# Patient Record
Sex: FEMALE | Race: OTHER | Employment: OTHER | URBAN - METROPOLITAN AREA
[De-identification: names, ages, dates, MRNs, and addresses within clinical notes are randomized per-mention and may not be internally consistent; named-entity substitution may affect disease eponyms.]

---

## 2017-09-05 ENCOUNTER — HOSPITAL ENCOUNTER (OUTPATIENT)
Dept: WOMENS IMAGING | Age: 72
Discharge: HOME | End: 2017-09-05
Payer: MEDICARE

## 2017-09-05 DIAGNOSIS — Z12.31 ENCOUNTER FOR SCREENING MAMMOGRAM FOR BREAST CANCER: ICD-10-CM

## 2017-09-05 PROCEDURE — G0202 SCR MAMMO BI INCL CAD: HCPCS

## 2021-08-12 LAB
ALBUMIN SERPL-MCNC: 4.8 G/DL (ref 3.5–4.6)
ALP BLD-CCNC: 90 U/L (ref 40–130)
ALT SERPL-CCNC: 25 U/L (ref 0–33)
ANION GAP SERPL CALCULATED.3IONS-SCNC: 12 MEQ/L (ref 9–15)
AST SERPL-CCNC: 19 U/L (ref 0–35)
BILIRUB SERPL-MCNC: 0.8 MG/DL (ref 0.2–0.7)
BILIRUBIN DIRECT: <0.2 MG/DL (ref 0–0.4)
BILIRUBIN, INDIRECT: ABNORMAL MG/DL (ref 0–0.6)
BUN BLDV-MCNC: 33 MG/DL (ref 8–23)
CALCIUM SERPL-MCNC: 10.2 MG/DL (ref 8.5–9.9)
CHLORIDE BLD-SCNC: 102 MEQ/L (ref 95–107)
CHOLESTEROL, TOTAL: 134 MG/DL (ref 0–199)
CO2: 22 MEQ/L (ref 20–31)
CREAT SERPL-MCNC: 0.91 MG/DL (ref 0.5–0.9)
GFR AFRICAN AMERICAN: >60
GFR NON-AFRICAN AMERICAN: 59.8
GLUCOSE BLD-MCNC: 115 MG/DL (ref 70–99)
HBA1C MFR BLD: 6.3 % (ref 4.8–5.9)
HCT VFR BLD CALC: 41.6 % (ref 37–47)
HDLC SERPL-MCNC: 59 MG/DL (ref 40–59)
HEMOGLOBIN: 13.8 G/DL (ref 12–16)
LDL CHOLESTEROL CALCULATED: 51 MG/DL (ref 0–129)
MAGNESIUM: 1.9 MG/DL (ref 1.7–2.4)
MCH RBC QN AUTO: 27.9 PG (ref 27–31.3)
MCHC RBC AUTO-ENTMCNC: 33.1 % (ref 33–37)
MCV RBC AUTO: 84.2 FL (ref 82–100)
PDW BLD-RTO: 13.5 % (ref 11.5–14.5)
PLATELET # BLD: 278 K/UL (ref 130–400)
POTASSIUM SERPL-SCNC: 4.8 MEQ/L (ref 3.4–4.9)
RBC # BLD: 4.94 M/UL (ref 4.2–5.4)
SODIUM BLD-SCNC: 136 MEQ/L (ref 135–144)
TOTAL PROTEIN: 6.9 G/DL (ref 6.3–8)
TRIGL SERPL-MCNC: 119 MG/DL (ref 0–150)
TSH SERPL DL<=0.05 MIU/L-ACNC: 3.76 UIU/ML (ref 0.44–3.86)
WBC # BLD: 6 K/UL (ref 4.8–10.8)

## 2022-04-05 ENCOUNTER — APPOINTMENT (OUTPATIENT)
Dept: CT IMAGING | Age: 77
End: 2022-04-05
Payer: MEDICARE

## 2022-04-05 ENCOUNTER — HOSPITAL ENCOUNTER (EMERGENCY)
Age: 77
Discharge: HOME | End: 2022-04-05
Payer: MEDICARE

## 2022-04-05 VITALS
OXYGEN SATURATION: 96 % | SYSTOLIC BLOOD PRESSURE: 187 MMHG | RESPIRATION RATE: 20 BRPM | TEMPERATURE: 97.8 F | BODY MASS INDEX: 22.63 KG/M2 | HEIGHT: 62 IN | DIASTOLIC BLOOD PRESSURE: 82 MMHG | HEART RATE: 95 BPM | WEIGHT: 123 LBS

## 2022-04-05 DIAGNOSIS — S00.83XA FACIAL CONTUSION, INITIAL ENCOUNTER: ICD-10-CM

## 2022-04-05 DIAGNOSIS — S09.93XA DENTAL INJURY, INITIAL ENCOUNTER: ICD-10-CM

## 2022-04-05 DIAGNOSIS — S01.511A LIP LACERATION, INITIAL ENCOUNTER: Primary | ICD-10-CM

## 2022-04-05 PROCEDURE — 12011 RPR F/E/E/N/L/M 2.5 CM/<: CPT

## 2022-04-05 PROCEDURE — 99285 EMERGENCY DEPT VISIT HI MDM: CPT

## 2022-04-05 PROCEDURE — A4217 STERILE WATER/SALINE, 500 ML: HCPCS | Performed by: NURSE PRACTITIONER

## 2022-04-05 PROCEDURE — 2580000003 HC RX 258: Performed by: NURSE PRACTITIONER

## 2022-04-05 PROCEDURE — 72125 CT NECK SPINE W/O DYE: CPT

## 2022-04-05 PROCEDURE — 70486 CT MAXILLOFACIAL W/O DYE: CPT

## 2022-04-05 PROCEDURE — 70450 CT HEAD/BRAIN W/O DYE: CPT

## 2022-04-05 PROCEDURE — 2500000003 HC RX 250 WO HCPCS: Performed by: NURSE PRACTITIONER

## 2022-04-05 RX ORDER — LIDOCAINE HYDROCHLORIDE 20 MG/ML
5 INJECTION, SOLUTION INFILTRATION; PERINEURAL ONCE
Status: COMPLETED | OUTPATIENT
Start: 2022-04-05 | End: 2022-04-05

## 2022-04-05 RX ORDER — MAGNESIUM HYDROXIDE 1200 MG/15ML
250 LIQUID ORAL ONCE
Status: COMPLETED | OUTPATIENT
Start: 2022-04-05 | End: 2022-04-05

## 2022-04-05 RX ORDER — SULFAMETHOXAZOLE AND TRIMETHOPRIM 800; 160 MG/1; MG/1
1 TABLET ORAL 2 TIMES DAILY
Qty: 14 TABLET | Refills: 0 | Status: SHIPPED | OUTPATIENT
Start: 2022-04-05 | End: 2022-04-12

## 2022-04-05 RX ADMIN — LIDOCAINE HYDROCHLORIDE 5 ML: 20 INJECTION, SOLUTION INFILTRATION; PERINEURAL at 12:44

## 2022-04-05 RX ADMIN — SODIUM CHLORIDE 250 ML: 900 IRRIGANT IRRIGATION at 12:44

## 2022-04-05 ASSESSMENT — PAIN SCALES - GENERAL
PAINLEVEL_OUTOF10: 9
PAINLEVEL_OUTOF10: 9

## 2022-04-05 ASSESSMENT — ENCOUNTER SYMPTOMS
FACIAL SWELLING: 1
ABDOMINAL PAIN: 0
COUGH: 0
BACK PAIN: 0
SHORTNESS OF BREATH: 0

## 2022-04-05 ASSESSMENT — PAIN - FUNCTIONAL ASSESSMENT: PAIN_FUNCTIONAL_ASSESSMENT: 0-10

## 2022-04-05 ASSESSMENT — PAIN DESCRIPTION - LOCATION: LOCATION: MOUTH

## 2022-04-05 ASSESSMENT — PAIN DESCRIPTION - PAIN TYPE: TYPE: ACUTE PAIN

## 2022-04-05 ASSESSMENT — PAIN DESCRIPTION - DESCRIPTORS: DESCRIPTORS: SHARP

## 2022-04-05 ASSESSMENT — PAIN DESCRIPTION - FREQUENCY: FREQUENCY: CONTINUOUS

## 2022-04-05 NOTE — ED NOTES
Discharge instructions reviewed with patient. Medications reviewed and explained to patient. Patient denies any further questions at this time. Pt encouraged to make follow up appointments with PCP and any speciality referrals.         Mandie García RN  04/05/22 8059

## 2022-04-05 NOTE — ED PROVIDER NOTES
[address] [facility] ED  eMERGENCY dEPARTMENT eNCOUnter      Pt Name: Chantel Roberson  MRN: [id number]  [place] 1945  Date of evaluation: 4/5/2022  Provider: EITAN Blevins CNP      HISTORY OF PRESENT ILLNESS    Chantel Roberson is a 64 y.o. female who presents to the Emergency Department with lip laceration the occurred when she tripped on the sidewalk and hit her face on the concrete. Pain is moderate. Patient also chipped her 2 front teeth. Denies LOC. REVIEW OF SYSTEMS       Review of Systems   Constitutional: Negative for activity change, appetite change and fever. HENT: Positive for facial swelling. Negative for congestion. Respiratory: Negative for cough and shortness of breath. Cardiovascular: Negative for chest pain. Gastrointestinal: Negative for abdominal pain. Genitourinary: Negative for dysuria. Musculoskeletal: Negative for arthralgias and back pain. Skin: Positive for wound. Negative for rash. Neurological: Negative for dizziness, syncope, light-headedness and headaches. All other systems reviewed and are negative. PAST MEDICAL HISTORY     Past Medical History:   Diagnosis Date    Hypertension          SURGICAL HISTORY     History reviewed. No pertinent surgical history. CURRENT MEDICATIONS       Previous Medications    No medications on file       ALLERGIES     Norco [hydrocodone-acetaminophen]    FAMILY HISTORY     History reviewed. No pertinent family history.        SOCIAL HISTORY       Social History     Socioeconomic History    Marital status:      Spouse name: None    Number of children: None    Years of education: None    Highest education level: None   Occupational History    None   Tobacco Use    Smoking status: Never Smoker    Smokeless tobacco: Never Used   Substance and Sexual Activity    Alcohol use: Not Currently    Drug use: None    Sexual activity: None   Other Topics Concern    None   Social History Narrative    None     Social Determinants of Health     Financial Resource Strain:     Difficulty of Paying Living Expenses: Not on file   Food Insecurity:     Worried About Running Out of Food in the Last Year: Not on file    [place]ju[place] of Food in the Last Year: Not on file   Transportation Needs:     Lack of Transportation (Medical): Not on file    Lack of Transportation (Non-Medical): Not on file   Physical Activity:     Days of Exercise per Week: Not on file    Minutes of Exercise per Session: Not on file   Stress:     Feeling of Stress : Not on file   Social Connections:     Frequency of Communication with Friends and Family: Not on file    Frequency of Social Gatherings with Friends and Family: Not on file    Attends Faith Services: Not on file    Active Member of [address] [place] [address] [employer] or Organizations: Not on file    Attends Club or Organization Meetings: Not on file    Marital Status: Not on file   Intimate Partner Violence:     Fear of Current or Ex-Partner: Not on file    Emotionally Abused: Not on file    Physically Abused: Not on file    Sexually Abused: Not on file   Housing Stability:     Unable to Pay for Housing in the Last Year: Not on file    Number of [place] in the Last Year: Not on file    Unstable Housing in the Last Year: Not on file       SCREENINGS    Kassy Coma Scale  Eye Opening: Spontaneous  Best Verbal Response: Oriented  Best Motor Response: Obeys commands  Kassy Coma Scale Score: 15 @FLOW([id number])@      PHYSICAL EXAM    (up to 7 for level 4, 8 or more for level 5)     ED Triage Vitals [04/05/22 1200]   BP Temp Temp Source Pulse Resp SpO2 Height Weight   (!) 187/82 97.8 °F (36.6 °C) Oral 95 20 96 % 5' 2\" (1.575 m) 123 lb (55.8 kg)       Physical Exam  Vitals and nursing note reviewed. Constitutional:       Appearance: She is well-developed. HENT:      Head: Normocephalic and atraumatic.       Right Ear: Hearing and external ear normal.      Left Ear: Hearing and external ear normal.      Nose: Nasal tenderness present. No nasal deformity or congestion. Mouth/Throat:      Mouth: Lacerations present. Eyes:      Conjunctiva/sclera: Conjunctivae normal.      Pupils: Pupils are equal, round, and reactive to light. Cardiovascular:      Rate and Rhythm: Normal rate and regular rhythm. Heart sounds: Normal heart sounds. Pulmonary:      Effort: Pulmonary effort is normal.      Breath sounds: Normal breath sounds. Chest:      Chest wall: No deformity or crepitus. Abdominal:      General: Bowel sounds are normal. There is no distension. Palpations: Abdomen is soft. Tenderness: There is no abdominal tenderness. Musculoskeletal:         General: Normal range of motion. Cervical back: Normal range of motion and neck supple. Skin:     General: Skin is warm and dry. Neurological:      General: No focal deficit present. Mental Status: She is alert and oriented to person, place, and time. GCS: GCS eye subscore is 4. GCS verbal subscore is 5. GCS motor subscore is 6. Cranial Nerves: Cranial nerves are intact. Sensory: Sensation is intact. Motor: Motor function is intact. Deep Tendon Reflexes: Reflexes are normal and symmetric. Psychiatric:         Judgment: Judgment normal.           All other labs were within normal range or not returned as of this dictation. EMERGENCY DEPARTMENT COURSE and DIFFERENTIALDIAGNOSIS/MDM:   Vitals:    Vitals:    04/05/22 1200 04/05/22 1207   BP: (!) 187/82 (!) 187/82   Pulse: 95 95   Resp: 20 20   Temp: 97.8 °F (36.6 °C) 97.8 °F (36.6 °C)   TempSrc: Oral Oral   SpO2: 96% 96%   Weight: 123 lb (55.8 kg)    Height: 5' 2\" (1.575 m)             64 yr old female with lip laceration d/t facial contusion. Lip was repaired with suture. Sutures to be removed in 5 -7 days. Prescription for Bactrim DS was sent to the pharmacy. Patient verbalizes understanding.        PROCEDURES:  Unless otherwise noted below, none     Lac Repair    Date/Time: 4/5/2022 1:25 PM  Performed by: EITAN Olguin CNP  Authorized by: EITAN Olguin CNP     Consent:     Consent obtained:  Verbal    Consent given by:  Patient    Risks discussed:  Infection, pain, poor cosmetic result and poor wound healing    Alternatives discussed:  No treatment  Anesthesia (see MAR for exact dosages): Anesthesia method:  Local infiltration    Local anesthetic:  Lidocaine 2% w/o epi  Laceration details:     Location:  Lip    Lip location:  Lower interior lip and lower exterior lip    Length (cm):  1.5    Depth (mm):  3  Repair type:     Repair type:  Simple  Pre-procedure details:     Preparation:  Patient was prepped and draped in usual sterile fashion  Exploration:     Hemostasis achieved with:  Direct pressure    Contaminated: no    Treatment:     Area cleansed with:  Hibiclens    Amount of cleaning:  Standard    Irrigation solution:  Sterile saline    Irrigation volume:  300    Irrigation method:  Pressure wash    Visualized foreign bodies/material removed: no    Skin repair:     Repair method:  Sutures    Suture size:  6-0    Suture material:  Nylon    Suture technique:  Simple interrupted    Number of sutures:  5  Approximation:     Approximation:  Close    Vermilion border: poorly aligned    Post-procedure details:     Dressing:  Open (no dressing)    Patient tolerance of procedure: Tolerated well, no immediate complications          FINAL IMPRESSION      1. Lip laceration, initial encounter    2.  Facial contusion, initial encounter          DISPOSITION/PLAN   DISPOSITION Decision To Discharge 04/05/2022 02:15:49 PM          EITAN Olguin CNP (electronically signed)  Attending Emergency Physician     EITAN Olguin CNP  04/05/22 1418

## 2022-04-05 NOTE — ED TRIAGE NOTES
Patient presents to ED c/o laceration to lower lip. Pt states she was walking and fell on concrete, biting thru her lower lip, bleeding controlled. Patient denies LOC, denies blood thinners.

## 2022-07-18 ENCOUNTER — APPOINTMENT (OUTPATIENT)
Dept: CT IMAGING | Age: 77
DRG: 291 | End: 2022-07-18
Payer: MEDICARE

## 2022-07-18 ENCOUNTER — HOSPITAL ENCOUNTER (INPATIENT)
Age: 77
LOS: 2 days | Discharge: HOME | DRG: 291 | End: 2022-07-20
Attending: EMERGENCY MEDICINE | Admitting: INTERNAL MEDICINE
Payer: MEDICARE

## 2022-07-18 ENCOUNTER — APPOINTMENT (OUTPATIENT)
Dept: GENERAL RADIOLOGY | Age: 77
DRG: 291 | End: 2022-07-18
Payer: MEDICARE

## 2022-07-18 DIAGNOSIS — J90 PLEURAL EFFUSION: ICD-10-CM

## 2022-07-18 DIAGNOSIS — K52.9 COLITIS: ICD-10-CM

## 2022-07-18 DIAGNOSIS — I50.9 ACUTE CONGESTIVE HEART FAILURE, UNSPECIFIED HEART FAILURE TYPE (HCC): Primary | ICD-10-CM

## 2022-07-18 PROBLEM — I50.43 CHF (CONGESTIVE HEART FAILURE), NYHA CLASS I, ACUTE ON CHRONIC, COMBINED (HCC): Status: ACTIVE | Noted: 2022-07-18

## 2022-07-18 PROBLEM — R06.2 WHEEZING: Status: ACTIVE | Noted: 2022-07-18

## 2022-07-18 LAB
ALBUMIN SERPL-MCNC: 4.4 G/DL (ref 3.5–4.6)
ALP BLD-CCNC: 99 U/L (ref 40–130)
ALT SERPL-CCNC: 41 U/L (ref 0–33)
ANION GAP SERPL CALCULATED.3IONS-SCNC: 12 MEQ/L (ref 9–15)
APTT: 20 SEC (ref 24.4–36.8)
AST SERPL-CCNC: 30 U/L (ref 0–35)
BACTERIA: NEGATIVE /HPF
BASOPHILS ABSOLUTE: 0.1 K/UL (ref 0–0.2)
BASOPHILS RELATIVE PERCENT: 0.8 %
BILIRUB SERPL-MCNC: 0.8 MG/DL (ref 0.2–0.7)
BILIRUBIN URINE: NEGATIVE
BLOOD, URINE: ABNORMAL
BUN BLDV-MCNC: 36 MG/DL (ref 8–23)
CALCIUM SERPL-MCNC: 9 MG/DL (ref 8.5–9.9)
CHLORIDE BLD-SCNC: 108 MEQ/L (ref 95–107)
CLARITY: CLEAR
CO2: 16 MEQ/L (ref 20–31)
COLOR: YELLOW
CREAT SERPL-MCNC: 1.06 MG/DL (ref 0.5–0.9)
D DIMER: 2.61 MG/L FEU (ref 0–0.5)
EOSINOPHILS ABSOLUTE: 0.1 K/UL (ref 0–0.7)
EOSINOPHILS RELATIVE PERCENT: 1.1 %
EPITHELIAL CELLS, UA: ABNORMAL /HPF (ref 0–5)
GFR AFRICAN AMERICAN: >60
GFR NON-AFRICAN AMERICAN: 50.1
GLOBULIN: 2.2 G/DL (ref 2.3–3.5)
GLUCOSE BLD-MCNC: 140 MG/DL (ref 70–99)
GLUCOSE URINE: NEGATIVE MG/DL
HCT VFR BLD CALC: 35.8 % (ref 37–47)
HEMOGLOBIN: 12.1 G/DL (ref 12–16)
HYALINE CASTS: ABNORMAL /HPF (ref 0–5)
HYALINE CASTS: ABNORMAL /LPF (ref 0–5)
INFLUENZA A BY PCR: NEGATIVE
INFLUENZA B BY PCR: NEGATIVE
INR BLD: 0.9
KETONES, URINE: NEGATIVE MG/DL
LEUKOCYTE ESTERASE, URINE: NEGATIVE
LIPASE: 41 U/L (ref 12–95)
LYMPHOCYTES ABSOLUTE: 1 K/UL (ref 1–4.8)
LYMPHOCYTES RELATIVE PERCENT: 11.2 %
MCH RBC QN AUTO: 28.5 PG (ref 27–31.3)
MCHC RBC AUTO-ENTMCNC: 34 % (ref 33–37)
MCV RBC AUTO: 83.8 FL (ref 82–100)
MONOCYTES ABSOLUTE: 0.4 K/UL (ref 0.2–0.8)
MONOCYTES RELATIVE PERCENT: 4.6 %
NEUTROPHILS ABSOLUTE: 7.1 K/UL (ref 1.4–6.5)
NEUTROPHILS RELATIVE PERCENT: 82.3 %
NITRITE, URINE: NEGATIVE
PDW BLD-RTO: 13 % (ref 11.5–14.5)
PH UA: 5.5 (ref 5–9)
PLATELET # BLD: 181 K/UL (ref 130–400)
POTASSIUM REFLEX MAGNESIUM: 5.1 MEQ/L (ref 3.4–4.9)
PRO-BNP: 3990 PG/ML
PROTEIN UA: >=300 MG/DL
PROTHROMBIN TIME: 12.4 SEC (ref 12.3–14.9)
RBC # BLD: 4.27 M/UL (ref 4.2–5.4)
RBC UA: >100 /HPF (ref 0–5)
RENAL EPITHELIAL, UA: ABNORMAL /HPF
SARS-COV-2, NAAT: NOT DETECTED
SODIUM BLD-SCNC: 136 MEQ/L (ref 135–144)
SPECIFIC GRAVITY UA: 1.02 (ref 1–1.03)
TOTAL PROTEIN: 6.6 G/DL (ref 6.3–8)
TROPONIN: <0.01 NG/ML (ref 0–0.01)
URINE REFLEX TO CULTURE: YES
UROBILINOGEN, URINE: 0.2 E.U./DL
WBC # BLD: 8.7 K/UL (ref 4.8–10.8)
WBC UA: ABNORMAL /HPF (ref 0–5)

## 2022-07-18 PROCEDURE — 85730 THROMBOPLASTIN TIME PARTIAL: CPT

## 2022-07-18 PROCEDURE — 99285 EMERGENCY DEPT VISIT HI MDM: CPT

## 2022-07-18 PROCEDURE — 87040 BLOOD CULTURE FOR BACTERIA: CPT

## 2022-07-18 PROCEDURE — 80053 COMPREHEN METABOLIC PANEL: CPT

## 2022-07-18 PROCEDURE — 99223 1ST HOSP IP/OBS HIGH 75: CPT | Performed by: INTERNAL MEDICINE

## 2022-07-18 PROCEDURE — 87502 INFLUENZA DNA AMP PROBE: CPT

## 2022-07-18 PROCEDURE — 81001 URINALYSIS AUTO W/SCOPE: CPT

## 2022-07-18 PROCEDURE — 6360000002 HC RX W HCPCS: Performed by: EMERGENCY MEDICINE

## 2022-07-18 PROCEDURE — 87086 URINE CULTURE/COLONY COUNT: CPT

## 2022-07-18 PROCEDURE — 2580000003 HC RX 258: Performed by: INTERNAL MEDICINE

## 2022-07-18 PROCEDURE — 93005 ELECTROCARDIOGRAM TRACING: CPT | Performed by: EMERGENCY MEDICINE

## 2022-07-18 PROCEDURE — 87507 IADNA-DNA/RNA PROBE TQ 12-25: CPT

## 2022-07-18 PROCEDURE — 87324 CLOSTRIDIUM AG IA: CPT

## 2022-07-18 PROCEDURE — 6360000004 HC RX CONTRAST MEDICATION: Performed by: EMERGENCY MEDICINE

## 2022-07-18 PROCEDURE — 71275 CT ANGIOGRAPHY CHEST: CPT

## 2022-07-18 PROCEDURE — 71045 X-RAY EXAM CHEST 1 VIEW: CPT

## 2022-07-18 PROCEDURE — 2580000003 HC RX 258: Performed by: EMERGENCY MEDICINE

## 2022-07-18 PROCEDURE — 87635 SARS-COV-2 COVID-19 AMP PRB: CPT

## 2022-07-18 PROCEDURE — 83690 ASSAY OF LIPASE: CPT

## 2022-07-18 PROCEDURE — 83550 IRON BINDING TEST: CPT

## 2022-07-18 PROCEDURE — 85025 COMPLETE CBC W/AUTO DIFF WBC: CPT

## 2022-07-18 PROCEDURE — 84484 ASSAY OF TROPONIN QUANT: CPT

## 2022-07-18 PROCEDURE — 6370000000 HC RX 637 (ALT 250 FOR IP): Performed by: EMERGENCY MEDICINE

## 2022-07-18 PROCEDURE — 6360000002 HC RX W HCPCS: Performed by: INTERNAL MEDICINE

## 2022-07-18 PROCEDURE — 85379 FIBRIN DEGRADATION QUANT: CPT

## 2022-07-18 PROCEDURE — 36415 COLL VENOUS BLD VENIPUNCTURE: CPT

## 2022-07-18 PROCEDURE — 87449 NOS EACH ORGANISM AG IA: CPT

## 2022-07-18 PROCEDURE — 85610 PROTHROMBIN TIME: CPT

## 2022-07-18 PROCEDURE — 83540 ASSAY OF IRON: CPT

## 2022-07-18 PROCEDURE — 74177 CT ABD & PELVIS W/CONTRAST: CPT

## 2022-07-18 PROCEDURE — 83880 ASSAY OF NATRIURETIC PEPTIDE: CPT

## 2022-07-18 PROCEDURE — 6370000000 HC RX 637 (ALT 250 FOR IP): Performed by: INTERNAL MEDICINE

## 2022-07-18 PROCEDURE — 2060000000 HC ICU INTERMEDIATE R&B

## 2022-07-18 RX ORDER — ACETAMINOPHEN 325 MG/1
650 TABLET ORAL EVERY 6 HOURS PRN
Status: DISCONTINUED | OUTPATIENT
Start: 2022-07-18 | End: 2022-07-20 | Stop reason: HOSPADM

## 2022-07-18 RX ORDER — METOPROLOL TARTRATE 50 MG/1
100 TABLET, FILM COATED ORAL 2 TIMES DAILY
Status: DISCONTINUED | OUTPATIENT
Start: 2022-07-18 | End: 2022-07-18

## 2022-07-18 RX ORDER — PANTOPRAZOLE SODIUM 40 MG/1
40 TABLET, DELAYED RELEASE ORAL DAILY
Status: DISCONTINUED | OUTPATIENT
Start: 2022-07-18 | End: 2022-07-20 | Stop reason: HOSPADM

## 2022-07-18 RX ORDER — ATORVASTATIN CALCIUM 20 MG/1
20 TABLET, FILM COATED ORAL NIGHTLY
Status: DISCONTINUED | OUTPATIENT
Start: 2022-07-18 | End: 2022-07-20 | Stop reason: HOSPADM

## 2022-07-18 RX ORDER — SODIUM CHLORIDE 9 MG/ML
INJECTION, SOLUTION INTRAVENOUS PRN
Status: DISCONTINUED | OUTPATIENT
Start: 2022-07-18 | End: 2022-07-20 | Stop reason: HOSPADM

## 2022-07-18 RX ORDER — ONDANSETRON 2 MG/ML
4 INJECTION INTRAMUSCULAR; INTRAVENOUS EVERY 6 HOURS PRN
Status: DISCONTINUED | OUTPATIENT
Start: 2022-07-18 | End: 2022-07-20 | Stop reason: HOSPADM

## 2022-07-18 RX ORDER — SPIRONOLACTONE 50 MG/1
50 TABLET, FILM COATED ORAL DAILY
COMMUNITY
End: 2022-08-10

## 2022-07-18 RX ORDER — ACETAMINOPHEN 325 MG/1
650 TABLET ORAL ONCE
Status: COMPLETED | OUTPATIENT
Start: 2022-07-18 | End: 2022-07-18

## 2022-07-18 RX ORDER — SPIRONOLACTONE 50 MG/1
50 TABLET, FILM COATED ORAL DAILY
Status: DISCONTINUED | OUTPATIENT
Start: 2022-07-18 | End: 2022-07-20 | Stop reason: HOSPADM

## 2022-07-18 RX ORDER — AMLODIPINE BESYLATE 10 MG/1
10 TABLET ORAL DAILY
Status: ON HOLD | COMMUNITY
End: 2022-07-20 | Stop reason: HOSPADM

## 2022-07-18 RX ORDER — FUROSEMIDE 10 MG/ML
40 INJECTION INTRAMUSCULAR; INTRAVENOUS 2 TIMES DAILY
Status: DISCONTINUED | OUTPATIENT
Start: 2022-07-18 | End: 2022-07-20 | Stop reason: HOSPADM

## 2022-07-18 RX ORDER — ENOXAPARIN SODIUM 100 MG/ML
40 INJECTION SUBCUTANEOUS DAILY
Status: DISCONTINUED | OUTPATIENT
Start: 2022-07-18 | End: 2022-07-20 | Stop reason: HOSPADM

## 2022-07-18 RX ORDER — METOPROLOL TARTRATE 50 MG/1
100 TABLET, FILM COATED ORAL 2 TIMES DAILY
Status: DISCONTINUED | OUTPATIENT
Start: 2022-07-18 | End: 2022-07-20 | Stop reason: HOSPADM

## 2022-07-18 RX ORDER — POLYETHYLENE GLYCOL 3350 17 G/17G
17 POWDER, FOR SOLUTION ORAL DAILY PRN
Status: DISCONTINUED | OUTPATIENT
Start: 2022-07-18 | End: 2022-07-20 | Stop reason: HOSPADM

## 2022-07-18 RX ORDER — SODIUM CHLORIDE 0.9 % (FLUSH) 0.9 %
5-40 SYRINGE (ML) INJECTION PRN
Status: DISCONTINUED | OUTPATIENT
Start: 2022-07-18 | End: 2022-07-20 | Stop reason: HOSPADM

## 2022-07-18 RX ORDER — SODIUM CHLORIDE 0.9 % (FLUSH) 0.9 %
5-40 SYRINGE (ML) INJECTION EVERY 12 HOURS SCHEDULED
Status: DISCONTINUED | OUTPATIENT
Start: 2022-07-18 | End: 2022-07-20 | Stop reason: HOSPADM

## 2022-07-18 RX ORDER — ONDANSETRON 4 MG/1
4 TABLET, ORALLY DISINTEGRATING ORAL EVERY 8 HOURS PRN
Status: DISCONTINUED | OUTPATIENT
Start: 2022-07-18 | End: 2022-07-20 | Stop reason: HOSPADM

## 2022-07-18 RX ORDER — 0.9 % SODIUM CHLORIDE 0.9 %
1000 INTRAVENOUS SOLUTION INTRAVENOUS ONCE
Status: COMPLETED | OUTPATIENT
Start: 2022-07-18 | End: 2022-07-18

## 2022-07-18 RX ORDER — CARVEDILOL 3.12 MG/1
3.12 TABLET ORAL 2 TIMES DAILY WITH MEALS
Status: DISCONTINUED | OUTPATIENT
Start: 2022-07-18 | End: 2022-07-18

## 2022-07-18 RX ORDER — LISINOPRIL 20 MG/1
20 TABLET ORAL DAILY
Status: DISCONTINUED | OUTPATIENT
Start: 2022-07-18 | End: 2022-07-20 | Stop reason: HOSPADM

## 2022-07-18 RX ORDER — METOPROLOL TARTRATE 100 MG/1
100 TABLET ORAL 2 TIMES DAILY
COMMUNITY

## 2022-07-18 RX ORDER — ATORVASTATIN CALCIUM 20 MG/1
20 TABLET, FILM COATED ORAL NIGHTLY
COMMUNITY
End: 2022-08-10

## 2022-07-18 RX ORDER — PANTOPRAZOLE SODIUM 40 MG/1
40 TABLET, DELAYED RELEASE ORAL DAILY
COMMUNITY
End: 2022-08-10

## 2022-07-18 RX ORDER — ACETAMINOPHEN 650 MG/1
650 SUPPOSITORY RECTAL EVERY 6 HOURS PRN
Status: DISCONTINUED | OUTPATIENT
Start: 2022-07-18 | End: 2022-07-20 | Stop reason: HOSPADM

## 2022-07-18 RX ORDER — METOPROLOL SUCCINATE 25 MG/1
25 TABLET, EXTENDED RELEASE ORAL DAILY
Status: DISCONTINUED | OUTPATIENT
Start: 2022-07-18 | End: 2022-07-18

## 2022-07-18 RX ADMIN — PANTOPRAZOLE SODIUM 40 MG: 40 TABLET, DELAYED RELEASE ORAL at 17:06

## 2022-07-18 RX ADMIN — FUROSEMIDE 40 MG: 10 INJECTION, SOLUTION INTRAMUSCULAR; INTRAVENOUS at 17:06

## 2022-07-18 RX ADMIN — LISINOPRIL 20 MG: 20 TABLET ORAL at 17:06

## 2022-07-18 RX ADMIN — CEFTRIAXONE SODIUM 1000 MG: 1 INJECTION, POWDER, FOR SOLUTION INTRAMUSCULAR; INTRAVENOUS at 15:03

## 2022-07-18 RX ADMIN — AZITHROMYCIN MONOHYDRATE 500 MG: 500 INJECTION, POWDER, LYOPHILIZED, FOR SOLUTION INTRAVENOUS at 13:03

## 2022-07-18 RX ADMIN — SODIUM ZIRCONIUM CYCLOSILICATE 10 G: 10 POWDER, FOR SUSPENSION ORAL at 17:06

## 2022-07-18 RX ADMIN — SODIUM CHLORIDE, PRESERVATIVE FREE 10 ML: 5 INJECTION INTRAVENOUS at 19:59

## 2022-07-18 RX ADMIN — ENOXAPARIN SODIUM 40 MG: 100 INJECTION SUBCUTANEOUS at 17:06

## 2022-07-18 RX ADMIN — ATORVASTATIN CALCIUM 20 MG: 20 TABLET, FILM COATED ORAL at 19:59

## 2022-07-18 RX ADMIN — IOPAMIDOL 75 ML: 612 INJECTION, SOLUTION INTRAVENOUS at 12:36

## 2022-07-18 RX ADMIN — SPIRONOLACTONE 50 MG: 50 TABLET ORAL at 17:06

## 2022-07-18 RX ADMIN — METOPROLOL TARTRATE 100 MG: 50 TABLET ORAL at 17:06

## 2022-07-18 RX ADMIN — ACETAMINOPHEN 650 MG: 325 TABLET ORAL at 10:23

## 2022-07-18 RX ADMIN — SODIUM CHLORIDE 1000 ML: 9 INJECTION, SOLUTION INTRAVENOUS at 11:19

## 2022-07-18 ASSESSMENT — PAIN SCALES - GENERAL
PAINLEVEL_OUTOF10: 0
PAINLEVEL_OUTOF10: 3
PAINLEVEL_OUTOF10: 9

## 2022-07-18 ASSESSMENT — ENCOUNTER SYMPTOMS
PHOTOPHOBIA: 0
VOMITING: 0

## 2022-07-18 ASSESSMENT — PAIN - FUNCTIONAL ASSESSMENT: PAIN_FUNCTIONAL_ASSESSMENT: 0-10

## 2022-07-18 ASSESSMENT — PAIN DESCRIPTION - LOCATION
LOCATION: HEAD
LOCATION: HEAD

## 2022-07-18 NOTE — CARE COORDINATION
[facility] Case Management Initial Discharge Assessment    Met with Patient to discuss discharge plan. PCP: Sonu Mitchell MD                                  Date of Last Visit: 3/22/2022    [facility] Patient: No        [facility] Notified: no    If no PCP, list provided? N/A    Discharge Planning    Living Arrangements: independently at home    Who do you live with? Boyfriend    Who helps you with your care:  self    If lives at home:     Do you have any barriers navigating in your home? no    Patient can perform ADL? Yes    Current Services (outpatient and in home) :  None    Dialysis: No    Is transportation available to get to your appointments? Yes, pt drives     DME Equipment:  no    Respiratory equipment: None    Respiratory provider:  no     Pharmacy:  yes - [facility]fall with Medication Assistance Program?  No      Patient agreeable to [facility]? Declined    Patient agreeable to SNF/Rehab? Declined    Other discharge needs identified? N/A    Does Patient Have a High-Risk for Readmission Diagnosis (CHF, PN, MI, COPD)? No      Initial Discharge Plan? (Note: please see concurrent daily documentation for any updates after initial note). Met with pt at bedside to discuss discharge planning. Pt plans to return home and declines needs at this time.      Readmission Risk              Risk of Unplanned Readmission:  0         Electronically signed by Lydia Hatch RN on 7/18/2022 at 1:56 PM

## 2022-07-18 NOTE — ED PROVIDER NOTES
[address] [facility] ED  EMERGENCY DEPARTMENT ENCOUNTER      Pt Name: Shelley Koroma  MRN: [id number]  [place] 1945  Date of evaluation: 7/18/2022  Provider: Guy Lamb [id number]       Chief Complaint   Patient presents with    Leg Swelling     Bilateral ankle swelling (started last night). Pt states she is wheezing. Pt states she has diarrhea and she is nauseated. Pt state she has a headache (9/10 pain)          HISTORY OF PRESENT ILLNESS   (Location/Symptom, Timing/Onset, Context/Setting, Quality, Duration, Modifying Factors, Severity)  Note limiting factors. Shelley Koroma is a 64 y.o. female who presents to the emergency department with multiple complaints. She reports history of hypertension. Denies previous heart or lung diagnoses. Says that yesterday she notices that her legs are swelling and she also noticed like she felt like there was a wheeze in her chest and when she would cough she would have some pressure. The symptoms were nonexertional, nonradiating with no associated vomiting, abdominal pain, back pain or calf pain. She also noted that she had general head pressure and nonbloody diarrhea. Denies syncope, fever, vision change, neck stiffness, numbness or weakness. HPI    Nursing Notes were reviewed. REVIEW OF SYSTEMS    (2-9 systems for level 4, 10 or more for level 5)     Review of Systems   Constitutional:  Negative for fever. Leg swelling, cough, diarrhea, headache   Eyes:  Negative for photophobia and visual disturbance. Gastrointestinal:  Negative for vomiting. Genitourinary:  Negative for dysuria and flank pain. Musculoskeletal:  Negative for neck pain and neck stiffness. Neurological:  Negative for dizziness, syncope, facial asymmetry, weakness, light-headedness and numbness. All other systems reviewed and are negative. Except as noted above the remainder of the review of systems was reviewed and negative.        PAST MEDICAL HISTORY Past Medical History:   Diagnosis Date    Hypertension          SURGICAL HISTORY     History reviewed. No pertinent surgical history. CURRENT MEDICATIONS       Previous Medications    No medications on file       ALLERGIES     Norco [hydrocodone-acetaminophen]    FAMILY HISTORY     No family history on file. SOCIAL HISTORY       Social History     Socioeconomic History    Marital status:      Spouse name: None    Number of children: None    Years of education: None    Highest education level: None   Tobacco Use    Smoking status: Never    Smokeless tobacco: Never   Substance and Sexual Activity    Alcohol use: Not Currently       SCREENINGS         [place] Coma Scale  Eye Opening: Spontaneous  Best Verbal Response: Oriented  Best Motor Response: Obeys commands  Kassy Coma Scale Score: 15                     CIWA Assessment  BP: (!) 166/75  Heart Rate: 87                 PHYSICAL EXAM    (up to 7 for level 4, 8 or more for level 5)     ED Triage Vitals [07/18/22 0943]   BP Temp Temp Source Heart Rate Resp SpO2 Height Weight   (!) 179/76 98.9 °F (37.2 °C) Temporal 93 20 97 % 5' 2\" (1.575 m) 125 lb (56.7 kg)       Physical Exam  Constitutional:       General: She is not in acute distress. Appearance: She is not ill-appearing, toxic-appearing or diaphoretic. HENT:      Head: Normocephalic and atraumatic. Nose: Nose normal.      Mouth/Throat:      Mouth: Mucous membranes are moist.   Eyes:      General: No scleral icterus. Extraocular Movements: Extraocular movements intact. Pupils: Pupils are equal, round, and reactive to light. Neck:      Comments: No JVD  Cardiovascular:      Rate and Rhythm: Normal rate and regular rhythm. Pulses: Normal pulses. Heart sounds: No murmur heard. No gallop. Pulmonary:      Effort: Pulmonary effort is normal. No respiratory distress. Breath sounds: Normal breath sounds. No stridor. No wheezing or rhonchi.    Abdominal: General: There is no distension. Tenderness: There is no abdominal tenderness. There is no right CVA tenderness, left CVA tenderness, guarding or rebound. Hernia: No hernia is present. Musculoskeletal:      Cervical back: Normal range of motion. No rigidity. Right lower leg: No edema. Left lower leg: No edema. Skin:     Capillary Refill: Capillary refill takes less than 2 seconds. Findings: No rash. Neurological:      General: No focal deficit present. Mental Status: She is alert and oriented to person, place, and time. Cranial Nerves: No cranial nerve deficit. Sensory: No sensory deficit. Motor: No weakness. Comments: No upper extremity pronator drift, no lateralizing signs   Psychiatric:         Mood and Affect: Mood normal.       DIAGNOSTIC RESULTS     EKG: All EKG's are interpreted by the Emergency Department Physician who either signs or Co-signs this chart in the absence of a cardiologist.    Normal sinus rhythm, rate 95, normal intervals, , poor wave progression, no STEMI    RADIOLOGY:   Non-plain film images such as CT, Ultrasound and MRI are read by the radiologist. Plain radiographic images are visualized and preliminarily interpreted by the emergency physician with the below findings:        Interpretation per the Radiologist below, if available at the time of this note:    CTA CHEST W WO CONTRAST   Final Result      No CT evidence pulmonary embolism. Borderline cardiomegaly. Small bilateral pleural effusions with bilateral dependent subsegmental atelectatic change. All CT scans at this facility use dose modulation, iterative reconstruction, and/or weight based dosing when appropriate to reduce radiation dose to as low as reasonably achievable. CT ABDOMEN PELVIS W IV CONTRAST Additional Contrast? None   Final Result   QUESTIONABLE FINDINGS OF COLITIS. SMALL AMOUNT OF FREE FLUID IN THE PELVIS.       SUSPECT PULMONARY EDEMA         XR CHEST PORTABLE   Final Result   BIBASILAR INFILTRATES OR (LESS LIKELY) EDEMA, LEFT GREATER THAN RIGHT.                ED BEDSIDE ULTRASOUND:   Performed by ED Physician - none    LABS:  Labs Reviewed   CBC WITH AUTO DIFFERENTIAL - Abnormal; Notable for the following components:       Result Value    Hematocrit 35.8 (*)     Neutrophils Absolute 7.1 (*)     All other components within normal limits   COMPREHENSIVE METABOLIC PANEL W/ REFLEX TO MG FOR LOW K - Abnormal; Notable for the following components:    Potassium reflex Magnesium 5.1 (*)     Chloride 108 (*)     CO2 16 (*)     Glucose 140 (*)     BUN 36 (*)     CREATININE 1.06 (*)     GFR Non- 50.1 (*)     Total Bilirubin 0.8 (*)     ALT 41 (*)     Globulin 2.2 (*)     All other components within normal limits   APTT - Abnormal; Notable for the following components:    aPTT 20.0 (*)     All other components within normal limits    Narrative:     CALL  [place]  [facility] tel. [phone],  DIMER results called to and read back by DR COMBS, 07/18/2022 11:07, by Johnathan Rising   D-DIMER, QUANTITATIVE - Abnormal; Notable for the following components:    D-Dimer, Quant 2.61 (*)     All other components within normal limits    Narrative:     Jane Amlin  [facility] tel. [phone],  DIMER results called to and read back by DR COMBS, 07/18/2022 11:07, by Alhaji [address] - Abnormal; Notable for the following components:    Blood, Urine LARGE (*)     Protein, UA >=300 (*)     All other components within normal limits   MICROSCOPIC URINALYSIS - Abnormal; Notable for the following components:    Renal Epithelial, UA 0-2 (*)     WBC, UA 10-20 (*)     RBC, UA >100 (*)     All other components within normal limits   COVID-19, RAPID   RAPID INFLUENZA A/B ANTIGENS   CULTURE, BLOOD 1   CULTURE, BLOOD 2   CULTURE, URINE   LIPASE   TROPONIN   BRAIN NATRIURETIC PEPTIDE   PROTIME-INR       All other labs were within normal range or not returned as of this dictation. EMERGENCY DEPARTMENT COURSE and DIFFERENTIAL DIAGNOSIS/MDM:   Vitals:    Vitals:    07/18/22 0943 07/18/22 1100 07/18/22 1130 07/18/22 1200   BP: (!) 179/76 (!) 173/77 (!) 168/75 (!) 166/75   Pulse: 93 87 84 87   Resp: 20 18 20 20   Temp: 98.9 °F (37.2 °C)      TempSrc: Temporal      SpO2: 97% 97% 95% 96%   Weight: 125 lb (56.7 kg)      Height: 5' 2\" (1.575 m)            No leukocytosis or significant anemia. Nonspecific electrolyte derangements consistent with dehydration. No transaminitis. Troponin within normal limits not consistent with ACS. MDM  Patient reports general headache, no red flags concerning for meningitis/encephalitis, no indication for CT imaging of the head. Benign neurologic exam not consistent with CVA/SAH  She does have moderately elevated BNP, denies history of CHF. If we were to treat this as pneumonia, CURB65 2  PSI/PORT Score: Pneumonia Severity Index for CAP 98 points- ADMIT  We can explain her cough with new onset CHF, this is likely given her pleural effusions    REASSESSMENT      Given her age, new onset symptoms is appropriate to admit for work-up, patient agreeable        CONSULTS:  None    PROCEDURES:  Unless otherwise noted below, none     Procedures        FINAL IMPRESSION      1. Acute congestive heart failure, unspecified heart failure type ([address]    2. Colitis    3. Pleural effusion          DISPOSITION/PLAN   DISPOSITION Decision To Admit 07/18/2022 12:30:58 PM      PATIENT REFERRED TO:  No follow-up provider specified. DISCHARGE MEDICATIONS:  New Prescriptions    No medications on file     Controlled Substances Monitoring:     No flowsheet data found.     (Please note that portions of this note were completed with a voice recognition program.  Efforts were made to edit the dictations but occasionally words are mis-transcribed.)    Per Sunshine MD (electronically signed)  Attending Emergency Physician            Per Sunshine MD  07/18/22 4858

## 2022-07-18 NOTE — ED NOTES
Report called to Isaac Villanueva RN on [address], [address] [place] [address]  07/18/22 0749

## 2022-07-18 NOTE — H&P
Hospital Medicine  History and Physical    Patient:  Negar Welch  MRN: [id number]    CHIEF COMPLAINT:    Chief Complaint   Patient presents with    Leg Swelling     Bilateral ankle swelling (started last night). Pt states she is wheezing. Pt states she has diarrhea and she is nauseated. Pt state she has a headache (9/10 pain)        History Obtained From:  Patient, EMR  Primary Care Physician: Virgie Rouse MD    HISTORY OF PRESENT ILLNESS:   The patient is a 64 y.o. female with PMH of htn who presents with lower extremity swelling. The patient states that yesterday or last night she noticed her feet beign swollen. She put ice on her feet and it got better. Last night she felt like she couldn't breath and had a headache. She has nausea and diarrhea. She called her doctor to make an appointment; talked to another doctor who told her to go to the hospital. She no longer has orthopnea. Past Medical History:      Diagnosis Date    Hypertension        Past Surgical History:      Procedure Laterality Date    CATARACT REMOVAL Bilateral     HERNIA REPAIR      HYSTERECTOMY, TOTAL ABDOMINAL (CERVIX REMOVED)         Medications Prior to Admission:    Prior to Admission medications    Medication Sig Start Date End Date Taking? Authorizing Provider   spironolactone (ALDACTONE) 50 MG tablet Take 50 mg by mouth in the morning. Yes Historical Provider, MD   pantoprazole (PROTONIX) 40 MG tablet Take 40 mg by mouth in the morning. Yes Historical Provider, MD   amLODIPine (NORVASC) 10 MG tablet Take 10 mg by mouth in the morning. Yes Historical Provider, MD   metoprolol (LOPRESSOR) 100 MG tablet Take 100 mg by mouth in the morning and 100 mg before bedtime. Yes Historical Provider, MD   atorvastatin (LIPITOR) 20 MG tablet Take 20 mg by mouth nightly   Yes Historical Provider, MD       Allergies:  Norco [hydrocodone-acetaminophen]    Social History:   TOBACCO:   reports that she has never smoked.  She has never FINDINGS   Liver: Negative    Spleen: Negative   Gallbladder: No calcified gallstones. Normal gallbladder wall. No pericholecystic fluid. Pancreas: Negative   Kidney: Negative   Adrenal glands are negative. Bowel: Lita Morelos Questionable wall thickening in the rectosigmoid area. Accuracy of this finding is limited due to lack of intraluminal contrast and resulting nondistended colon. Small amount of free fluid in the pelvis. Some fatty infiltration of the submucosal fat along the right side of the colon. This is a nonspecific finding but can be seen in inflammatory bowel disease. No acute diverticulitis. There  is no CT evidence for appendicitis. Nodes: No lymphadenopathy. Aorta: Negative    Pelvis:Negative   Peritoneum: No free fluid or free air. The abdominal wall is intact. Bones: No acute osseous abnormalities. Other: Small bilateral effusions with some dependent opacities with some interstitial lines/fluid at the lung bases. QUESTIONABLE FINDINGS OF COLITIS. SMALL AMOUNT OF FREE FLUID IN THE PELVIS. SUSPECT PULMONARY EDEMA     XR CHEST PORTABLE    Result Date: 7/18/2022  EXAMINATION:  PORTABLE CHEST RADIOGRAPH CLINICAL HISTORY:  PNEUMONIA. COMPARISONS:  NONE AVAILABLE TECHNIQUE:  AP erect portable frontal view chest. FINDINGS:  Heart size is probably within upper limits of normal. Amorphous density involves the lung bases, more so on the left. Although nonspecific, this does have an appearance compatible with pneumonia. More superior lung fields are clear. There is no significant pleural fluid or pneumothorax. Osseous structures appear grossly intact     BIBASILAR INFILTRATES OR (LESS LIKELY) EDEMA, LEFT GREATER THAN RIGHT.      Assessment and Plan   Lower extrtemity edema with SILVA:  Possibly CHF vs possible norvasc related; cardiology consult; echo; beta blocker; switch norvasc to lisinopril  Expiratory wheezes:  Likely cardiac; will consult pulm per pt request  HTN:  Continue home meds; change norvasc to lisinopril; consider changing lpressor to coreg; defer to cardiology  Functional Status: Fall precautions. Up with assistance. PT OT  Diet: Cardiac Diabetic   DVT ppx: Lovenox SCDs  Disposition: Dependent on hospital course. Will discharge once medically stable. SW on board for discharge planning.      Patient Active Problem List   Diagnosis Code    CHF (congestive heart failure), NYHA class I, acute on chronic, combined ([facility] I50.43       Juanjose Owusu MD, MD  Admitting Hospitalist    Emergency Contact:

## 2022-07-18 NOTE — PROGRESS NOTES
Advance Care Planning     Advance Care Planning Inpatient Note  [facility] Department    Today's Date: 7/18/2022  Unit: [facility]LO[facility] 1W TELEMETRY    Received request from [employer]. Upon review of chart and communication with care team, patient's decision making abilities are not in question. . Patient was/were present in the room during visit. Goals of ACP Conversation:  Discuss advance care planning documents    Health Care Decision Makers:     No healthcare decision makers have been documented. Click here to complete [address] including selection of the Healthcare Decision Maker Relationship (ie \"Primary\")  Summary:  No Decision Maker named by patient at this time    Advance Care Planning Documents (Patient Wishes):  None     Assessment:  Pt would like to name her son as her decision maker. He is the one she depends on she said, and who is always there for her. She said that she is not , although we have her listed as such. Reviewed next of kin law in our state, and answered all questions regarding POA. She will speak to him tonight she said, and will consider completing POA forms with us.      Interventions:  Provided education on documents for clarity and greater understanding  Discussed and provided education on state decision maker hierarchy    Electronically signed by Vandana Wheatley, [address] [place] [employer] [employer] on 7/18/2022 at 4:42 PM

## 2022-07-18 NOTE — ED TRIAGE NOTES
Pt states last night he ankles were swollen and she heard whistling in her chest   Pt states she has diarrhea, cough, nauseated, and headache  Pt states her pain in her head is a 9/10  Pt has a steady gait   Pt is afebrile  Pt is A&Ox 4  Pt's breathing and respiration are unlabored and equal

## 2022-07-19 PROBLEM — R31.29 MICROHEMATURIA: Status: ACTIVE | Noted: 2022-07-19

## 2022-07-19 PROBLEM — I50.9 ACUTE CONGESTIVE HEART FAILURE (HCC): Status: ACTIVE | Noted: 2022-07-19

## 2022-07-19 LAB
ADENOVIRUS F 40 41 PCR: NOT DETECTED
ANION GAP SERPL CALCULATED.3IONS-SCNC: 11 MEQ/L (ref 9–15)
ANION GAP SERPL CALCULATED.3IONS-SCNC: 12 MEQ/L (ref 9–15)
ASTROVIRUS PCR: NOT DETECTED
BASOPHILS ABSOLUTE: 0.1 K/UL (ref 0–0.2)
BASOPHILS RELATIVE PERCENT: 1.2 %
BUN BLDV-MCNC: 28 MG/DL (ref 8–23)
BUN BLDV-MCNC: 29 MG/DL (ref 8–23)
C DIFF TOXIN/ANTIGEN: NORMAL
C-REACTIVE PROTEIN, HIGH SENSITIVITY: 13.7 MG/L (ref 0–5)
CALCIUM SERPL-MCNC: 8.5 MG/DL (ref 8.5–9.9)
CALCIUM SERPL-MCNC: 9.1 MG/DL (ref 8.5–9.9)
CAMPYLOBACTER PCR: NOT DETECTED
CHLORIDE BLD-SCNC: 108 MEQ/L (ref 95–107)
CHLORIDE BLD-SCNC: 110 MEQ/L (ref 95–107)
CHOLESTEROL, TOTAL: 96 MG/DL (ref 0–199)
CO2: 16 MEQ/L (ref 20–31)
CO2: 21 MEQ/L (ref 20–31)
CREAT SERPL-MCNC: 1.02 MG/DL (ref 0.5–0.9)
CREAT SERPL-MCNC: 1.17 MG/DL (ref 0.5–0.9)
CRYPTOSPORIDIUM PCR: NOT DETECTED
CYCLOSPORA CAYETANENSIS PCR: NOT DETECTED
E COLI ENTEROAGGREGATIVE PCR: NOT DETECTED
E COLI ENTEROPATHOGENIC PCR: NOT DETECTED
E COLI ENTEROTOXIGENIC PCR: NOT DETECTED
E COLI SHIGELLA/ENTEROINVASIVE PCR: NOT DETECTED
ENTAMOEBA HISTOLYTICA PCR: NOT DETECTED
EOSINOPHILS ABSOLUTE: 0.1 K/UL (ref 0–0.7)
EOSINOPHILS RELATIVE PERCENT: 2.6 %
GFR AFRICAN AMERICAN: 54
GFR AFRICAN AMERICAN: >60
GFR NON-AFRICAN AMERICAN: 44.7
GFR NON-AFRICAN AMERICAN: 52.3
GIARDIA LAMBLIA PCR: NOT DETECTED
GLUCOSE BLD-MCNC: 115 MG/DL (ref 70–99)
GLUCOSE BLD-MCNC: 87 MG/DL (ref 70–99)
HCT VFR BLD CALC: 30.9 % (ref 37–47)
HDLC SERPL-MCNC: 36 MG/DL (ref 40–59)
HEMOGLOBIN: 10.6 G/DL (ref 12–16)
IRON SATURATION: 18 % (ref 20–55)
IRON: 52 UG/DL (ref 37–145)
LDL CHOLESTEROL CALCULATED: 29 MG/DL (ref 0–129)
LV EF: 75 %
LVEF MODALITY: NORMAL
LYMPHOCYTES ABSOLUTE: 1.1 K/UL (ref 1–4.8)
LYMPHOCYTES RELATIVE PERCENT: 21.9 %
MAGNESIUM: 2.6 MG/DL (ref 1.7–2.4)
MCH RBC QN AUTO: 28.7 PG (ref 27–31.3)
MCHC RBC AUTO-ENTMCNC: 34.4 % (ref 33–37)
MCV RBC AUTO: 83.6 FL (ref 82–100)
MONOCYTES ABSOLUTE: 0.5 K/UL (ref 0.2–0.8)
MONOCYTES RELATIVE PERCENT: 8.9 %
NEUTROPHILS ABSOLUTE: 3.4 K/UL (ref 1.4–6.5)
NEUTROPHILS RELATIVE PERCENT: 65.4 %
NOROVIRUS GI GII PCR: NOT DETECTED
PDW BLD-RTO: 13 % (ref 11.5–14.5)
PLATELET # BLD: 166 K/UL (ref 130–400)
PLESIOMONAS SHIGELLOIDES PCR: NOT DETECTED
POTASSIUM SERPL-SCNC: 4.4 MEQ/L (ref 3.4–4.9)
POTASSIUM SERPL-SCNC: 4.6 MEQ/L (ref 3.4–4.9)
PROCALCITONIN: 0.08 NG/ML (ref 0–0.15)
RBC # BLD: 3.7 M/UL (ref 4.2–5.4)
ROTAVIRUS A PCR: NOT DETECTED
SALMONELLA PCR: NOT DETECTED
SAPOVIRUS PCR: NOT DETECTED
SEDIMENTATION RATE, ERYTHROCYTE: 13 MM (ref 0–30)
SHIGA-LIKE TOXIN-PRODUCING E. COLI (STEC) STX1/STX2: NOT DETECTED
SODIUM BLD-SCNC: 138 MEQ/L (ref 135–144)
SODIUM BLD-SCNC: 140 MEQ/L (ref 135–144)
TOTAL IRON BINDING CAPACITY: 292 UG/DL (ref 250–450)
TRIGL SERPL-MCNC: 156 MG/DL (ref 0–150)
UNSATURATED IRON BINDING CAPACITY: 240 UG/DL (ref 112–347)
URINE CULTURE, ROUTINE: NORMAL
VIBRIO CHOLERAE PCR: NOT DETECTED
VIBRIO PCR: NOT DETECTED
WBC # BLD: 5.2 K/UL (ref 4.8–10.8)
YERSINIA ENTEROCOLITICA PCR: NOT DETECTED

## 2022-07-19 PROCEDURE — 85025 COMPLETE CBC W/AUTO DIFF WBC: CPT

## 2022-07-19 PROCEDURE — 6360000002 HC RX W HCPCS: Performed by: INTERNAL MEDICINE

## 2022-07-19 PROCEDURE — 36415 COLL VENOUS BLD VENIPUNCTURE: CPT

## 2022-07-19 PROCEDURE — 99233 SBSQ HOSP IP/OBS HIGH 50: CPT | Performed by: INTERNAL MEDICINE

## 2022-07-19 PROCEDURE — 93306 TTE W/DOPPLER COMPLETE: CPT

## 2022-07-19 PROCEDURE — 6370000000 HC RX 637 (ALT 250 FOR IP): Performed by: INTERNAL MEDICINE

## 2022-07-19 PROCEDURE — 86141 C-REACTIVE PROTEIN HS: CPT

## 2022-07-19 PROCEDURE — 2060000000 HC ICU INTERMEDIATE R&B

## 2022-07-19 PROCEDURE — 80048 BASIC METABOLIC PNL TOTAL CA: CPT

## 2022-07-19 PROCEDURE — 85652 RBC SED RATE AUTOMATED: CPT

## 2022-07-19 PROCEDURE — 84145 PROCALCITONIN (PCT): CPT

## 2022-07-19 PROCEDURE — 99221 1ST HOSP IP/OBS SF/LOW 40: CPT | Performed by: UROLOGY

## 2022-07-19 PROCEDURE — 2580000003 HC RX 258: Performed by: INTERNAL MEDICINE

## 2022-07-19 PROCEDURE — 80061 LIPID PANEL: CPT

## 2022-07-19 PROCEDURE — 83735 ASSAY OF MAGNESIUM: CPT

## 2022-07-19 RX ORDER — POTASSIUM CHLORIDE AND SODIUM CHLORIDE 450; 150 MG/100ML; MG/100ML
INJECTION, SOLUTION INTRAVENOUS CONTINUOUS
Status: DISCONTINUED | OUTPATIENT
Start: 2022-07-19 | End: 2022-07-20

## 2022-07-19 RX ORDER — ALBUTEROL SULFATE 2.5 MG/3ML
2.5 SOLUTION RESPIRATORY (INHALATION) EVERY 6 HOURS PRN
Status: DISCONTINUED | OUTPATIENT
Start: 2022-07-19 | End: 2022-07-20 | Stop reason: HOSPADM

## 2022-07-19 RX ADMIN — SODIUM CHLORIDE, PRESERVATIVE FREE 10 ML: 5 INJECTION INTRAVENOUS at 08:24

## 2022-07-19 RX ADMIN — METOPROLOL TARTRATE 100 MG: 50 TABLET ORAL at 08:17

## 2022-07-19 RX ADMIN — ACETAMINOPHEN 650 MG: 325 TABLET ORAL at 08:19

## 2022-07-19 RX ADMIN — LISINOPRIL 20 MG: 20 TABLET ORAL at 08:16

## 2022-07-19 RX ADMIN — SPIRONOLACTONE 50 MG: 50 TABLET ORAL at 08:16

## 2022-07-19 RX ADMIN — FUROSEMIDE 40 MG: 10 INJECTION, SOLUTION INTRAMUSCULAR; INTRAVENOUS at 17:16

## 2022-07-19 RX ADMIN — ATORVASTATIN CALCIUM 20 MG: 20 TABLET, FILM COATED ORAL at 21:19

## 2022-07-19 RX ADMIN — PANTOPRAZOLE SODIUM 40 MG: 40 TABLET, DELAYED RELEASE ORAL at 08:16

## 2022-07-19 RX ADMIN — POTASSIUM CHLORIDE AND SODIUM CHLORIDE: 450; 150 INJECTION, SOLUTION INTRAVENOUS at 14:55

## 2022-07-19 RX ADMIN — METOPROLOL TARTRATE 100 MG: 50 TABLET ORAL at 21:19

## 2022-07-19 RX ADMIN — FUROSEMIDE 40 MG: 10 INJECTION, SOLUTION INTRAMUSCULAR; INTRAVENOUS at 08:14

## 2022-07-19 ASSESSMENT — PAIN DESCRIPTION - LOCATION
LOCATION: HEAD
LOCATION: HEAD

## 2022-07-19 ASSESSMENT — PAIN SCALES - GENERAL
PAINLEVEL_OUTOF10: 5
PAINLEVEL_OUTOF10: 7

## 2022-07-19 NOTE — PROGRESS NOTES
Hospitalist Progress Note      PCP: Jose Charles MD    Date of Admission: 7/18/2022    Chief Complaint:    Chief Complaint   Patient presents with    Leg Swelling     Bilateral ankle swelling (started last night). Pt states she is wheezing. Pt states she has diarrhea and she is nauseated. Pt state she has a headache (9/10 pain)        Subjective:  Patient denies fevers, chills, sweats, CP, SOB, diarrhea or burning micturition. Breathing feels better. 12 point ROS negative other than mentioned above     Medications:  Reviewed    Infusion Medications    sodium chloride       Scheduled Medications    sodium chloride flush  5-40 mL IntraVENous 2 times per day    enoxaparin  40 mg SubCUTAneous Daily    furosemide  40 mg IntraVENous BID    atorvastatin  20 mg Oral Nightly    spironolactone  50 mg Oral Daily    pantoprazole  40 mg Oral Daily    metoprolol  100 mg Oral BID    lisinopril  20 mg Oral Daily     PRN Meds: sodium chloride flush, sodium chloride, ondansetron **OR** ondansetron, polyethylene glycol, acetaminophen **OR** acetaminophen      Intake/Output Summary (Last 24 hours) at 7/19/2022 1355  Last data filed at 7/19/2022 1324  Gross per 24 hour   Intake 1338.42 ml   Output 1100 ml   Net 238.42 ml       Exam:    BP (!) 156/86   Pulse 94   Temp 97.9 °F (36.6 °C) (Oral)   Resp 18   Ht 5' 2\" (1.575 m)   Wt 151 lb (68.5 kg)   SpO2 97%   BMI 27.62 kg/m²     General appearance: No apparent distress, appears stated age and cooperative. HEENT:  Conjunctivae/corneas clear. Neck: Trachea midline. Respiratory:  Normal respiratory effort. Clear to auscultation  Cardiovascular: Regular rate and rhythm  Abdomen: Soft, non-tender, non-distended with normal bowel sounds.   Musculoskeletal: No clubbing, cyanosis or edema bilaterally  Neuro: Non Focal.   Capillary Refill: Brisk,< 3 seconds   Peripheral Pulses: +2 palpable, equal bilaterally       Labs:   Recent Labs     07/18/22  1025   WBC 8.7   HGB 12.1   HCT 35.8*      Recent Labs     07/18/22  1025 07/19/22  0449    138   K 5.1* 4.6   * 110*   CO2 16* 16*   BUN 36* 29*   CREATININE 1.06* 1.02*   CALCIUM 9.0 8.5     Recent Labs     07/18/22  1025   AST 30   ALT 41*   BILITOT 0.8*   ALKPHOS 99     Recent Labs     07/18/22  1026   INR 0.9     Recent Labs     07/18/22  1025   TROPONINI <0.010       Urinalysis:      Lab Results   Component Value Date/Time    NITRU Negative 07/18/2022 10:30 AM    WBCUA 10-20 07/18/2022 10:30 AM    BACTERIA Negative 07/18/2022 10:30 AM    RBCUA >100 07/18/2022 10:30 AM    BLOODU LARGE 07/18/2022 10:30 AM    SPECGRAV 1.020 07/18/2022 10:30 AM    GLUCOSEU Negative 07/18/2022 10:30 AM       Radiology:  CTA CHEST W WO CONTRAST   Final Result      No CT evidence pulmonary embolism. Borderline cardiomegaly. Small bilateral pleural effusions with bilateral dependent subsegmental atelectatic change. All CT scans at this facility use dose modulation, iterative reconstruction, and/or weight based dosing when appropriate to reduce radiation dose to as low as reasonably achievable. CT ABDOMEN PELVIS W IV CONTRAST Additional Contrast? None   Final Result   QUESTIONABLE FINDINGS OF COLITIS. SMALL AMOUNT OF FREE FLUID IN THE PELVIS. SUSPECT PULMONARY EDEMA         XR CHEST PORTABLE   Final Result   BIBASILAR INFILTRATES OR (LESS LIKELY) EDEMA, LEFT GREATER THAN RIGHT. Assessment/Plan:    Acute on chronic diastolic CHF:  Improving; continue IVF  Expiratory wheezes:  Much improved; likely cardiac wheeze; improving; pulm is on board  HTN:  ContiWill defer further changes to antihypertensives to cardiology  Functional Status: Fall precautions. Up with assistance. PT OT  Diet: Cardiac Diabetic  DVT ppx: Lovenox SCDs  Disposition: Dependent on hospital course. Will discharge once medically stable. SW on board for discharge planning.      Active Hospital Problems    Diagnosis Date Noted    Acute congestive

## 2022-07-19 NOTE — FLOWSHEET NOTE
Pt in room alert no distress noted HS medication given per [facility] pt ambulated to bathroom with steady gate stand by assist no needs at this time will continue to monitor

## 2022-07-19 NOTE — CONSULTS
[address]                      [address], [address] [facility]                                  CONSULTATION    PATIENT NAME: Kamaljit Mac                    :        1945  MED REC NO:   [id number]                            ROOM:       R646  ACCOUNT NO:   [de-identified]                           ADMIT DATE: 2022  PROVIDER:     Gisele Kate MD    CONSULT DATE:  2022    PERSON REQUESTING CONSULT:  Esequiel Smart MD    REASON FOR CONSULTATION:  Hematuria. HISTORY OF PRESENT ILLNESS:  This is a 41-year-old female with a history  of hypertension, who was admitted through the emergency room yesterday  with lower extremity edema, shortness of breath and was found to have  microscopic hematuria. Urologic consultation was requested. She has  been admitted for possible congestive heart failure and management of  this and is undergoing cardiac evaluation. The patient has no prior  urologic surgical history. She may have passed the kidney stone several  years ago. She has no abdominal or flank pain. No gross hematuria,  dysuria, frequency, urgency, urge incontinence or suprapubic discomfort. She has no other current urologic complaints. She has no prior urologic  surgical history. PAST MEDICAL HISTORY:  Includes hypertension. PAST SURGICAL HISTORY:  Includes cataract, removal of hernia, and  hysterectomy. FAMILY HISTORY:  There is no history of genitourinary malignancies in  the family. SOCIAL HISTORY:  Denies cigarette smoking. MEDICATIONS:  On admission include Aldactone, Protonix, Norvasc,  Lopressor, Lipitor. ALLERGIES:  She has allergies to Susan Otoniel. REVIEW OF SYSTEMS:  Negative unless otherwise as outlined in the history  of present illness. PHYSICAL EXAMINATION:  GENERAL:  She is a well-developed, well-nourished female lying in no  acute distress in bed.   VITAL SIGNS:  Her temperature is 36.6, heart rate 94, respiratory rate  18, blood pressure 156/86. HEENT:  Atraumatic, normocephalic. Her eyes showed normal conjunctivae. CHEST:  She had good breath sounds bilaterally with some expiratory  wheezing. She had no rhonchi. CARDIOVASCULAR:  Her heart was regular rate and rhythm. ABDOMEN:  Soft, nondistended, nontender. She had no palpable  organomegaly. No palpable abdominal hernias. No CVA tenderness. EXTREMITIES:  Showed some edema. NEUROLOGIC:  She was alert and oriented. PSYCHIATRIC:  She had a normal affect. LABORATORY DATA:  Her labs shows a creatinine of 1.02. She has a C.  diff negative. This was done for diarrhea. She has a urine culture  pending. Her microanalysis showed greater than 100 red cells, 10-20  white cells. Her urine was nitrite negative and leukocyte negative. COVID testing was negative. Her INR was 0.9. She had a white count of  8.7, hematocrit of 35.8, and platelet count of 292. She had a CT scan  with contrast of the abdomen and pelvis, which showed the kidneys to be  negative without hydronephrosis, stones or any abnormalities. She had a  questionable findings of colitis and some pulmonary edema noted on CT  scan. IMPRESSION:  A 40-year-old female with hypertension, admitted for  possible congestive heart failure evaluation with lower extremity edema  and shortness of breath. She had an incidental finding of microscopic  hematuria. I explained the workup for microscopic hematuria. His CT  with contrast which she has already had showing no abnormalities and  then we will arrange for outpatient cystoscopy through my office. There  is no indication for any urologic intervention at this time. She has no  evidence for urinary tract infection on urinalysis nor she had any gross  hematuria or other urologic complaints. These recommendations were  discussed in detail with the patient today. We will continue to follow  her through her stay here.         Sheryl Whitney MD    D: 07/19/2022 12:31:47       T: 07/19/2022 12:36:16     [email or web address]  Job#: [id number]     Doc#: [id number]    CC:  Tawanda Jeffery MD

## 2022-07-19 NOTE — PROGRESS NOTES
INPATIENT PROGRESS NOTES    PATIENT NAME: Nathan Knapp  MRN: [id number]  SERVICE DATE:  July 19, 2022   SERVICE TIME:  4:42 PM      PRIMARY SERVICE: Pulmonary Disease    CHIEF COMPLAIN: Shortness of breath, wheezing, diarrhea      INTERVAL HPI: Patient seen and examined at bedside, Interval Notes, orders reviewed. Nursing notes noted  Patient is still feeling like having wheezing and chest tightness. She is coughing with clear mucus. She is having diarrhea. Still leg swelling less than yesterday. OBJECTIVE    Body mass index is 27.62 kg/m². PHYSICAL EXAM:  Vitals:  BP (!) 175/90   Pulse 92   Temp 98.2 °F (36.8 °C)   Resp 18   Ht 5' 2\" (1.575 m)   Wt 151 lb (68.5 kg)   SpO2 96%   BMI 27.62 kg/m²   General: Alert, awake . comfortable in bed, No distress. Head: Atraumatic , Normocephalic   Eyes: PERRL. No sclera icterus. No conjunctival injection. No discharge   ENT: No nasal  discharge. Pharynx clear. Neck:  Trachea midline. No thyromegaly, no JVD, No cervical adenopathy. Chest : Bilaterally symmetrical ,Normal effort,  No accessory muscle use  Lung : . Fair BS bilateral, decreased BS at bases. No Rales. Few scattered wheezing. No rhonchi. Heart[de-identified] Normal  rate. Regular rhythm. No mumur ,  Rub or gallop  ABD: Non-tender. Non-distended. No masses. No organmegaly. Normal bowel sounds. No hernia.   Ext : 1+ pitting both leg , No Cyanosis No clubbing  Neuro: no focal weakness          DATA:   Recent Labs     07/18/22  1025 07/19/22  1428   WBC 8.7 5.2   HGB 12.1 10.6*   HCT 35.8* 30.9*   MCV 83.8 83.6    166     Recent Labs     07/18/22  1025 07/19/22  0449 07/19/22  1428    138 140   K 5.1* 4.6 4.4   * 110* 108*   CO2 16* 16* 21   BUN 36* 29* 28*   CREATININE 1.06* 1.02* 1.17*   GLUCOSE 140* 87 115*   CALCIUM 9.0 8.5 9.1   PROT 6.6  --   --    LABALBU 4.4  --   --    BILITOT 0.8*  --   --    ALKPHOS 99  --   --    AST 30  --   --    ALT 41*  --   --    LABGLOM 50.1* 52.3* 44.7* GFRAA >60.0 >60.0 54.0*   GLOB 2.2*  --   --        MV Settings:          No results for input(s): PHART, ZFV6PRD, PO2ART, OLI9ACC, BEART, S8DHTSTF in the last 72 hours. O2 Device: None (Room air)    ADULT DIET; Regular; Low Sodium (2 gm)     MEDICATIONS during current hospitalization:    Continuous Infusions:   0.45 % NaCl with KCl 20 mEq 50 mL/hr at 07/19/22 1455    sodium chloride         Scheduled Meds:   sodium chloride flush  5-40 mL IntraVENous 2 times per day    enoxaparin  40 mg SubCUTAneous Daily    furosemide  40 mg IntraVENous BID    atorvastatin  20 mg Oral Nightly    spironolactone  50 mg Oral Daily    pantoprazole  40 mg Oral Daily    metoprolol  100 mg Oral BID    lisinopril  20 mg Oral Daily       PRN Meds:sodium chloride flush, sodium chloride, ondansetron **OR** ondansetron, polyethylene glycol, acetaminophen **OR** acetaminophen    Radiology  Echocardiogram complete 2D with doppler with color    Result Date: 7/19/2022  Transthoracic Echocardiography Report (TTE)  Demographics   Patient Name     Dennie Marts Gender                Female   Patient Number   [id number]        Race                  Other                                    Ethnicity              or    Visit Number     [id number]       Room Number           N797   Corporate ID                     Date of Study         07/19/2022   Accession Number [id number]      Referring Physician   Date of Birth    1945      Sonographer           Shaw Maxwell   Age              64 year(s)      Interpreting          [facility]                                   Physician             Chapito Soliman MD  Procedure Type of Study   TTE procedure:ECHO COMPLETE 2D W/DOP W/COLOR. Procedure Date Date: 07/19/2022 Start: 08:45 AM Study Location: Portable Technical Quality: Adequate visualization Indications:Congestive heart failure.  Patient Status: Routine Height: 62 inches Weight: 146 pounds BSA: 1.67 m^2 BMI: 26.7 kg/m^2 BP: 166/67 mmHg Conclusions   Summary  Normal left ventricle structure and function. Left ventricular ejection fraction is visually estimated at 75%. Moderate concentric LVH  Borderline diastolic dysfunction  There is DUST (discrete upper septal thickening) without evidence of  outflow tract obstruction. Normal right ventricle structure and function. Right ventricular systolic pressure of 37 mm Hg consistent with mild  pulmonary hypertension. Normal mitral valve structure and function. 1+ MR  MAC  Normal aortic valve structure and function. Mild aortic sclerosis  Normal tricuspid valve structure and function. Mild tricuspid regurgitation. Mildly dilated left atrium. Signature   ----------------------------------------------------------------  Electronically signed by Denilson Clayton MD(Interpreting  physician) on 07/19/2022 12:11 PM  ----------------------------------------------------------------   Findings  Left Ventricle Normal left ventricle structure and function. Left ventricular ejection fraction is visually estimated at 75%. Moderate concentric LVH Borderline diastolic dysfunction There is DUST (discrete upper septal thickening) without evidence of outflow tract obstruction. Right Ventricle Normal right ventricle structure and function. Right ventricular systolic pressure of 37 mm Hg consistent with mild pulmonary hypertension. Left Atrium Mildly dilated left atrium. Right Atrium Normal right atrium. Mitral Valve Normal mitral valve structure and function. 1+ MR MAC Tricuspid Valve Normal tricuspid valve structure and function. Mild tricuspid regurgitation. Aortic Valve Normal aortic valve structure and function. Mild aortic sclerosis Pulmonic Valve Normal pulmonic valve structure and function. Pericardial Effusion No evidence of pericardial effusion. Pleural Effusion No evidence of pleural effusion. Aorta \ Miscellaneous Aortic root dimension within normal limits.  M-Mode Measurements (cm)   LVIDd: 4.26 cm LVIDs: 2.38 cm  IVSd: 1.53 cm                          IVSs: 1.65 cm  LVPWd: 0.94 cm                         LVPWs: 1.45 cm  Rt. Vent.  Dimension: 1.81 cm           AO Root Dimension: 2.37 cm                                         ACS: 1.41 cm                                         LVOT: 1.68 cm  Doppler Measurements:   AV Peak Gradient: 9.88 mmHg           MV Peak E-Wave: 1.2 m/s  AV Mean Gradient: 5.27 mmHg           MV Peak A-Wave: 1.55 m/s  TR Velocity:2.6 m/s  TR Gradient:27.03 mmHg                                        Estimated RAP:10 mmHg                                        RVSP:37.03 mmHg  Valves  Mitral Valve   Peak E-Wave: 1.2 m/s           Peak A-Wave: 1.55 m/s                                 E/A Ratio: 0.78                                 Peak Gradient: 5.77 mmHg  MR Velocity: 3.92 m/s          Deceleration Time: 155.9 msec   Aortic Valve   Peak Velocity: 1.57 m/s             Mean Velocity: 1.06 m/s  Peak Gradient: 9.88 mmHg            Mean Gradient: 5.27 mmHg  AV VTI: 34.61 cm   Cusp Separation: 1.41 cm   Tricuspid Valve   Estimated RVSP: 37.03 mmHg              Estimated RAP: 10 mmHg  TR Velocity: 2.6 m/s                    TR Gradient: 27.03 mmHg   Pulmonic Valve   Peak Velocity: 0.98 m/s           Peak Gradient: 3.85 mmHg                                    Estimated PASP: 37.03 mmHg   LVOT   LVOT Diameter: 1.68 cm  Structures  Left Atrium   LA Volume/Index: 41.59 ml /25 m^2             LA Area: 15.59 cm^2   Left Ventricle   Diastolic Dimension: 4.52 cm         Systolic Dimension: 8.56 cm  Septum Diastolic: 4.77 cm            Septum Systolic: 6.73 cm  PW Diastolic: 9.10 cm                PW Systolic: 4.63 cm                                       FS: 44.1 %  LV EDV/LV EDV Index: 81.31 ml/49 m^2 LV ESV/LV ESV Index: 19.76 ml/12 m^2  EF Calculated: 75.7 %   LVOT Diameter: 1.68 cm   Right Atrium   RA Systolic Pressure: 10 mmHg   Right Ventricle   Diastolic Dimension: 5.51 cm RV Systolic Pressure: 33.08 mmHg  Aorta/ Miscellaneous Aorta   Aortic Root: 2.37 cm  LVOT Diameter: 1.68 cm      CTA CHEST W WO CONTRAST    Result Date: 7/18/2022  CT PULMONARY ANGIOGRAM WITH INTRAVENOUS CONTRAST MEDIUM. REASON FOR EXAMINATION:  CHEST PRESSURE, WHEEZING, LEG SWELLING TECHNIQUE: Helical CTA was performed through the chest utilizing 75 mL Isovue 300 intravenous contrast.  Images were obtained with bolus tracking in order to opacify the pulmonary arteries. Thick section coronal MIP 3D reconstructions were performed on a separate workstation. Study done in conjunction with CT abdomen pelvis, reported separately. COMPARISON:none FINDINGS:  Pulmonary arteries: No intraluminal filling defects. Thoracic aorta: Normal in course and caliber. Cardiac Size: Upper limits of normal. Pericardial effusion: None. Right lung: No nodules, or masses. Small right pleural effusion. Dependent subsegmental atelectatic change. Left lung: No nodules, or masses. Small left pleural effusion. Dependent subsegmental atelectatic change. Lymph nodes: No hilar, mediastinal, or axillary lymph node enlargement. Upper abdomen:Limited imaging upper abdomen shows no gross anomaly. Musculoskeletal:No osteoblastic, and no osteolytic lesions. No CT evidence pulmonary embolism. Borderline cardiomegaly. Small bilateral pleural effusions with bilateral dependent subsegmental atelectatic change. All CT scans at this facility use dose modulation, iterative reconstruction, and/or weight based dosing when appropriate to reduce radiation dose to as low as reasonably achievable. CT ABDOMEN PELVIS W IV CONTRAST Additional Contrast? None    Result Date: 7/18/2022  EXAMINATION: CT ABDOMEN PELVIS W IV CONTRAST DATE AND TIME:7/18/2022 12:29 PM CLINICAL HISTORY: Acute abdominal pain. Epigastric pain. abd pain, diarrhea  COMPARISON: July 16, 2014 TECHNIQUE: Contiguous axial CT sections of the abdomen and pelvis.   100 More superior lung fields are clear. There is no significant pleural fluid or pneumothorax. Osseous structures appear grossly intact     BIBASILAR INFILTRATES OR (LESS LIKELY) EDEMA, LEFT GREATER THAN RIGHT. IMPRESSION AND SUGGESTION:  Acute on chronic diastolic CHF  Wheezing likely cardiac secondary to pulmonary vascular congestion  Mild pulmonary hypertension  Diarrhea probably secondary to colitis. C. difficile negative  Leg edema    Patient is on diuretic therapy with Lasix 40 mg twice daily. She is still having wheezing but less than yesterday. 2D echo results noted. Mild pulmonary hypertension. Diarrhea likely due to colitis. C. difficile is negative. .  Bibasilar infiltration likely edema. Procalcitonin is within normal range, nebulizer with albuterol 4 times daily as needed. Will follow. NOTE: This report was transcribed using voice recognition software. Every effort was made to ensure accuracy; however, inadvertent computerized transcription errors may be present.       Electronically signed by Maxine Terrell MD, FCCP on 7/19/2022 at 4:42 PM

## 2022-07-19 NOTE — CONSULTS
[address] [facility] Consult Note        Date of Consult:   2022    Patient:    Nathan Knapp    :    1945  CSN:    [id number]    Consulting Cardiologist:  Dr. Raegan Hernandez APRN-CNP     Primary Cardiologist:  Dr. Raegan Carlos MD    Requesting Physician:   Ricky Mercedes MD      Reason for Consult:   CHF      Assessment:    Shortness of breath  Edema: Bilateral lower extremity edema  Cough, R/O infection  Diarrhea  Headaches  Abdominal Pain with N/V: CT of abdomen showed questionable findings of colitis. Small amount of free fluid in pelvis. Abnormal CXR with Infiltrates vs edema. R/O infection / other. Possible pulmonary edema. Possible CHF, Acute, History of diastolic dysfunction, mild. Normal LV systolic function, LVEF 84%. Hypertension  Elevated D-dimer: 2.61  CT of chest negative for Pulmonary embolism  Elevated ALT:  ALT 41  Hematuria:  Urology consulted. CAD in native artery: Mild CAD only on Cardiac CTA 1/15. Ca Score 41. History of abnormal Lexiscan with mild apical ischemia  Hyperlipidemia: Triglycerides 156  GERD  History of sinus tachycardia/wide-complex tachycardia  History vitamin D deficiency  Otherwise as prior and below. Plan:    Cardiac Supportive Care  Serial Labs, R/O ACS, Infection  Monitor on telemetry   Continue current medications as tolerated. Hydration with Diuresis. Medicine work up and treatment. Pulmonary consulted  Urology consulted  Further Recommendations  to follow. See orders. HISTORY OF PRESENT ILLNESS:      Nathan Knapp is a pleasant 64 y.o. female  with a PMH of HTN, HLD, CAD, diastolic dysfunction, GERD that presented to [facility] ER 2022 with CCO bilateral ankle swelling, wheezing, diarrhea and nausea. She stated that she called her doctors office who advised her to go to the hospital for further evaluation. EKG showed SR HR 95 bpm. Chest x-ray showed bibasilar infiltrates or edema.   Abnormal labs; K + 5.1, Bun/creat 36/1.06, GFR 50.1, BNP 3990, Triglycerides 156, ALT 41, D-dimer  2.61 with CT of chest negative for pulmonary embolism. She was admitted for further evaluation. We were asked to see her for cardiac consult for CHF. She states that she was short of breath, and had lower extremity edema, nausea and diarrhea. She thought she had COVID but has tested negative. She states that her breathing and edema has improved some with the Lasix IV but she continues to have diarrhea and abdominal upset. Patient Follows with Dr. Andi Ferrara MD     Patient History and Records, EMR reviewed. Patient Interviewed and examined. Denies CP, SOB, LH, Dizziness, TIA or CVA Symptoms. No Orthopnea, Edema or CHF symptoms. No Palpitations. No Syncope. No Fever, Chills or Cold symptoms. No GI,  or Bleeding complaints. Cardiac and general ROS otherwise negative. [address] otherwise negative other than noted. Past Medical History:   Diagnosis Date    Hypertension        Past Surgical History:   Procedure Laterality Date    CATARACT REMOVAL Bilateral     HERNIA REPAIR      HYSTERECTOMY, TOTAL ABDOMINAL (CERVIX REMOVED)         Prior to Admission medications    Medication Sig Start Date End Date Taking? Authorizing Provider   spironolactone (ALDACTONE) 50 MG tablet Take 50 mg by mouth in the morning. Yes Historical Provider, MD   pantoprazole (PROTONIX) 40 MG tablet Take 40 mg by mouth in the morning. Yes Historical Provider, MD   amLODIPine (NORVASC) 10 MG tablet Take 10 mg by mouth in the morning. Yes Historical Provider, MD   metoprolol (LOPRESSOR) 100 MG tablet Take 100 mg by mouth in the morning and 100 mg before bedtime.    Yes Historical Provider, MD   atorvastatin (LIPITOR) 20 MG tablet Take 20 mg by mouth nightly   Yes Historical Provider, MD       Scheduled Meds:   sodium chloride flush  5-40 mL IntraVENous 2 times per day    enoxaparin  40 mg SubCUTAneous Daily    furosemide  40 mg IntraVENous BID    atorvastatin  20 mg Oral Nightly    spironolactone  50 mg Oral Daily    pantoprazole  40 mg Oral Daily    metoprolol  100 mg Oral BID    sodium zirconium cyclosilicate  10 g Oral TID    lisinopril  20 mg Oral Daily     Continuous Infusions:   sodium chloride       PRN Meds:sodium chloride flush, sodium chloride, ondansetron **OR** ondansetron, polyethylene glycol, acetaminophen **OR** acetaminophen    Allergies   Allergen Reactions    Norco [Hydrocodone-Acetaminophen]        Social History     Socioeconomic History    Marital status:      Spouse name: Not on file    Number of children: Not on file    Years of education: Not on file    Highest education level: Not on file   Occupational History    Not on file   Tobacco Use    Smoking status: Never    Smokeless tobacco: Never   Substance and Sexual Activity    Alcohol use: Not Currently    Drug use: Not on file    Sexual activity: Not on file   Other Topics Concern    Not on file   Social History Narrative    Not on file     Social Determinants of Health     Financial Resource Strain: Not on file   Food Insecurity: Not on file   Transportation Needs: Not on file   Physical Activity: Not on file   Stress: Not on file   Social Connections: Not on file   Intimate Partner Violence: Not on file   Housing Stability: Not on file       No family history on file. Review Of Systems:    14 point ROS negative other than mentioned. Physical Exam:    CURRENT VITALS: BP (!) 156/86   Pulse 94   Temp 97.9 °F (36.6 °C) (Oral)   Resp 18   Ht 5' 2\" (1.575 m)   Wt 151 lb (68.5 kg)   SpO2 97%   BMI 27.62 kg/m²     CONSTITUTIONAL:  awake, alert, cooperative, no apparent distress,   ENT:  Normocephalic, without obvious abnormality, atraumatic, sinuses nontender on palpation, external ears without lesions,  NECK:  Supple, symmetrical, trachea midline, no adenopathy, thyroid symmetric, not enlarged and no tenderness, skin normal, No bruits.   LUNGS:  No mg/dL    Alkaline Phosphatase 99 40 - 130 U/L    ALT 41 (H) 0 - 33 U/L    AST 30 0 - 35 U/L    Globulin 2.2 (L) 2.3 - 3.5 g/dL   Lipase    Collection Time: 07/18/22 10:25 AM   Result Value Ref Range    Lipase 41 12 - 95 U/L   Troponin    Collection Time: 07/18/22 10:25 AM   Result Value Ref Range    Troponin <0.010 0.000 - 0.010 ng/mL   Brain Natriuretic Peptide    Collection Time: 07/18/22 10:25 AM   Result Value Ref Range    Pro-BNP 3,990 pg/mL   Protime-INR    Collection Time: 07/18/22 10:26 AM   Result Value Ref Range    Protime 12.4 12.3 - 14.9 sec    INR 0.9    APTT    Collection Time: 07/18/22 10:26 AM   Result Value Ref Range    aPTT 20.0 (L) 24.4 - 36.8 sec   D-Dimer, Quantitative    Collection Time: 07/18/22 10:26 AM   Result Value Ref Range    D-Dimer, Quant 2.61 (HH) 0.00 - 0.50 mg/L FEU   COVID-19, Rapid    Collection Time: 07/18/22 10:28 AM    Specimen: Nasopharyngeal Swab   Result Value Ref Range    SARS-CoV-2, NAAT Not Detected Not Detected   Rapid Influenza A/B Antigens    Collection Time: 07/18/22 10:28 AM    Specimen: Nasopharyngeal   Result Value Ref Range    Influenza A by PCR Negative     Influenza B by PCR Negative    Urinalysis with Reflex to Culture    Collection Time: 07/18/22 10:30 AM    Specimen: Urine   Result Value Ref Range    Color, UA Yellow Straw/Yellow    Clarity, UA Clear Clear    Glucose, Ur Negative Negative mg/dL    Bilirubin Urine Negative Negative    Ketones, Urine Negative Negative mg/dL    Specific Gravity, UA 1.020 1.005 - 1.030    Blood, Urine LARGE (A) Negative    pH, UA 5.5 5.0 - 9.0    Protein, UA >=300 (A) Negative mg/dL    Urobilinogen, Urine 0.2 <2.0 E.U./dL    Nitrite, Urine Negative Negative    Leukocyte Esterase, Urine Negative Negative    Urine Reflex to Culture Yes    Microscopic Urinalysis    Collection Time: 07/18/22 10:30 AM   Result Value Ref Range    Hyaline Casts, UA 1-3 0 - 5 /LPF    Renal Epithelial, UA 0-2 (A) /HPF    Bacteria, UA Negative Negative /HPF    Hyaline Casts, UA 10-20 0 - 5 /HPF    WBC, UA 10-20 (A) 0 - 5 /HPF    RBC, UA >100 (H) 0 - 5 /HPF    Epithelial Cells, UA 3-5 0 - 5 /HPF   Iron and TIBC    Collection Time: 22  4:25 PM   Result Value Ref Range    Iron 52 37 - 145 ug/dL    TIBC 292 250 - 450 ug/dL    Iron Saturation 18 (L) 20 - 55 %    UIBC 240 112 - 347 ug/dL   Basic Metabolic Panel    Collection Time: 22  4:49 AM   Result Value Ref Range    Sodium 138 135 - 144 mEq/L    Potassium 4.6 3.4 - 4.9 mEq/L    Chloride 110 (H) 95 - 107 mEq/L    CO2 16 (L) 20 - 31 mEq/L    Anion Gap 12 9 - 15 mEq/L    Glucose 87 70 - 99 mg/dL    BUN 29 (H) 8 - 23 mg/dL    CREATININE 1.02 (H) 0.50 - 0.90 mg/dL    GFR Non-African American 52.3 (L) >60    GFR  >60.0 >60    Calcium 8.5 8.5 - 9.9 mg/dL   Magnesium    Collection Time: 22  4:49 AM   Result Value Ref Range    Magnesium 2.6 (H) 1.7 - 2.4 mg/dL   Lipid panel - fasting    Collection Time: 22  4:49 AM   Result Value Ref Range    Cholesterol, Total 96 0 - 199 mg/dL    Triglycerides 156 (H) 0 - 150 mg/dL    HDL 36 (L) 40 - 59 mg/dL    LDL Calculated 29 0 - 129 mg/dL   EKG 12 Lead    Collection Time: 22  8:35 AM   Result Value Ref Range    Ventricular Rate 95 BPM    Atrial Rate 95 BPM    P-R Interval 182 ms    QRS Duration 76 ms    Q-T Interval 372 ms    QTc Calculation (Bazett) 467 ms    P Axis 60 degrees    R Axis 24 degrees    T Axis 46 degrees       [id number]   Normal sinus rhythm  HR 95 bpm   Normal ECG     ECHO:     Normal left ventricle structure and function. Left ventricular ejection fraction is visually estimated at 75%. Moderate concentric LVH    Borderline diastolic dysfunction    There is DUST (discrete upper septal thickening) without evidence of    outflow tract obstruction. Normal right ventricle structure and function. Right ventricular systolic pressure of 37 mm Hg consistent with mild    pulmonary hypertension.     Normal mitral valve structure and function. 1+ MR    MAC    Normal aortic valve structure and function. Mild aortic sclerosis    Normal tricuspid valve structure and function. Mild tricuspid regurgitation. Mildly dilated left atrium.       ========================================    [address] [facility] [facility] Office Note 9/1/2021  Subjective:   09/01/2021 - [place] Lemons was seen in cardiac evaluation at the [facility] office 09/01/2021. The patients problems are listed in the impression below. Electronic medical records reviewed. Patient returns. She states that she is quite active. She walks 7 to 8 miles per day. She has no symptoms of. No cardiovascular complaints. Patient denies Chest Pain, SOB, Lightheadedness, Dizziness, TIA or CVA symptoms. No CHF or Edema. No Palpitations. No GI,  or Bleeding Issues. No Recent Fever or Chills. Cardiovascular and general review of systems is otherwise negative. A 14-system review is otherwise negative, other than noted. ELECTROCARDIOGRAM: Sinus rhythm rate 83. No acute changes. CARDIAC TESTING: None    LABORATORY DATA: Chem-7, CBC, liver function days normal except for glucose 158. Hemoglobin A1c 6.3. TSH normal. Magnesium 1.9. Cholesterol 134, triglyceride 119, HDL 59, LDL 51. Otherwise as noted below. All above testing was personally reviewed. PHYSICAL EXAMINATION:   General: No acute distress. Vital signs as noted. Alert and oriented. Head And Neck Examination: No jugular venous distention, no carotid  bruits, no mass. Carotid upstrokes preserved. Oral mucosa moist.   No xanthelasma. Head and neck examination otherwise unremarkable. Lungs: Clear to auscultation and percussion. No wheezes, no rales,   and no rhonchi. Chest: Excursion appeared to be normal. No chest wall tenderness on  palpation. Heart: Normal S1 and S2. No S3. No S4. No rub. Grade 1/6  systolic murmur, best heard at the left sternal border.  Point of  maximal impulse was within normal limits. Abdomen: Soft. Nontender. No organomegaly. No bruits. No masses. Obese. Extremities: No bipedal edema. No clubbing. No cyanosis. Pulses are strong throughout. No bruits. Musculoskeletal Exam: No ulcers, otherwise unremarkable. Neuro: Neurologically appeared grossly intact. IMPRESSION:     Cardiovascular status stable  Asymptomatic  Palpitations, resolved. Chest pain, atypical but concerning given below history, resolved  Wide complex tachycardia  Inappropriate sinus tachycardia  Coronary artery disease, mild by EKG a January 2015 (calcium score 41)  Normal upper function ejection fraction 53%  Diastolic dysfunction, stage I  Negative Lexiscan perfusion study 11/2020, LVEF 78%. Mild mitral and tricuspid regurgitation. Hypertension  Hyperlipidemia  Reflux disease  Nonsmoker  Degenerative joint disease  Otherwise as per assessment below. RECOMMENDATIONS:     She will continue her current medications. Refills were provided. I have encouraged her to continue her exercise walking program. Hydration. Follow my health portal was encouraged. We will plan to see back in 1 year with Laboratory Studies and ECG as noted below. Patient will follow up with their primary physician for general care. The patient knows to contact medical care earlier if need be. Active Problems   1. Abnormal ECG (794.31) (R94.31)   2. Abnormal results of cardiovascular function studies (794.30) (R94.30)   abnormal Lesiscan stress with mild apical ischemia 12/14   3. CAD in native artery (414.01) (I25.10)   Mild CAD only on Cardiac CTA 1/15. Ca Score 41.   4. Cervical radiculopathy (723.4) (M54.12)   5. Cervical spondylolysis (756.19) (M43.02)   · Xray 5/2015 C3-4 throgh C6-C7   6. Diastolic dysfunction (785.7) (I51.89)   stage 1   7. GERD (gastroesophageal reflux disease) (530.81) (K21.9)   8. HTN (hypertension) (401.9) (I10)   9. Hyperlipidemia (272.4) (E78.5)   10.  Inappropriate sinus tachycardia (427.89) (R00.0)   11. Never a smoker   12. Normal colonoscopy   · Dr. Claudell Goodnight 10.15.2013   13. Papanicolaou smear (V76.2) (Z12.4)   · Dr. Jason Mcbride ([facility])   14. Vitamin D deficiency (268.9) (E55.9)   15. Wide-complex tachycardia (427.1) (I47.2)    Family History   1. Family history of hypertension (V17.49) (Z82.49) : Mother, Sister    Social History   · Caffeine use (V49.89) (Z78.9)   · Never a smoker   · No alcohol use   · No illicit drug use    Current Meds   1. amLODIPine Besylate 10 MG Oral Tablet; take 1 tablet by mouth once daily; Therapy: 08Apr2019 to ([facility])  Requested for: 65Pqv8016; Last   Rx:[id number] Ordered   2. Aspirin EC Low Dose 81 MG Oral Tablet Delayed Release; 1 TABLET DAILY; Therapy: 55XAW4166 to (Ernestine Traore)  Requested for: 48Vcv9799; Last   Rx:[id number] Ordered   3. Atorvastatin Calcium 20 MG Oral Tablet; take 1 tablet by mouth once daily at bedtime; Therapy: 11SGD3887 to ([facility])  Requested for: Feroz Freeman; Last   Rx:[id number] Ordered   4. Losartan Potassium 50 MG Oral Tablet; 1 tablet twice a day; Therapy: 04Wsi2065 to (Rigoberto Risk)  Requested for: 79Wna2124; Last   Rx:[id number] Ordered   5. Metoprolol Tartrate 100 MG Oral Tablet; take 1 tablet by mouth twice daily; Therapy: 32TXJ2119 to ([facility])  Requested for: Feroz Freeman; Last   Rx:[id number] Ordered   6. Omega 3 1000 MG Oral Capsule; TAKE 1 TABLET WHEN REMEMBERS; Therapy: (Recorded:01Mar2021) to Recorded   7. Pantoprazole Sodium 40 MG Oral Tablet Delayed Release; TAKE 1 TABLET DAILY; Therapy: 59FDW6876 to (Rigoberto Risk)  Requested for: 69Cjs9864; Last   Rx:[id number] Ordered   8.  Spironolactone 50 MG Oral Tablet; TAKE 1 TABLET DAILY  Requested for: 14UGU7933;   Last Gracia Cuellar MD, [address] [facility] / [address] [place] [address], APRN - CNP  [address] [facility]      Electronically signed on 7/19/22 at 8:58 AM

## 2022-07-19 NOTE — PROGRESS NOTES
Pt resting in bed most of the day. Pt able to get oob and ambulate to bathroom well. Pts  at bedisde throughout the day. Iv restarted due to infiltrated site, pt tolerated well. Iv fluids initiated per order. Pt states she had two loose stools this am but none since this morning, and pt has been without complaints this shift.

## 2022-07-19 NOTE — PROGRESS NOTES
No changes from previous assessment. Pt asleep, call light in reach and will continue to monitor.  Ginger Skelton RN

## 2022-07-19 NOTE — CARE COORDINATION
Definition of CHF discussed with patient. Symptoms of heart failure and decompensation reviewed: weight gain of >3 #, edema, difficulty breathing, cough, issues with appetite, fatigue, or difficulty with sleep. Common causes of CHF reviewed: CAD, arrhythmias, MI, HTN, valve dz., infection,  ETOH or drug abuse, or genetic abnormalities. Importance of daily weight and B/P monitoring discussed. Pt to use a calender or notebook to record daily weight and call physician immediately with 3 # weight gain. Low sodium diet and fluid intake discussed. Pt taught about a fluid restriction and advised to discuss this with the cardiologist prior to limiting oral intake. Shown how to read labels for sodium levels, recommended food list provided. I emphasized the importance of following their physician's orders for medication administration. Importance of flu and pneumonia vaccinations reinforced. Common CHF medications reviewed as well as avoiding certain other meds (decongestants, NSAIDS)  Instructed to discuss activity recommendations with physician. Pt. denies smoking. Sample CHF weight documentation form provided. CHF Zones discussed. Importance of staying in \"green\" area stressed. Pt verbalized understanding to call MD ASAP when she reaches the yellow zone, and to call 911 when reaching the red zone. Booklet and zone pamphlet provided to the pt. Patient denies any further questions at this time.    Electronically signed by Daysi Bowling RN on 7/19/2022 at 11:46 AM

## 2022-07-19 NOTE — PROGRESS NOTES
Nightly    spironolactone  50 mg Oral Daily    pantoprazole  40 mg Oral Daily    metoprolol  100 mg Oral BID    sodium zirconium cyclosilicate  10 g Oral TID    lisinopril  20 mg Oral Daily       PRN Meds:sodium chloride flush, sodium chloride, ondansetron **OR** ondansetron, polyethylene glycol, acetaminophen **OR** acetaminophen    REVIEW OF SYSTEMS:  As in history of present illness  Other 14 point review of system is negative. PHYSICAL EXAM:    Vitals:  BP (!) 176/80   Pulse 92   Temp 97.3 °F (36.3 °C) (Oral)   Resp 20   Ht 5' 2\" (1.575 m)   Wt 146 lb 12.8 oz (66.6 kg)   SpO2 93%   BMI 26.85 kg/m²   General:   Alert, awake, Oriented x3  .comfortable in bed, No distress. Head: Atraumatic , Normocephalic   Eyes: PERRL. No sclera icterus. No conjunctival injection. No discharge   ENT: No nasal  discharge. Pharynx clear. Neck:  Trachea midline. No thyromegaly, no JVD, No cervical adenopathy. Chest : Bilaterally symmetrical ,Normal effort,  No accessory muscle use  Lung : Diminished BS bilateraly. No Rales. Few scattered wheezing. No rhonchi. Heart[de-identified] Normal  rate. Regular rhythm. No mumur ,  Rub or gallop  ABD: Non-tender. Non-distended. No masses. No organmegaly. Normal bowel sounds. No hernia. Musculoskeleton: normal range of motion in all extremites, strength and tone  Extremities: 1+ pitting in both lower leg , No Cyanosis ,No clubbing  Neuro: no cranial nerve abnormality, normal reflex and sensation, no focal weakness   Skin: Warm and dry. No erythema rash on exposed extremities. Data Review  Recent Labs     07/18/22  1025   WBC 8.7   HGB 12.1   HCT 35.8*         Recent Labs     07/18/22  1025      K 5.1*   *   CO2 16*   BUN 36*   CREATININE 1.06*   GLUCOSE 140*       MV Settings: ABGs: No results for input(s): PHART, VVJ2AGT, PO2ART, VXO5ODO, BEART, U6BKVHSJ, UCD0XGX in the last 72 hours.   O2 Device: None (Room air)  No results found for: [facility]    Radiology  CTA CHEST W WO CONTRAST    Result Date: 7/18/2022  CT PULMONARY ANGIOGRAM WITH INTRAVENOUS CONTRAST MEDIUM. REASON FOR EXAMINATION:  CHEST PRESSURE, WHEEZING, LEG SWELLING TECHNIQUE: Helical CTA was performed through the chest utilizing 75 mL Isovue 300 intravenous contrast.  Images were obtained with bolus tracking in order to opacify the pulmonary arteries. Thick section coronal MIP 3D reconstructions were performed on a separate workstation. Study done in conjunction with CT abdomen pelvis, reported separately. COMPARISON:none FINDINGS:  Pulmonary arteries: No intraluminal filling defects. Thoracic aorta: Normal in course and caliber. Cardiac Size: Upper limits of normal. Pericardial effusion: None. Right lung: No nodules, or masses. Small right pleural effusion. Dependent subsegmental atelectatic change. Left lung: No nodules, or masses. Small left pleural effusion. Dependent subsegmental atelectatic change. Lymph nodes: No hilar, mediastinal, or axillary lymph node enlargement. Upper abdomen:Limited imaging upper abdomen shows no gross anomaly. Musculoskeletal:No osteoblastic, and no osteolytic lesions. No CT evidence pulmonary embolism. Borderline cardiomegaly. Small bilateral pleural effusions with bilateral dependent subsegmental atelectatic change. All CT scans at this facility use dose modulation, iterative reconstruction, and/or weight based dosing when appropriate to reduce radiation dose to as low as reasonably achievable. CT ABDOMEN PELVIS W IV CONTRAST Additional Contrast? None    Result Date: 7/18/2022  EXAMINATION: CT ABDOMEN PELVIS W IV CONTRAST DATE AND TIME:7/18/2022 12:29 PM CLINICAL HISTORY: Acute abdominal pain. Epigastric pain. abd pain, diarrhea  COMPARISON: July 16, 2014 TECHNIQUE: Contiguous axial CT sections of the abdomen and pelvis. 100 cc's of IV contrast given. .  All CT scans at this facility use dose modulation, iterative reconstruction, and/or weight grossly intact     BIBASILAR INFILTRATES OR (LESS LIKELY) EDEMA, LEFT GREATER THAN RIGHT. Assessment and  plan:     Wheezing/bronchospasm secondary to pulmonary vascular congestion  Acute congestive heart failure, fluid overload  Leg edema secondary to above  Hypertension    Patient has bilateral lower extremity edema with shortness of breath and most likely acute congestive heart failure. Bronchospasm due to pulmonary vascular congestion. No history of asthma in the past no history of smoking. Currently on Lasix 40 mg IV twice daily. Chest x-ray and CT chest reviewed. Nebulizer with albuterol 4 times daily as needed for wheezing and shortness of breath. O2 as needed to keep saturation 92% or above. If continue to have wheezing and shortness of breath consider PFT as outpatient. We will follow. Thank you for consult    NOTE: This report was transcribed using voice recognition software. Every effort was made to ensure accuracy; however, inadvertent computerized transcription errors may be present.     Electronically signed by Jo-Ann Powell MD, FCCP on 7/18/2022 at 8:41 PM

## 2022-07-20 VITALS
BODY MASS INDEX: 29.44 KG/M2 | HEIGHT: 62 IN | DIASTOLIC BLOOD PRESSURE: 72 MMHG | WEIGHT: 160 LBS | RESPIRATION RATE: 18 BRPM | SYSTOLIC BLOOD PRESSURE: 163 MMHG | TEMPERATURE: 98.4 F | OXYGEN SATURATION: 97 % | HEART RATE: 85 BPM

## 2022-07-20 PROBLEM — R19.7 DIARRHEA OF PRESUMED INFECTIOUS ORIGIN: Status: ACTIVE | Noted: 2022-07-20

## 2022-07-20 LAB
ANION GAP SERPL CALCULATED.3IONS-SCNC: 12 MEQ/L (ref 9–15)
BUN BLDV-MCNC: 27 MG/DL (ref 8–23)
CALCIUM SERPL-MCNC: 8.8 MG/DL (ref 8.5–9.9)
CHLORIDE BLD-SCNC: 109 MEQ/L (ref 95–107)
CO2: 19 MEQ/L (ref 20–31)
CREAT SERPL-MCNC: 1.03 MG/DL (ref 0.5–0.9)
GFR AFRICAN AMERICAN: >60
GFR NON-AFRICAN AMERICAN: 51.7
GLUCOSE BLD-MCNC: 102 MG/DL (ref 70–99)
POTASSIUM SERPL-SCNC: 4.9 MEQ/L (ref 3.4–4.9)
SODIUM BLD-SCNC: 140 MEQ/L (ref 135–144)

## 2022-07-20 PROCEDURE — 80048 BASIC METABOLIC PNL TOTAL CA: CPT

## 2022-07-20 PROCEDURE — 6370000000 HC RX 637 (ALT 250 FOR IP): Performed by: INTERNAL MEDICINE

## 2022-07-20 PROCEDURE — 99232 SBSQ HOSP IP/OBS MODERATE 35: CPT | Performed by: INTERNAL MEDICINE

## 2022-07-20 PROCEDURE — 36415 COLL VENOUS BLD VENIPUNCTURE: CPT

## 2022-07-20 PROCEDURE — 6360000002 HC RX W HCPCS: Performed by: INTERNAL MEDICINE

## 2022-07-20 PROCEDURE — 2580000003 HC RX 258: Performed by: INTERNAL MEDICINE

## 2022-07-20 PROCEDURE — 99222 1ST HOSP IP/OBS MODERATE 55: CPT | Performed by: INTERNAL MEDICINE

## 2022-07-20 RX ORDER — LOPERAMIDE HYDROCHLORIDE 2 MG/1
2 CAPSULE ORAL 4 TIMES DAILY PRN
Qty: 40 CAPSULE | Refills: 0 | Status: SHIPPED | OUTPATIENT
Start: 2022-07-20 | End: 2022-07-30

## 2022-07-20 RX ORDER — AMLODIPINE BESYLATE 5 MG/1
5 TABLET ORAL 2 TIMES DAILY
Qty: 30 TABLET | Refills: 3 | Status: SHIPPED | OUTPATIENT
Start: 2022-07-20 | End: 2022-08-10

## 2022-07-20 RX ORDER — AMLODIPINE BESYLATE 5 MG/1
5 TABLET ORAL 2 TIMES DAILY
Status: DISCONTINUED | OUTPATIENT
Start: 2022-07-20 | End: 2022-07-20 | Stop reason: HOSPADM

## 2022-07-20 RX ORDER — LISINOPRIL 20 MG/1
20 TABLET ORAL DAILY
Qty: 30 TABLET | Refills: 3 | Status: SHIPPED | OUTPATIENT
Start: 2022-07-21 | End: 2022-08-10

## 2022-07-20 RX ORDER — FUROSEMIDE 20 MG/1
20 TABLET ORAL DAILY
Qty: 30 TABLET | Refills: 1 | Status: ON HOLD | OUTPATIENT
Start: 2022-07-20 | End: 2022-10-15 | Stop reason: HOSPADM

## 2022-07-20 RX ORDER — LOPERAMIDE HYDROCHLORIDE 2 MG/1
2 CAPSULE ORAL 4 TIMES DAILY PRN
Status: DISCONTINUED | OUTPATIENT
Start: 2022-07-20 | End: 2022-07-20 | Stop reason: HOSPADM

## 2022-07-20 RX ADMIN — FUROSEMIDE 40 MG: 10 INJECTION, SOLUTION INTRAMUSCULAR; INTRAVENOUS at 08:12

## 2022-07-20 RX ADMIN — PANTOPRAZOLE SODIUM 40 MG: 40 TABLET, DELAYED RELEASE ORAL at 08:21

## 2022-07-20 RX ADMIN — SPIRONOLACTONE 50 MG: 50 TABLET ORAL at 08:21

## 2022-07-20 RX ADMIN — METOPROLOL TARTRATE 100 MG: 50 TABLET ORAL at 08:20

## 2022-07-20 RX ADMIN — SODIUM CHLORIDE, PRESERVATIVE FREE 10 ML: 5 INJECTION INTRAVENOUS at 08:22

## 2022-07-20 RX ADMIN — AMLODIPINE BESYLATE 5 MG: 5 TABLET ORAL at 11:13

## 2022-07-20 RX ADMIN — LISINOPRIL 20 MG: 20 TABLET ORAL at 08:21

## 2022-07-20 RX ADMIN — ENOXAPARIN SODIUM 40 MG: 100 INJECTION SUBCUTANEOUS at 08:21

## 2022-07-20 NOTE — CARE COORDINATION
MET WITH PATIENT, FOC OFFERED. STATES FROM HOME. PLAN IS TO RETURN . PATIENT MAY BENEFIT FROM ONGOING CHF EDUCATION AS PATIENT STATES \"IM CONFUSED ABOUT WHATS WRONG WITH ME\" ENCOURAGED PATIENT TO CONTINUE TO READ HAND OUT PROVIDED PER HF NURSE.

## 2022-07-20 NOTE — PROGRESS NOTES
Nutrition Education      Met with pt to review low sodium guidelines for CHF. Reviewed 2,000 mg sodium limit with examples of foods to avoid as well as foods to choose more often. Reviewed nutrition label reading for comparing foods for best choices and identifying lower sodium foods. Discussed tips for dining out and ways to flavor foods without salt. Recipes for salt-free seasoning blends provided. All questions answered. Pt encouraged to contact RD for further questions/assistance prior to discharge, contact information provided     Educated on 2000 mg sodium restriction for CHF  Learners: Patient  Readiness: Acceptance  Method: Explanation and Handout  Response: Verbalizes Understanding  Contact name and number provided.     Flor Valente, NASREEN, LD

## 2022-07-20 NOTE — CONSULTS
Inpatient consult to GI  Consult performed by: Brian Goel MD  Consult ordered by: Anita Woods MD      Patient Name: Tiny Page Date: 2022  9:48 AM  MR #: [id number]  : 1945    Attending Physician: Anita Woods MD  Reason for consult: persistent diarrhea  History Obtained From:  patient, electronic medical record, staff nurse  History of Presenting Illness:      Tiffanie Mancilla is a 64 y.o. female on hospital day 2 with PMH HTN. Past surgical history of hysterectomy, hernia repair. Admitted with acute on chronic diastolic CHF. GI consulted for persistent diarrhea. Patient reports presenting to the hospital due to lower extremity swelling and shortness of breath. She reports on  she began experiencing nausea along with diarrhea. She states she began experiencing yellow/watery diarrhea approximately 5-6 times per day since , denies hematochezia, melena or abdominal pain. She reports history of GERD for which she takes omeprazole 20 mg over-the-counter which helps her symptoms. She reports prior to  she would have a formed bowel movement in the morning with possibly one loose stool within the hour afterwards and then she would have no further bowel movements for the day. She denies vomiting, hematemesis, dysphagia. She is a non-smoker, denies alcohol or illicit drug use, denies NSAID use. States she takes Tylenol as needed. She denies taking a blood thinner or aspirin. She denies fever/chills or new skin rashes. She reports nausea has subsided, however diarrhea has continued, states she has had approximately 4 loose stools since this morning.   On admit, WBCs 8.7, Hgb 12.1, MCV 83.8, platelets 985, sodium 136, potassium 5.1, BUN 36, creatinine 1.06, ALT 41, AST 30, total bilirubin 0.8, lipase 41, INR 0.9  Preliminary blood cultures negative  2022, iron levels normal with decreased saturation (likely chronic) GI panel and C. difficile stool negative, sed (PROTONIX) 40 MG tablet, Take 40 mg by mouth in the morning. amLODIPine (NORVASC) 10 MG tablet, Take 10 mg by mouth in the morning. metoprolol (LOPRESSOR) 100 MG tablet, Take 100 mg by mouth in the morning and 100 mg before bedtime. atorvastatin (LIPITOR) 20 MG tablet, Take 20 mg by mouth nightly  Current Hospital Medications:   Scheduled Meds:   amLODIPine  5 mg Oral BID    sodium chloride flush  5-40 mL IntraVENous 2 times per day    enoxaparin  40 mg SubCUTAneous Daily    furosemide  40 mg IntraVENous BID    atorvastatin  20 mg Oral Nightly    spironolactone  50 mg Oral Daily    pantoprazole  40 mg Oral Daily    metoprolol  100 mg Oral BID    lisinopril  20 mg Oral Daily     Continuous Infusions:   sodium chloride       PRN Meds:. loperamide, albuterol, sodium chloride flush, sodium chloride, ondansetron **OR** ondansetron, polyethylene glycol, acetaminophen **OR** acetaminophen   sodium chloride        Allergies: Allergies   Allergen Reactions    Norco [Hydrocodone-Acetaminophen]       Review of Systems:    [x] CV, Resp, Neuro, , and all other systems reviewed and negative other than listed in HPI. Objective Findings:     Vitals:   Vitals:    07/19/22 2119 07/20/22 0443 07/20/22 0820 07/20/22 1113   BP: (!) 182/82  (!) 174/69 (!) 174/69   Pulse: 94  87    Resp:       Temp:       TempSrc:       SpO2:       Weight:  160 lb (72.6 kg)     Height:            Physical Examination:  General: Alert, oriented, in no acute distress  HEENT: Normocephalic, no scleral icterus. Neck: Soft/supple  Heart: Regular, no murmur, no rub/gallop. Lungs: Clear to ascultation, no rales/wheezing/rhonchi. Equal chest wall excursion. Abdomen: Abdomen soft, non-tender. BS normal. No masses,  No organomegaly. + surgical car  Extremities: No clubbing/cyanosis, no edema. Skin: Warm, dry, normal turgor, no rash, no bruise, no petichiae.   Neuro: without focal deficit, moves all extremities   Psych: normal affect    Results/ Medications reviewed 7/20/2022, 11:31 AM     Laboratory, Microbiology, Pathology, Radiology, Cardiology, Medications and Transcriptions reviewed  Scheduled Meds:   amLODIPine  5 mg Oral BID    sodium chloride flush  5-40 mL IntraVENous 2 times per day    enoxaparin  40 mg SubCUTAneous Daily    furosemide  40 mg IntraVENous BID    atorvastatin  20 mg Oral Nightly    spironolactone  50 mg Oral Daily    pantoprazole  40 mg Oral Daily    metoprolol  100 mg Oral BID    lisinopril  20 mg Oral Daily     Continuous Infusions:   sodium chloride         Recent Labs     07/18/22  1025 07/19/22  1428   WBC 8.7 5.2   HGB 12.1 10.6*   HCT 35.8* 30.9*   MCV 83.8 83.6    166     Recent Labs     07/19/22  0449 07/19/22  1428 07/20/22  0454    140 140   K 4.6 4.4 4.9   * 108* 109*   CO2 16* 21 19*   BUN 29* 28* 27*   CREATININE 1.02* 1.17* 1.03*     Recent Labs     07/18/22  1025   AST 30   ALT 41*   BILITOT 0.8*   ALKPHOS 99     Recent Labs     07/18/22  1025   LIPASE 41     Recent Labs     07/18/22  1025 07/18/22  1026   PROT 6.6  --    INR  --  0.9     Echocardiogram complete 2D with doppler with color    Result Date: 7/19/2022  Transthoracic Echocardiography Report (TTE)  Demographics   Patient Name     Elva Bachelor Gender                Female   Patient Number   [id number]        Race                  Other                                    Ethnicity              or    Visit Number     [id number]       Room Number           O840   Corporate ID                     Date of Study         07/19/2022   Accession Number [id number]      Referring Physician   Date of Birth    1945      Sonographer           Emily Wills   Age              64 year(s)      Interpreting          [address]                                   Physician             Ashley Traylor MD  Procedure Type of Study   TTE procedure:ECHO COMPLETE 2D W/DOP W/COLOR.   Procedure Date Date: 07/19/2022 Start: 08:45 AM Study Location: Portable Technical Quality: Adequate visualization Indications:Congestive heart failure. Patient Status: Routine Height: 62 inches Weight: 146 pounds BSA: 1.67 m^2 BMI: 26.7 kg/m^2 BP: 166/67 mmHg  Conclusions   Summary  Normal left ventricle structure and function. Left ventricular ejection fraction is visually estimated at 75%. Moderate concentric LVH  Borderline diastolic dysfunction  There is DUST (discrete upper septal thickening) without evidence of  outflow tract obstruction. Normal right ventricle structure and function. Right ventricular systolic pressure of 37 mm Hg consistent with mild  pulmonary hypertension. Normal mitral valve structure and function. 1+ MR  MAC  Normal aortic valve structure and function. Mild aortic sclerosis  Normal tricuspid valve structure and function. Mild tricuspid regurgitation. Mildly dilated left atrium. Signature   ----------------------------------------------------------------  Electronically signed by Candelario Mcallister MD(Interpreting  physician) on 07/19/2022 12:11 PM  ----------------------------------------------------------------   Findings  Left Ventricle Normal left ventricle structure and function. Left ventricular ejection fraction is visually estimated at 75%. Moderate concentric LVH Borderline diastolic dysfunction There is DUST (discrete upper septal thickening) without evidence of outflow tract obstruction. Right Ventricle Normal right ventricle structure and function. Right ventricular systolic pressure of 37 mm Hg consistent with mild pulmonary hypertension. Left Atrium Mildly dilated left atrium. Right Atrium Normal right atrium. Mitral Valve Normal mitral valve structure and function. 1+ MR MAC Tricuspid Valve Normal tricuspid valve structure and function. Mild tricuspid regurgitation. Aortic Valve Normal aortic valve structure and function. Mild aortic sclerosis Pulmonic Valve Normal pulmonic valve structure and function.  Pericardial Effusion No ml/49 m^2 LV ESV/LV ESV Index: 19.76 ml/12 m^2  EF Calculated: 75.7 %   LVOT Diameter: 1.68 cm   Right Atrium   RA Systolic Pressure: 10 mmHg   Right Ventricle   Diastolic Dimension: 4.77 cm                                    RV Systolic Pressure: 85.44 mmHg  Aorta/ Miscellaneous Aorta   Aortic Root: 2.37 cm  LVOT Diameter: 1.68 cm      CTA CHEST W WO CONTRAST    Result Date: 7/18/2022  CT PULMONARY ANGIOGRAM WITH INTRAVENOUS CONTRAST MEDIUM. REASON FOR EXAMINATION:  CHEST PRESSURE, WHEEZING, LEG SWELLING TECHNIQUE: Helical CTA was performed through the chest utilizing 75 mL Isovue 300 intravenous contrast.  Images were obtained with bolus tracking in order to opacify the pulmonary arteries. Thick section coronal MIP 3D reconstructions were performed on a separate workstation. Study done in conjunction with CT abdomen pelvis, reported separately. COMPARISON:none FINDINGS:  Pulmonary arteries: No intraluminal filling defects. Thoracic aorta: Normal in course and caliber. Cardiac Size: Upper limits of normal. Pericardial effusion: None. Right lung: No nodules, or masses. Small right pleural effusion. Dependent subsegmental atelectatic change. Left lung: No nodules, or masses. Small left pleural effusion. Dependent subsegmental atelectatic change. Lymph nodes: No hilar, mediastinal, or axillary lymph node enlargement. Upper abdomen:Limited imaging upper abdomen shows no gross anomaly. Musculoskeletal:No osteoblastic, and no osteolytic lesions. No CT evidence pulmonary embolism. Borderline cardiomegaly. Small bilateral pleural effusions with bilateral dependent subsegmental atelectatic change. All CT scans at this facility use dose modulation, iterative reconstruction, and/or weight based dosing when appropriate to reduce radiation dose to as low as reasonably achievable.       CT ABDOMEN PELVIS W IV CONTRAST Additional Contrast? None    Result Date: 7/18/2022  EXAMINATION: CT ABDOMEN PELVIS W IV CONTRAST DATE AND TIME:7/18/2022 12:29 PM CLINICAL HISTORY: Acute abdominal pain. Epigastric pain. abd pain, diarrhea  COMPARISON: July 16, 2014 TECHNIQUE: Contiguous axial CT sections of the abdomen and pelvis. 100 cc's of IV contrast given. .  All CT scans at this facility use dose modulation, iterative reconstruction, and/or weight based dosing when appropriate to reduce radiation dose to as low as reasonably achievable. Some of this report was completed using  [id number] and may include unintended errors with respect to translation of words, typographical errors or grammatical errors which may not have been identified prior to the finalization of this report. FINDINGS   Liver: Negative    Spleen: Negative   Gallbladder: No calcified gallstones. Normal gallbladder wall. No pericholecystic fluid. Pancreas: Negative   Kidney: Negative   Adrenal glands are negative. Bowel: Christine [employer] Questionable wall thickening in the rectosigmoid area. Accuracy of this finding is limited due to lack of intraluminal contrast and resulting nondistended colon. Small amount of free fluid in the pelvis. Some fatty infiltration of the submucosal fat along the right side of the colon. This is a nonspecific finding but can be seen in inflammatory bowel disease. No acute diverticulitis. There  is no CT evidence for appendicitis. Nodes: No lymphadenopathy. Aorta: Negative    Pelvis:Negative   Peritoneum: No free fluid or free air. The abdominal wall is intact. Bones: No acute osseous abnormalities. Other: Small bilateral effusions with some dependent opacities with some interstitial lines/fluid at the lung bases. QUESTIONABLE FINDINGS OF COLITIS. SMALL AMOUNT OF FREE FLUID IN THE PELVIS. SUSPECT PULMONARY EDEMA     XR CHEST PORTABLE    Result Date: 7/18/2022  EXAMINATION:  PORTABLE CHEST RADIOGRAPH CLINICAL HISTORY:  PNEUMONIA.  COMPARISONS:  NONE AVAILABLE TECHNIQUE:  AP erect portable frontal view chest. FINDINGS:  Heart size is probably within upper limits of normal. Amorphous density involves the lung bases, more so on the left. Although nonspecific, this does have an appearance compatible with pneumonia. More superior lung fields are clear. There is no significant pleural fluid or pneumothorax. Osseous structures appear grossly intact     BIBASILAR INFILTRATES OR (LESS LIKELY) EDEMA, LEFT GREATER THAN RIGHT. Impression:   64 y.o. female with PMH HTN. Past surgical history of hysterectomy, hernia repair. Admitted with acute on chronic diastolic CHF. GI consulted for persistent diarrhea. Patient with new onset yellow/watery diarrhea proximately 5-6 times per day that began on Sunday, no overt GI bleed. Not on any blood thinners. No fever/chills. Stool for C. difficile and GI panel negative. CT this admit with questionable bowel wall thickening in the rectosigmoid area and some fatty infiltration in the submucosal fat along the right side of the colon may be nonspecific but can be seen in IBD. Last colonoscopy in 2013 with sigmoid diverticulosis, normal TI and internal/external hemorrhoids. On admit, WBCs 8.7, Hgb 12.1, MCV 83.8, platelets 526, sodium 136, potassium 5.1, BUN 36, creatinine 1.06, ALT 41, AST 30, total bilirubin 0.8, lipase 41, INR 0.9  Preliminary blood cultures negative  7/18/2022, iron levels normal with decreased saturation (likely chronic) GI panel and C. difficile stool negative, sed rate 13, WBCs 5.2, Hgb 10.6. Today, sodium 140, potassium 4.9, BUN 27, creatinine 303    58-year-old female with new onset diarrhea and nausea. Given acute onset of symptoms, likely related to viral gastroenteritis. Post infectious IBS versus inflammatory process in differential, although less likely as patient without weight loss, hematochezia, fever/chills or leukocytosis.     Plan:   -Medical management per primary team  -Recommend trial of fiber supplementation (Metamucil or Benefiber) daily titrate upwards as needed  -Trial of Imodium 2 mg tablets by mouth daily  -No urgent need for endoscopic evaluation as patient without overt GI bleed. Patient can follow-up as outpatient in GI clinic in 4 weeks. -GI to sign off    Comments: Thank you for allowing us to participate in the care of this patient. Will sign off. Please call if questions or concerns arise. Electronically signed by Belkis Lassiter MD on 7/20/2022 at 11:31 AM    Please note this report has been partially produced using speech recognition software and may cause contain errors related to that system including grammar, punctuation and spelling as well as words and phrases that may seem inappropriate. If there are questions or concerns please feel free to contact me to clarify.

## 2022-07-20 NOTE — PROGRESS NOTES
INPATIENT PROGRESS NOTES    PATIENT NAME: Penelope Pineda  MRN: [id number]  SERVICE DATE:  July 20, 2022   SERVICE TIME:  8:05 AM      PRIMARY SERVICE: Pulmonary Disease    CHIEF COMPLAIN: Shortness of breath, wheezing, diarrhea      INTERVAL HPI: Patient seen and examined at bedside, Interval Notes, orders reviewed. Nursing notes noted  Patient is still having diarrhea. Wheezing and shortness of breath is better. Leg swelling also improved. No fever or chills. No chest pain. Body mass index is 29.26 kg/m². PHYSICAL EXAM:  Vitals:  BP (!) 182/82   Pulse 94   Temp 98.2 °F (36.8 °C)   Resp 18   Ht 5' 2\" (1.575 m)   Wt 160 lb (72.6 kg)   SpO2 96%   BMI 29.26 kg/m²   General: Alert, awake . comfortable in bed, No distress. Head: Atraumatic , Normocephalic   Eyes: PERRL. No sclera icterus. No conjunctival injection. No discharge   ENT: No nasal  discharge. Pharynx clear. Neck:  Trachea midline. No thyromegaly, no JVD, No cervical adenopathy. Chest : Bilaterally symmetrical ,Normal effort,  No accessory muscle use  Lung : . Fair BS bilateral, decreased BS at bases. No Rales. No wheezing no rhonchi. Heart[de-identified] Normal  rate. Regular rhythm. No mumur ,  Rub or gallop  ABD: Non-tender. Non-distended. No masses. No organmegaly. Normal bowel sounds. No hernia.   Ext : race pitting both leg , No Cyanosis No clubbing  Neuro: no focal weakness          DATA:   Recent Labs     07/18/22  1025 07/19/22  1428   WBC 8.7 5.2   HGB 12.1 10.6*   HCT 35.8* 30.9*   MCV 83.8 83.6    166     Recent Labs     07/18/22  1025 07/19/22  0449 07/19/22  1428 07/20/22  0454      < > 140 140   K 5.1*   < > 4.4 4.9   *   < > 108* 109*   CO2 16*   < > 21 19*   BUN 36*   < > 28* 27*   CREATININE 1.06*   < > 1.17* 1.03*   GLUCOSE 140*   < > 115* 102*   CALCIUM 9.0   < > 9.1 8.8   PROT 6.6  --   --   --    LABALBU 4.4  --   --   --    BILITOT 0.8*  --   --   --    ALKPHOS 99  --   --   --    AST 30  --   --   --    ALT 41*  --   --   --    LABGLOM 50.1*   < > 44.7* 51.7*   GFRAA >60.0   < > 54.0* >60.0   GLOB 2.2*  --   --   --     < > = values in this interval not displayed. MV Settings:          No results for input(s): PHART, QRR8JPB, PO2ART, DFX8DMN, BEART, J8JYCUHZ in the last 72 hours. O2 Device: None (Room air)    ADULT DIET; Regular; Low Sodium (2 gm)     MEDICATIONS during current hospitalization:    Continuous Infusions:   0.45 % NaCl with KCl 20 mEq 50 mL/hr at 07/19/22 1455    sodium chloride         Scheduled Meds:   sodium chloride flush  5-40 mL IntraVENous 2 times per day    enoxaparin  40 mg SubCUTAneous Daily    furosemide  40 mg IntraVENous BID    atorvastatin  20 mg Oral Nightly    spironolactone  50 mg Oral Daily    pantoprazole  40 mg Oral Daily    metoprolol  100 mg Oral BID    lisinopril  20 mg Oral Daily       PRN Meds:albuterol, sodium chloride flush, sodium chloride, ondansetron **OR** ondansetron, polyethylene glycol, acetaminophen **OR** acetaminophen    Radiology  Echocardiogram complete 2D with doppler with color    Result Date: 7/19/2022  Transthoracic Echocardiography Report (TTE)  Demographics   Patient Name     Chato Baig Gender                Female   Patient Number   [id number]        Race                  Other                                    Ethnicity              or    Visit Number     [id number]       Room Number           A744   Corporate ID                     Date of Study         07/19/2022   Accession Number [id number]      Referring Physician   Date of Birth    1945      Sonographer           Patience Ring   Age              64 year(s)      Interpreting          [facility]                                   Physician             Arianne Sheriff MD  Procedure Type of Study   TTE procedure:ECHO COMPLETE 2D W/DOP W/COLOR.   Procedure Date Date: 07/19/2022 Start: 08:45 AM Study Location: Portable Technical Quality: Adequate visualization Indications:Congestive heart failure. Patient Status: Routine Height: 62 inches Weight: 146 pounds BSA: 1.67 m^2 BMI: 26.7 kg/m^2 BP: 166/67 mmHg  Conclusions   Summary  Normal left ventricle structure and function. Left ventricular ejection fraction is visually estimated at 75%. Moderate concentric LVH  Borderline diastolic dysfunction  There is DUST (discrete upper septal thickening) without evidence of  outflow tract obstruction. Normal right ventricle structure and function. Right ventricular systolic pressure of 37 mm Hg consistent with mild  pulmonary hypertension. Normal mitral valve structure and function. 1+ MR  MAC  Normal aortic valve structure and function. Mild aortic sclerosis  Normal tricuspid valve structure and function. Mild tricuspid regurgitation. Mildly dilated left atrium. Signature   ----------------------------------------------------------------  Electronically signed by Lucio Farnsworth MD(Interpreting  physician) on 07/19/2022 12:11 PM  ----------------------------------------------------------------   Findings  Left Ventricle Normal left ventricle structure and function. Left ventricular ejection fraction is visually estimated at 75%. Moderate concentric LVH Borderline diastolic dysfunction There is DUST (discrete upper septal thickening) without evidence of outflow tract obstruction. Right Ventricle Normal right ventricle structure and function. Right ventricular systolic pressure of 37 mm Hg consistent with mild pulmonary hypertension. Left Atrium Mildly dilated left atrium. Right Atrium Normal right atrium. Mitral Valve Normal mitral valve structure and function. 1+ MR MAC Tricuspid Valve Normal tricuspid valve structure and function. Mild tricuspid regurgitation. Aortic Valve Normal aortic valve structure and function. Mild aortic sclerosis Pulmonic Valve Normal pulmonic valve structure and function. Pericardial Effusion No evidence of pericardial effusion.  Pleural Effusion No evidence of pleural effusion. Aorta \ Miscellaneous Aortic root dimension within normal limits. M-Mode Measurements (cm)   LVIDd: 4.26 cm                         LVIDs: 2.38 cm  IVSd: 1.53 cm                          IVSs: 1.65 cm  LVPWd: 0.94 cm                         LVPWs: 1.45 cm  Rt. Vent.  Dimension: 1.81 cm           AO Root Dimension: 2.37 cm                                         ACS: 1.41 cm                                         LVOT: 1.68 cm  Doppler Measurements:   AV Peak Gradient: 9.88 mmHg           MV Peak E-Wave: 1.2 m/s  AV Mean Gradient: 5.27 mmHg           MV Peak A-Wave: 1.55 m/s  TR Velocity:2.6 m/s  TR Gradient:27.03 mmHg                                        Estimated RAP:10 mmHg                                        RVSP:37.03 mmHg  Valves  Mitral Valve   Peak E-Wave: 1.2 m/s           Peak A-Wave: 1.55 m/s                                 E/A Ratio: 0.78                                 Peak Gradient: 5.77 mmHg  MR Velocity: 3.92 m/s          Deceleration Time: 155.9 msec   Aortic Valve   Peak Velocity: 1.57 m/s             Mean Velocity: 1.06 m/s  Peak Gradient: 9.88 mmHg            Mean Gradient: 5.27 mmHg  AV VTI: 34.61 cm   Cusp Separation: 1.41 cm   Tricuspid Valve   Estimated RVSP: 37.03 mmHg              Estimated RAP: 10 mmHg  TR Velocity: 2.6 m/s                    TR Gradient: 27.03 mmHg   Pulmonic Valve   Peak Velocity: 0.98 m/s           Peak Gradient: 3.85 mmHg                                    Estimated PASP: 37.03 mmHg   LVOT   LVOT Diameter: 1.68 cm  Structures  Left Atrium   LA Volume/Index: 41.59 ml /25 m^2             LA Area: 15.59 cm^2   Left Ventricle   Diastolic Dimension: 8.90 cm         Systolic Dimension: 9.97 cm  Septum Diastolic: 5.80 cm            Septum Systolic: 0.67 cm  PW Diastolic: 0.40 cm                PW Systolic: 3.09 cm                                       FS: 44.1 %  LV EDV/LV EDV Index: 81.31 ml/49 m^2 LV ESV/LV ESV Index: 19.76 ml/12 m^2  EF Calculated: 75.7 % LVOT Diameter: 1.68 cm   Right Atrium   RA Systolic Pressure: 10 mmHg   Right Ventricle   Diastolic Dimension: 9.95 cm                                    RV Systolic Pressure: 35.04 mmHg  Aorta/ Miscellaneous Aorta   Aortic Root: 2.37 cm  LVOT Diameter: 1.68 cm      CTA CHEST W WO CONTRAST    Result Date: 7/18/2022  CT PULMONARY ANGIOGRAM WITH INTRAVENOUS CONTRAST MEDIUM. REASON FOR EXAMINATION:  CHEST PRESSURE, WHEEZING, LEG SWELLING TECHNIQUE: Helical CTA was performed through the chest utilizing 75 mL Isovue 300 intravenous contrast.  Images were obtained with bolus tracking in order to opacify the pulmonary arteries. Thick section coronal MIP 3D reconstructions were performed on a separate workstation. Study done in conjunction with CT abdomen pelvis, reported separately. COMPARISON:none FINDINGS:  Pulmonary arteries: No intraluminal filling defects. Thoracic aorta: Normal in course and caliber. Cardiac Size: Upper limits of normal. Pericardial effusion: None. Right lung: No nodules, or masses. Small right pleural effusion. Dependent subsegmental atelectatic change. Left lung: No nodules, or masses. Small left pleural effusion. Dependent subsegmental atelectatic change. Lymph nodes: No hilar, mediastinal, or axillary lymph node enlargement. Upper abdomen:Limited imaging upper abdomen shows no gross anomaly. Musculoskeletal:No osteoblastic, and no osteolytic lesions. No CT evidence pulmonary embolism. Borderline cardiomegaly. Small bilateral pleural effusions with bilateral dependent subsegmental atelectatic change. All CT scans at this facility use dose modulation, iterative reconstruction, and/or weight based dosing when appropriate to reduce radiation dose to as low as reasonably achievable. CT ABDOMEN PELVIS W IV CONTRAST Additional Contrast? None    Result Date: 7/18/2022  EXAMINATION: CT ABDOMEN PELVIS W IV CONTRAST DATE AND TIME:7/18/2022 12:29 PM CLINICAL HISTORY: Acute abdominal pain. Epigastric pain. abd pain, diarrhea  COMPARISON: July 16, 2014 TECHNIQUE: Contiguous axial CT sections of the abdomen and pelvis. 100 cc's of IV contrast given. .  All CT scans at this facility use dose modulation, iterative reconstruction, and/or weight based dosing when appropriate to reduce radiation dose to as low as reasonably achievable. Some of this report was completed using  Power [employer] [facility] OLYUWXBYHN and may include unintended errors with respect to translation of words, typographical errors or grammatical errors which may not have been identified prior to the finalization of this report. FINDINGS   Liver: Negative    Spleen: Negative   Gallbladder: No calcified gallstones. Normal gallbladder wall. No pericholecystic fluid. Pancreas: Negative   Kidney: Negative   Adrenal glands are negative. Bowel: Christine Gallery Questionable wall thickening in the rectosigmoid area. Accuracy of this finding is limited due to lack of intraluminal contrast and resulting nondistended colon. Small amount of free fluid in the pelvis. Some fatty infiltration of the submucosal fat along the right side of the colon. This is a nonspecific finding but can be seen in inflammatory bowel disease. No acute diverticulitis. There  is no CT evidence for appendicitis. Nodes: No lymphadenopathy. Aorta: Negative    Pelvis:Negative   Peritoneum: No free fluid or free air. The abdominal wall is intact. Bones: No acute osseous abnormalities. Other: Small bilateral effusions with some dependent opacities with some interstitial lines/fluid at the lung bases. QUESTIONABLE FINDINGS OF COLITIS. SMALL AMOUNT OF FREE FLUID IN THE PELVIS. SUSPECT PULMONARY EDEMA     XR CHEST PORTABLE    Result Date: 7/18/2022  EXAMINATION:  PORTABLE CHEST RADIOGRAPH CLINICAL HISTORY:  PNEUMONIA.  COMPARISONS:  NONE AVAILABLE TECHNIQUE:  AP erect portable frontal view chest. FINDINGS:  Heart size is probably within upper limits of normal. Amorphous density involves the lung bases, more so on the left. Although nonspecific, this does have an appearance compatible with pneumonia. More superior lung fields are clear. There is no significant pleural fluid or pneumothorax. Osseous structures appear grossly intact     BIBASILAR INFILTRATES OR (LESS LIKELY) EDEMA, LEFT GREATER THAN RIGHT. IMPRESSION AND SUGGESTION:  Acute on chronic diastolic CHF, improving  Wheezing likely cardiac secondary to pulmonary vascular congestion, improved  Mild pulmonary hypertension  Diarrhea probably secondary to colitis. C. difficile negative  Leg edema  Rule out sleep disordered breathing    Patient is on diuretic therapy with Lasix 40 mg twice daily. Wheezing improved 2D echo results noted. Mild pulmonary hypertension. Diarrhea likely due to colitis. Bibasilar infiltration likely edema. Procalcitonin is within normal range, nebulizer with albuterol 4 times daily as needed. Recommend PFT and also may consider sleep study as outpatient if patient is agreeable    NOTE: This report was transcribed using voice recognition software. Every effort was made to ensure accuracy; however, inadvertent computerized transcription errors may be present.       Electronically signed by Gia Zheng MD, FCCP on 7/20/2022 at 8:05 AM

## 2022-07-20 NOTE — PROGRESS NOTES
[facility] Progress Note        Date of Consult:   2022    Patient:    Miguelina Serna    :    1945  CSN:    [id number]    Consulting Cardiologist:  Dr. Max Stover MD    Primary Cardiologist:  Dr. Max Stover MD    Requesting Physician:   Erma Edwards MD      Reason for Initial Consult:   CHF      Subjective:    No significant changes overnite. Still SOB with cough. Less edema. Diarrhea better. 14 system General and Cardiac ROS otherwise negative or unchanged. Assessment:    Shortness of breath  Edema: Bilateral lower extremity edema  Cough, R/O infection  Diarrhea  Headaches  Abdominal Pain with N/V: CT of abdomen showed questionable findings of colitis. Small amount of free fluid in pelvis. Abnormal CXR with Infiltrates vs edema. R/O infection / other. Possible pulmonary edema. Possible CHF, Acute, History of diastolic dysfunction, mild. Normal LV systolic function, LVEF 38%. Hypertension  Elevated D-dimer: 2.61  CT of chest negative for Pulmonary embolism  Elevated ALT:  ALT 41  Hematuria:  Urology consulted. CAD in native artery: Mild CAD only on Cardiac CTA 1/15. Ca Score 41. History of abnormal Lexiscan with mild apical ischemia  Hyperlipidemia: Triglycerides 156  GERD  History of sinus tachycardia/wide-complex tachycardia  History vitamin D deficiency  Otherwise as prior and below. Plan:    Cardiac Supportive Care  Monitor on telemetry  Continue current medications as tolerated. Medicine work up and treatment. Pulmonary consulted noted  OK to DC home form CV point once ok with others. Outpatient CV re eval with 48 hr holter. as noted. See orders. HISTORY OF PRESENT ILLNESS:      Miguelina Serna is a pleasant 64 y.o. female  with a PMH of HTN, HLD, CAD, diastolic dysfunction, GERD that presented to [facility] 2022 with CCO bilateral ankle swelling, wheezing, diarrhea and nausea.   She stated that she called her doctors office who advised her to go to the hospital for further evaluation. EKG showed SR HR 95 bpm. Chest x-ray showed bibasilar infiltrates or edema. Abnormal labs; K + 5.1, Bun/creat 36/1.06, GFR 50.1, BNP 3990, Triglycerides 156, ALT 41, D-dimer  2.61 with CT of chest negative for pulmonary embolism. She was admitted for further evaluation. We were asked to see her for cardiac consult for CHF. She states that she was short of breath, and had lower extremity edema, nausea and diarrhea. She thought she had COVID but has tested negative. She states that her breathing and edema has improved some with the Lasix IV but she continues to have diarrhea and abdominal upset. Patient Follows with Dr. Michelle Medrano MD     Patient History and Records, EMR reviewed. Patient Interviewed and examined. Denies CP, SOB, LH, Dizziness, TIA or CVA Symptoms. No Orthopnea, Edema or CHF symptoms. No Palpitations. No Syncope. No Fever, Chills or Cold symptoms. No GI,  or Bleeding complaints. Cardiac and general ROS otherwise negative. [address] otherwise negative other than noted. Past Medical History:   Diagnosis Date    Hypertension        Past Surgical History:   Procedure Laterality Date    CATARACT REMOVAL Bilateral     HERNIA REPAIR      HYSTERECTOMY, TOTAL ABDOMINAL (CERVIX REMOVED)         Prior to Admission medications    Medication Sig Start Date End Date Taking? Authorizing Provider   spironolactone (ALDACTONE) 50 MG tablet Take 50 mg by mouth in the morning. Yes Historical Provider, MD   pantoprazole (PROTONIX) 40 MG tablet Take 40 mg by mouth in the morning. Yes Historical Provider, MD   amLODIPine (NORVASC) 10 MG tablet Take 10 mg by mouth in the morning. Yes Historical Provider, MD   metoprolol (LOPRESSOR) 100 MG tablet Take 100 mg by mouth in the morning and 100 mg before bedtime.    Yes Historical Provider, MD   atorvastatin (LIPITOR) 20 MG tablet Take 20 mg by mouth nightly   Yes Historical Provider, MD       Scheduled Meds:   sodium chloride flush  5-40 mL IntraVENous 2 times per day    enoxaparin  40 mg SubCUTAneous Daily    furosemide  40 mg IntraVENous BID    atorvastatin  20 mg Oral Nightly    spironolactone  50 mg Oral Daily    pantoprazole  40 mg Oral Daily    metoprolol  100 mg Oral BID    lisinopril  20 mg Oral Daily     Continuous Infusions:   0.45 % NaCl with KCl 20 mEq 50 mL/hr at 07/19/22 1455    sodium chloride       PRN Meds:albuterol, sodium chloride flush, sodium chloride, ondansetron **OR** ondansetron, polyethylene glycol, acetaminophen **OR** acetaminophen    Allergies   Allergen Reactions    Norco [Hydrocodone-Acetaminophen]        Social History     Socioeconomic History    Marital status:      Spouse name: Not on file    Number of children: Not on file    Years of education: Not on file    Highest education level: Not on file   Occupational History    Not on file   Tobacco Use    Smoking status: Never    Smokeless tobacco: Never   Substance and Sexual Activity    Alcohol use: Not Currently    Drug use: Not on file    Sexual activity: Not on file   Other Topics Concern    Not on file   Social History Narrative    Not on file     Social Determinants of Health     Financial Resource Strain: Not on file   Food Insecurity: Not on file   Transportation Needs: Not on file   Physical Activity: Not on file   Stress: Not on file   Social Connections: Not on file   Intimate Partner Violence: Not on file   Housing Stability: Not on file       No family history on file. Review Of Systems:    14 point ROS negative other than mentioned.      Physical Exam:    CURRENT VITALS: BP (!) 174/69   Pulse 87   Temp 98.2 °F (36.8 °C)   Resp 18   Ht 5' 2\" (1.575 m)   Wt 160 lb (72.6 kg)   SpO2 96%   BMI 29.26 kg/m²     CONSTITUTIONAL:  awake, alert, cooperative, no apparent distress,   ENT:  Normocephalic, without obvious abnormality, atraumatic, sinuses nontender on palpation, external ears without lesions,  NECK:  Supple, symmetrical, trachea midline, no adenopathy, thyroid symmetric, not enlarged and no tenderness, skin normal, No bruits. LUNGS:  No increased work of breathing, good air exchange, clear to auscultation bilaterally, no crackles, no wheezing  CARDIOVASCULAR:  Normal apical impulse, regular rate and rhythm, normal S1 and S2,  1/6 Systolic murmur noted. ABDOMEN:   normal bowel sounds, soft, non-distended, non-tender, no masses palpated, no hepatosplenomegally  EXTREMETIES: Trace bilateral lower extremity edema, Pulses Strong Thruout. No ulcers. NEUROLOGIC:  Awake, alert, oriented to name, place and time. Following all commands and moving all extremties.   SKIN:  no bruising or bleeding, normal skin color, texture, turgor and no rashes    Labs:  Recent Results (from the past 24 hour(s))   Basic Metabolic Panel    Collection Time: 07/19/22  2:28 PM   Result Value Ref Range    Sodium 140 135 - 144 mEq/L    Potassium 4.4 3.4 - 4.9 mEq/L    Chloride 108 (H) 95 - 107 mEq/L    CO2 21 20 - 31 mEq/L    Anion Gap 11 9 - 15 mEq/L    Glucose 115 (H) 70 - 99 mg/dL    BUN 28 (H) 8 - 23 mg/dL    CREATININE 1.17 (H) 0.50 - 0.90 mg/dL    GFR Non-African American 44.7 (L) >60    GFR  54.0 (L) >60    Calcium 9.1 8.5 - 9.9 mg/dL   CBC with Auto Differential    Collection Time: 07/19/22  2:28 PM   Result Value Ref Range    WBC 5.2 4.8 - 10.8 K/uL    RBC 3.70 (L) 4.20 - 5.40 M/uL    Hemoglobin 10.6 (L) 12.0 - 16.0 g/dL    Hematocrit 30.9 (L) 37.0 - 47.0 %    MCV 83.6 82.0 - 100.0 fL    MCH 28.7 27.0 - 31.3 pg    MCHC 34.4 33.0 - 37.0 %    RDW 13.0 11.5 - 14.5 %    Platelets 575 614 - 036 K/uL    Neutrophils % 65.4 %    Lymphocytes % 21.9 %    Monocytes % 8.9 %    Eosinophils % 2.6 %    Basophils % 1.2 %    Neutrophils Absolute 3.4 1.4 - 6.5 K/uL    Lymphocytes Absolute 1.1 1.0 - 4.8 K/uL    Monocytes Absolute 0.5 0.2 - 0.8 K/uL    Eosinophils Absolute 0.1 0.0 - 0.7 K/uL    Basophils Absolute 0.1 0.0 - 0.2 K/uL   Procalcitonin    Collection Time: 22  2:28 PM   Result Value Ref Range    Procalcitonin 0.08 0.00 - 0.15 ng/mL   Sedimentation Rate    Collection Time: 22  2:28 PM   Result Value Ref Range    Sed Rate 13 0 - 30 mm   High sensitivity CRP    Collection Time: 22  2:28 PM   Result Value Ref Range    CRP High Sensitivity 13.7 (H) 0.0 - 5.0 mg/L   Basic Metabolic Panel    Collection Time: 22  4:54 AM   Result Value Ref Range    Sodium 140 135 - 144 mEq/L    Potassium 4.9 3.4 - 4.9 mEq/L    Chloride 109 (H) 95 - 107 mEq/L    CO2 19 (L) 20 - 31 mEq/L    Anion Gap 12 9 - 15 mEq/L    Glucose 102 (H) 70 - 99 mg/dL    BUN 27 (H) 8 - 23 mg/dL    CREATININE 1.03 (H) 0.50 - 0.90 mg/dL    GFR Non-African American 51.7 (L) >60    GFR  >60.0 >60    Calcium 8.8 8.5 - 9.9 mg/dL       EC2022   Normal sinus rhythm  HR 95 bpm   Normal ECG     ECHO:     Normal left ventricle structure and function. Left ventricular ejection fraction is visually estimated at 75%. Moderate concentric LVH    Borderline diastolic dysfunction    There is DUST (discrete upper septal thickening) without evidence of    outflow tract obstruction. Normal right ventricle structure and function. Right ventricular systolic pressure of 37 mm Hg consistent with mild    pulmonary hypertension. Normal mitral valve structure and function. 1+ MR    MAC    Normal aortic valve structure and function. Mild aortic sclerosis    Normal tricuspid valve structure and function. Mild tricuspid regurgitation. Mildly dilated left atrium.       ========================================    [facility] Office Note 2021  Subjective:   2021 - Randee Davisondiony     Randee Ivy was seen in cardiac evaluation at the [facility] office 2021. The patients problems are listed in the impression below. Electronic medical records reviewed. Patient returns.  She states that she 39)  Normal upper function ejection fraction 25%  Diastolic dysfunction, stage I  Negative Lexiscan perfusion study 11/2020, LVEF 78%. Mild mitral and tricuspid regurgitation. Hypertension  Hyperlipidemia  Reflux disease  Nonsmoker  Degenerative joint disease  Otherwise as per assessment below. RECOMMENDATIONS:     She will continue her current medications. Refills were provided. I have encouraged her to continue her exercise walking program. Hydration. Follow my health portal was encouraged. We will plan to see back in 1 year with Laboratory Studies and ECG as noted below. Patient will follow up with their primary physician for general care. The patient knows to contact medical care earlier if need be. Active Problems   1. Abnormal ECG (794.31) (R94.31)   2. Abnormal results of cardiovascular function studies (794.30) (R94.30)   abnormal Lesiscan stress with mild apical ischemia 12/14   3. CAD in native artery (414.01) (I25.10)   Mild CAD only on Cardiac CTA 1/15. Ca Score 41.   4. Cervical radiculopathy (723.4) (M54.12)   5. Cervical spondylolysis (756.19) (M43.02)   · Xray 5/2015 C3-4 throgh C6-C7   6. Diastolic dysfunction (062.2) (I51.89)   stage 1   7. GERD (gastroesophageal reflux disease) (530.81) (K21.9)   8. HTN (hypertension) (401.9) (I10)   9. Hyperlipidemia (272.4) (E78.5)   10. Inappropriate sinus tachycardia (427.89) (R00.0)   11. Never a smoker   12. Normal colonoscopy   · Dr. Yemi Flores 10.15.2013   13. Papanicolaou smear (V76.2) (Z12.4)   · Dr. Omar Hall ([facility])   14. Vitamin D deficiency (268.9) (E55.9)   15. Wide-complex tachycardia (427.1) (I47.2)    Family History   1. Family history of hypertension (V17.49) (Z82.49) : Mother, Sister    Social History   · Caffeine use (V49.89) (Z78.9)   · Never a smoker   · No alcohol use   · No illicit drug use    Current Meds   1. amLODIPine Besylate 10 MG Oral Tablet; take 1 tablet by mouth once daily;    Therapy: 08Apr2019 to (Donte Chiang)  Requested for: [id number]; Last   Rx:[id number] Ordered   2. Aspirin EC Low Dose 81 MG Oral Tablet Delayed Release; 1 TABLET DAILY; Therapy: [id number] to (Cristina Whitley)  Requested for: 64Ygz0277; Last   Rx:[id number] Ordered   3. Atorvastatin Calcium 20 MG Oral Tablet; take 1 tablet by mouth once daily at bedtime; Therapy: [id number] to (Marizol Daley)  Requested for: Misbah Priest; Last   Rx:[id number] Ordered   4. Losartan Potassium 50 MG Oral Tablet; 1 tablet twice a day; Therapy: [id number] to (Tonny Bermeo)  Requested for: 25Mar2021; Last   Rx:25Mar2021 Ordered   5. Metoprolol Tartrate 100 MG Oral Tablet; take 1 tablet by mouth twice daily; Therapy: [id number] to (Marizol Daley)  Requested for: Misbah Priest; Last   Rx:[id number] Ordered   6. Omega 3 1000 MG Oral Capsule; TAKE 1 TABLET WHEN REMEMBERS; Therapy: (Recorded:01Mar2021) to Recorded   7. Pantoprazole Sodium 40 MG Oral Tablet Delayed Release; TAKE 1 TABLET DAILY; Therapy: [id number] to (Tonny Bermeo)  Requested for: 02Apr2021; Last   Rx:02Apr2021 Ordered   8.  Spironolactone 50 MG Oral Tablet; TAKE 1 TABLET DAILY  Requested for: [id number];   Last Pascual Angel MD, [address] [facility]            -----

## 2022-07-20 NOTE — DISCHARGE INSTR - DIET
Good nutrition is important when healing from an illness, injury, or surgery. Follow any nutrition recommendations given to you during your hospital stay. If you were given an oral nutrition supplement while in the hospital, continue to take this supplement at home. You can take it with meals, in-between meals, and/or before bedtime. These supplements can be purchased at most local grocery stores, pharmacies, and chain [employer]-stores. If you have any questions about your diet or nutrition, call the hospital and ask for the dietitian.       Resume normal diet as tolerated

## 2022-07-20 NOTE — DISCHARGE SUMMARY
Hospital Medicine Discharge Summary    Valery Licea  :  1945  MRN:  [id number]    Admit date:  2022  Discharge date:  2022    Admitting Physician:  Rosebud Schlatter, MD  Primary Care Physician:  Aron Beltran MD    Discharge Diagnoses:    Diastolic CHF    Chief Complaint   Patient presents with    Leg Swelling     Bilateral ankle swelling (started last night). Pt states she is wheezing. Pt states she has diarrhea and she is nauseated. Pt state she has a headache (9/10 pain)      Hospital Course:   Patient presented with acute on chronic diastolic CHF. She improved with IV diuresis; cardiology adjusted her medications for discharge and she will be discharged home now that she is euvolemic with advice to follow up closely. Exam on discharge:   BP (!) 174/69   Pulse 87   Temp 98.2 °F (36.8 °C) (Oral)   Resp 18   Ht 5' 2\" (1.575 m)   Wt 160 lb (72.6 kg)   SpO2 95%   BMI 29.26 kg/m²   General appearance: No apparent distress, appears stated age and cooperative. HEENT: Conjunctivae/corneas clear. Neck: Trachea midline. Respiratory:  Normal respiratory effort. Clear to auscultation  Cardiovascular: Regular rate and rhythm . Abdomen: Soft, non-tender, non-distended with normal bowel sounds. Musculoskeletal: No clubbing, cyanosis or edema bilaterally.    Neuro: Non Focal.   Capillary Refill: Brisk,< 3 seconds   Peripheral Pulses: +2 palpable, equal bilaterally     Patient was seen by the following consultants   Consults:  IP CONSULT TO [place]  IP CONSULT TO HEART FAILURE NURSE/COORDINATOR  IP CONSULT TO DIETITIAN  IP CONSULT TO UROLOGY  IP CONSULT TO PULMONOLOGY  IP CONSULT TO CARDIOLOGY  IP CONSULT TO GI    Significant Diagnostic Studies:    Refer to chart     Please refer to chart if no studies are shown here    Echocardiogram complete 2D with doppler with color    Result Date: 2022  Transthoracic Echocardiography Report (TTE)  Demographics   Patient Name     [facility] Gender                Female   Patient Number   [id number]        Race                  Other                                    Ethnicity              or    Visit Number     [id number]       Room Number           H778   Corporate ID                     Date of Study         07/19/2022   Accession Number [id number]      Referring Physician   Date of Birth    1945      Sonographer           Patience Ring   Age              64 year(s)      Interpreting          [address]                                   Physician             Arianne Sheriff MD  Procedure Type of Study   TTE procedure:ECHO COMPLETE 2D W/DOP W/COLOR. Procedure Date Date: 07/19/2022 Start: 08:45 AM Study Location: Portable Technical Quality: Adequate visualization Indications:Congestive heart failure. Patient Status: Routine Height: 62 inches Weight: 146 pounds BSA: 1.67 m^2 BMI: 26.7 kg/m^2 BP: 166/67 mmHg  Conclusions   Summary  Normal left ventricle structure and function. Left ventricular ejection fraction is visually estimated at 75%. Moderate concentric LVH  Borderline diastolic dysfunction  There is DUST (discrete upper septal thickening) without evidence of  outflow tract obstruction. Normal right ventricle structure and function. Right ventricular systolic pressure of 37 mm Hg consistent with mild  pulmonary hypertension. Normal mitral valve structure and function. 1+ MR  MAC  Normal aortic valve structure and function. Mild aortic sclerosis  Normal tricuspid valve structure and function. Mild tricuspid regurgitation. Mildly dilated left atrium. Signature   ----------------------------------------------------------------  Electronically signed by Arianne Sheriff MD(Interpreting  physician) on 07/19/2022 12:11 PM  ----------------------------------------------------------------   Findings  Left Ventricle Normal left ventricle structure and function. Left ventricular ejection fraction is visually estimated at 75%. Moderate concentric LVH Borderline diastolic dysfunction There is DUST (discrete upper septal thickening) without evidence of outflow tract obstruction. Right Ventricle Normal right ventricle structure and function. Right ventricular systolic pressure of 37 mm Hg consistent with mild pulmonary hypertension. Left Atrium Mildly dilated left atrium. Right Atrium Normal right atrium. Mitral Valve Normal mitral valve structure and function. 1+ MR MAC Tricuspid Valve Normal tricuspid valve structure and function. Mild tricuspid regurgitation. Aortic Valve Normal aortic valve structure and function. Mild aortic sclerosis Pulmonic Valve Normal pulmonic valve structure and function. Pericardial Effusion No evidence of pericardial effusion. Pleural Effusion No evidence of pleural effusion. Aorta \ Miscellaneous Aortic root dimension within normal limits. M-Mode Measurements (cm)   LVIDd: 4.26 cm                         LVIDs: 2.38 cm  IVSd: 1.53 cm                          IVSs: 1.65 cm  LVPWd: 0.94 cm                         LVPWs: 1.45 cm  Rt. Vent.  Dimension: 1.81 cm           AO Root Dimension: 2.37 cm                                         ACS: 1.41 cm                                         LVOT: 1.68 cm  Doppler Measurements:   AV Peak Gradient: 9.88 mmHg           MV Peak E-Wave: 1.2 m/s  AV Mean Gradient: 5.27 mmHg           MV Peak A-Wave: 1.55 m/s  TR Velocity:2.6 m/s  TR Gradient:27.03 mmHg                                        Estimated RAP:10 mmHg                                        RVSP:37.03 mmHg  Valves  Mitral Valve   Peak E-Wave: 1.2 m/s           Peak A-Wave: 1.55 m/s                                 E/A Ratio: 0.78                                 Peak Gradient: 5.77 mmHg  MR Velocity: 3.92 m/s          Deceleration Time: 155.9 msec   Aortic Valve   Peak Velocity: 1.57 m/s             Mean Velocity: 1.06 m/s  Peak Gradient: 9.88 mmHg            Mean Gradient: 5.27 mmHg  AV VTI: 34.61 cm   Cusp Separation: 1.41 cm   Tricuspid Valve   Estimated RVSP: 37.03 mmHg              Estimated RAP: 10 mmHg  TR Velocity: 2.6 m/s                    TR Gradient: 27.03 mmHg   Pulmonic Valve   Peak Velocity: 0.98 m/s           Peak Gradient: 3.85 mmHg                                    Estimated PASP: 37.03 mmHg   LVOT   LVOT Diameter: 1.68 cm  Structures  Left Atrium   LA Volume/Index: 41.59 ml /25 m^2             LA Area: 15.59 cm^2   Left Ventricle   Diastolic Dimension: 6.86 cm         Systolic Dimension: 4.01 cm  Septum Diastolic: 0.87 cm            Septum Systolic: 9.19 cm  PW Diastolic: 6.79 cm                PW Systolic: 7.17 cm                                       FS: 44.1 %  LV EDV/LV EDV Index: 81.31 ml/49 m^2 LV ESV/LV ESV Index: 19.76 ml/12 m^2  EF Calculated: 75.7 %   LVOT Diameter: 1.68 cm   Right Atrium   RA Systolic Pressure: 10 mmHg   Right Ventricle   Diastolic Dimension: 1.56 cm                                    RV Systolic Pressure: 12.86 mmHg  Aorta/ Miscellaneous Aorta   Aortic Root: 2.37 cm  LVOT Diameter: 1.68 cm      CTA CHEST W WO CONTRAST    Result Date: 7/18/2022  CT PULMONARY ANGIOGRAM WITH INTRAVENOUS CONTRAST MEDIUM. REASON FOR EXAMINATION:  CHEST PRESSURE, WHEEZING, LEG SWELLING TECHNIQUE: Helical CTA was performed through the chest utilizing 75 mL Isovue 300 intravenous contrast.  Images were obtained with bolus tracking in order to opacify the pulmonary arteries. Thick section coronal MIP 3D reconstructions were performed on a separate workstation. Study done in conjunction with CT abdomen pelvis, reported separately. COMPARISON:none FINDINGS:  Pulmonary arteries: No intraluminal filling defects. Thoracic aorta: Normal in course and caliber. Cardiac Size: Upper limits of normal. Pericardial effusion: None. Right lung: No nodules, or masses. Small right pleural effusion. Dependent subsegmental atelectatic change. Left lung: No nodules, or masses. Small left pleural effusion.  Dependent subsegmental atelectatic change. Lymph nodes: No hilar, mediastinal, or axillary lymph node enlargement. Upper abdomen:Limited imaging upper abdomen shows no gross anomaly. Musculoskeletal:No osteoblastic, and no osteolytic lesions. No CT evidence pulmonary embolism. Borderline cardiomegaly. Small bilateral pleural effusions with bilateral dependent subsegmental atelectatic change. All CT scans at this facility use dose modulation, iterative reconstruction, and/or weight based dosing when appropriate to reduce radiation dose to as low as reasonably achievable. CT ABDOMEN PELVIS W IV CONTRAST Additional Contrast? None    Result Date: 7/18/2022  EXAMINATION: CT ABDOMEN PELVIS W IV CONTRAST DATE AND TIME:7/18/2022 12:29 PM CLINICAL HISTORY: Acute abdominal pain. Epigastric pain. abd pain, diarrhea  COMPARISON: July 16, 2014 TECHNIQUE: Contiguous axial CT sections of the abdomen and pelvis. 100 cc's of IV contrast given. .  All CT scans at this facility use dose modulation, iterative reconstruction, and/or weight based dosing when appropriate to reduce radiation dose to as low as reasonably achievable. Some of this report was completed using  Power Scribe 147 [id number] and may include unintended errors with respect to translation of words, typographical errors or grammatical errors which may not have been identified prior to the finalization of this report. FINDINGS   Liver: Negative    Spleen: Negative   Gallbladder: No calcified gallstones. Normal gallbladder wall. No pericholecystic fluid. Pancreas: Negative   Kidney: Negative   Adrenal glands are negative. Bowel: Pinky Angles Questionable wall thickening in the rectosigmoid area. Accuracy of this finding is limited due to lack of intraluminal contrast and resulting nondistended colon. Small amount of free fluid in the pelvis. Some fatty infiltration of the submucosal fat along the right side of the colon.  This is a nonspecific finding but can be seen in inflammatory bowel disease. No acute diverticulitis. There  is no CT evidence for appendicitis. Nodes: No lymphadenopathy. Aorta: Negative    Pelvis:Negative   Peritoneum: No free fluid or free air. The abdominal wall is intact. Bones: No acute osseous abnormalities. Other: Small bilateral effusions with some dependent opacities with some interstitial lines/fluid at the lung bases. QUESTIONABLE FINDINGS OF COLITIS. SMALL AMOUNT OF FREE FLUID IN THE PELVIS. SUSPECT PULMONARY EDEMA     XR CHEST PORTABLE    Result Date: 7/18/2022  EXAMINATION:  PORTABLE CHEST RADIOGRAPH CLINICAL HISTORY:  PNEUMONIA. COMPARISONS:  NONE AVAILABLE TECHNIQUE:  AP erect portable frontal view chest. FINDINGS:  Heart size is probably within upper limits of normal. Amorphous density involves the lung bases, more so on the left. Although nonspecific, this does have an appearance compatible with pneumonia. More superior lung fields are clear. There is no significant pleural fluid or pneumothorax. Osseous structures appear grossly intact     BIBASILAR INFILTRATES OR (LESS LIKELY) EDEMA, LEFT GREATER THAN RIGHT. Discharge Medications:         Medication List        START taking these medications      furosemide 20 MG tablet  Commonly known as: Lasix  Take 1 tablet by mouth in the morning. lisinopril 20 MG tablet  Commonly known as: PRINIVIL;ZESTRIL  Take 1 tablet by mouth in the morning. Start taking on: July 21, 2022     loperamide 2 MG capsule  Commonly known as: IMODIUM  Take 1 capsule by mouth 4 times daily as needed for Diarrhea            CHANGE how you take these medications      amLODIPine 5 MG tablet  Commonly known as: NORVASC  Take 1 tablet by mouth in the morning and 1 tablet before bedtime.   What changed:   medication strength  how much to take  when to take this            CONTINUE taking these medications      atorvastatin 20 MG tablet  Commonly known as: LIPITOR     metoprolol 100 MG tablet  Commonly known as: LOPRESSOR     Protonix 40 MG tablet  Generic drug: pantoprazole     spironolactone 50 MG tablet  Commonly known as: ALDACTONE               Where to Get Your Medications        These medications were sent to [address], [address] [place] [address]      Phone: [phone]   amLODIPine 5 MG tablet  furosemide 20 MG tablet  lisinopril 20 MG tablet  loperamide 2 MG capsule         Disposition:   If discharged to Home, Any [facility] needs that were indicated and/or required as been addressed and set up by Social Work. Condition at discharge: good     Activity: activity as tolerated    Total time taken for discharging this patient: 40 minutes. Greater than 70% of time was spent focused exclusively on this patient. Time was taken to review chart, discuss plans with consultants, reconciling medications, discussing plan answering questions with patient.      Signed:  Darell Tierney MD  7/20/2022, 3:15 PM  ----------------------------------------------------------------------------------------------------------------------    Roxane Del Rio

## 2022-07-20 NOTE — PROGRESS NOTES
CLINICAL PHARMACY NOTE: MEDS TO BEDS    Total # of Prescriptions Filled: 3   The following medications were delivered to the patient:  Furosemide 20 mg Tab  Lisinopril 20 mg Tab  Amlodipine 5 mg Tab    Additional Documentation:

## 2022-07-21 LAB
EKG ATRIAL RATE: 95 BPM
EKG P AXIS: 60 DEGREES
EKG P-R INTERVAL: 182 MS
EKG Q-T INTERVAL: 372 MS
EKG QRS DURATION: 76 MS
EKG QTC CALCULATION (BAZETT): 467 MS
EKG R AXIS: 24 DEGREES
EKG T AXIS: 46 DEGREES
EKG VENTRICULAR RATE: 95 BPM

## 2022-07-23 LAB
BLOOD CULTURE, ROUTINE: NORMAL
CULTURE, BLOOD 2: NORMAL

## 2022-07-24 LAB
EKG ATRIAL RATE: 95 BPM
EKG P AXIS: 66 DEGREES
EKG P-R INTERVAL: 188 MS
EKG Q-T INTERVAL: 350 MS
EKG QRS DURATION: 80 MS
EKG QTC CALCULATION (BAZETT): 439 MS
EKG R AXIS: 34 DEGREES
EKG T AXIS: 43 DEGREES
EKG VENTRICULAR RATE: 95 BPM

## 2022-07-24 PROCEDURE — 93010 ELECTROCARDIOGRAM REPORT: CPT | Performed by: INTERNAL MEDICINE

## 2022-08-04 LAB
ALBUMIN SERPL-MCNC: 4.1 G/DL (ref 3.5–4.6)
ALP BLD-CCNC: 80 U/L (ref 40–130)
ALT SERPL-CCNC: 23 U/L (ref 0–33)
ANION GAP SERPL CALCULATED.3IONS-SCNC: 11 MEQ/L (ref 9–15)
AST SERPL-CCNC: 14 U/L (ref 0–35)
BILIRUB SERPL-MCNC: 0.5 MG/DL (ref 0.2–0.7)
BILIRUBIN DIRECT: <0.2 MG/DL (ref 0–0.4)
BILIRUBIN, INDIRECT: ABNORMAL MG/DL (ref 0–0.6)
BUN BLDV-MCNC: 44 MG/DL (ref 8–23)
CALCIUM SERPL-MCNC: 9.7 MG/DL (ref 8.5–9.9)
CHLORIDE BLD-SCNC: 110 MEQ/L (ref 95–107)
CHOLESTEROL, TOTAL: 136 MG/DL (ref 0–199)
CO2: 18 MEQ/L (ref 20–31)
CREAT SERPL-MCNC: 1.35 MG/DL (ref 0.5–0.9)
GFR AFRICAN AMERICAN: 45.8
GFR NON-AFRICAN AMERICAN: 37.9
GLUCOSE BLD-MCNC: 106 MG/DL (ref 70–99)
HCT VFR BLD CALC: 33.9 % (ref 37–47)
HDLC SERPL-MCNC: 47 MG/DL (ref 40–59)
HEMOGLOBIN: 11.3 G/DL (ref 12–16)
LDL CHOLESTEROL CALCULATED: 56 MG/DL (ref 0–129)
MAGNESIUM: 2.1 MG/DL (ref 1.7–2.4)
MCH RBC QN AUTO: 28 PG (ref 27–31.3)
MCHC RBC AUTO-ENTMCNC: 33.4 % (ref 33–37)
MCV RBC AUTO: 84 FL (ref 82–100)
PDW BLD-RTO: 13.3 % (ref 11.5–14.5)
PLATELET # BLD: 241 K/UL (ref 130–400)
POTASSIUM SERPL-SCNC: 5.3 MEQ/L (ref 3.4–4.9)
RBC # BLD: 4.04 M/UL (ref 4.2–5.4)
SODIUM BLD-SCNC: 139 MEQ/L (ref 135–144)
TOTAL PROTEIN: 6.1 G/DL (ref 6.3–8)
TRIGL SERPL-MCNC: 167 MG/DL (ref 0–150)
TSH SERPL DL<=0.05 MIU/L-ACNC: 6.59 UIU/ML (ref 0.44–3.86)
WBC # BLD: 6.7 K/UL (ref 4.8–10.8)

## 2022-08-10 ENCOUNTER — PROCEDURE VISIT (OUTPATIENT)
Dept: UROLOGY | Age: 77
End: 2022-08-10
Payer: MEDICARE

## 2022-08-10 VITALS
HEART RATE: 69 BPM | BODY MASS INDEX: 24.66 KG/M2 | WEIGHT: 134 LBS | HEIGHT: 62 IN | SYSTOLIC BLOOD PRESSURE: 132 MMHG | DIASTOLIC BLOOD PRESSURE: 70 MMHG

## 2022-08-10 DIAGNOSIS — R31.29 MICROSCOPIC HEMATURIA: Primary | ICD-10-CM

## 2022-08-10 LAB
BILIRUBIN, POC: ABNORMAL
BLOOD URINE, POC: ABNORMAL
CLARITY, POC: CLEAR
COLOR, POC: YELLOW
GLUCOSE URINE, POC: ABNORMAL
KETONES, POC: ABNORMAL
LEUKOCYTE EST, POC: ABNORMAL
NITRITE, POC: ABNORMAL
PH, POC: 5
PROTEIN, POC: ABNORMAL
SPECIFIC GRAVITY, POC: 1.02
UROBILINOGEN, POC: 0.2

## 2022-08-10 PROCEDURE — 52000 CYSTOURETHROSCOPY: CPT | Performed by: UROLOGY

## 2022-08-10 PROCEDURE — 81003 URINALYSIS AUTO W/O SCOPE: CPT | Performed by: UROLOGY

## 2022-08-10 RX ORDER — PANTOPRAZOLE SODIUM 40 MG/1
40 TABLET, DELAYED RELEASE ORAL DAILY
Status: ON HOLD | COMMUNITY
Start: 2022-03-07 | End: 2022-10-15 | Stop reason: HOSPADM

## 2022-08-10 RX ORDER — LOSARTAN POTASSIUM 50 MG/1
TABLET ORAL
Status: ON HOLD | COMMUNITY
Start: 2022-06-16 | End: 2022-10-15 | Stop reason: HOSPADM

## 2022-08-10 RX ORDER — AMLODIPINE BESYLATE 10 MG/1
TABLET ORAL
Status: ON HOLD | COMMUNITY
Start: 2022-06-09 | End: 2022-10-15 | Stop reason: HOSPADM

## 2022-08-10 RX ORDER — ATORVASTATIN CALCIUM 20 MG/1
TABLET, FILM COATED ORAL
COMMUNITY
Start: 2022-06-03

## 2022-08-10 RX ORDER — SULFAMETHOXAZOLE AND TRIMETHOPRIM 800; 160 MG/1; MG/1
1 TABLET ORAL ONCE
Qty: 1 TABLET | Refills: 0 | COMMUNITY
Start: 2022-08-10 | End: 2022-08-10

## 2022-08-10 ASSESSMENT — ENCOUNTER SYMPTOMS
ABDOMINAL PAIN: 0
ABDOMINAL DISTENTION: 0

## 2022-08-10 NOTE — PROGRESS NOTES
Psychiatric:         Mood and Affect: Mood normal.              CT ABDOMEN PELVIS W IV CONTRAST Additional Contrast? None [[id number]]    Status: Final result       Order Providers    Authorizing Billing   MD Ramona Graham MD                Signed by    Signed Date/Time Phone Pager   Edwin Weems 7/18/2022  1:08 [phone]      Reading Providers    Read Date Phone Pager   Dot Ledesma Jul 18, 2022  1:08 [phone]              CT ABDOMEN PELVIS W IV CONTRAST Additional Contrast? None: Patient Communication    Not Released Not seen       Routing History    Priority Sent On From To Message Type    8/4/2022 12:23 PM Meek, Chpo Incoming Lab Results From Chana Murray MD Results    7/23/2022  2:15 PM Emek, Chpo Incoming Lab Results From Halima Coleman MD Results    7/22/2022 12:09 PM Meek, Chpo Incoming Results From Nata Jackson MD Results    7/21/2022  7:46 AM Meek, Chpo Incoming Results From Madhavi Toth MD Results    7/19/2022  2:57 PM Meek, Chpo Incoming Lab Results From Chana Ndiaye MD CC'd Results    7/19/2022  6:12 AM Meek, Chpo Incoming Lab Results From Ramos Sanchez MD CC'd Results    7/19/2022  6:12 AM Meek, Chpo Incoming Lab Results From Chana Fitzpatrick MD CC'd Results    7/19/2022  6:12 AM Meek, Chpo Incoming Lab Results From Chana Finney DO CC'd Results               Radiation Dose Estimates    No radiation information found for this patient            Narrative       EXAMINATION: CT ABDOMEN PELVIS W IV CONTRAST        DATE AND TIME:7/18/2022 12:29 PM       CLINICAL HISTORY: Acute abdominal pain. Epigastric pain. abd pain, diarrhea         COMPARISON: July 16, 2014       TECHNIQUE: Contiguous axial CT sections of the abdomen and pelvis. 100 cc's of IV contrast given. .  All CT scans at this facility use dose modulation, iterative reconstruction, and/or weight based dosing when appropriate to reduce radiation dose to as low as reasonably achievable. Some of this report was completed using  Power Scribe 078 [id number] and may include unintended errors with respect to translation of words, typographical errors or grammatical errors which may not have    been identified prior to the finalization of this report. FINDINGS         Liver: Negative           Spleen: Negative          Gallbladder: No calcified gallstones. Normal gallbladder wall. No pericholecystic fluid. Pancreas: Negative          Kidney: Negative   Adrenal glands are negative. Bowel: Sterling Welch Questionable wall thickening in the rectosigmoid area. Accuracy of this finding is limited due to lack of intraluminal contrast and resulting nondistended colon. Small amount of free fluid in the pelvis. Some fatty infiltration of the    submucosal fat along the right side of the colon. This is a nonspecific finding but can be seen in inflammatory bowel disease. No acute diverticulitis. There  is no CT evidence for appendicitis. Nodes: No lymphadenopathy. Aorta: Negative           Pelvis:Negative         Peritoneum: No free fluid or free air. The abdominal wall is intact. Bones: No acute osseous abnormalities. Other: Small bilateral effusions with some dependent opacities with some interstitial lines/fluid at the lung bases. Impression   QUESTIONABLE FINDINGS OF COLITIS. SMALL AMOUNT OF FREE FLUID IN THE PELVIS.        SUSPECT PULMONARY EDEMA         Component 8/10/22 1515    Color, UA YELLOW    Clarity, UA CLEAR    Glucose, UA POC NEG    Bilirubin, UA NEG    Ketones, UA NEG    Spec Grav, UA 1.020    Blood, UA POC LARGE    pH, UA 5.0    Protein, UA POC >300MG    Urobilinogen, UA 0.2    Leukocytes, UA NEG    Nitrite, UA NEG    Cystoscopy Procedure Note      Pre-operative Diagnosis: Microhematuria    Post-operative Diagnosis: Same    Surgeon: Alondra Pena MD     Assistants: Josh Lockett. CLAUDIO Woods    Anesthesia: Local anesthesia topical 2% lidocaine gel      Procedure Details   The risks, benefits, complications, treatment options, and expected outcomes were discussed with the patient. The patient concurred with the proposed plan, giving informed consent. Cystoscopy was performed today under local anesthesia, using sterile technique. The patient was placed in the supine position, prepped and draped in the usual sterile fashion. A flexible cystoscope was used to inspect the entire bladder including retroflexion. Findings:  Anterior urethra: Normal  Prostatic Urethra:Normal  Bladder: Normal Mucosa with mild trabeculation and two posterior bladder wall diverticuli with normal internal mucosa  Ureteral orifice(s) were Normal . Ureteral orifice(s) were in the Normal location. Specimens: None                 Complications:  None; patient tolerated the procedure well. Disposition: To home. Condition: stable    Assessment: This is a 68-year-old female with a history HTN, and now with negative microhematuria evaluation given cystoscopy today. I reassured her of these findings and explained the microhematuria may be of medical-renal etiology given proteinuria noted and h/o HTN. She will call if gets any new  problems such as gross hematuria in future.        Plan:      F/U prn        Kimberlyn Ball MD

## 2022-09-01 ENCOUNTER — OFFICE VISIT (OUTPATIENT)
Dept: ENDOCRINOLOGY | Age: 77
End: 2022-09-01
Payer: MEDICARE

## 2022-09-01 VITALS
HEIGHT: 62 IN | DIASTOLIC BLOOD PRESSURE: 73 MMHG | SYSTOLIC BLOOD PRESSURE: 163 MMHG | BODY MASS INDEX: 23.92 KG/M2 | OXYGEN SATURATION: 97 % | HEART RATE: 89 BPM | WEIGHT: 130 LBS

## 2022-09-01 DIAGNOSIS — E03.9 ACQUIRED HYPOTHYROIDISM: Primary | ICD-10-CM

## 2022-09-01 PROCEDURE — 99203 OFFICE O/P NEW LOW 30 MIN: CPT | Performed by: INTERNAL MEDICINE

## 2022-09-01 PROCEDURE — 1123F ACP DISCUSS/DSCN MKR DOCD: CPT | Performed by: INTERNAL MEDICINE

## 2022-09-01 RX ORDER — LEVOTHYROXINE SODIUM 0.03 MG/1
TABLET ORAL
Qty: 30 TABLET | Refills: 5 | Status: SHIPPED | OUTPATIENT
Start: 2022-09-01 | End: 2022-11-04 | Stop reason: SDUPTHER

## 2022-09-01 RX ORDER — CALCIUM CARBONATE 300MG(750)
TABLET,CHEWABLE ORAL
COMMUNITY
Start: 2022-08-30

## 2022-09-01 RX ORDER — DOXAZOSIN 2 MG/1
2 TABLET ORAL DAILY
Status: ON HOLD | COMMUNITY
Start: 2022-08-23 | End: 2022-10-15 | Stop reason: HOSPADM

## 2022-09-01 NOTE — PROGRESS NOTES
2022    Assessment:       Diagnosis Orders   1. Acquired hypothyroidism  TSH with Reflex    T4, Free    Thyroid Peroxidase Antibody            PLAN:     Orders Placed This Encounter   Procedures    TSH with Reflex     Standing Status:   Future     Standing Expiration Date:   2023    T4, Free     Standing Status:   Future     Standing Expiration Date:   2023    Thyroid Peroxidase Antibody     Standing Status:   Future     Standing Expiration Date:   2023     Start patient on low-dose levothyroxine repeat labs in 2 months also check thyroid antibodies more than 50% of 30 minutes spent patient education counseling  Orders Placed This Encounter   Medications    levothyroxine (SYNTHROID) 25 MCG tablet     Si/2 po daily     Dispense:  30 tablet     Refill:  5       Subjective:     Chief Complaint   Patient presents with    New Patient    Thyroid Problem     Referred by Dr. Annabel Gargce:    22 1534 22 1536   BP: (!) 158/77 (!) 163/73   Site: Left Upper Arm Right Upper Arm   Position: Sitting Sitting   Cuff Size: Medium Adult Medium Adult   Pulse: 89    SpO2: 97%    Weight: 130 lb (59 kg)    Height: 5' 2\" (1.575 m)      Wt Readings from Last 3 Encounters:   22 130 lb (59 kg)   08/10/22 134 lb (60.8 kg)   22 160 lb (72.6 kg)     BP Readings from Last 3 Encounters:   22 (!) 163/73   08/10/22 132/70   22 (!) 163/72     Patient referred by cardiologist for hypothyroidism TSH has been slightly high patient does have a history of congestive heart failure denies any history of A. fib currently not on thyroid replacement denies any neck pain difficulty swallowing weight gain    Other  This is a new (Hypothyroidism) problem. The current episode started more than 1 year ago. The problem occurs intermittently. The problem has been waxing and waning. Associated symptoms include fatigue. Pertinent negatives include no neck pain or swollen glands.  Nothing aggravates the symptoms. She has tried nothing for the symptoms. The treatment provided no relief. Past Medical History:   Diagnosis Date    Hypertension      Past Surgical History:   Procedure Laterality Date    CATARACT REMOVAL Bilateral     HERNIA REPAIR      HYSTERECTOMY, TOTAL ABDOMINAL (CERVIX REMOVED)       Social History     Socioeconomic History    Marital status:      Spouse name: Not on file    Number of children: Not on file    Years of education: Not on file    Highest education level: Not on file   Occupational History    Not on file   Tobacco Use    Smoking status: Never    Smokeless tobacco: Never   Substance and Sexual Activity    Alcohol use: Not Currently    Drug use: Not on file    Sexual activity: Not on file   Other Topics Concern    Not on file   Social History Narrative    Not on file     Social Determinants of Health     Financial Resource Strain: Not on file   Food Insecurity: Not on file   Transportation Needs: Not on file   Physical Activity: Not on file   Stress: Not on file   Social Connections: Not on file   Intimate Partner Violence: Not on file   Housing Stability: Not on file     No family history on file. Allergies   Allergen Reactions    Norco [Hydrocodone-Acetaminophen]        Current Outpatient Medications:     doxazosin (CARDURA) 2 MG tablet, Take 2 mg by mouth daily, Disp: , Rfl:     Magnesium 400 MG TABS, TAKE 1 TABLET BY MOUTH ONCE DAILY, Disp: , Rfl:     amLODIPine (NORVASC) 10 MG tablet, Take by mouth, Disp: , Rfl:     atorvastatin (LIPITOR) 20 MG tablet, Take by mouth, Disp: , Rfl:     losartan (COZAAR) 50 MG tablet, , Disp: , Rfl:     pantoprazole (PROTONIX) 40 MG tablet, Take 40 mg by mouth in the morning., Disp: , Rfl:     furosemide (LASIX) 20 MG tablet, Take 1 tablet by mouth in the morning., Disp: 30 tablet, Rfl: 1    metoprolol (LOPRESSOR) 100 MG tablet, Take 100 mg by mouth in the morning and 100 mg before bedtime. , Disp: , Rfl:   Lab Results   Component Value Date Movements: Extraocular movements intact. Conjunctiva/sclera: Conjunctivae normal.   Cardiovascular:      Rate and Rhythm: Normal rate and regular rhythm. Heart sounds: Normal heart sounds. Pulmonary:      Effort: Pulmonary effort is normal.      Breath sounds: Normal breath sounds. Abdominal:      Palpations: Abdomen is soft. Musculoskeletal:         General: Normal range of motion. Cervical back: Normal range of motion and neck supple. Right lower leg: No edema. Left lower leg: No edema. Skin:     General: Skin is warm and dry. Neurological:      General: No focal deficit present. Mental Status: She is alert and oriented to person, place, and time.    Psychiatric:         Mood and Affect: Mood normal.         Behavior: Behavior normal.

## 2022-09-05 ASSESSMENT — ENCOUNTER SYMPTOMS
EYES NEGATIVE: 1
SWOLLEN GLANDS: 0

## 2022-09-30 ENCOUNTER — APPOINTMENT (OUTPATIENT)
Dept: GENERAL RADIOLOGY | Age: 77
DRG: 291 | End: 2022-09-30
Payer: MEDICARE

## 2022-09-30 ENCOUNTER — HOSPITAL ENCOUNTER (INPATIENT)
Age: 77
LOS: 14 days | Discharge: HOME | DRG: 291 | End: 2022-10-15
Attending: INTERNAL MEDICINE | Admitting: INTERNAL MEDICINE
Payer: MEDICARE

## 2022-09-30 DIAGNOSIS — I50.33 ACUTE ON CHRONIC DIASTOLIC (CONGESTIVE) HEART FAILURE (HCC): ICD-10-CM

## 2022-09-30 DIAGNOSIS — N17.9 AKI (ACUTE KIDNEY INJURY) (HCC): ICD-10-CM

## 2022-09-30 DIAGNOSIS — I13.10 CARDIORENAL DISEASE: ICD-10-CM

## 2022-09-30 DIAGNOSIS — I16.1 HYPERTENSIVE EMERGENCY: Primary | ICD-10-CM

## 2022-09-30 LAB
ALBUMIN SERPL-MCNC: 3 G/DL (ref 3.5–4.6)
ALP BLD-CCNC: 126 U/L (ref 40–130)
ALT SERPL-CCNC: 15 U/L (ref 0–33)
ANION GAP SERPL CALCULATED.3IONS-SCNC: 13 MEQ/L (ref 9–15)
AST SERPL-CCNC: 23 U/L (ref 0–35)
BACTERIA: NEGATIVE /HPF
BASOPHILS ABSOLUTE: 0.1 K/UL (ref 0–0.2)
BASOPHILS RELATIVE PERCENT: 1.2 %
BILIRUB SERPL-MCNC: 0.3 MG/DL (ref 0.2–0.7)
BILIRUBIN URINE: NEGATIVE
BLOOD, URINE: ABNORMAL
BUN BLDV-MCNC: 35 MG/DL (ref 8–23)
CALCIUM SERPL-MCNC: 8.2 MG/DL (ref 8.5–9.9)
CHLORIDE BLD-SCNC: 105 MEQ/L (ref 95–107)
CLARITY: CLEAR
CO2: 23 MEQ/L (ref 20–31)
COLOR: YELLOW
CREAT SERPL-MCNC: 2.39 MG/DL (ref 0.5–0.9)
EKG ATRIAL RATE: 88 BPM
EKG P AXIS: 50 DEGREES
EKG P-R INTERVAL: 172 MS
EKG Q-T INTERVAL: 386 MS
EKG QRS DURATION: 74 MS
EKG QTC CALCULATION (BAZETT): 467 MS
EKG R AXIS: 4 DEGREES
EKG T AXIS: 31 DEGREES
EKG VENTRICULAR RATE: 88 BPM
EOSINOPHILS ABSOLUTE: 0.6 K/UL (ref 0–0.7)
EOSINOPHILS RELATIVE PERCENT: 9.8 %
EPITHELIAL CELLS, UA: ABNORMAL /HPF (ref 0–5)
GFR AFRICAN AMERICAN: 23.7
GFR NON-AFRICAN AMERICAN: 19.6
GLOBULIN: 2.2 G/DL (ref 2.3–3.5)
GLUCOSE BLD-MCNC: 112 MG/DL (ref 70–99)
GLUCOSE URINE: NEGATIVE MG/DL
HCT VFR BLD CALC: 25.1 % (ref 37–47)
HEMOGLOBIN: 8.8 G/DL (ref 12–16)
HYALINE CASTS: ABNORMAL /HPF (ref 0–5)
KETONES, URINE: NEGATIVE MG/DL
LEUKOCYTE ESTERASE, URINE: NEGATIVE
LYMPHOCYTES ABSOLUTE: 1 K/UL (ref 1–4.8)
LYMPHOCYTES RELATIVE PERCENT: 16.2 %
MAGNESIUM: 2.2 MG/DL (ref 1.7–2.4)
MCH RBC QN AUTO: 28.4 PG (ref 27–31.3)
MCHC RBC AUTO-ENTMCNC: 35.2 % (ref 33–37)
MCV RBC AUTO: 80.7 FL (ref 82–100)
MONOCYTES ABSOLUTE: 0.4 K/UL (ref 0.2–0.8)
MONOCYTES RELATIVE PERCENT: 6.5 %
NEUTROPHILS ABSOLUTE: 4 K/UL (ref 1.4–6.5)
NEUTROPHILS RELATIVE PERCENT: 66.3 %
NITRITE, URINE: NEGATIVE
PDW BLD-RTO: 13.5 % (ref 11.5–14.5)
PH UA: 7 (ref 5–9)
PLATELET # BLD: 239 K/UL (ref 130–400)
POTASSIUM SERPL-SCNC: 3.1 MEQ/L (ref 3.4–4.9)
PRO-BNP: NORMAL PG/ML
PROCALCITONIN: 0.21 NG/ML (ref 0–0.15)
PROTEIN UA: >=300 MG/DL
RBC # BLD: 3.1 M/UL (ref 4.2–5.4)
RBC UA: >100 /HPF (ref 0–5)
RENAL EPITHELIAL, UA: ABNORMAL /HPF
SARS-COV-2, NAAT: NOT DETECTED
SODIUM BLD-SCNC: 141 MEQ/L (ref 135–144)
SPECIFIC GRAVITY UA: 1.01 (ref 1–1.03)
TOTAL PROTEIN: 5.2 G/DL (ref 6.3–8)
TROPONIN: <0.01 NG/ML (ref 0–0.01)
TSH SERPL DL<=0.05 MIU/L-ACNC: 4.8 UIU/ML (ref 0.44–3.86)
URINE REFLEX TO CULTURE: ABNORMAL
UROBILINOGEN, URINE: 0.2 E.U./DL
WBC # BLD: 6.1 K/UL (ref 4.8–10.8)
WBC UA: ABNORMAL /HPF (ref 0–5)

## 2022-09-30 PROCEDURE — 6370000000 HC RX 637 (ALT 250 FOR IP): Performed by: STUDENT IN AN ORGANIZED HEALTH CARE EDUCATION/TRAINING PROGRAM

## 2022-09-30 PROCEDURE — 85025 COMPLETE CBC W/AUTO DIFF WBC: CPT

## 2022-09-30 PROCEDURE — 99285 EMERGENCY DEPT VISIT HI MDM: CPT

## 2022-09-30 PROCEDURE — 36415 COLL VENOUS BLD VENIPUNCTURE: CPT

## 2022-09-30 PROCEDURE — 93005 ELECTROCARDIOGRAM TRACING: CPT | Performed by: STUDENT IN AN ORGANIZED HEALTH CARE EDUCATION/TRAINING PROGRAM

## 2022-09-30 PROCEDURE — 2500000003 HC RX 250 WO HCPCS: Performed by: STUDENT IN AN ORGANIZED HEALTH CARE EDUCATION/TRAINING PROGRAM

## 2022-09-30 PROCEDURE — 84484 ASSAY OF TROPONIN QUANT: CPT

## 2022-09-30 PROCEDURE — 83880 ASSAY OF NATRIURETIC PEPTIDE: CPT

## 2022-09-30 PROCEDURE — 81001 URINALYSIS AUTO W/SCOPE: CPT

## 2022-09-30 PROCEDURE — 71045 X-RAY EXAM CHEST 1 VIEW: CPT

## 2022-09-30 PROCEDURE — 96374 THER/PROPH/DIAG INJ IV PUSH: CPT

## 2022-09-30 PROCEDURE — 87635 SARS-COV-2 COVID-19 AMP PRB: CPT

## 2022-09-30 PROCEDURE — 2500000003 HC RX 250 WO HCPCS

## 2022-09-30 PROCEDURE — 83735 ASSAY OF MAGNESIUM: CPT

## 2022-09-30 PROCEDURE — 84443 ASSAY THYROID STIM HORMONE: CPT

## 2022-09-30 PROCEDURE — 96375 TX/PRO/DX INJ NEW DRUG ADDON: CPT

## 2022-09-30 PROCEDURE — 6360000002 HC RX W HCPCS: Performed by: STUDENT IN AN ORGANIZED HEALTH CARE EDUCATION/TRAINING PROGRAM

## 2022-09-30 PROCEDURE — 84145 PROCALCITONIN (PCT): CPT

## 2022-09-30 PROCEDURE — 93010 ELECTROCARDIOGRAM REPORT: CPT | Performed by: INTERNAL MEDICINE

## 2022-09-30 PROCEDURE — 80053 COMPREHEN METABOLIC PANEL: CPT

## 2022-09-30 RX ORDER — LABETALOL HYDROCHLORIDE 5 MG/ML
20 INJECTION, SOLUTION INTRAVENOUS ONCE
Status: DISCONTINUED | OUTPATIENT
Start: 2022-09-30 | End: 2022-09-30

## 2022-09-30 RX ORDER — NITROGLYCERIN 20 MG/100ML
5-200 INJECTION INTRAVENOUS CONTINUOUS
Status: DISCONTINUED | OUTPATIENT
Start: 2022-09-30 | End: 2022-10-01

## 2022-09-30 RX ORDER — LABETALOL HYDROCHLORIDE 5 MG/ML
10 INJECTION, SOLUTION INTRAVENOUS ONCE
Status: COMPLETED | OUTPATIENT
Start: 2022-09-30 | End: 2022-09-30

## 2022-09-30 RX ORDER — POTASSIUM CHLORIDE 750 MG/1
40 TABLET, FILM COATED, EXTENDED RELEASE ORAL ONCE
Status: COMPLETED | OUTPATIENT
Start: 2022-09-30 | End: 2022-09-30

## 2022-09-30 RX ORDER — FUROSEMIDE 10 MG/ML
20 INJECTION INTRAMUSCULAR; INTRAVENOUS ONCE
Status: COMPLETED | OUTPATIENT
Start: 2022-09-30 | End: 2022-09-30

## 2022-09-30 RX ORDER — NITROGLYCERIN 0.4 MG/1
0.4 TABLET SUBLINGUAL ONCE
Status: COMPLETED | OUTPATIENT
Start: 2022-09-30 | End: 2022-09-30

## 2022-09-30 RX ORDER — HYDRALAZINE HYDROCHLORIDE 20 MG/ML
20 INJECTION INTRAMUSCULAR; INTRAVENOUS ONCE
Status: COMPLETED | OUTPATIENT
Start: 2022-09-30 | End: 2022-09-30

## 2022-09-30 RX ORDER — FUROSEMIDE 10 MG/ML
40 INJECTION INTRAMUSCULAR; INTRAVENOUS ONCE
Status: COMPLETED | OUTPATIENT
Start: 2022-09-30 | End: 2022-09-30

## 2022-09-30 RX ORDER — FUROSEMIDE 10 MG/ML
40 INJECTION INTRAMUSCULAR; INTRAVENOUS ONCE
Status: DISCONTINUED | OUTPATIENT
Start: 2022-09-30 | End: 2022-09-30

## 2022-09-30 RX ADMIN — FUROSEMIDE 20 MG: 10 INJECTION, SOLUTION INTRAMUSCULAR; INTRAVENOUS at 13:58

## 2022-09-30 RX ADMIN — NITROGLYCERIN 80 MCG/MIN: 20 INJECTION INTRAVENOUS at 23:13

## 2022-09-30 RX ADMIN — LABETALOL HYDROCHLORIDE 10 MG: 5 INJECTION, SOLUTION INTRAVENOUS at 19:14

## 2022-09-30 RX ADMIN — POTASSIUM CHLORIDE 40 MEQ: 750 TABLET, FILM COATED, EXTENDED RELEASE ORAL at 16:19

## 2022-09-30 RX ADMIN — LABETALOL HYDROCHLORIDE 10 MG: 5 INJECTION, SOLUTION INTRAVENOUS at 16:20

## 2022-09-30 RX ADMIN — FUROSEMIDE 40 MG: 10 INJECTION, SOLUTION INTRAMUSCULAR; INTRAVENOUS at 16:22

## 2022-09-30 RX ADMIN — HYDRALAZINE HYDROCHLORIDE 20 MG: 20 INJECTION INTRAMUSCULAR; INTRAVENOUS at 19:15

## 2022-09-30 RX ADMIN — NITROGLYCERIN 0.4 MG: 0.4 TABLET SUBLINGUAL at 15:11

## 2022-09-30 ASSESSMENT — LIFESTYLE VARIABLES
HOW OFTEN DO YOU HAVE A DRINK CONTAINING ALCOHOL: NEVER
HOW MANY STANDARD DRINKS CONTAINING ALCOHOL DO YOU HAVE ON A TYPICAL DAY: PATIENT DOES NOT DRINK
HOW OFTEN DO YOU HAVE A DRINK CONTAINING ALCOHOL: NEVER
HOW MANY STANDARD DRINKS CONTAINING ALCOHOL DO YOU HAVE ON A TYPICAL DAY: PATIENT DOES NOT DRINK

## 2022-09-30 ASSESSMENT — ENCOUNTER SYMPTOMS
WHEEZING: 0
PHOTOPHOBIA: 0
SHORTNESS OF BREATH: 1
NAUSEA: 0
DIARRHEA: 0
VOMITING: 0
ABDOMINAL PAIN: 0
COUGH: 1

## 2022-09-30 ASSESSMENT — PAIN - FUNCTIONAL ASSESSMENT
PAIN_FUNCTIONAL_ASSESSMENT: NONE - DENIES PAIN

## 2022-09-30 NOTE — ED TRIAGE NOTES
Patient arrives via EMS from [facility], with reports of shortness of breath for over one week. Patient states she also had chest pain yesterday, but denies any pain today. Patient in no distress on arrival. Skin pink, warm, and dry. Respirations equal and unlabored.

## 2022-09-30 NOTE — ED NOTES
1847 [facility] AC update, still awaiting bed at [facility].       Reynaldo Hayes  09/30/22 1851

## 2022-09-30 NOTE — ED PROVIDER NOTES
[address] [facility] ED  EMERGENCY DEPARTMENT ENCOUNTER      Pt Name: Khadra Patricio  MRN: [id number]  [place] 1945  Date of evaluation: 9/30/2022  Provider: Aida Underwood Dr       Chief Complaint   Patient presents with    Shortness of Breath     X 1 week with bilateral leg swelling, had chest pain yesterday, denies chest pain today         HISTORY OF PRESENT ILLNESS   (Location/Symptom, Timing/Onset, Context/Setting, Quality, Duration, Modifying Factors, Severity)  Note limiting factors. Khadra Patricio is a 64 y.o. female who presents to the emergency department for evaluation of sob x 2 weeks. Was sent from primary care for evaluation. SOB is constant, worse with exertion. +orthopnea. States when she lies flat she hears wheezing. Non smoker, no asthma or COPD. Follows with [facility] cardiology. Associated leg swelling. Compliant with meds/diet at home. Had episode of substernal chest pain yesterday at night, none since. Associated dry cough. No sick contacts. Denies fever, chills, nvd, headache, current cp, abd pain, urinary sx, diaphoresis, dizziness. HPI    Nursing Notes were reviewed. REVIEW OF SYSTEMS    (2-9 systems for level 4, 10 or more for level 5)     Review of Systems   Constitutional:  Negative for chills, fatigue and fever. HENT:  Negative for congestion. Eyes:  Negative for photophobia. Respiratory:  Positive for cough and shortness of breath. Negative for wheezing. Cardiovascular:  Positive for chest pain (resolved) and leg swelling. Negative for palpitations. Gastrointestinal:  Negative for abdominal pain, diarrhea, nausea and vomiting. Genitourinary:  Negative for dysuria, frequency and hematuria. Musculoskeletal:  Negative for myalgias. Allergic/Immunologic: Negative for immunocompromised state. Neurological:  Negative for dizziness, weakness and headaches. All other systems reviewed and are negative.     Except as noted above the remainder of the review of systems was reviewed and negative. PAST MEDICAL HISTORY     Past Medical History:   Diagnosis Date    CHF (congestive heart failure) ([facility] [address]     Hypertension          SURGICAL HISTORY       Past Surgical History:   Procedure Laterality Date    CATARACT REMOVAL Bilateral     HERNIA REPAIR      HYSTERECTOMY, TOTAL ABDOMINAL (CERVIX REMOVED)           CURRENT MEDICATIONS       Previous Medications    AMLODIPINE (NORVASC) 10 MG TABLET    Take by mouth    ATORVASTATIN (LIPITOR) 20 MG TABLET    Take by mouth    DOXAZOSIN (CARDURA) 2 MG TABLET    Take 2 mg by mouth daily    FUROSEMIDE (LASIX) 20 MG TABLET    Take 1 tablet by mouth in the morning. LEVOTHYROXINE (SYNTHROID) 25 MCG TABLET    1/2 po daily    LOSARTAN (COZAAR) 50 MG TABLET        MAGNESIUM 400 MG TABS    TAKE 1 TABLET BY MOUTH ONCE DAILY    METOPROLOL (LOPRESSOR) 100 MG TABLET    Take 100 mg by mouth in the morning and 100 mg before bedtime. PANTOPRAZOLE (PROTONIX) 40 MG TABLET    Take 40 mg by mouth in the morning. ALLERGIES     Norco [hydrocodone-acetaminophen]    FAMILY HISTORY     History reviewed. No pertinent family history.        SOCIAL HISTORY       Social History     Socioeconomic History    Marital status:      Spouse name: None    Number of children: None    Years of education: None    Highest education level: None   Tobacco Use    Smoking status: Never    Smokeless tobacco: Never   Vaping Use    Vaping Use: Never used   Substance and Sexual Activity    Alcohol use: Not Currently    Drug use: Never    Sexual activity: Never       SCREENINGS         [place] Coma Scale  Eye Opening: Spontaneous  Best Verbal Response: Oriented  Best Motor Response: Obeys commands  [place] Coma Scale Score: 15                     CIWA Assessment  BP: (!) 189/95  Heart Rate: (!) 110                 PHYSICAL EXAM    (up to 7 for level 4, 8 or more for level 5)     ED Triage Vitals [09/30/22 1244]   BP Temp Temp Source Heart Rate Resp SpO2 Height Weight   (!) 219/102 98.1 °F (36.7 °C) Oral 88 20 99 % -- --       Physical Exam  Constitutional:       General: She is not in acute distress. Appearance: She is well-developed. She is not ill-appearing, toxic-appearing or diaphoretic. HENT:      Head: Normocephalic and atraumatic. Nose: Nose normal.      Mouth/Throat:      Mouth: Mucous membranes are moist.   Eyes:      Pupils: Pupils are equal, round, and reactive to light. Cardiovascular:      Rate and Rhythm: Normal rate and regular rhythm. Heart sounds: No murmur heard. No friction rub. No gallop. Pulmonary:      Effort: Pulmonary effort is normal. No respiratory distress. Breath sounds: Wheezing present. No rhonchi. Comments: Mild crackles, bilateral lower lobes  Chest:      Chest wall: No tenderness. Abdominal:      General: Bowel sounds are normal. There is no distension. Palpations: Abdomen is soft. Tenderness: There is no abdominal tenderness. There is no guarding or rebound. Musculoskeletal:         General: No swelling. Cervical back: Normal range of motion. Right lower leg: Edema present. Left lower leg: Edema present. Comments: Bilateral 2+ pitting edema    Skin:     General: Skin is warm and dry. Capillary Refill: Capillary refill takes less than 2 seconds. Findings: No rash. Neurological:      General: No focal deficit present. Mental Status: She is alert and oriented to person, place, and time. DIAGNOSTIC RESULTS     EKG: All EKG's are interpreted by the Emergency Department Physician who either signs or Co-signs this chart in the absence of a cardiologist.    EKG shows NSR with HR 88, normal axis, normal intervals, no ST changes.         RADIOLOGY:   Non-plain film images such as CT, Ultrasound and MRI are read by the radiologist. Plain radiographic images are visualized and preliminarily interpreted by the emergency physician with the below findings:        Interpretation per the Radiologist below, if available at the time of this note:    XR CHEST PORTABLE   Final Result   1. Trace bilateral pleural effusions   2.  Mild vascular congestion               ED BEDSIDE ULTRASOUND:   Performed by ED Physician - none    LABS:  Labs Reviewed   COMPREHENSIVE METABOLIC PANEL - Abnormal; Notable for the following components:       Result Value    Potassium 3.1 (*)     Glucose 112 (*)     BUN 35 (*)     Creatinine 2.39 (*)     GFR Non- 19.6 (*)     GFR  23.7 (*)     Calcium 8.2 (*)     Total Protein 5.2 (*)     Albumin 3.0 (*)     Globulin 2.2 (*)     All other components within normal limits   CBC WITH AUTO DIFFERENTIAL - Abnormal; Notable for the following components:    RBC 3.10 (*)     Hemoglobin 8.8 (*)     Hematocrit 25.1 (*)     MCV 80.7 (*)     All other components within normal limits   URINALYSIS WITH REFLEX TO CULTURE - Abnormal; Notable for the following components:    Blood, Urine LARGE (*)     Protein, UA >=300 (*)     All other components within normal limits   TSH - Abnormal; Notable for the following components:    TSH 4.800 (*)     All other components within normal limits   PROCALCITONIN - Abnormal; Notable for the following components:    Procalcitonin 0.21 (*)     All other components within normal limits   MICROSCOPIC URINALYSIS - Abnormal; Notable for the following components:    Renal Epithelial, UA 0-2 (*)     WBC, UA 6-9 (*)     RBC, UA >100 (*)     All other components within normal limits   TROPONIN - Abnormal; Notable for the following components:    Troponin 0.019 (*)     All other components within normal limits    Narrative:     CALL  [facility] tel. [phone],  Chemistry results called to and read back by liza mitchell, 10/01/2022 05:35, by  ELICIA NOLAND-19, RAPID   MAGNESIUM   TROPONIN   BRAIN NATRIURETIC PEPTIDE   TROPONIN   TROPONIN   TROPONIN   TROPONIN   CBC WITH AUTO DIFFERENTIAL   COMPREHENSIVE METABOLIC PANEL   BRAIN NATRIURETIC PEPTIDE       All other labs were within normal range or not returned as of this dictation. EMERGENCY DEPARTMENT COURSE and DIFFERENTIAL DIAGNOSIS/MDM:   Vitals:    Vitals:    10/01/22 0400 10/01/22 0430 10/01/22 0500 10/01/22 0515   BP: (!) 148/118 (!) 177/84 (!) 194/87 (!) 189/95   Pulse: (!) 113 100 (!) 108 (!) 110   Resp: 25 17 20 26   Temp:       TempSrc:       SpO2: 99% 94% 95% 99%   Weight:       Height:           MDM     Amount and/or Complexity of Data Reviewed  Clinical lab tests: reviewed  Tests in the radiology section of CPT®: reviewed  Tests in the medicine section of CPT®: reviewed  Decide to obtain previous medical records or to obtain history from someone other than the patient: yes      Patient is a 75-year-old female presents the ED for evaluation of shortness of breath. She is afebrile hemodynamically stable. Hypertensive to 219/102. No significant respiratory distress 99% on room air. Clinically patient is fluid overloaded. She was given IV Lasix at this time. Per ED attending, pt was given SL nitro x 1 for HTN/CHF without improvement in BP. Given IV labetalol. EKG shows NSR with HR 88, normal axis, normal intervals, no ST changes. CMP is remarkable for potassium of 3.1 BUN 35 creatinine 2.39 and GFR 19 consistent with CHAYO, baseline creatinine around 1. Anemia with a hemoglobin of 8.8 which is decreased from baseline of 11. Patient denies any active bleeding. BNP is significant elevated to 14,458. Mild elevation procalcitonin 0.21, no signs of infection at this time. COVID-negative. Chest x-ray shows trace bilateral pleural effusions and mild vascular congestion. Due to CHF exacerbation with CHAYO, cardiorenal syndrome pt to be admitted. Patient requesting transfer to [facility] for continuity of care. Gi Crespo is her cardiologist. Valentino Heller with Dr. Liz Arteaga at [facility] who will accept once BP improves. Additional meds ordered.  [facility] will call back 90 min to reassess. REASSESSMENT      Patient was received on signout from [facility] at 18 with transfer initiated and patient accepted at [facility]. Patient is in hypertensive emergency. As described above patient had gotten labetalol x2 and IV hydralazine as well as 1 sublingual nitro in the emergency department. She remained persistently hypertensive. With CHF exacerbation and persistent hypertension initiated nitroglycerin drip. Patient had been given 60 mg IV Lasix on presentation, noted by nursing during her extended stay to have very good urinary output. Diuresing well. Given second dose 60 mg IV lasix. Patient is reporting marked symptom improvement. She remains comfortable without respiratory distress, increased work of breathing, no return of chest pain. Troponin is trending. As patient is in the emergency department for approximately 16 hours with no ETA on bed assignment at [facility], I had a discussion with patient regarding admission to this facility for better care acutely. Patient completely agrees with this and would like to be admitted in house. At this time her nitro drip is titrated at 120 mcg. Will admit to [facility] for hypertensive emergency, CHAYO. Spoke with Dr. Kalie Ramsay accepting. Transition orders placed. Pt updated of plan and continues to be agreeable. CRITICAL CARE TIME   Total Critical Care time was 0 minutes, excluding separately reportable procedures. There was a high probability of clinically significant/life threatening deterioration in the patient's condition which required my urgent intervention. CONSULTS:  None    PROCEDURES:  Unless otherwise noted below, none     Procedures        FINAL IMPRESSION      1. Hypertensive emergency    2. Acute on chronic diastolic (congestive) heart failure (HCC)    3. Cardiorenal disease    4.  CHAYO (acute kidney injury) ([facility] [address]          DISPOSITION/PLAN   DISPOSITION Admitted 10/01/2022 05:48:11 AM      PATIENT REFERRED TO:  No follow-up provider specified. DISCHARGE MEDICATIONS:  New Prescriptions    No medications on file     Controlled Substances Monitoring:     No flowsheet data found.     (Please note that portions of this note were completed with a voice recognition program.  Efforts were made to edit the dictations but occasionally words are mis-transcribed.)    CARMELITA Shukla (electronically signed)             Izzy Shukla  10/01/22 5605

## 2022-10-01 PROBLEM — I16.1 HYPERTENSIVE EMERGENCY: Status: ACTIVE | Noted: 2022-10-01

## 2022-10-01 LAB
ALBUMIN SERPL-MCNC: 2.7 G/DL (ref 3.5–4.6)
ALP BLD-CCNC: 119 U/L (ref 40–130)
ALT SERPL-CCNC: 14 U/L (ref 0–33)
ANION GAP SERPL CALCULATED.3IONS-SCNC: 13 MEQ/L (ref 9–15)
AST SERPL-CCNC: 18 U/L (ref 0–35)
BASOPHILS ABSOLUTE: 0.1 K/UL (ref 0–0.2)
BASOPHILS RELATIVE PERCENT: 1.1 %
BILIRUB SERPL-MCNC: 0.4 MG/DL (ref 0.2–0.7)
BUN BLDV-MCNC: 34 MG/DL (ref 8–23)
CALCIUM SERPL-MCNC: 8.3 MG/DL (ref 8.5–9.9)
CHLORIDE BLD-SCNC: 103 MEQ/L (ref 95–107)
CO2: 24 MEQ/L (ref 20–31)
CREAT SERPL-MCNC: 2.25 MG/DL (ref 0.5–0.9)
EOSINOPHILS ABSOLUTE: 0.3 K/UL (ref 0–0.7)
EOSINOPHILS RELATIVE PERCENT: 3.7 %
GFR AFRICAN AMERICAN: 25.4
GFR NON-AFRICAN AMERICAN: 21
GLOBULIN: 2 G/DL (ref 2.3–3.5)
GLUCOSE BLD-MCNC: 153 MG/DL (ref 70–99)
HCT VFR BLD CALC: 22.6 % (ref 37–47)
HCT VFR BLD CALC: 23.8 % (ref 37–47)
HEMOGLOBIN: 7.3 G/DL (ref 12–16)
HEMOGLOBIN: 8.1 G/DL (ref 12–16)
LYMPHOCYTES ABSOLUTE: 0.9 K/UL (ref 1–4.8)
LYMPHOCYTES RELATIVE PERCENT: 11.4 %
MCH RBC QN AUTO: 26.9 PG (ref 27–31.3)
MCH RBC QN AUTO: 27.7 PG (ref 27–31.3)
MCHC RBC AUTO-ENTMCNC: 32.3 % (ref 33–37)
MCHC RBC AUTO-ENTMCNC: 34 % (ref 33–37)
MCV RBC AUTO: 81.6 FL (ref 82–100)
MCV RBC AUTO: 83.3 FL (ref 82–100)
MONOCYTES ABSOLUTE: 0.5 K/UL (ref 0.2–0.8)
MONOCYTES RELATIVE PERCENT: 6.3 %
NEUTROPHILS ABSOLUTE: 6.1 K/UL (ref 1.4–6.5)
NEUTROPHILS RELATIVE PERCENT: 77.5 %
PDW BLD-RTO: 13.7 % (ref 11.5–14.5)
PDW BLD-RTO: 13.8 % (ref 11.5–14.5)
PLATELET # BLD: 203 K/UL (ref 130–400)
PLATELET # BLD: 212 K/UL (ref 130–400)
POTASSIUM SERPL-SCNC: 2.7 MEQ/L (ref 3.4–4.9)
PRO-BNP: NORMAL PG/ML
RBC # BLD: 2.72 M/UL (ref 4.2–5.4)
RBC # BLD: 2.91 M/UL (ref 4.2–5.4)
SODIUM BLD-SCNC: 140 MEQ/L (ref 135–144)
TOTAL PROTEIN: 4.7 G/DL (ref 6.3–8)
TROPONIN: 0.01 NG/ML (ref 0–0.01)
TROPONIN: 0.01 NG/ML (ref 0–0.01)
TROPONIN: 0.02 NG/ML (ref 0–0.01)
WBC # BLD: 7.2 K/UL (ref 4.8–10.8)
WBC # BLD: 7.9 K/UL (ref 4.8–10.8)

## 2022-10-01 PROCEDURE — 6370000000 HC RX 637 (ALT 250 FOR IP): Performed by: INTERNAL MEDICINE

## 2022-10-01 PROCEDURE — 83880 ASSAY OF NATRIURETIC PEPTIDE: CPT

## 2022-10-01 PROCEDURE — 99223 1ST HOSP IP/OBS HIGH 75: CPT | Performed by: INTERNAL MEDICINE

## 2022-10-01 PROCEDURE — 85025 COMPLETE CBC W/AUTO DIFF WBC: CPT

## 2022-10-01 PROCEDURE — 6360000002 HC RX W HCPCS: Performed by: INTERNAL MEDICINE

## 2022-10-01 PROCEDURE — 85027 COMPLETE CBC AUTOMATED: CPT

## 2022-10-01 PROCEDURE — 84484 ASSAY OF TROPONIN QUANT: CPT

## 2022-10-01 PROCEDURE — 36415 COLL VENOUS BLD VENIPUNCTURE: CPT

## 2022-10-01 PROCEDURE — 0TB03ZX EXCISION OF RIGHT KIDNEY, PERCUTANEOUS APPROACH, DIAGNOSTIC: ICD-10-PCS | Performed by: RADIOLOGY

## 2022-10-01 PROCEDURE — 2500000003 HC RX 250 WO HCPCS: Performed by: STUDENT IN AN ORGANIZED HEALTH CARE EDUCATION/TRAINING PROGRAM

## 2022-10-01 PROCEDURE — 6360000002 HC RX W HCPCS: Performed by: STUDENT IN AN ORGANIZED HEALTH CARE EDUCATION/TRAINING PROGRAM

## 2022-10-01 PROCEDURE — 80053 COMPREHEN METABOLIC PANEL: CPT

## 2022-10-01 PROCEDURE — 2060000000 HC ICU INTERMEDIATE R&B

## 2022-10-01 PROCEDURE — 2580000003 HC RX 258: Performed by: INTERNAL MEDICINE

## 2022-10-01 PROCEDURE — 6360000002 HC RX W HCPCS

## 2022-10-01 PROCEDURE — 6370000000 HC RX 637 (ALT 250 FOR IP): Performed by: STUDENT IN AN ORGANIZED HEALTH CARE EDUCATION/TRAINING PROGRAM

## 2022-10-01 PROCEDURE — 2500000003 HC RX 250 WO HCPCS: Performed by: INTERNAL MEDICINE

## 2022-10-01 RX ORDER — FUROSEMIDE 10 MG/ML
60 INJECTION INTRAMUSCULAR; INTRAVENOUS ONCE
Status: COMPLETED | OUTPATIENT
Start: 2022-10-01 | End: 2022-10-01

## 2022-10-01 RX ORDER — DOXAZOSIN MESYLATE 1 MG/1
2 TABLET ORAL DAILY
Status: DISCONTINUED | OUTPATIENT
Start: 2022-10-01 | End: 2022-10-15 | Stop reason: HOSPADM

## 2022-10-01 RX ORDER — POTASSIUM BICARBONATE 25 MEQ/1
50 TABLET, EFFERVESCENT ORAL ONCE
Status: COMPLETED | OUTPATIENT
Start: 2022-10-01 | End: 2022-10-01

## 2022-10-01 RX ORDER — NITROGLYCERIN 20 MG/100ML
120 INJECTION INTRAVENOUS CONTINUOUS
Status: DISCONTINUED | OUTPATIENT
Start: 2022-10-01 | End: 2022-10-02

## 2022-10-01 RX ORDER — LABETALOL HYDROCHLORIDE 5 MG/ML
10 INJECTION, SOLUTION INTRAVENOUS EVERY 4 HOURS PRN
Status: DISCONTINUED | OUTPATIENT
Start: 2022-10-01 | End: 2022-10-15 | Stop reason: HOSPADM

## 2022-10-01 RX ORDER — LABETALOL HYDROCHLORIDE 5 MG/ML
20 INJECTION, SOLUTION INTRAVENOUS ONCE
Status: COMPLETED | OUTPATIENT
Start: 2022-10-01 | End: 2022-10-01

## 2022-10-01 RX ORDER — HEPARIN SODIUM 5000 [USP'U]/ML
5000 INJECTION, SOLUTION INTRAVENOUS; SUBCUTANEOUS EVERY 8 HOURS SCHEDULED
Status: DISCONTINUED | OUTPATIENT
Start: 2022-10-01 | End: 2022-10-05

## 2022-10-01 RX ORDER — LEVOTHYROXINE SODIUM 0.03 MG/1
25 TABLET ORAL DAILY
Status: DISCONTINUED | OUTPATIENT
Start: 2022-10-01 | End: 2022-10-15 | Stop reason: HOSPADM

## 2022-10-01 RX ORDER — LOSARTAN POTASSIUM 50 MG/1
50 TABLET ORAL DAILY
Status: DISCONTINUED | OUTPATIENT
Start: 2022-10-01 | End: 2022-10-02

## 2022-10-01 RX ORDER — SODIUM CHLORIDE 0.9 % (FLUSH) 0.9 %
5-40 SYRINGE (ML) INJECTION PRN
Status: DISCONTINUED | OUTPATIENT
Start: 2022-10-01 | End: 2022-10-15 | Stop reason: HOSPADM

## 2022-10-01 RX ORDER — METOPROLOL TARTRATE 50 MG/1
100 TABLET, FILM COATED ORAL 2 TIMES DAILY
Status: DISCONTINUED | OUTPATIENT
Start: 2022-10-01 | End: 2022-10-15 | Stop reason: HOSPADM

## 2022-10-01 RX ORDER — PANTOPRAZOLE SODIUM 40 MG/1
40 TABLET, DELAYED RELEASE ORAL DAILY
Status: DISCONTINUED | OUTPATIENT
Start: 2022-10-01 | End: 2022-10-15 | Stop reason: HOSPADM

## 2022-10-01 RX ORDER — SODIUM CHLORIDE 0.9 % (FLUSH) 0.9 %
5-40 SYRINGE (ML) INJECTION EVERY 12 HOURS SCHEDULED
Status: DISCONTINUED | OUTPATIENT
Start: 2022-10-01 | End: 2022-10-15 | Stop reason: HOSPADM

## 2022-10-01 RX ORDER — ACETAMINOPHEN 325 MG/1
650 TABLET ORAL EVERY 4 HOURS PRN
Status: DISCONTINUED | OUTPATIENT
Start: 2022-10-01 | End: 2022-10-15 | Stop reason: HOSPADM

## 2022-10-01 RX ORDER — ATORVASTATIN CALCIUM 40 MG/1
40 TABLET, FILM COATED ORAL NIGHTLY
Status: DISCONTINUED | OUTPATIENT
Start: 2022-10-01 | End: 2022-10-15 | Stop reason: HOSPADM

## 2022-10-01 RX ORDER — TORSEMIDE 20 MG/1
20 TABLET ORAL 2 TIMES DAILY
Status: ON HOLD | COMMUNITY
End: 2022-10-15 | Stop reason: HOSPADM

## 2022-10-01 RX ORDER — LANOLIN ALCOHOL/MO/W.PET/CERES
400 CREAM (GRAM) TOPICAL DAILY
Status: DISCONTINUED | OUTPATIENT
Start: 2022-10-01 | End: 2022-10-15 | Stop reason: HOSPADM

## 2022-10-01 RX ORDER — ONDANSETRON 4 MG/1
4 TABLET, ORALLY DISINTEGRATING ORAL EVERY 8 HOURS PRN
Status: DISCONTINUED | OUTPATIENT
Start: 2022-10-01 | End: 2022-10-15 | Stop reason: HOSPADM

## 2022-10-01 RX ORDER — FUROSEMIDE 10 MG/ML
40 INJECTION INTRAMUSCULAR; INTRAVENOUS 2 TIMES DAILY
Status: DISCONTINUED | OUTPATIENT
Start: 2022-10-01 | End: 2022-10-02

## 2022-10-01 RX ORDER — HYDRALAZINE HYDROCHLORIDE 10 MG/1
10 TABLET, FILM COATED ORAL 4 TIMES DAILY
Status: ON HOLD | COMMUNITY
End: 2022-10-15 | Stop reason: HOSPADM

## 2022-10-01 RX ORDER — ONDANSETRON 2 MG/ML
4 INJECTION INTRAMUSCULAR; INTRAVENOUS EVERY 6 HOURS PRN
Status: DISCONTINUED | OUTPATIENT
Start: 2022-10-01 | End: 2022-10-15 | Stop reason: HOSPADM

## 2022-10-01 RX ORDER — ISOSORBIDE MONONITRATE 60 MG/1
120 TABLET, EXTENDED RELEASE ORAL DAILY
COMMUNITY
End: 2022-11-03

## 2022-10-01 RX ORDER — HYDRALAZINE HYDROCHLORIDE 20 MG/ML
10 INJECTION INTRAMUSCULAR; INTRAVENOUS EVERY 4 HOURS PRN
Status: DISCONTINUED | OUTPATIENT
Start: 2022-10-01 | End: 2022-10-15 | Stop reason: HOSPADM

## 2022-10-01 RX ORDER — AMLODIPINE BESYLATE 10 MG/1
10 TABLET ORAL DAILY
Status: DISCONTINUED | OUTPATIENT
Start: 2022-10-01 | End: 2022-10-02

## 2022-10-01 RX ORDER — HYDRALAZINE HYDROCHLORIDE 20 MG/ML
10 INJECTION INTRAMUSCULAR; INTRAVENOUS ONCE
Status: COMPLETED | OUTPATIENT
Start: 2022-10-01 | End: 2022-10-01

## 2022-10-01 RX ORDER — SODIUM CHLORIDE 9 MG/ML
INJECTION, SOLUTION INTRAVENOUS PRN
Status: DISCONTINUED | OUTPATIENT
Start: 2022-10-01 | End: 2022-10-15 | Stop reason: HOSPADM

## 2022-10-01 RX ADMIN — LOSARTAN POTASSIUM 50 MG: 50 TABLET, FILM COATED ORAL at 12:46

## 2022-10-01 RX ADMIN — Medication 400 MG: at 12:46

## 2022-10-01 RX ADMIN — METOPROLOL TARTRATE 100 MG: 50 TABLET, FILM COATED ORAL at 12:46

## 2022-10-01 RX ADMIN — POTASSIUM BICARBONATE 50 MEQ: 978 TABLET, EFFERVESCENT ORAL at 09:00

## 2022-10-01 RX ADMIN — LEVOTHYROXINE SODIUM 25 MCG: 0.03 TABLET ORAL at 12:46

## 2022-10-01 RX ADMIN — Medication 10 ML: at 20:47

## 2022-10-01 RX ADMIN — DOXAZOSIN 2 MG: 1 TABLET ORAL at 12:46

## 2022-10-01 RX ADMIN — HEPARIN SODIUM 5000 UNITS: 5000 INJECTION INTRAVENOUS; SUBCUTANEOUS at 20:46

## 2022-10-01 RX ADMIN — NITROGLYCERIN 120 MCG/MIN: 20 INJECTION INTRAVENOUS at 07:17

## 2022-10-01 RX ADMIN — LABETALOL HYDROCHLORIDE 10 MG: 5 INJECTION, SOLUTION INTRAVENOUS at 10:27

## 2022-10-01 RX ADMIN — FUROSEMIDE 60 MG: 10 INJECTION, SOLUTION INTRAMUSCULAR; INTRAVENOUS at 03:48

## 2022-10-01 RX ADMIN — HYDRALAZINE HYDROCHLORIDE 10 MG: 20 INJECTION INTRAMUSCULAR; INTRAVENOUS at 10:27

## 2022-10-01 RX ADMIN — METOPROLOL TARTRATE 100 MG: 50 TABLET, FILM COATED ORAL at 20:46

## 2022-10-01 RX ADMIN — ATORVASTATIN CALCIUM 40 MG: 40 TABLET, FILM COATED ORAL at 20:46

## 2022-10-01 RX ADMIN — HEPARIN SODIUM 5000 UNITS: 5000 INJECTION INTRAVENOUS; SUBCUTANEOUS at 07:20

## 2022-10-01 RX ADMIN — FUROSEMIDE 40 MG: 10 INJECTION, SOLUTION INTRAMUSCULAR; INTRAVENOUS at 12:48

## 2022-10-01 RX ADMIN — HYDRALAZINE HYDROCHLORIDE 10 MG: 20 INJECTION INTRAMUSCULAR; INTRAVENOUS at 16:26

## 2022-10-01 RX ADMIN — PANTOPRAZOLE SODIUM 40 MG: 40 TABLET, DELAYED RELEASE ORAL at 12:46

## 2022-10-01 RX ADMIN — HEPARIN SODIUM 5000 UNITS: 5000 INJECTION INTRAVENOUS; SUBCUTANEOUS at 14:28

## 2022-10-01 RX ADMIN — HYDRALAZINE HYDROCHLORIDE 10 MG: 20 INJECTION INTRAMUSCULAR; INTRAVENOUS at 07:17

## 2022-10-01 RX ADMIN — LABETALOL HYDROCHLORIDE 20 MG: 5 INJECTION, SOLUTION INTRAVENOUS at 08:56

## 2022-10-01 RX ADMIN — Medication 10 ML: at 08:58

## 2022-10-01 ASSESSMENT — ENCOUNTER SYMPTOMS
WHEEZING: 0
GASTROINTESTINAL NEGATIVE: 1
VOMITING: 0
ALLERGIC/IMMUNOLOGIC NEGATIVE: 1
ABDOMINAL PAIN: 0
EYES NEGATIVE: 1
SHORTNESS OF BREATH: 1
NAUSEA: 0

## 2022-10-01 ASSESSMENT — PAIN - FUNCTIONAL ASSESSMENT
PAIN_FUNCTIONAL_ASSESSMENT: NONE - DENIES PAIN

## 2022-10-01 NOTE — CARE COORDINATION
10/01/22    From:HOME W SPOUSE INDEPENDENT (ONLY USING ROLATOR NOW B/C LEGS SWOLLEN & HURT)    Admit:CHF CLASS 1 ACUTE ON CHRONIC, HYPERTENSIVE URGENCY SENT BY PCP SEEN 9/30    PMH:CHF, HTN    Anticipated Discharge Disposition:HOME W SPOUSE    Patient Mobility or PT/OT ordered:N/A INDEPENDENT    Consults: CARDIOLOGY (ADMITTING)    Clinical:   SR--RA--BP-- 217/99--186/78--189/83--154/74 NITRO GTT D/C IN ER  K+ 2.7--CXR= MILD VAS CONGESTION  TRACE B/L PLEURAL EFF ---UA= LG BLOOD > 300 PROTEIN    Barriers to Discharge:  NO ECHO OR CHF, DIETICIAN ORDER  APRESOLINE/LABETALOL IV Q4'  IV LASIX  TROP Q3'  35/ 2.39--34/ 2.25  TROP NEG--0.019--0.012  BNP--14,458--16,372--    Assessments: CMI DONE

## 2022-10-01 NOTE — ED NOTES
Pt ambulated to restroom with a steady gait at this time. Family at bedside. Pt is aware that we are still waiting on a bed.      Sarita Dyson RN  10/01/22 9618

## 2022-10-01 NOTE — ED NOTES
Pt and family updated and are aware that we are waiting on a bed assignment from  at this time.      Kinga Cardenas RN  09/30/22 7877

## 2022-10-01 NOTE — ED NOTES
Patient to [facility].  Attempted to call and update nurse on Nitro drip changes and Hydralazine prn. Patient to come back to ED per nurse Wellington Dietz. Dr. Kami Cruz/FESTUS informed of the above.      Raj Falk RN  10/01/22 5265

## 2022-10-01 NOTE — CARE COORDINATION
[facility] EMERGENCY [facility] Case Management Initial Discharge Assessment    Met with Patient and  Justyna Smith to discuss discharge plan. PCP: Radames Lopez MD                                Date of Last Visit: 9/30    [facility] Patient: No        [facility] Notified: no    If no PCP, list provided? N/A    Discharge Planning    Living Arrangements: independently at home    Who do you live with? SPOUSE    Who helps you with your care:  self, family, or spouse    If lives at home:     Do you have any barriers navigating in your home? yes - BED & BATH UPSTAIRS    Patient can perform ADL? Yes ONLY USING ROLATOR B/C LEGS HURT FROM SWELLING OTHERWISE INDEPENDENT W ALL ADLS'    Current Services (outpatient and in home) :   NONE    Dialysis: No    Is transportation available to get to your appointments? Yes PT DRIVES    DME Equipment:  ROLLATOR    Respiratory equipment: None    Respiratory provider:  no     Pharmacy:  yes - Liliana Boothe with Medication Assistance Program?  No      Patient agreeable to [facility]? N/A    Patient agreeable to SNF/Rehab? N/A  Other discharge needs identified? Other CM TO REASSESS FOR ANY NEW NEEDSI    Does Patient Have a High-Risk for Readmission Diagnosis (CHF, PN, MI, COPD)? Yes CHF     If Yes,    Consult with pulmonologist? No  Consult with cardiologist? Yes  Cardiac Rehab referral if EF <35%? TBD*  Consult with Pharmacy for medication assessment prior to discharge? No  Consult with Behavioral health to aid in depression, anxiety, or coping issues? No  Palliative Care Consult? No  Pulmonary Rehab order for COPD, PN, and CHF (if EF > 35%)? N/A   Does patient have a reliable scale and know how to read it (for CHF)? Yes  Nutrition consult for CHF? WILL NEED ORDER*  Respiratory therapy consult that includes bedside instruction on administration of nebulizers and/or inhalers, and assessment of oxygen and equipment needs in the home? CURRENTLY ON RA*    Initial Discharge Plan?  (Note: please see concurrent daily documentation for any updates after initial note).     RETURN HOME W SPOUSE DENIES NEEDS    Readmission Risk              Risk of Unplanned Readmission:  17         Electronically signed by Kiley Lopez RN on 10/1/2022 at 11:22 AM

## 2022-10-01 NOTE — H&P
Chief Complaint   Patient presents with    Shortness of Breath     X 1 week with bilateral leg swelling, had chest pain yesterday, denies chest pain today        Patient is a 64 y.o. female who presents with a chief complaint of short of breath. Patient is followed on a regular basis by Dr. uJstin Kumar MD. patient with past medical history of chronic diastolic congestive heart failure, hypertension, hyperlipidemia, chronic kidney disease who presents with worsening shortness of breath and increased lower extremity edema. Patient generally follows with [facility] she was requesting transfer but no beds available. She denies history of cardiac catheterization or any recent stress test.  Creatinine of 2.39 with a GFR of 19.6 on admission. BNP is elevated at 16,000 with mildly elevated troponin and a flat pattern. Hemoglobin is 8.1. He was noted to have extremely elevated blood pressure on arrival initiated on nitro drip. Currently she is in the 566C systolic. Echocardiogram at [facility] in 2017 ejection fraction of 60 to 65% no significant valve abnormalities. Negative nuclear stress test in 2017 at [facility].       Past Medical History:   Diagnosis Date    CHF (congestive heart failure) ([facility]     Hypertension       Patient Active Problem List   Diagnosis    CHF (congestive heart failure), NYHA class I, acute on chronic, combined ([facility]    Wheezing    Acute congestive heart failure (HCC)    Microhematuria    Diarrhea of presumed infectious origin    Hypertensive emergency       Past Surgical History:   Procedure Laterality Date    CATARACT REMOVAL Bilateral     HERNIA REPAIR      HYSTERECTOMY, TOTAL ABDOMINAL (CERVIX REMOVED)         Social History     Socioeconomic History    Marital status:      Spouse name: None    Number of children: None    Years of education: None    Highest education level: None   Tobacco Use    Smoking status: Never    Smokeless tobacco: Never   Vaping Use    Vaping Use: Never used   Substance and Sexual Activity    Alcohol use: Not Currently    Drug use: Never    Sexual activity: Never       History reviewed. No pertinent family history. Current Facility-Administered Medications   Medication Dose Route Frequency Provider Last Rate Last Admin    sodium chloride flush 0.9 % injection 5-40 mL  5-40 mL IntraVENous 2 times per day Guille J Holiday, DO   10 mL at 10/01/22 0858    sodium chloride flush 0.9 % injection 5-40 mL  5-40 mL IntraVENous PRN Guille DANIELLA Holiday, DO        0.9 % sodium chloride infusion   IntraVENous PRN [facility] Holiday, DO        ondansetron (ZOFRAN-ODT) disintegrating tablet 4 mg  4 mg Oral Q8H PRN [facility] Holiday, DO        Or    ondansetron (ZOFRAN) injection 4 mg  4 mg IntraVENous Q6H PRN [facility] Holiday, DO        acetaminophen (TYLENOL) tablet 650 mg  650 mg Oral Q4H PRN [facility] Holiday, DO        heparin (porcine) injection 5,000 Units  5,000 Units SubCUTAneous 3 times per day [facility] Holiday, DO   5,000 Units at 10/01/22 0720    nitroGLYCERIN 50 mg in dextrose 5% 250 mL infusion  120 mcg/min IntraVENous Continuous Jessica Neumann MD   Stopped at 10/01/22 2018     Current Outpatient Medications   Medication Sig Dispense Refill    doxazosin (CARDURA) 2 MG tablet Take 2 mg by mouth daily      Magnesium 400 MG TABS TAKE 1 TABLET BY MOUTH ONCE DAILY      levothyroxine (SYNTHROID) 25 MCG tablet 1/2 po daily 30 tablet 5    amLODIPine (NORVASC) 10 MG tablet Take by mouth      atorvastatin (LIPITOR) 20 MG tablet Take by mouth      losartan (COZAAR) 50 MG tablet       pantoprazole (PROTONIX) 40 MG tablet Take 40 mg by mouth in the morning. furosemide (LASIX) 20 MG tablet Take 1 tablet by mouth in the morning. 30 tablet 1    metoprolol (LOPRESSOR) 100 MG tablet Take 100 mg by mouth in the morning and 100 mg before bedtime. ALLERGIES: Norco [hydrocodone-acetaminophen]    Review of Systems   Constitutional: Negative. Negative for chills and fever. HENT: Negative. Eyes: Negative. Respiratory:  Positive for shortness of breath. Negative for wheezing. Cardiovascular:  Positive for chest pain and leg swelling. Negative for palpitations. Gastrointestinal: Negative. Negative for abdominal pain, nausea and vomiting. Endocrine: Negative. Genitourinary: Negative. Musculoskeletal: Negative. Skin: Negative. Negative for rash. Allergic/Immunologic: Negative. Neurological: Negative. Negative for dizziness, weakness and headaches. Hematological: Negative. Psychiatric/Behavioral: Negative. VITALS:  Blood pressure (!) 162/72, pulse (!) 108, temperature 98.1 °F (36.7 °C), temperature source Oral, resp. rate 20, height 5' 2\" (1.575 m), weight 132 lb (59.9 kg), SpO2 97 %. Body mass index is 24.14 kg/m². Physical Exam  Vitals and nursing note reviewed. Constitutional:       Appearance: She is well-developed. She is not diaphoretic. HENT:      Head: Normocephalic and atraumatic. Eyes:      Pupils: Pupils are equal, round, and reactive to light. Neck:      Thyroid: No thyromegaly. Vascular: No JVD. Trachea: No tracheal deviation. Cardiovascular:      Rate and Rhythm: Normal rate and regular rhythm. Chest Wall: PMI is not displaced. Pulses: Intact distal pulses. Heart sounds: Normal heart sounds. Heart sounds not distant. No murmur heard. No friction rub. No gallop. No S3 sounds. Pulmonary:      Effort: No respiratory distress. Breath sounds: No wheezing or rales. Chest:      Chest wall: No tenderness. Abdominal:      General: Bowel sounds are normal. There is no distension. Palpations: Abdomen is soft. There is no mass. Tenderness: There is no abdominal tenderness. There is no guarding or rebound. Musculoskeletal:         General: Normal range of motion. Cervical back: Normal range of motion and neck supple. Right lower leg: Edema present. Left lower leg: Edema present.    Skin:     General: Skin is warm and dry. Coloration: Skin is not pale. Findings: No erythema or rash. Neurological:      Mental Status: She is alert and oriented to person, place, and time. Cranial Nerves: No cranial nerve deficit. Psychiatric:         Behavior: Behavior normal.         Thought Content:  Thought content normal.         Judgment: Judgment normal.       LABS:  Recent Results (from the past 24 hour(s))   Comprehensive Metabolic Panel    Collection Time: 09/30/22  1:00 PM   Result Value Ref Range    Sodium 141 135 - 144 mEq/L    Potassium 3.1 (L) 3.4 - 4.9 mEq/L    Chloride 105 95 - 107 mEq/L    CO2 23 20 - 31 mEq/L    Anion Gap 13 9 - 15 mEq/L    Glucose 112 (H) 70 - 99 mg/dL    BUN 35 (H) 8 - 23 mg/dL    Creatinine 2.39 (H) 0.50 - 0.90 mg/dL    GFR Non-African American 19.6 (L) >60    GFR  23.7 (L) >60    Calcium 8.2 (L) 8.5 - 9.9 mg/dL    Total Protein 5.2 (L) 6.3 - 8.0 g/dL    Albumin 3.0 (L) 3.5 - 4.6 g/dL    Total Bilirubin 0.3 0.2 - 0.7 mg/dL    Alkaline Phosphatase 126 40 - 130 U/L    ALT 15 0 - 33 U/L    AST 23 0 - 35 U/L    Globulin 2.2 (L) 2.3 - 3.5 g/dL   CBC with Auto Differential    Collection Time: 09/30/22  1:00 PM   Result Value Ref Range    WBC 6.1 4.8 - 10.8 K/uL    RBC 3.10 (L) 4.20 - 5.40 M/uL    Hemoglobin 8.8 (L) 12.0 - 16.0 g/dL    Hematocrit 25.1 (L) 37.0 - 47.0 %    MCV 80.7 (L) 82.0 - 100.0 fL    MCH 28.4 27.0 - 31.3 pg    MCHC 35.2 33.0 - 37.0 %    RDW 13.5 11.5 - 14.5 %    Platelets 025 846 - 682 K/uL    Neutrophils % 66.3 %    Lymphocytes % 16.2 %    Monocytes % 6.5 %    Eosinophils % 9.8 %    Basophils % 1.2 %    Neutrophils Absolute 4.0 1.4 - 6.5 K/uL    Lymphocytes Absolute 1.0 1.0 - 4.8 K/uL    Monocytes Absolute 0.4 0.2 - 0.8 K/uL    Eosinophils Absolute 0.6 0.0 - 0.7 K/uL    Basophils Absolute 0.1 0.0 - 0.2 K/uL   Magnesium    Collection Time: 09/30/22  1:00 PM   Result Value Ref Range    Magnesium 2.2 1.7 - 2.4 mg/dL   Troponin    Collection Time: 09/30/22 1:00 PM   Result Value Ref Range    Troponin <0.010 0.000 - 0.010 ng/mL   Urinalysis with Reflex to Culture    Collection Time: 09/30/22  1:00 PM    Specimen: Urine   Result Value Ref Range    Color, UA Yellow Straw/Yellow    Clarity, UA Clear Clear    Glucose, Ur Negative Negative mg/dL    Bilirubin Urine Negative Negative    Ketones, Urine Negative Negative mg/dL    Specific Gravity, UA 1.010 1.005 - 1.030    Blood, Urine LARGE (A) Negative    pH, UA 7.0 5.0 - 9.0    Protein, UA >=300 (A) Negative mg/dL    Urobilinogen, Urine 0.2 <2.0 E.U./dL    Nitrite, Urine Negative Negative    Leukocyte Esterase, Urine Negative Negative    Urine Reflex to Culture Not Indicated    TSH    Collection Time: 09/30/22  1:00 PM   Result Value Ref Range    TSH 4.800 (H) 0.440 - 3.860 uIU/mL   Brain Natriuretic Peptide    Collection Time: 09/30/22  1:00 PM   Result Value Ref Range    Pro-BNP 14,458 pg/mL   Procalcitonin    Collection Time: 09/30/22  1:00 PM   Result Value Ref Range    Procalcitonin 0.21 (H) 0.00 - 0.15 ng/mL   Microscopic Urinalysis    Collection Time: 09/30/22  1:00 PM   Result Value Ref Range    Renal Epithelial, UA 0-2 (A) /HPF    Bacteria, UA Negative Negative /HPF    Hyaline Casts, UA 5-10 0 - 5 /HPF    WBC, UA 6-9 (A) 0 - 5 /HPF    RBC, UA >100 (H) 0 - 5 /HPF    Epithelial Cells, UA 3-5 0 - 5 /HPF   EKG 12 Lead - Chest Pain    Collection Time: 09/30/22  1:37 PM   Result Value Ref Range    Ventricular Rate 88 BPM    Atrial Rate 88 BPM    P-R Interval 172 ms    QRS Duration 74 ms    Q-T Interval 386 ms    QTc Calculation (Bazett) 467 ms    P Axis 50 degrees    R Axis 4 degrees    T Axis 31 degrees   COVID-19, Rapid    Collection Time: 09/30/22  1:51 PM    Specimen: Nasopharyngeal Swab   Result Value Ref Range    SARS-CoV-2, NAAT Not Detected Not Detected   Troponin    Collection Time: 10/01/22  5:06 AM   Result Value Ref Range    Troponin 0.019 (HH) 0.000 - 0.010 ng/mL   Troponin    Collection Time: 10/01/22  7:59 AM   Result Value Ref Range    Troponin 0.012 (HH) 0.000 - 0.010 ng/mL   CBC with Auto Differential    Collection Time: 10/01/22  7:59 AM   Result Value Ref Range    WBC 7.9 4.8 - 10.8 K/uL    RBC 2.91 (L) 4.20 - 5.40 M/uL    Hemoglobin 8.1 (L) 12.0 - 16.0 g/dL    Hematocrit 23.8 (L) 37.0 - 47.0 %    MCV 81.6 (L) 82.0 - 100.0 fL    MCH 27.7 27.0 - 31.3 pg    MCHC 34.0 33.0 - 37.0 %    RDW 13.7 11.5 - 14.5 %    Platelets 174 696 - 458 K/uL    Neutrophils % 77.5 %    Lymphocytes % 11.4 %    Monocytes % 6.3 %    Eosinophils % 3.7 %    Basophils % 1.1 %    Neutrophils Absolute 6.1 1.4 - 6.5 K/uL    Lymphocytes Absolute 0.9 (L) 1.0 - 4.8 K/uL    Monocytes Absolute 0.5 0.2 - 0.8 K/uL    Eosinophils Absolute 0.3 0.0 - 0.7 K/uL    Basophils Absolute 0.1 0.0 - 0.2 K/uL   Comprehensive Metabolic Panel    Collection Time: 10/01/22  7:59 AM   Result Value Ref Range    Sodium 140 135 - 144 mEq/L    Potassium 2.7 (LL) 3.4 - 4.9 mEq/L    Chloride 103 95 - 107 mEq/L    CO2 24 20 - 31 mEq/L    Anion Gap 13 9 - 15 mEq/L    Glucose 153 (H) 70 - 99 mg/dL    BUN 34 (H) 8 - 23 mg/dL    Creatinine 2.25 (H) 0.50 - 0.90 mg/dL    GFR Non-African American 21.0 (L) >60    GFR  25.4 (L) >60    Calcium 8.3 (L) 8.5 - 9.9 mg/dL    Total Protein 4.7 (L) 6.3 - 8.0 g/dL    Albumin 2.7 (L) 3.5 - 4.6 g/dL    Total Bilirubin 0.4 0.2 - 0.7 mg/dL    Alkaline Phosphatase 119 40 - 130 U/L    ALT 14 0 - 33 U/L    AST 18 0 - 35 U/L    Globulin 2.0 (L) 2.3 - 3.5 g/dL   Brain Natriuretic Peptide    Collection Time: 10/01/22  7:59 AM   Result Value Ref Range    Pro-BNP 16,372 pg/mL     Troponin:   Lab Results   Component Value Date/Time    TROPONINI 0.012 10/01/2022 07:59 AM       EKG: normal sinus rhythm, nonspecific ST and T waves changes poor progression across the precordial leads      ASSESSMENT:    Active Hospital Problems    Diagnosis Date Noted    Hypertensive emergency [I16.1] 10/01/2022     Priority: Medium     Acute on chronic decompensated diastolic congestive heart failure  Hypertensive urgency  Elevated cardiac enzyme. Questionable demand ischemia. Rule out underlying coronary ischemia  Chronic kidney disease  Hyperlipidemia  Anemia    PLAN:   As always, aggressive risk factor modification is strongly recommended. We should adhere to the JNC VIII guidelines for HTN management and the NCEPATP III guidelines for LDL-C management. Check 2D echocardiogram  IV diuresis as tolerated. Monitor I's and O's and renal function  Coronary valuation in the form of cardiac catheterization when clinically stable/renal function allows. Patient with multiple risk factors for CAD and acute decompensated heart failure.   Also has abnormal EKG and mildly elevated cardiac enzymes high risk for CAD  Avoid nephrotoxic agents  Monitor on telemetry  GI/DVT prophylaxis  CHF teaching  Follow-up in CHF clinic post discharge  Further recommendations to follow      Electronically signed by Vishnu Rivas DO on 10/1/2022 at 10:09 AM

## 2022-10-02 LAB
ANION GAP SERPL CALCULATED.3IONS-SCNC: 11 MEQ/L (ref 9–15)
BUN BLDV-MCNC: 34 MG/DL (ref 8–23)
CALCIUM SERPL-MCNC: 8.3 MG/DL (ref 8.5–9.9)
CHLORIDE BLD-SCNC: 107 MEQ/L (ref 95–107)
CO2: 26 MEQ/L (ref 20–31)
CREAT SERPL-MCNC: 2.53 MG/DL (ref 0.5–0.9)
GFR AFRICAN AMERICAN: 22.2
GFR NON-AFRICAN AMERICAN: 18.3
GLUCOSE BLD-MCNC: 111 MG/DL (ref 70–99)
HCT VFR BLD CALC: 24 % (ref 37–47)
POTASSIUM SERPL-SCNC: 3 MEQ/L (ref 3.4–4.9)
RETICULOCYTE ABSOLUTE COUNT: 0.11 M/CUMM (ref 0.02–0.11)
RETICULOCYTE COUNT PCT: 3.8 % (ref 0.6–2.2)
SODIUM BLD-SCNC: 144 MEQ/L (ref 135–144)

## 2022-10-02 PROCEDURE — 99233 SBSQ HOSP IP/OBS HIGH 50: CPT | Performed by: INTERNAL MEDICINE

## 2022-10-02 PROCEDURE — 6370000000 HC RX 637 (ALT 250 FOR IP): Performed by: INTERNAL MEDICINE

## 2022-10-02 PROCEDURE — 36415 COLL VENOUS BLD VENIPUNCTURE: CPT

## 2022-10-02 PROCEDURE — 82607 VITAMIN B-12: CPT

## 2022-10-02 PROCEDURE — 6360000002 HC RX W HCPCS: Performed by: INTERNAL MEDICINE

## 2022-10-02 PROCEDURE — 2580000003 HC RX 258: Performed by: INTERNAL MEDICINE

## 2022-10-02 PROCEDURE — 83550 IRON BINDING TEST: CPT

## 2022-10-02 PROCEDURE — 82746 ASSAY OF FOLIC ACID SERUM: CPT

## 2022-10-02 PROCEDURE — 83540 ASSAY OF IRON: CPT

## 2022-10-02 PROCEDURE — 85046 RETICYTE/HGB CONCENTRATE: CPT

## 2022-10-02 PROCEDURE — 80048 BASIC METABOLIC PNL TOTAL CA: CPT

## 2022-10-02 PROCEDURE — 2060000000 HC ICU INTERMEDIATE R&B

## 2022-10-02 RX ORDER — POTASSIUM CHLORIDE 7.45 MG/ML
10 INJECTION INTRAVENOUS ONCE
Status: COMPLETED | OUTPATIENT
Start: 2022-10-02 | End: 2022-10-02

## 2022-10-02 RX ORDER — HYDRALAZINE HYDROCHLORIDE 25 MG/1
25 TABLET, FILM COATED ORAL EVERY 12 HOURS SCHEDULED
Status: DISCONTINUED | OUTPATIENT
Start: 2022-10-02 | End: 2022-10-04

## 2022-10-02 RX ORDER — POTASSIUM CHLORIDE 20 MEQ/1
20 TABLET, EXTENDED RELEASE ORAL
Status: DISCONTINUED | OUTPATIENT
Start: 2022-10-02 | End: 2022-10-05

## 2022-10-02 RX ORDER — ISOSORBIDE MONONITRATE 30 MG/1
30 TABLET, EXTENDED RELEASE ORAL DAILY
Status: DISCONTINUED | OUTPATIENT
Start: 2022-10-02 | End: 2022-10-07

## 2022-10-02 RX ADMIN — CHLOROTHIAZIDE SODIUM 250 MG: 500 INJECTION, POWDER, LYOPHILIZED, FOR SOLUTION INTRAVENOUS at 11:11

## 2022-10-02 RX ADMIN — FUROSEMIDE 5 MG/HR: 10 INJECTION, SOLUTION INTRAMUSCULAR; INTRAVENOUS at 11:10

## 2022-10-02 RX ADMIN — POTASSIUM CHLORIDE 20 MEQ: 1500 TABLET, EXTENDED RELEASE ORAL at 14:38

## 2022-10-02 RX ADMIN — METOPROLOL TARTRATE 100 MG: 50 TABLET, FILM COATED ORAL at 09:18

## 2022-10-02 RX ADMIN — Medication 10 ML: at 11:47

## 2022-10-02 RX ADMIN — HYDRALAZINE HYDROCHLORIDE 25 MG: 25 TABLET, FILM COATED ORAL at 20:14

## 2022-10-02 RX ADMIN — METOPROLOL TARTRATE 100 MG: 50 TABLET, FILM COATED ORAL at 20:14

## 2022-10-02 RX ADMIN — HYDRALAZINE HYDROCHLORIDE 25 MG: 25 TABLET, FILM COATED ORAL at 10:11

## 2022-10-02 RX ADMIN — AMLODIPINE BESYLATE 10 MG: 10 TABLET ORAL at 09:17

## 2022-10-02 RX ADMIN — HEPARIN SODIUM 5000 UNITS: 5000 INJECTION INTRAVENOUS; SUBCUTANEOUS at 20:15

## 2022-10-02 RX ADMIN — POTASSIUM CHLORIDE 10 MEQ: 7.46 INJECTION, SOLUTION INTRAVENOUS at 10:35

## 2022-10-02 RX ADMIN — HYDRALAZINE HYDROCHLORIDE 10 MG: 20 INJECTION INTRAMUSCULAR; INTRAVENOUS at 09:18

## 2022-10-02 RX ADMIN — ATORVASTATIN CALCIUM 40 MG: 40 TABLET, FILM COATED ORAL at 20:14

## 2022-10-02 RX ADMIN — POTASSIUM CHLORIDE 20 MEQ: 1500 TABLET, EXTENDED RELEASE ORAL at 20:13

## 2022-10-02 RX ADMIN — DOXAZOSIN 2 MG: 1 TABLET ORAL at 09:18

## 2022-10-02 RX ADMIN — Medication 400 MG: at 09:18

## 2022-10-02 RX ADMIN — Medication 10 ML: at 20:14

## 2022-10-02 RX ADMIN — ISOSORBIDE MONONITRATE 30 MG: 30 TABLET, EXTENDED RELEASE ORAL at 10:22

## 2022-10-02 RX ADMIN — HEPARIN SODIUM 5000 UNITS: 5000 INJECTION INTRAVENOUS; SUBCUTANEOUS at 14:38

## 2022-10-02 RX ADMIN — PANTOPRAZOLE SODIUM 40 MG: 40 TABLET, DELAYED RELEASE ORAL at 09:18

## 2022-10-02 RX ADMIN — HEPARIN SODIUM 5000 UNITS: 5000 INJECTION INTRAVENOUS; SUBCUTANEOUS at 05:17

## 2022-10-02 RX ADMIN — LEVOTHYROXINE SODIUM 25 MCG: 0.03 TABLET ORAL at 05:16

## 2022-10-02 RX ADMIN — CHLOROTHIAZIDE SODIUM 250 MG: 500 INJECTION, POWDER, LYOPHILIZED, FOR SOLUTION INTRAVENOUS at 21:53

## 2022-10-02 ASSESSMENT — PAIN SCALES - GENERAL: PAINLEVEL_OUTOF10: 0

## 2022-10-02 NOTE — CONSULTS
Renal consult dictated CT no hydro  6/18/22  Cr 1.5mg%  GFR 34 cc/min old records    CKD stage ?   OHDx HFrEF  Hypokalemia  Hypertension  Anemia  Obesity    Plan inititate HF medications  obtain urine albumin/Cr  Iron B12 levels  stools blood  hold cozaar incase entresto needed to be started based on ECHO  Supplement IV/oral

## 2022-10-02 NOTE — PROGRESS NOTES
Chief Complaint   Patient presents with    Shortness of Breath       X 1 week with bilateral leg swelling, had chest pain yesterday, denies chest pain today          Patient is a 64 y.o. female who presents with a chief complaint of short of breath. Patient is followed on a regular basis by Dr. Barbara Almeida MD. patient with past medical history of chronic diastolic congestive heart failure, hypertension, hyperlipidemia, chronic kidney disease who presents with worsening shortness of breath and increased lower extremity edema. Patient generally follows with [facility] she was requesting transfer but no beds available. She denies history of cardiac catheterization or any recent stress test.  Creatinine of 2.39 with a GFR of 19.6 on admission. BNP is elevated at 16,000 with mildly elevated troponin and a flat pattern. Hemoglobin is 8.1. He was noted to have extremely elevated blood pressure on arrival initiated on nitro drip. Currently she is in the 543V systolic. Echocardiogram at [facility] in 2017 ejection fraction of 60 to 65% no significant valve abnormalities. Negative nuclear stress test in 2017 at [facility].      10/2/2022: Patient is feeling somewhat better. Renal function slightly worsening. Sinus tach on telemetry heart rate of 106 bpm.  Continues to have significant bilateral extremity edema. Patient with significant EMEA hemoglobin is 7.3 from 12 2 months ago.         Past Medical History        Past Medical History:   Diagnosis Date    CHF (congestive heart failure) ([address]      Hypertension               Patient Active Problem List   Diagnosis    CHF (congestive heart failure), NYHA class I, acute on chronic, combined ([address]    Wheezing    Acute congestive heart failure (HCC)    Microhematuria    Diarrhea of presumed infectious origin    Hypertensive emergency         Past Surgical History         Past Surgical History:   Procedure Laterality Date    CATARACT REMOVAL Bilateral      HERNIA REPAIR HYSTERECTOMY, TOTAL ABDOMINAL (CERVIX REMOVED)                Social History   Social History            Socioeconomic History    Marital status:        Spouse name: None    Number of children: None    Years of education: None    Highest education level: None   Tobacco Use    Smoking status: Never    Smokeless tobacco: Never   Vaping Use    Vaping Use: Never used   Substance and Sexual Activity    Alcohol use: Not Currently    Drug use: Never    Sexual activity: Never            Family History   History reviewed. No pertinent family history.         Current Facility-Administered Medications             Current Facility-Administered Medications   Medication Dose Route Frequency Provider Last Rate Last Admin    sodium chloride flush 0.9 % injection 5-40 mL  5-40 mL IntraVENous 2 times per day Guille J Holiday, DO   10 mL at 10/01/22 0858    sodium chloride flush 0.9 % injection 5-40 mL  5-40 mL IntraVENous PRN SmarterShade Holiday, DO        0.9 % sodium chloride infusion   IntraVENous PRN SmarterShade Holiday, DO        ondansetron (ZOFRAN-ODT) disintegrating tablet 4 mg  4 mg Oral Q8H PRN SmarterShade Holiday, DO         Or    ondansetron (ZOFRAN) injection 4 mg  4 mg IntraVENous Q6H PRN SmarterShade Holiday, DO        acetaminophen (TYLENOL) tablet 650 mg  650 mg Oral Q4H PRN SmarterShade Holiday, DO        heparin (porcine) injection 5,000 Units  5,000 Units SubCUTAneous 3 times per day Guille DANIELLA Holiday, DO   5,000 Units at 10/01/22 0720    nitroGLYCERIN 50 mg in dextrose 5% 250 mL infusion  120 mcg/min IntraVENous Continuous Austin MD Tanvir   Stopped at 10/01/22 7880             Current Outpatient Medications   Medication Sig Dispense Refill    doxazosin (CARDURA) 2 MG tablet Take 2 mg by mouth daily        Magnesium 400 MG TABS TAKE 1 TABLET BY MOUTH ONCE DAILY        levothyroxine (SYNTHROID) 25 MCG tablet 1/2 po daily 30 tablet 5    amLODIPine (NORVASC) 10 MG tablet Take by mouth        atorvastatin (LIPITOR) 20 MG tablet Take by mouth losartan (COZAAR) 50 MG tablet          pantoprazole (PROTONIX) 40 MG tablet Take 40 mg by mouth in the morning. furosemide (LASIX) 20 MG tablet Take 1 tablet by mouth in the morning. 30 tablet 1    metoprolol (LOPRESSOR) 100 MG tablet Take 100 mg by mouth in the morning and 100 mg before bedtime. ALLERGIES: Norco [hydrocodone-acetaminophen]     Review of Systems   Constitutional: Negative. Negative for chills and fever. HENT: Negative. Eyes: Negative. Respiratory:  Positive for shortness of breath. Negative for wheezing. Cardiovascular:  Positive for chest pain and leg swelling. Negative for palpitations. Gastrointestinal: Negative. Negative for abdominal pain, nausea and vomiting. Endocrine: Negative. Genitourinary: Negative. Musculoskeletal: Negative. Skin: Negative. Negative for rash. Allergic/Immunologic: Negative. Neurological: Negative. Negative for dizziness, weakness and headaches. Hematological: Negative. Psychiatric/Behavioral: Negative. VITALS:  Blood pressure (!) 162/72, pulse (!) 108, temperature 98.1 °F (36.7 °C), temperature source Oral, resp. rate 20, height 5' 2\" (1.575 m), weight 132 lb (59.9 kg), SpO2 97 %. Body mass index is 24.14 kg/m². Physical Exam  Vitals and nursing note reviewed. Constitutional:       Appearance: She is well-developed. She is not diaphoretic. HENT:      Head: Normocephalic and atraumatic. Eyes:      Pupils: Pupils are equal, round, and reactive to light. Neck:      Thyroid: No thyromegaly. Vascular: No JVD. Trachea: No tracheal deviation. Cardiovascular:      Rate and Rhythm: Normal rate and regular rhythm. Chest Wall: PMI is not displaced. Pulses: Intact distal pulses. Heart sounds: Normal heart sounds. Heart sounds not distant. No murmur heard. No friction rub. No gallop. No S3 sounds. Pulmonary:      Effort: No respiratory distress.       Breath sounds: No wheezing or rales. Chest:      Chest wall: No tenderness. Abdominal:      General: Bowel sounds are normal. There is no distension. Palpations: Abdomen is soft. There is no mass. Tenderness: There is no abdominal tenderness. There is no guarding or rebound. Musculoskeletal:         General: Normal range of motion. Cervical back: Normal range of motion and neck supple. Right lower leg: Edema present. Left lower leg: Edema present. Skin:     General: Skin is warm and dry. Coloration: Skin is not pale. Findings: No erythema or rash. Neurological:      Mental Status: She is alert and oriented to person, place, and time. Cranial Nerves: No cranial nerve deficit. Psychiatric:         Behavior: Behavior normal.         Thought Content:  Thought content normal.         Judgment: Judgment normal.         LABS:  Recent Results         Recent Results (from the past 24 hour(s))   Comprehensive Metabolic Panel     Collection Time: 09/30/22  1:00 PM   Result Value Ref Range     Sodium 141 135 - 144 mEq/L     Potassium 3.1 (L) 3.4 - 4.9 mEq/L     Chloride 105 95 - 107 mEq/L     CO2 23 20 - 31 mEq/L     Anion Gap 13 9 - 15 mEq/L     Glucose 112 (H) 70 - 99 mg/dL     BUN 35 (H) 8 - 23 mg/dL     Creatinine 2.39 (H) 0.50 - 0.90 mg/dL     GFR Non- 19.6 (L) >60     GFR  23.7 (L) >60     Calcium 8.2 (L) 8.5 - 9.9 mg/dL     Total Protein 5.2 (L) 6.3 - 8.0 g/dL     Albumin 3.0 (L) 3.5 - 4.6 g/dL     Total Bilirubin 0.3 0.2 - 0.7 mg/dL     Alkaline Phosphatase 126 40 - 130 U/L     ALT 15 0 - 33 U/L     AST 23 0 - 35 U/L     Globulin 2.2 (L) 2.3 - 3.5 g/dL   CBC with Auto Differential     Collection Time: 09/30/22  1:00 PM   Result Value Ref Range     WBC 6.1 4.8 - 10.8 K/uL     RBC 3.10 (L) 4.20 - 5.40 M/uL     Hemoglobin 8.8 (L) 12.0 - 16.0 g/dL     Hematocrit 25.1 (L) 37.0 - 47.0 %     MCV 80.7 (L) 82.0 - 100.0 fL     MCH 28.4 27.0 - 31.3 pg MCHC 35.2 33.0 - 37.0 %     RDW 13.5 11.5 - 14.5 %     Platelets 191 735 - 270 K/uL     Neutrophils % 66.3 %     Lymphocytes % 16.2 %     Monocytes % 6.5 %     Eosinophils % 9.8 %     Basophils % 1.2 %     Neutrophils Absolute 4.0 1.4 - 6.5 K/uL     Lymphocytes Absolute 1.0 1.0 - 4.8 K/uL     Monocytes Absolute 0.4 0.2 - 0.8 K/uL     Eosinophils Absolute 0.6 0.0 - 0.7 K/uL     Basophils Absolute 0.1 0.0 - 0.2 K/uL   Magnesium     Collection Time: 09/30/22  1:00 PM   Result Value Ref Range     Magnesium 2.2 1.7 - 2.4 mg/dL   Troponin     Collection Time: 09/30/22  1:00 PM   Result Value Ref Range     Troponin <0.010 0.000 - 0.010 ng/mL   Urinalysis with Reflex to Culture     Collection Time: 09/30/22  1:00 PM     Specimen: Urine   Result Value Ref Range     Color, UA Yellow Straw/Yellow     Clarity, UA Clear Clear     Glucose, Ur Negative Negative mg/dL     Bilirubin Urine Negative Negative     Ketones, Urine Negative Negative mg/dL     Specific Gravity, UA 1.010 1.005 - 1.030     Blood, Urine LARGE (A) Negative     pH, UA 7.0 5.0 - 9.0     Protein, UA >=300 (A) Negative mg/dL     Urobilinogen, Urine 0.2 <2.0 E.U./dL     Nitrite, Urine Negative Negative     Leukocyte Esterase, Urine Negative Negative     Urine Reflex to Culture Not Indicated     TSH     Collection Time: 09/30/22  1:00 PM   Result Value Ref Range     TSH 4.800 (H) 0.440 - 3.860 uIU/mL   Brain Natriuretic Peptide     Collection Time: 09/30/22  1:00 PM   Result Value Ref Range     Pro-BNP 14,458 pg/mL   Procalcitonin     Collection Time: 09/30/22  1:00 PM   Result Value Ref Range     Procalcitonin 0.21 (H) 0.00 - 0.15 ng/mL   Microscopic Urinalysis     Collection Time: 09/30/22  1:00 PM   Result Value Ref Range     Renal Epithelial, UA 0-2 (A) /HPF     Bacteria, UA Negative Negative /HPF     Hyaline Casts, UA 5-10 0 - 5 /HPF     WBC, UA 6-9 (A) 0 - 5 /HPF     RBC, UA >100 (H) 0 - 5 /HPF     Epithelial Cells, UA 3-5 0 - 5 /HPF   EKG 12 Lead - Chest Pain Collection Time: 09/30/22  1:37 PM   Result Value Ref Range     Ventricular Rate 88 BPM     Atrial Rate 88 BPM     P-R Interval 172 ms     QRS Duration 74 ms     Q-T Interval 386 ms     QTc Calculation (Bazett) 467 ms     P Axis 50 degrees     R Axis 4 degrees     T Axis 31 degrees   COVID-19, Rapid     Collection Time: 09/30/22  1:51 PM     Specimen: Nasopharyngeal Swab   Result Value Ref Range     SARS-CoV-2, NAAT Not Detected Not Detected   Troponin     Collection Time: 10/01/22  5:06 AM   Result Value Ref Range     Troponin 0.019 (HH) 0.000 - 0.010 ng/mL   Troponin     Collection Time: 10/01/22  7:59 AM   Result Value Ref Range     Troponin 0.012 (HH) 0.000 - 0.010 ng/mL   CBC with Auto Differential     Collection Time: 10/01/22  7:59 AM   Result Value Ref Range     WBC 7.9 4.8 - 10.8 K/uL     RBC 2.91 (L) 4.20 - 5.40 M/uL     Hemoglobin 8.1 (L) 12.0 - 16.0 g/dL     Hematocrit 23.8 (L) 37.0 - 47.0 %     MCV 81.6 (L) 82.0 - 100.0 fL     MCH 27.7 27.0 - 31.3 pg     MCHC 34.0 33.0 - 37.0 %     RDW 13.7 11.5 - 14.5 %     Platelets 701 393 - 214 K/uL     Neutrophils % 77.5 %     Lymphocytes % 11.4 %     Monocytes % 6.3 %     Eosinophils % 3.7 %     Basophils % 1.1 %     Neutrophils Absolute 6.1 1.4 - 6.5 K/uL     Lymphocytes Absolute 0.9 (L) 1.0 - 4.8 K/uL     Monocytes Absolute 0.5 0.2 - 0.8 K/uL     Eosinophils Absolute 0.3 0.0 - 0.7 K/uL     Basophils Absolute 0.1 0.0 - 0.2 K/uL   Comprehensive Metabolic Panel     Collection Time: 10/01/22  7:59 AM   Result Value Ref Range     Sodium 140 135 - 144 mEq/L     Potassium 2.7 (LL) 3.4 - 4.9 mEq/L     Chloride 103 95 - 107 mEq/L     CO2 24 20 - 31 mEq/L     Anion Gap 13 9 - 15 mEq/L     Glucose 153 (H) 70 - 99 mg/dL     BUN 34 (H) 8 - 23 mg/dL     Creatinine 2.25 (H) 0.50 - 0.90 mg/dL     GFR Non- 21.0 (L) >60     GFR  25.4 (L) >60     Calcium 8.3 (L) 8.5 - 9.9 mg/dL     Total Protein 4.7 (L) 6.3 - 8.0 g/dL     Albumin 2.7 (L) 3.5 - 4.6 g/dL     Total Bilirubin 0.4 0.2 - 0.7 mg/dL     Alkaline Phosphatase 119 40 - 130 U/L     ALT 14 0 - 33 U/L     AST 18 0 - 35 U/L     Globulin 2.0 (L) 2.3 - 3.5 g/dL   Brain Natriuretic Peptide     Collection Time: 10/01/22  7:59 AM   Result Value Ref Range     Pro-BNP 16,372 pg/mL         Troponin:         Lab Results   Component Value Date/Time     TROPONINI 0.012 10/01/2022 07:59 AM         EKG: normal sinus rhythm, nonspecific ST and T waves changes poor progression across the precordial leads        ASSESSMENT:           Active Hospital Problems     Diagnosis Date Noted    Hypertensive emergency [I16.1] 10/01/2022       Priority: Medium      Acute on chronic decompensated diastolic congestive heart failure  Hypertensive urgency  Elevated cardiac enzyme. Questionable demand ischemia. Rule out underlying coronary ischemia  Chronic kidney disease  Hyperlipidemia  Anemia     PLAN:   As always, aggressive risk factor modification is strongly recommended. We should adhere to the JNC VIII guidelines for HTN management and the NCEPATP III guidelines for LDL-C management. Check 2D echocardiogram  IV diuresis as tolerated. Monitor I's and O's and renal function. Initiated on Lasix drip at 5 mg an hour as well as HydroDIURIL IV. Consult nephrology for worsening renal function/assistance with IV diuresis  work-up for anemia. Await iron studies, folate, vitamin B12 as well as Hemoccult stools for  Coronary valuation in the form of cardiac catheterization when clinically stable/renal function allows. Patient with multiple risk factors for CAD and acute decompensated heart failure.   Also has abnormal EKG and mildly elevated cardiac enzymes high risk for CAD  Will discontinue Norvasc secondary to lower extremity edema as well as acute decompensated heart failure  Avoid nephrotoxic agents  Monitor on telemetry  GI/DVT prophylaxis  CHF teaching  Follow-up in CHF clinic post discharge  Further recommendations to follow        Electronically signed by Coco Silverio DO on 10/2/2022 at 9:18 AM

## 2022-10-02 NOTE — CONSULTS
[address]                      [address], [address] [facility]                                  CONSULTATION    PATIENT NAME: Timmy Faith                    :        1945  MED REC NO:   [id number]                            ROOM:       [id number]  ACCOUNT NO:   [de-identified]                           ADMIT DATE: 2022  PROVIDER:     Abraham Davila DO    CONSULT DATE:  10/02/2022    RENAL CONSULTATION    HISTORY OF PRESENT ILLNESS:  A 64-year-old [place] female, pleasant in  no acute distress, being seen for azotemia. The patient sees Dr. Bowen Narvaez  as her primary care at the [facility]. She has been told of  underlying chronic kidney disease, the stage she is uncertain. She has  known chronic diastolic heart failure in addition to hypertension, which  was quite high on admission and a history of hyperlipidemia. The  patient has noted increasing shortness of breath and leg swelling over  the past few days. She denies any nonsteroidal anti-inflammatory  medication use. She denies any gross hematuria or dysuria. No history  of kidney stones. PAST MEDICAL HISTORY:  1. Suspected CKD III. 2.  Organic heart disease with heart failure, reduced ejection fraction. 3.  Dyspnea. 4.  Hypertension. PAST SURGICAL HISTORY:  Cataracts, hernia repair, hysterectomy. HABITS:  No smoking, no alcohol use, no nonsteroidals. Uses Tylenol  only. . ALLERGIES TO MEDICATIONS:  Jennifer Heller. PHYSICAL EXAMINATION:  VITAL SIGNS:  Height 5 feet and 2 inches, 132 pounds. Blood pressure is  160/60, heart rate 100, respirations 18, afebrile. On admission to the  hospital, the patient had blood pressure of 220/100. HEENT:  Normocephalic. Pupils reactive to light. Extraocular muscles  intact. NECK:  Supple. No JVD or adenopathy. CHEST:  Lungs relatively clear. Few moist rhonchi at the bases. No  decreased sounds. No wheezing.   CARDIOVASCULAR:  Heart is regular with 1/6 systolic murmur. ABDOMEN:  Obese, soft. No guarding or rigidity. Evidence of previous  surgery noted with scar healed. EXTREMITIES:  Show the patient to have 2+ edema of the lower limbs from  the knees to the feet bilaterally. No cyanosis. No calf tenderness. IMPRESSION:  1. Suspected CKD III with CHAYO from recent pulmonary edema, heart  failure. 2.  Organic heart disease, heart failure, reduced ejection fraction. 3.  Hypertension, uncontrolled. 4.  Obesity. PLAN:  We will initiate therapy for heart failure. Watch I and Os. Continue diuresis. Obtain old records tomorrow when office hours are  open. Avoid nephrotoxic agents and hypotension.         Joe Haynes DO    D: 10/02/2022 9:10:32       T: 10/02/2022 9:14:32     [email or web address]  Job#: [id number]     Doc#: [id number]    CC:

## 2022-10-02 NOTE — PROGRESS NOTES
Physician Progress Note      PATIENT:               Lazarus Carlson  CSN #:                  [id number]  :                       1945  ADMIT DATE:       2022 12:37 PM  DISCH DATE:  RESPONDING  PROVIDER #:        Jose Finney DO        QUERY TEXT:    Stage of Chronic Kidney Disease: Please provide further specificity, if known. Clinical indicators include: chronic kidney disease, creatinine, gfr, bnp,   bun, brain natriuretic peptide, pro-bnp  Options provided:  -- Chronic kidney disease stage 1  -- Chronic kidney disease stage 2  -- Chronic kidney disease stage 3  -- Chronic kidney disease stage 3a  -- Chronic kidney disease stage 3b  -- Chronic kidney disease stage 4  -- Chronic kidney disease stage 5  -- Chronic kidney disease stage 5, requiring dialysis  -- End stage renal disease  -- Other - I will add my own diagnosis  -- Disagree - Not applicable / Not valid  -- Disagree - Clinically Unable to determine / Unknown        PROVIDER RESPONSE TEXT:    Provider was unable to determine a response for this query. Benedicto      Electronically signed by:   Jose Finney DO 10/2/2022 8:18 AM

## 2022-10-03 ENCOUNTER — APPOINTMENT (OUTPATIENT)
Dept: ULTRASOUND IMAGING | Age: 77
DRG: 291 | End: 2022-10-03
Payer: MEDICARE

## 2022-10-03 PROBLEM — D64.9 ANEMIA: Status: ACTIVE | Noted: 2022-10-03

## 2022-10-03 LAB
ANION GAP SERPL CALCULATED.3IONS-SCNC: 12 MEQ/L (ref 9–15)
BUN BLDV-MCNC: 34 MG/DL (ref 8–23)
CALCIUM SERPL-MCNC: 8.3 MG/DL (ref 8.5–9.9)
CHLORIDE BLD-SCNC: 102 MEQ/L (ref 95–107)
CO2: 27 MEQ/L (ref 20–31)
CREAT SERPL-MCNC: 2.38 MG/DL (ref 0.5–0.9)
FOLATE: 9.7 NG/ML
GFR AFRICAN AMERICAN: 23.8
GFR NON-AFRICAN AMERICAN: 19.7
GLUCOSE BLD-MCNC: 102 MG/DL (ref 70–99)
HCT VFR BLD CALC: 22.2 % (ref 37–47)
HEMOGLOBIN: 7.5 G/DL (ref 12–16)
IRON SATURATION: 22 % (ref 20–55)
IRON: 46 UG/DL (ref 37–145)
MAGNESIUM: 2 MG/DL (ref 1.7–2.4)
POTASSIUM SERPL-SCNC: 3 MEQ/L (ref 3.4–4.9)
SODIUM BLD-SCNC: 141 MEQ/L (ref 135–144)
TOTAL IRON BINDING CAPACITY: 211 UG/DL (ref 250–450)
UNSATURATED IRON BINDING CAPACITY: 165 UG/DL (ref 112–347)
VITAMIN B-12: 411 PG/ML (ref 232–1245)

## 2022-10-03 PROCEDURE — 2060000000 HC ICU INTERMEDIATE R&B

## 2022-10-03 PROCEDURE — 80048 BASIC METABOLIC PNL TOTAL CA: CPT

## 2022-10-03 PROCEDURE — 36415 COLL VENOUS BLD VENIPUNCTURE: CPT

## 2022-10-03 PROCEDURE — 85018 HEMOGLOBIN: CPT

## 2022-10-03 PROCEDURE — 99233 SBSQ HOSP IP/OBS HIGH 50: CPT | Performed by: INTERNAL MEDICINE

## 2022-10-03 PROCEDURE — 83735 ASSAY OF MAGNESIUM: CPT

## 2022-10-03 PROCEDURE — 6360000002 HC RX W HCPCS: Performed by: INTERNAL MEDICINE

## 2022-10-03 PROCEDURE — 6370000000 HC RX 637 (ALT 250 FOR IP): Performed by: INTERNAL MEDICINE

## 2022-10-03 PROCEDURE — 85014 HEMATOCRIT: CPT

## 2022-10-03 PROCEDURE — 2580000003 HC RX 258: Performed by: INTERNAL MEDICINE

## 2022-10-03 PROCEDURE — 93975 VASCULAR STUDY: CPT

## 2022-10-03 PROCEDURE — 99221 1ST HOSP IP/OBS SF/LOW 40: CPT | Performed by: INTERNAL MEDICINE

## 2022-10-03 RX ORDER — POTASSIUM CHLORIDE 7.45 MG/ML
10 INJECTION INTRAVENOUS
Status: COMPLETED | OUTPATIENT
Start: 2022-10-03 | End: 2022-10-03

## 2022-10-03 RX ADMIN — POTASSIUM CHLORIDE 10 MEQ: 7.46 INJECTION, SOLUTION INTRAVENOUS at 08:55

## 2022-10-03 RX ADMIN — DOXAZOSIN 2 MG: 1 TABLET ORAL at 08:56

## 2022-10-03 RX ADMIN — HEPARIN SODIUM 5000 UNITS: 5000 INJECTION INTRAVENOUS; SUBCUTANEOUS at 06:05

## 2022-10-03 RX ADMIN — HYDRALAZINE HYDROCHLORIDE 25 MG: 25 TABLET, FILM COATED ORAL at 08:57

## 2022-10-03 RX ADMIN — CHLOROTHIAZIDE SODIUM 250 MG: 500 INJECTION, POWDER, LYOPHILIZED, FOR SOLUTION INTRAVENOUS at 08:54

## 2022-10-03 RX ADMIN — Medication 400 MG: at 08:57

## 2022-10-03 RX ADMIN — PANTOPRAZOLE SODIUM 40 MG: 40 TABLET, DELAYED RELEASE ORAL at 08:56

## 2022-10-03 RX ADMIN — METOPROLOL TARTRATE 100 MG: 50 TABLET, FILM COATED ORAL at 08:57

## 2022-10-03 RX ADMIN — POTASSIUM CHLORIDE 20 MEQ: 1500 TABLET, EXTENDED RELEASE ORAL at 18:29

## 2022-10-03 RX ADMIN — POTASSIUM CHLORIDE 20 MEQ: 1500 TABLET, EXTENDED RELEASE ORAL at 15:45

## 2022-10-03 RX ADMIN — POTASSIUM CHLORIDE 10 MEQ: 7.46 INJECTION, SOLUTION INTRAVENOUS at 10:04

## 2022-10-03 RX ADMIN — CHLOROTHIAZIDE SODIUM 250 MG: 500 INJECTION, POWDER, LYOPHILIZED, FOR SOLUTION INTRAVENOUS at 21:23

## 2022-10-03 RX ADMIN — HEPARIN SODIUM 5000 UNITS: 5000 INJECTION INTRAVENOUS; SUBCUTANEOUS at 15:45

## 2022-10-03 RX ADMIN — HEPARIN SODIUM 5000 UNITS: 5000 INJECTION INTRAVENOUS; SUBCUTANEOUS at 20:45

## 2022-10-03 RX ADMIN — HYDRALAZINE HYDROCHLORIDE 25 MG: 25 TABLET, FILM COATED ORAL at 20:41

## 2022-10-03 RX ADMIN — ATORVASTATIN CALCIUM 40 MG: 40 TABLET, FILM COATED ORAL at 20:41

## 2022-10-03 RX ADMIN — POTASSIUM CHLORIDE 20 MEQ: 1500 TABLET, EXTENDED RELEASE ORAL at 08:57

## 2022-10-03 RX ADMIN — ISOSORBIDE MONONITRATE 30 MG: 30 TABLET, EXTENDED RELEASE ORAL at 08:56

## 2022-10-03 RX ADMIN — Medication 10 ML: at 11:41

## 2022-10-03 RX ADMIN — METOPROLOL TARTRATE 100 MG: 50 TABLET, FILM COATED ORAL at 20:41

## 2022-10-03 RX ADMIN — Medication 10 ML: at 20:47

## 2022-10-03 RX ADMIN — LEVOTHYROXINE SODIUM 25 MCG: 0.03 TABLET ORAL at 06:05

## 2022-10-03 ASSESSMENT — PAIN SCALES - GENERAL: PAINLEVEL_OUTOF10: 0

## 2022-10-03 NOTE — CARE COORDINATION
Definition of CHF reviewed with patient. Symptoms of heart failure and decompensation reviewed: weight gain of >3 #, edema, difficulty breathing, cough, issues with appetite, fatigue, or difficulty with sleep. Common causes of CHF reviewed: CAD, arrhythmias, MI, HTN, valve dz., infection,  ETOH or drug abuse, or genetic abnormalities. Importance of daily weight and B/P monitoring discussed. Pt to use a calender or notebook to record daily weight and call physician immediately with 3 # weight gain. She did call her doctor and went to the appointment. The doctor sent her to ER. She may have waited a bit too long to schedule an appointment. Low sodium diet and fluid intake discussed. Pt taught about a fluid restriction and advised to discuss this with the cardiologist prior to limiting oral intake. Reminded to read labels for sodium levels, recommended food list provided. She believes that she ate too much salt last week. I emphasized the importance of following their physician's orders for medication administration. Importance of flu and pneumonia vaccinations reinforced. Common CHF medications reviewed as well as avoiding certain other meds (decongestants, NSAIDS)  Instructed to discuss activity recommendations with physician. Pt. denies smoking. Sample CHF weight documentation form provided. CHF Zones discussed. Importance of staying in \"green\" area stressed. Pt verbalized understanding to call MD ASAP when she reaches the yellow zone, and to call 911 when reaching the red zone. Booklet and zone pamphlet provided to the pt. in July and patient states that she still has it. Patient denies any further questions at this time.    Electronically signed by Alysa Lr RN on 10/3/2022 at 10:06 AM

## 2022-10-03 NOTE — CARE COORDINATION
Met with pt and spouse at bedside. D/C plan remains home with spouse. Pt awaiting consults by GI. New order for US ABD to be completed.

## 2022-10-03 NOTE — PROGRESS NOTES
Chief Complaint   Patient presents with    Shortness of Breath       X 1 week with bilateral leg swelling, had chest pain yesterday, denies chest pain today          Patient is a 64 y.o. female who presents with a chief complaint of short of breath. Patient is followed on a regular basis by Dr. Mervin Santos MD. patient with past medical history of chronic diastolic congestive heart failure, hypertension, hyperlipidemia, chronic kidney disease who presents with worsening shortness of breath and increased lower extremity edema. Patient generally follows with [facility] she was requesting transfer but no beds available. She denies history of cardiac catheterization or any recent stress test.  Creatinine of 2.39 with a GFR of 19.6 on admission. BNP is elevated at 16,000 with mildly elevated troponin and a flat pattern. Hemoglobin is 8.1. He was noted to have extremely elevated blood pressure on arrival initiated on nitro drip. Currently she is in the 988K systolic. Echocardiogram at [facility] in 2017 ejection fraction of 60 to 65% no significant valve abnormalities. Negative nuclear stress test in 2017 at [facility].      10/2/2022: Patient is feeling somewhat better. Renal function slightly worsening. Sinus tach on telemetry heart rate of 106 bpm.  Continues to have significant bilateral extremity edema. Patient with significant EMEA hemoglobin is 7.3 from 12 2 months ago. 10-3-22: discussed with son on phone. No CP or SOB today. On lasix gtt. Hd stable. BP elevated. + LE edema.          Past Medical History        Past Medical History:   Diagnosis Date    CHF (congestive heart failure) ([facility] [address]      Hypertension               Patient Active Problem List   Diagnosis    CHF (congestive heart failure), NYHA class I, acute on chronic, combined ([address]    Wheezing    Acute congestive heart failure (HCC)    Microhematuria    Diarrhea of presumed infectious origin    Hypertensive emergency         Past Surgical History Past Surgical History:   Procedure Laterality Date    CATARACT REMOVAL Bilateral      HERNIA REPAIR        HYSTERECTOMY, TOTAL ABDOMINAL (CERVIX REMOVED)                Social History   Social History            Socioeconomic History    Marital status:        Spouse name: None    Number of children: None    Years of education: None    Highest education level: None   Tobacco Use    Smoking status: Never    Smokeless tobacco: Never   Vaping Use    Vaping Use: Never used   Substance and Sexual Activity    Alcohol use: Not Currently    Drug use: Never    Sexual activity: Never            Family History   History reviewed. No pertinent family history.         Current Facility-Administered Medications             Current Facility-Administered Medications   Medication Dose Route Frequency Provider Last Rate Last Admin    sodium chloride flush 0.9 % injection 5-40 mL  5-40 mL IntraVENous 2 times per day Guille DANIELLA Holiday, DO   10 mL at 10/01/22 0858    sodium chloride flush 0.9 % injection 5-40 mL  5-40 mL IntraVENous PRN Guille  Holiday, DO        0.9 % sodium chloride infusion   IntraVENous PRN Guille  Holiday, DO        ondansetron (ZOFRAN-ODT) disintegrating tablet 4 mg  4 mg Oral Q8H PRN [employer] Holiday, DO         Or    ondansetron (ZOFRAN) injection 4 mg  4 mg IntraVENous Q6H PRN [employer] Holiday, DO        acetaminophen (TYLENOL) tablet 650 mg  650 mg Oral Q4H PRN [employer] Holiday, DO        heparin (porcine) injection 5,000 Units  5,000 Units SubCUTAneous 3 times per day Guille  Holiday, DO   5,000 Units at 10/01/22 0720    nitroGLYCERIN 50 mg in dextrose 5% 250 mL infusion  120 mcg/min IntraVENous Continuous Rui Rodriguez MD   Stopped at 10/01/22 5056             Current Outpatient Medications   Medication Sig Dispense Refill    doxazosin (CARDURA) 2 MG tablet Take 2 mg by mouth daily        Magnesium 400 MG TABS TAKE 1 TABLET BY MOUTH ONCE DAILY        levothyroxine (SYNTHROID) 25 MCG tablet 1/2 po daily 30 tablet 5 amLODIPine (NORVASC) 10 MG tablet Take by mouth        atorvastatin (LIPITOR) 20 MG tablet Take by mouth        losartan (COZAAR) 50 MG tablet          pantoprazole (PROTONIX) 40 MG tablet Take 40 mg by mouth in the morning. furosemide (LASIX) 20 MG tablet Take 1 tablet by mouth in the morning. 30 tablet 1    metoprolol (LOPRESSOR) 100 MG tablet Take 100 mg by mouth in the morning and 100 mg before bedtime. ALLERGIES: Norco [hydrocodone-acetaminophen]     Review of Systems   Constitutional: Negative. Negative for chills and fever. HENT: Negative. Eyes: Negative. Respiratory:  Positive for shortness of breath. Negative for wheezing. Cardiovascular:  Positive for chest pain and leg swelling. Negative for palpitations. Gastrointestinal: Negative. Negative for abdominal pain, nausea and vomiting. Endocrine: Negative. Genitourinary: Negative. Musculoskeletal: Negative. Skin: Negative. Negative for rash. Allergic/Immunologic: Negative. Neurological: Negative. Negative for dizziness, weakness and headaches. Hematological: Negative. Psychiatric/Behavioral: Negative. VITALS:  Blood pressure (!) 162/72, pulse (!) 108, temperature 98.1 °F (36.7 °C), temperature source Oral, resp. rate 20, height 5' 2\" (1.575 m), weight 132 lb (59.9 kg), SpO2 97 %. Body mass index is 24.14 kg/m². Physical Exam  Vitals and nursing note reviewed. Constitutional:       Appearance: She is well-developed. She is not diaphoretic. HENT:      Head: Normocephalic and atraumatic. Eyes:      Pupils: Pupils are equal, round, and reactive to light. Neck:      Thyroid: No thyromegaly. Vascular: No JVD. Trachea: No tracheal deviation. Cardiovascular:      Rate and Rhythm: Normal rate and regular rhythm. Chest Wall: PMI is not displaced. Pulses: Intact distal pulses. Heart sounds: Normal heart sounds. Heart sounds not distant. No murmur heard. No friction rub. No gallop. No S3 sounds. Pulmonary:      Effort: No respiratory distress. Breath sounds: No wheezing or rales. Chest:      Chest wall: No tenderness. Abdominal:      General: Bowel sounds are normal. There is no distension. Palpations: Abdomen is soft. There is no mass. Tenderness: There is no abdominal tenderness. There is no guarding or rebound. Musculoskeletal:         General: Normal range of motion. Cervical back: Normal range of motion and neck supple. Right lower leg: Edema present. Left lower leg: Edema present. Skin:     General: Skin is warm and dry. Coloration: Skin is not pale. Findings: No erythema or rash. Neurological:      Mental Status: She is alert and oriented to person, place, and time. Cranial Nerves: No cranial nerve deficit. Psychiatric:         Behavior: Behavior normal.         Thought Content:  Thought content normal.         Judgment: Judgment normal.         LABS:  Recent Results         Recent Results (from the past 24 hour(s))   Comprehensive Metabolic Panel     Collection Time: 09/30/22  1:00 PM   Result Value Ref Range     Sodium 141 135 - 144 mEq/L     Potassium 3.1 (L) 3.4 - 4.9 mEq/L     Chloride 105 95 - 107 mEq/L     CO2 23 20 - 31 mEq/L     Anion Gap 13 9 - 15 mEq/L     Glucose 112 (H) 70 - 99 mg/dL     BUN 35 (H) 8 - 23 mg/dL     Creatinine 2.39 (H) 0.50 - 0.90 mg/dL     GFR Non- 19.6 (L) >60     GFR  23.7 (L) >60     Calcium 8.2 (L) 8.5 - 9.9 mg/dL     Total Protein 5.2 (L) 6.3 - 8.0 g/dL     Albumin 3.0 (L) 3.5 - 4.6 g/dL     Total Bilirubin 0.3 0.2 - 0.7 mg/dL     Alkaline Phosphatase 126 40 - 130 U/L     ALT 15 0 - 33 U/L     AST 23 0 - 35 U/L     Globulin 2.2 (L) 2.3 - 3.5 g/dL   CBC with Auto Differential     Collection Time: 09/30/22  1:00 PM   Result Value Ref Range     WBC 6.1 4.8 - 10.8 K/uL     RBC 3.10 (L) 4.20 - 5.40 M/uL     Hemoglobin 8.8 (L) 12.0 - 16.0 g/dL     Hematocrit 25.1 (L) 37.0 - 47.0 %     MCV 80.7 (L) 82.0 - 100.0 fL     MCH 28.4 27.0 - 31.3 pg     MCHC 35.2 33.0 - 37.0 %     RDW 13.5 11.5 - 14.5 %     Platelets 692 299 - 008 K/uL     Neutrophils % 66.3 %     Lymphocytes % 16.2 %     Monocytes % 6.5 %     Eosinophils % 9.8 %     Basophils % 1.2 %     Neutrophils Absolute 4.0 1.4 - 6.5 K/uL     Lymphocytes Absolute 1.0 1.0 - 4.8 K/uL     Monocytes Absolute 0.4 0.2 - 0.8 K/uL     Eosinophils Absolute 0.6 0.0 - 0.7 K/uL     Basophils Absolute 0.1 0.0 - 0.2 K/uL   Magnesium     Collection Time: 09/30/22  1:00 PM   Result Value Ref Range     Magnesium 2.2 1.7 - 2.4 mg/dL   Troponin     Collection Time: 09/30/22  1:00 PM   Result Value Ref Range     Troponin <0.010 0.000 - 0.010 ng/mL   Urinalysis with Reflex to Culture     Collection Time: 09/30/22  1:00 PM     Specimen: Urine   Result Value Ref Range     Color, UA Yellow Straw/Yellow     Clarity, UA Clear Clear     Glucose, Ur Negative Negative mg/dL     Bilirubin Urine Negative Negative     Ketones, Urine Negative Negative mg/dL     Specific Gravity, UA 1.010 1.005 - 1.030     Blood, Urine LARGE (A) Negative     pH, UA 7.0 5.0 - 9.0     Protein, UA >=300 (A) Negative mg/dL     Urobilinogen, Urine 0.2 <2.0 E.U./dL     Nitrite, Urine Negative Negative     Leukocyte Esterase, Urine Negative Negative     Urine Reflex to Culture Not Indicated     TSH     Collection Time: 09/30/22  1:00 PM   Result Value Ref Range     TSH 4.800 (H) 0.440 - 3.860 uIU/mL   Brain Natriuretic Peptide     Collection Time: 09/30/22  1:00 PM   Result Value Ref Range     Pro-BNP 14,458 pg/mL   Procalcitonin     Collection Time: 09/30/22  1:00 PM   Result Value Ref Range     Procalcitonin 0.21 (H) 0.00 - 0.15 ng/mL   Microscopic Urinalysis     Collection Time: 09/30/22  1:00 PM   Result Value Ref Range     Renal Epithelial, UA 0-2 (A) /HPF     Bacteria, UA Negative Negative /HPF     Hyaline Casts, UA 5-10 0 - 5 /HPF     WBC, UA 6-9 (A) 0 - 5 /HPF     RBC, UA >100 (H) 0 - 5 /HPF     Epithelial Cells, UA 3-5 0 - 5 /HPF   EKG 12 Lead - Chest Pain     Collection Time: 09/30/22  1:37 PM   Result Value Ref Range     Ventricular Rate 88 BPM     Atrial Rate 88 BPM     P-R Interval 172 ms     QRS Duration 74 ms     Q-T Interval 386 ms     QTc Calculation (Bazett) 467 ms     P Axis 50 degrees     R Axis 4 degrees     T Axis 31 degrees   COVID-19, Rapid     Collection Time: 09/30/22  1:51 PM     Specimen: Nasopharyngeal Swab   Result Value Ref Range     SARS-CoV-2, NAAT Not Detected Not Detected   Troponin     Collection Time: 10/01/22  5:06 AM   Result Value Ref Range     Troponin 0.019 (HH) 0.000 - 0.010 ng/mL   Troponin     Collection Time: 10/01/22  7:59 AM   Result Value Ref Range     Troponin 0.012 (HH) 0.000 - 0.010 ng/mL   CBC with Auto Differential     Collection Time: 10/01/22  7:59 AM   Result Value Ref Range     WBC 7.9 4.8 - 10.8 K/uL     RBC 2.91 (L) 4.20 - 5.40 M/uL     Hemoglobin 8.1 (L) 12.0 - 16.0 g/dL     Hematocrit 23.8 (L) 37.0 - 47.0 %     MCV 81.6 (L) 82.0 - 100.0 fL     MCH 27.7 27.0 - 31.3 pg     MCHC 34.0 33.0 - 37.0 %     RDW 13.7 11.5 - 14.5 %     Platelets 442 231 - 791 K/uL     Neutrophils % 77.5 %     Lymphocytes % 11.4 %     Monocytes % 6.3 %     Eosinophils % 3.7 %     Basophils % 1.1 %     Neutrophils Absolute 6.1 1.4 - 6.5 K/uL     Lymphocytes Absolute 0.9 (L) 1.0 - 4.8 K/uL     Monocytes Absolute 0.5 0.2 - 0.8 K/uL     Eosinophils Absolute 0.3 0.0 - 0.7 K/uL     Basophils Absolute 0.1 0.0 - 0.2 K/uL   Comprehensive Metabolic Panel     Collection Time: 10/01/22  7:59 AM   Result Value Ref Range     Sodium 140 135 - 144 mEq/L     Potassium 2.7 (LL) 3.4 - 4.9 mEq/L     Chloride 103 95 - 107 mEq/L     CO2 24 20 - 31 mEq/L     Anion Gap 13 9 - 15 mEq/L     Glucose 153 (H) 70 - 99 mg/dL     BUN 34 (H) 8 - 23 mg/dL     Creatinine 2.25 (H) 0.50 - 0.90 mg/dL     GFR Non- 21.0 (L) >60     GFR  25.4 (L) >60     Calcium 8.3 (L) 8.5 - 9.9 mg/dL     Total Protein 4.7 (L) 6.3 - 8.0 g/dL     Albumin 2.7 (L) 3.5 - 4.6 g/dL     Total Bilirubin 0.4 0.2 - 0.7 mg/dL     Alkaline Phosphatase 119 40 - 130 U/L     ALT 14 0 - 33 U/L     AST 18 0 - 35 U/L     Globulin 2.0 (L) 2.3 - 3.5 g/dL   Brain Natriuretic Peptide     Collection Time: 10/01/22  7:59 AM   Result Value Ref Range     Pro-BNP 16,372 pg/mL         Troponin:         Lab Results   Component Value Date/Time     TROPONINI 0.012 10/01/2022 07:59 AM         EKG: normal sinus rhythm, nonspecific ST and T waves changes poor progression across the precordial leads        ASSESSMENT:           Active Hospital Problems     Diagnosis Date Noted    Hypertensive emergency [I16.1] 10/01/2022       Priority: Medium      Acute on chronic decompensated diastolic congestive heart failure  Hypertensive urgency  Elevated cardiac enzyme. Questionable demand ischemia. Rule out underlying coronary ischemia  Chronic kidney disease  Hyperlipidemia  Anemia     PLAN:   As always, aggressive risk factor modification is strongly recommended. We should adhere to the JNC VIII guidelines for HTN management and the NCEPATP III guidelines for LDL-C management. IV diuresis as tolerated. Monitor I's and O's and renal function. Initiated on Lasix drip at 5 mg an hour as well as HydroDIURIL IV. Consult nephrology for worsening renal function/assistance with IV diuresis  Check renal Duplex US  work-up for anemia. Consult GI. Coronary valuation in the form of cardiac catheterization when clinically stable/renal function allows. Patient with multiple risk factors for CAD and acute decompensated heart failure.   Also has abnormal EKG and mildly elevated cardiac enzymes high risk for CAD  NO Norvasc secondary to lower extremity edema as well as acute decompensated heart failure  Avoid nephrotoxic agents  Monitor on telemetry  GI/DVT prophylaxis  CHF teaching  Follow-up in CHF clinic post discharge  Further recommendations to follow        Electronically signed by Onur Rey DO on 10/3/2022 at 1:17 PM

## 2022-10-03 NOTE — PROGRESS NOTES
NephNo I&O being donerology Progress Note    Assessment:  CKD-4  GFR 26 cc/min 8/23/2022  Cardiac  normal EF  Hypokalemia  Hypertension    Plan:d/c minipress// increase  lasix drip dose  replace Kcl  IV and Oral    Patient Active Problem List:     CHF (congestive heart failure), NYHA class I, acute on chronic, combined (HCC)     Wheezing     Acute congestive heart failure ([address]     Microhematuria     Diarrhea of presumed infectious origin     Hypertensive emergency      Subjective:  Admit Date: 9/30/2022    Interval History: slight improvement in breathing edema legs     Medications:  Scheduled Meds:   chlorothiazide (DIURIL) IVPB  250 mg IntraVENous Q12H    isosorbide mononitrate  30 mg Oral Daily    hydrALAZINE  25 mg Oral 2 times per day    potassium chloride  20 mEq Oral TID WC    sodium chloride flush  5-40 mL IntraVENous 2 times per day    heparin (porcine)  5,000 Units SubCUTAneous 3 times per day    atorvastatin  40 mg Oral Nightly    doxazosin  2 mg Oral Daily    levothyroxine  25 mcg Oral Daily    metoprolol  100 mg Oral BID    pantoprazole  40 mg Oral Daily    magnesium oxide  400 mg Oral Daily     Continuous Infusions:   furosemide (LASIX) infusion 5 mg/hr (10/02/22 1110)    sodium chloride         CBC:   Recent Labs     10/01/22  0759 10/01/22  1453   WBC 7.9 7.2   HGB 8.1* 7.3*    203     CMP:    Recent Labs     10/01/22  0759 10/02/22  0520 10/03/22  0555    144 141   K 2.7* 3.0* 3.0*    107 102   CO2 24 26 27   BUN 34* 34* 34*   CREATININE 2.25* 2.53* 2.38*   GLUCOSE 153* 111* 102*   CALCIUM 8.3* 8.3* 8.3*   LABGLOM 21.0* 18.3* 19.7*     Troponin:   Recent Labs     10/01/22  2249   TROPONINI 0.022*     BNP: No results for input(s): BNP in the last 72 hours. INR: No results for input(s): INR in the last 72 hours. Lipids: No results for input(s): CHOL, LDLDIRECT, TRIG, HDL, AMYLASE, LIPASE in the last 72 hours.   Liver:   Recent Labs     10/01/22  0759   AST 18   ALT 14   ALKPHOS 119   PROT 4.7*   LABALBU 2.7*   BILITOT 0.4     Iron:  No results for input(s): IRONS, FERRITIN in the last 72 hours. Invalid input(s): LABIRONS  Urinalysis: No results for input(s): UA in the last 72 hours.     Objective:  Vitals: BP (!) 148/60   Pulse 88   Temp 97.7 °F (36.5 °C) (Oral)   Resp 18   Ht 5' 2\" (1.575 m)   Wt 132 lb (59.9 kg)   SpO2 98%   BMI 24.14 kg/m²    Wt Readings from Last 3 Encounters:   09/30/22 132 lb (59.9 kg)   09/01/22 130 lb (59 kg)   08/10/22 134 lb (60.8 kg)      24HR INTAKE/OUTPUT:  No intake or output data in the 24 hours ending 10/03/22 0807    General: alert, in no apparent distress  HEENT: normocephalic, atraumatic, anicteric  Neck: supple, no mass  Lungs: non-labored respirations, clear to auscultation bilaterally  Heart: regular rate and rhythm, no murmurs or rubs  Abdomen: soft, non-tender, non-distended  Ext: no cyanosis, 2+ peripheral edema  Neuro: alert and oriented, no gross abnormalities  Psych: normal mood and affect  Skin: no rash      Electronically signed by Kb Niño DO, MD

## 2022-10-03 NOTE — CONSULTS
Inpatient consult to GI  Consult performed by: Ezra Flores MD  Consult ordered by: Lencho Mays DO        Patient Name: Maria M Sorto Date: 2022 12:37 PM  MR #: [id number]  : 1945    Attending Physician: Lencho Mays DO  Reason for consult: anemia    History of Presenting Illness:      Arti Torres is a 64 y.o. female on hospital day 2 with a history of CHF, hypertension. Past surgical history significant for hysterectomy, hernia, and cataracts. Family history was negative for GI malignancies. Social history denies history of nicotine, EtOH or illicit drug use. History Obtained From:  patient, electronic medical record  GI consult for anemia-patient was admitted with acute on chronic decompensated diastolic heart failure and hypertension. GI was asked to evaluate anemia, patient was noted to have microcytic anemia on arrival with hemoglobin of 8.8, baseline from 1 month prior was 11.3. At baseline patient is not on oral anticoagulation or antiplatelet therapy. No reported history of GI bleeding or chronic liver disease. Denies dysphagia, changes to appetite, unintentional weight loss, changes to bowel habits, nausea or vomiting, hematemesis, melena, or hematochezia. History:      Past Medical History:   Diagnosis Date    CHF (congestive heart failure) (HCC)     Hypertension      Past Surgical History:   Procedure Laterality Date    CATARACT REMOVAL Bilateral     HERNIA REPAIR      HYSTERECTOMY, TOTAL ABDOMINAL (CERVIX REMOVED)       Family History  History reviewed. No pertinent family history.   [] Unable to obtain due to ventilated and/ or neurologic status  Social History     Socioeconomic History    Marital status:      Spouse name: Not on file    Number of children: Not on file    Years of education: Not on file    Highest education level: Not on file   Occupational History    Not on file   Tobacco Use    Smoking status: Never    Smokeless tobacco: Never   Vaping Use    Vaping Use: Never used   Substance and Sexual Activity    Alcohol use: Not Currently    Drug use: Never    Sexual activity: Never   Other Topics Concern    Not on file   Social History Narrative    Not on file     Social Determinants of Health     Financial Resource Strain: Not on file   Food Insecurity: Not on file   Transportation Needs: Not on file   Physical Activity: Not on file   Stress: Not on file   Social Connections: Not on file   Intimate Partner Violence: Not on file   Housing Stability: Not on file      [] Unable to obtain due to ventilated and/ or neurologic status    Home Medications:      Medications Prior to Admission: hydrALAZINE (APRESOLINE) 10 MG tablet, Take 10 mg by mouth 4 times daily  isosorbide mononitrate (IMDUR) 60 MG extended release tablet, Take 120 mg by mouth daily  torsemide (DEMADEX) 20 MG tablet, Take 20 mg by mouth 2 times daily  doxazosin (CARDURA) 2 MG tablet, Take 2 mg by mouth daily (Patient not taking: Reported on 10/1/2022)  Magnesium 400 MG TABS, TAKE 1 TABLET BY MOUTH ONCE DAILY  levothyroxine (SYNTHROID) 25 MCG tablet, 1/2 po daily  amLODIPine (NORVASC) 10 MG tablet, Take by mouth (Patient not taking: Reported on 10/1/2022)  atorvastatin (LIPITOR) 20 MG tablet, Take by mouth  losartan (COZAAR) 50 MG tablet,   pantoprazole (PROTONIX) 40 MG tablet, Take 40 mg by mouth in the morning. furosemide (LASIX) 20 MG tablet, Take 1 tablet by mouth in the morning. (Patient not taking: Reported on 10/1/2022)  metoprolol (LOPRESSOR) 100 MG tablet, Take 100 mg by mouth in the morning and 100 mg before bedtime.     Current Hospital Medications:   Scheduled Meds:   chlorothiazide (DIURIL) IVPB  250 mg IntraVENous Q12H    isosorbide mononitrate  30 mg Oral Daily    hydrALAZINE  25 mg Oral 2 times per day    potassium chloride  20 mEq Oral TID WC    sodium chloride flush  5-40 mL IntraVENous 2 times per day    heparin (porcine)  5,000 Units SubCUTAneous 3 times per day    atorvastatin 40 mg Oral Nightly    doxazosin  2 mg Oral Daily    levothyroxine  25 mcg Oral Daily    metoprolol  100 mg Oral BID    pantoprazole  40 mg Oral Daily    magnesium oxide  400 mg Oral Daily     Continuous Infusions:   furosemide (LASIX) infusion 5 mg/hr (10/02/22 1110)    sodium chloride       PRN Meds:.sodium chloride flush, sodium chloride, ondansetron **OR** ondansetron, acetaminophen, labetalol, hydrALAZINE   furosemide (LASIX) infusion 5 mg/hr (10/02/22 1110)    sodium chloride        Allergies: Allergies   Allergen Reactions    Norco [Hydrocodone-Acetaminophen]       Review of Systems:       [x] CV, Resp, Neuro, , and all other systems reviewed and negative other than listed in HPI. Objective Findings:     Vitals:   Vitals:    10/02/22 1011 10/02/22 1602 10/02/22 2001 10/03/22 0852   BP: (!) 179/66 (!) 148/60  (!) 173/64   Pulse: (!) 104 92 88 97   Resp: 18 18  18   Temp: 97.9 °F (36.6 °C) 97.7 °F (36.5 °C)  97.9 °F (36.6 °C)   TempSrc: Oral Oral  Oral   SpO2: 97% 98%  97%   Weight:       Height:            Physical Examination:  General: alert  HEENT: Normocephalic, no scleral icterus. Neck: No JVD. Heart: Regular, no murmur, no rub/gallop. No RV heave. Lungs: Clear to ascultation, no rales/wheezing/rhonchi. Good chest wall excursion. Abdomen: Appearance:, no Distension , Soft , no tenderness , Scars , Bowel sounds normal  Extremities: No clubbing/cyanosis, no edema. Skin: Warm, dry, normal turgor, no rash, no bruise, no petichiae. Neuro: No myoclonus or tremor.    Psych: Normal affect    Results/ Medications reviewed 10/3/2022, 1:00 PM     Laboratory, Microbiology, Pathology, Radiology, Cardiology, Medications and Transcriptions reviewed  Scheduled Meds:   chlorothiazide (DIURIL) IVPB  250 mg IntraVENous Q12H    isosorbide mononitrate  30 mg Oral Daily    hydrALAZINE  25 mg Oral 2 times per day    potassium chloride  20 mEq Oral TID WC    sodium chloride flush  5-40 mL IntraVENous 2 times per day    heparin (porcine)  5,000 Units SubCUTAneous 3 times per day    atorvastatin  40 mg Oral Nightly    doxazosin  2 mg Oral Daily    levothyroxine  25 mcg Oral Daily    metoprolol  100 mg Oral BID    pantoprazole  40 mg Oral Daily    magnesium oxide  400 mg Oral Daily     Continuous Infusions:   furosemide (LASIX) infusion 5 mg/hr (10/02/22 1110)    sodium chloride         Recent Labs     10/01/22  0759 10/01/22  1453 10/02/22  1012 10/03/22  0933   WBC 7.9 7.2  --   --    HGB 8.1* 7.3*  --  7.5*   HCT 23.8* 22.6* 24.0* 22.2*   MCV 81.6* 83.3  --   --     203  --   --      Recent Labs     10/01/22  0759 10/02/22  0520 10/03/22  0555    144 141   K 2.7* 3.0* 3.0*    107 102   CO2 24 26 27   BUN 34* 34* 34*   CREATININE 2.25* 2.53* 2.38*     Recent Labs     10/01/22  0759   AST 18   ALT 14   BILITOT 0.4   ALKPHOS 119     No results for input(s): LIPASE, AMYLASE in the last 72 hours. Recent Labs     10/01/22  0759   PROT 4.7*     XR CHEST PORTABLE    Result Date: 9/30/2022  EXAMINATION: ONE XRAY VIEW OF THE CHEST 9/30/2022 2:06 pm COMPARISON: None. HISTORY: ORDERING SYSTEM PROVIDED HISTORY: sob TECHNOLOGIST PROVIDED HISTORY: Reason for exam:->sob What reading provider will be dictating this exam?->CRC FINDINGS: The heart is within normals. There are findings of mild vascular congestion. There are trace bilateral pleural effusions. There are no findings of pneumonia     1. Trace bilateral pleural effusions 2. Mild vascular congestion     Endoscopic hx:  EGD 9/5/22 Dr Bowen Aguirre  Antral erosions, otherwise normal  Colonoscopy 9/5/22 Dr Bowen Aguirre  Sigmoid diverticulosis, otherwise normal  Bx:  Gastric and duodenal bx negative for HP or celiac  Random colon biopsies negative  Impression:   63 y/o female admitted with CHF, noted to have microcytic anemia on arrival with hemoglobin of 8.8, baseline from 1 month prior was 11.3.   At baseline patient is not on oral anticoagulation or antiplatelet therapy. No reported history of GI bleeding or chronic liver disease. Noted recent EGD and colonoscopy less than 1 month ago at [facility] by Dr. Haris Sood noted sigmoid diverticulosis, and antral erosions, biopsies were negative for HP, celiac and random colon biopsies were negative. Patient denies overt bleeding. Plan:   -continue course of care  -serial HH trend and transfuse accordingly to cardiology recommendations  -IV PPI  -Given the absence of overt bleeding and recent <1 month ago normal EGD and colonoscopy, there is no immediate clinical indication for endoscopic investigation, this is ongoing pending clinical course.   -Could consider Push enteroscopy, however in the absence of overt blood loss an outpatient small bowel pill capsule endoscopy would yield better clinical information  Comments: Thank you for allowing us to participate in the care of this patient. Will continue to follow. Please call if questions or concerns arise. Electronically signed by Parish Larios MD on 10/3/2022 at 1:00 PM    Please note this report has been partially produced using speech recognition software and may cause contain errors related to that system including grammar, punctuation and spelling as well as words and phrases that may seem inappropriate. If there are questions or concerns please feel free to contact me to clarify.

## 2022-10-04 ENCOUNTER — TELEPHONE (OUTPATIENT)
Dept: CARDIOLOGY CLINIC | Age: 77
End: 2022-10-04

## 2022-10-04 LAB
ANION GAP SERPL CALCULATED.3IONS-SCNC: 12 MEQ/L (ref 9–15)
ANION GAP SERPL CALCULATED.3IONS-SCNC: 13 MEQ/L (ref 9–15)
BUN BLDV-MCNC: 35 MG/DL (ref 8–23)
BUN BLDV-MCNC: 37 MG/DL (ref 8–23)
CALCIUM SERPL-MCNC: 8.4 MG/DL (ref 8.5–9.9)
CALCIUM SERPL-MCNC: 8.5 MG/DL (ref 8.5–9.9)
CHLORIDE BLD-SCNC: 100 MEQ/L (ref 95–107)
CHLORIDE BLD-SCNC: 100 MEQ/L (ref 95–107)
CO2: 27 MEQ/L (ref 20–31)
CO2: 27 MEQ/L (ref 20–31)
CREAT SERPL-MCNC: 2.15 MG/DL (ref 0.5–0.9)
CREAT SERPL-MCNC: 2.48 MG/DL (ref 0.5–0.9)
GFR AFRICAN AMERICAN: 22.7
GFR AFRICAN AMERICAN: 26.8
GFR NON-AFRICAN AMERICAN: 18.8
GFR NON-AFRICAN AMERICAN: 22.1
GLUCOSE BLD-MCNC: 118 MG/DL (ref 70–99)
GLUCOSE BLD-MCNC: 165 MG/DL (ref 70–99)
POTASSIUM SERPL-SCNC: 3.1 MEQ/L (ref 3.4–4.9)
POTASSIUM SERPL-SCNC: 3.7 MEQ/L (ref 3.4–4.9)
SODIUM BLD-SCNC: 139 MEQ/L (ref 135–144)
SODIUM BLD-SCNC: 140 MEQ/L (ref 135–144)

## 2022-10-04 PROCEDURE — 99232 SBSQ HOSP IP/OBS MODERATE 35: CPT | Performed by: NURSE PRACTITIONER

## 2022-10-04 PROCEDURE — 6360000002 HC RX W HCPCS: Performed by: INTERNAL MEDICINE

## 2022-10-04 PROCEDURE — 80048 BASIC METABOLIC PNL TOTAL CA: CPT

## 2022-10-04 PROCEDURE — 2060000000 HC ICU INTERMEDIATE R&B

## 2022-10-04 PROCEDURE — 36415 COLL VENOUS BLD VENIPUNCTURE: CPT

## 2022-10-04 PROCEDURE — 99233 SBSQ HOSP IP/OBS HIGH 50: CPT | Performed by: INTERNAL MEDICINE

## 2022-10-04 PROCEDURE — 6370000000 HC RX 637 (ALT 250 FOR IP): Performed by: INTERNAL MEDICINE

## 2022-10-04 PROCEDURE — 2580000003 HC RX 258: Performed by: INTERNAL MEDICINE

## 2022-10-04 RX ORDER — HYDRALAZINE HYDROCHLORIDE 50 MG/1
50 TABLET, FILM COATED ORAL EVERY 12 HOURS SCHEDULED
Status: DISCONTINUED | OUTPATIENT
Start: 2022-10-04 | End: 2022-10-05

## 2022-10-04 RX ORDER — VALSARTAN 80 MG/1
80 TABLET ORAL 2 TIMES DAILY
Status: DISCONTINUED | OUTPATIENT
Start: 2022-10-04 | End: 2022-10-06

## 2022-10-04 RX ADMIN — POTASSIUM CHLORIDE 20 MEQ: 1500 TABLET, EXTENDED RELEASE ORAL at 09:15

## 2022-10-04 RX ADMIN — HYDRALAZINE HYDROCHLORIDE 50 MG: 50 TABLET, FILM COATED ORAL at 09:14

## 2022-10-04 RX ADMIN — HEPARIN SODIUM 5000 UNITS: 5000 INJECTION INTRAVENOUS; SUBCUTANEOUS at 14:23

## 2022-10-04 RX ADMIN — DOXAZOSIN 2 MG: 1 TABLET ORAL at 09:14

## 2022-10-04 RX ADMIN — Medication 10 ML: at 09:17

## 2022-10-04 RX ADMIN — Medication 400 MG: at 09:15

## 2022-10-04 RX ADMIN — Medication 10 ML: at 20:46

## 2022-10-04 RX ADMIN — ATORVASTATIN CALCIUM 40 MG: 40 TABLET, FILM COATED ORAL at 20:45

## 2022-10-04 RX ADMIN — ISOSORBIDE MONONITRATE 30 MG: 30 TABLET, EXTENDED RELEASE ORAL at 09:14

## 2022-10-04 RX ADMIN — HEPARIN SODIUM 5000 UNITS: 5000 INJECTION INTRAVENOUS; SUBCUTANEOUS at 06:23

## 2022-10-04 RX ADMIN — PANTOPRAZOLE SODIUM 40 MG: 40 TABLET, DELAYED RELEASE ORAL at 09:14

## 2022-10-04 RX ADMIN — METOPROLOL TARTRATE 100 MG: 50 TABLET, FILM COATED ORAL at 20:45

## 2022-10-04 RX ADMIN — METOPROLOL TARTRATE 100 MG: 50 TABLET, FILM COATED ORAL at 09:14

## 2022-10-04 RX ADMIN — VALSARTAN 80 MG: 80 TABLET, FILM COATED ORAL at 09:14

## 2022-10-04 RX ADMIN — EPOETIN ALFA-EPBX 1800 UNITS: 2000 INJECTION, SOLUTION INTRAVENOUS; SUBCUTANEOUS at 16:33

## 2022-10-04 RX ADMIN — POTASSIUM CHLORIDE 20 MEQ: 1500 TABLET, EXTENDED RELEASE ORAL at 12:05

## 2022-10-04 RX ADMIN — HYDRALAZINE HYDROCHLORIDE 10 MG: 20 INJECTION INTRAMUSCULAR; INTRAVENOUS at 07:20

## 2022-10-04 RX ADMIN — POTASSIUM CHLORIDE 20 MEQ: 1500 TABLET, EXTENDED RELEASE ORAL at 16:33

## 2022-10-04 RX ADMIN — VALSARTAN 80 MG: 80 TABLET, FILM COATED ORAL at 20:45

## 2022-10-04 RX ADMIN — HEPARIN SODIUM 5000 UNITS: 5000 INJECTION INTRAVENOUS; SUBCUTANEOUS at 20:47

## 2022-10-04 RX ADMIN — LEVOTHYROXINE SODIUM 25 MCG: 0.03 TABLET ORAL at 06:23

## 2022-10-04 RX ADMIN — HYDRALAZINE HYDROCHLORIDE 50 MG: 50 TABLET, FILM COATED ORAL at 20:45

## 2022-10-04 NOTE — PROGRESS NOTES
Chief Complaint   Patient presents with    Shortness of Breath       X 1 week with bilateral leg swelling, had chest pain yesterday, denies chest pain today          Patient is a 64 y.o. female who presents with a chief complaint of short of breath. Patient is followed on a regular basis by Dr. Krysten Barry MD. patient with past medical history of chronic diastolic congestive heart failure, hypertension, hyperlipidemia, chronic kidney disease who presents with worsening shortness of breath and increased lower extremity edema. Patient generally follows with [facility] she was requesting transfer but no beds available. She denies history of cardiac catheterization or any recent stress test.  Creatinine of 2.39 with a GFR of 19.6 on admission. BNP is elevated at 16,000 with mildly elevated troponin and a flat pattern. Hemoglobin is 8.1. He was noted to have extremely elevated blood pressure on arrival initiated on nitro drip. Currently she is in the 219Z systolic. Echocardiogram at [facility] in 2017 ejection fraction of 60 to 65% no significant valve abnormalities. Negative nuclear stress test in 2017 at [facility].      10/2/2022: Patient is feeling somewhat better. Renal function slightly worsening. Sinus tach on telemetry heart rate of 106 bpm.  Continues to have significant bilateral extremity edema. Patient with significant EMEA hemoglobin is 7.3 from 12 2 months ago. 10-3-22: discussed with son on phone. No CP or SOB today. On lasix gtt. Hd stable. BP elevated. + LE edema. 10/4/2022: Sounds present at the bedside. All questions answered. Remains on Lasix drip in the lower extremity edema has significantly improved. She denies any chest pain or shortness of breath. Hemodynamically stable. Creatinine remains elevated with a GFR of 22. Hemoglobin is 7.5.   GI input appreciated        Past Medical History        Past Medical History:   Diagnosis Date    CHF (congestive heart failure) ([facility] [address]      Hypertension Patient Active Problem List   Diagnosis    CHF (congestive heart failure), NYHA class I, acute on chronic, combined (HCC)    Wheezing    Acute congestive heart failure (HCC)    Microhematuria    Diarrhea of presumed infectious origin    Hypertensive emergency         Past Surgical History         Past Surgical History:   Procedure Laterality Date    CATARACT REMOVAL Bilateral      HERNIA REPAIR        HYSTERECTOMY, TOTAL ABDOMINAL (CERVIX REMOVED)                Social History   Social History            Socioeconomic History    Marital status:        Spouse name: None    Number of children: None    Years of education: None    Highest education level: None   Tobacco Use    Smoking status: Never    Smokeless tobacco: Never   Vaping Use    Vaping Use: Never used   Substance and Sexual Activity    Alcohol use: Not Currently    Drug use: Never    Sexual activity: Never            Family History   History reviewed. No pertinent family history.         Current Facility-Administered Medications             Current Facility-Administered Medications   Medication Dose Route Frequency Provider Last Rate Last Admin    sodium chloride flush 0.9 % injection 5-40 mL  5-40 mL IntraVENous 2 times per day Guille Finney, DO   10 mL at 10/01/22 0858    sodium chloride flush 0.9 % injection 5-40 mL  5-40 mL IntraVENous PRN Guille Finney, DO        0.9 % sodium chloride infusion   IntraVENous PRN Guille Finney, DO        ondansetron (ZOFRAN-ODT) disintegrating tablet 4 mg  4 mg Oral Q8H PRN Guille BREWER Holiday, DO         Or    ondansetron (ZOFRAN) injection 4 mg  4 mg IntraVENous Q6H PRN Guille Arciniegaiday, DO        acetaminophen (TYLENOL) tablet 650 mg  650 mg Oral Q4H PRN Guille BREWER Holiday, DO        heparin (porcine) injection 5,000 Units  5,000 Units SubCUTAneous 3 times per day Guille Finney, DO   5,000 Units at 10/01/22 0720    nitroGLYCERIN 50 mg in dextrose 5% 250 mL infusion  120 mcg/min IntraVENous Continuous Hemant HURLEY MD Tanvi   Stopped at 10/01/22 3938             Current Outpatient Medications   Medication Sig Dispense Refill    doxazosin (CARDURA) 2 MG tablet Take 2 mg by mouth daily        Magnesium 400 MG TABS TAKE 1 TABLET BY MOUTH ONCE DAILY        levothyroxine (SYNTHROID) 25 MCG tablet 1/2 po daily 30 tablet 5    amLODIPine (NORVASC) 10 MG tablet Take by mouth        atorvastatin (LIPITOR) 20 MG tablet Take by mouth        losartan (COZAAR) 50 MG tablet          pantoprazole (PROTONIX) 40 MG tablet Take 40 mg by mouth in the morning. furosemide (LASIX) 20 MG tablet Take 1 tablet by mouth in the morning. 30 tablet 1    metoprolol (LOPRESSOR) 100 MG tablet Take 100 mg by mouth in the morning and 100 mg before bedtime. ALLERGIES: Norco [hydrocodone-acetaminophen]     Review of Systems   Constitutional: Negative. Negative for chills and fever. HENT: Negative. Eyes: Negative. Respiratory:  Positive for shortness of breath. Negative for wheezing. Cardiovascular:  Positive for chest pain and leg swelling. Negative for palpitations. Gastrointestinal: Negative. Negative for abdominal pain, nausea and vomiting. Endocrine: Negative. Genitourinary: Negative. Musculoskeletal: Negative. Skin: Negative. Negative for rash. Allergic/Immunologic: Negative. Neurological: Negative. Negative for dizziness, weakness and headaches. Hematological: Negative. Psychiatric/Behavioral: Negative. VITALS:  Blood pressure (!) 162/72, pulse (!) 108, temperature 98.1 °F (36.7 °C), temperature source Oral, resp. rate 20, height 5' 2\" (1.575 m), weight 132 lb (59.9 kg), SpO2 97 %. Body mass index is 24.14 kg/m². Physical Exam  Vitals and nursing note reviewed. Constitutional:       Appearance: She is well-developed. She is not diaphoretic. HENT:      Head: Normocephalic and atraumatic. Eyes:      Pupils: Pupils are equal, round, and reactive to light.    Neck: Thyroid: No thyromegaly. Vascular: No JVD. Trachea: No tracheal deviation. Cardiovascular:      Rate and Rhythm: Normal rate and regular rhythm. Chest Wall: PMI is not displaced. Pulses: Intact distal pulses. Heart sounds: Normal heart sounds. Heart sounds not distant. No murmur heard. No friction rub. No gallop. No S3 sounds. Pulmonary:      Effort: No respiratory distress. Breath sounds: No wheezing or rales. Chest:      Chest wall: No tenderness. Abdominal:      General: Bowel sounds are normal. There is no distension. Palpations: Abdomen is soft. There is no mass. Tenderness: There is no abdominal tenderness. There is no guarding or rebound. Musculoskeletal:         General: Normal range of motion. Cervical back: Normal range of motion and neck supple. Right lower leg: Edema present. Left lower leg: Edema present. Skin:     General: Skin is warm and dry. Coloration: Skin is not pale. Findings: No erythema or rash. Neurological:      Mental Status: She is alert and oriented to person, place, and time. Cranial Nerves: No cranial nerve deficit. Psychiatric:         Behavior: Behavior normal.         Thought Content:  Thought content normal.         Judgment: Judgment normal.         LABS:  Recent Results         Recent Results (from the past 24 hour(s))   Comprehensive Metabolic Panel     Collection Time: 09/30/22  1:00 PM   Result Value Ref Range     Sodium 141 135 - 144 mEq/L     Potassium 3.1 (L) 3.4 - 4.9 mEq/L     Chloride 105 95 - 107 mEq/L     CO2 23 20 - 31 mEq/L     Anion Gap 13 9 - 15 mEq/L     Glucose 112 (H) 70 - 99 mg/dL     BUN 35 (H) 8 - 23 mg/dL     Creatinine 2.39 (H) 0.50 - 0.90 mg/dL     GFR Non- 19.6 (L) >60     GFR  23.7 (L) >60     Calcium 8.2 (L) 8.5 - 9.9 mg/dL     Total Protein 5.2 (L) 6.3 - 8.0 g/dL     Albumin 3.0 (L) 3.5 - 4.6 g/dL     Total Bilirubin 0.3 0.2 - 0.7 mg/dL     Alkaline Phosphatase 126 40 - 130 U/L     ALT 15 0 - 33 U/L     AST 23 0 - 35 U/L     Globulin 2.2 (L) 2.3 - 3.5 g/dL   CBC with Auto Differential     Collection Time: 09/30/22  1:00 PM   Result Value Ref Range     WBC 6.1 4.8 - 10.8 K/uL     RBC 3.10 (L) 4.20 - 5.40 M/uL     Hemoglobin 8.8 (L) 12.0 - 16.0 g/dL     Hematocrit 25.1 (L) 37.0 - 47.0 %     MCV 80.7 (L) 82.0 - 100.0 fL     MCH 28.4 27.0 - 31.3 pg     MCHC 35.2 33.0 - 37.0 %     RDW 13.5 11.5 - 14.5 %     Platelets 582 341 - 936 K/uL     Neutrophils % 66.3 %     Lymphocytes % 16.2 %     Monocytes % 6.5 %     Eosinophils % 9.8 %     Basophils % 1.2 %     Neutrophils Absolute 4.0 1.4 - 6.5 K/uL     Lymphocytes Absolute 1.0 1.0 - 4.8 K/uL     Monocytes Absolute 0.4 0.2 - 0.8 K/uL     Eosinophils Absolute 0.6 0.0 - 0.7 K/uL     Basophils Absolute 0.1 0.0 - 0.2 K/uL   Magnesium     Collection Time: 09/30/22  1:00 PM   Result Value Ref Range     Magnesium 2.2 1.7 - 2.4 mg/dL   Troponin     Collection Time: 09/30/22  1:00 PM   Result Value Ref Range     Troponin <0.010 0.000 - 0.010 ng/mL   Urinalysis with Reflex to Culture     Collection Time: 09/30/22  1:00 PM     Specimen: Urine   Result Value Ref Range     Color, UA Yellow Straw/Yellow     Clarity, UA Clear Clear     Glucose, Ur Negative Negative mg/dL     Bilirubin Urine Negative Negative     Ketones, Urine Negative Negative mg/dL     Specific Gravity, UA 1.010 1.005 - 1.030     Blood, Urine LARGE (A) Negative     pH, UA 7.0 5.0 - 9.0     Protein, UA >=300 (A) Negative mg/dL     Urobilinogen, Urine 0.2 <2.0 E.U./dL     Nitrite, Urine Negative Negative     Leukocyte Esterase, Urine Negative Negative     Urine Reflex to Culture Not Indicated     TSH     Collection Time: 09/30/22  1:00 PM   Result Value Ref Range     TSH 4.800 (H) 0.440 - 3.860 uIU/mL   Brain Natriuretic Peptide     Collection Time: 09/30/22  1:00 PM   Result Value Ref Range     Pro-BNP 14,458 pg/mL   Procalcitonin     Collection Time: 09/30/22  1:00 PM   Result Value Ref Range     Procalcitonin 0.21 (H) 0.00 - 0.15 ng/mL   Microscopic Urinalysis     Collection Time: 09/30/22  1:00 PM   Result Value Ref Range     Renal Epithelial, UA 0-2 (A) /HPF     Bacteria, UA Negative Negative /HPF     Hyaline Casts, UA 5-10 0 - 5 /HPF     WBC, UA 6-9 (A) 0 - 5 /HPF     RBC, UA >100 (H) 0 - 5 /HPF     Epithelial Cells, UA 3-5 0 - 5 /HPF   EKG 12 Lead - Chest Pain     Collection Time: 09/30/22  1:37 PM   Result Value Ref Range     Ventricular Rate 88 BPM     Atrial Rate 88 BPM     P-R Interval 172 ms     QRS Duration 74 ms     Q-T Interval 386 ms     QTc Calculation (Bazett) 467 ms     P Axis 50 degrees     R Axis 4 degrees     T Axis 31 degrees   COVID-19, Rapid     Collection Time: 09/30/22  1:51 PM     Specimen: Nasopharyngeal Swab   Result Value Ref Range     SARS-CoV-2, NAAT Not Detected Not Detected   Troponin     Collection Time: 10/01/22  5:06 AM   Result Value Ref Range     Troponin 0.019 (HH) 0.000 - 0.010 ng/mL   Troponin     Collection Time: 10/01/22  7:59 AM   Result Value Ref Range     Troponin 0.012 (HH) 0.000 - 0.010 ng/mL   CBC with Auto Differential     Collection Time: 10/01/22  7:59 AM   Result Value Ref Range     WBC 7.9 4.8 - 10.8 K/uL     RBC 2.91 (L) 4.20 - 5.40 M/uL     Hemoglobin 8.1 (L) 12.0 - 16.0 g/dL     Hematocrit 23.8 (L) 37.0 - 47.0 %     MCV 81.6 (L) 82.0 - 100.0 fL     MCH 27.7 27.0 - 31.3 pg     MCHC 34.0 33.0 - 37.0 %     RDW 13.7 11.5 - 14.5 %     Platelets 562 492 - 112 K/uL     Neutrophils % 77.5 %     Lymphocytes % 11.4 %     Monocytes % 6.3 %     Eosinophils % 3.7 %     Basophils % 1.1 %     Neutrophils Absolute 6.1 1.4 - 6.5 K/uL     Lymphocytes Absolute 0.9 (L) 1.0 - 4.8 K/uL     Monocytes Absolute 0.5 0.2 - 0.8 K/uL     Eosinophils Absolute 0.3 0.0 - 0.7 K/uL     Basophils Absolute 0.1 0.0 - 0.2 K/uL   Comprehensive Metabolic Panel     Collection Time: 10/01/22  7:59 AM   Result Value Ref Range     Sodium 140 135 - 144 mEq/L     Potassium 2.7 (LL) 3.4 - 4.9 mEq/L     Chloride 103 95 - 107 mEq/L     CO2 24 20 - 31 mEq/L     Anion Gap 13 9 - 15 mEq/L     Glucose 153 (H) 70 - 99 mg/dL     BUN 34 (H) 8 - 23 mg/dL     Creatinine 2.25 (H) 0.50 - 0.90 mg/dL     GFR Non- 21.0 (L) >60     GFR  25.4 (L) >60     Calcium 8.3 (L) 8.5 - 9.9 mg/dL     Total Protein 4.7 (L) 6.3 - 8.0 g/dL     Albumin 2.7 (L) 3.5 - 4.6 g/dL     Total Bilirubin 0.4 0.2 - 0.7 mg/dL     Alkaline Phosphatase 119 40 - 130 U/L     ALT 14 0 - 33 U/L     AST 18 0 - 35 U/L     Globulin 2.0 (L) 2.3 - 3.5 g/dL   Brain Natriuretic Peptide     Collection Time: 10/01/22  7:59 AM   Result Value Ref Range     Pro-BNP 16,372 pg/mL         Troponin:         Lab Results   Component Value Date/Time     TROPONINI 0.012 10/01/2022 07:59 AM         EKG: normal sinus rhythm, nonspecific ST and T waves changes poor progression across the precordial leads        ASSESSMENT:           Active Hospital Problems     Diagnosis Date Noted    Hypertensive emergency [I16.1] 10/01/2022       Priority: Medium      Acute on chronic decompensated diastolic congestive heart failure  Hypertensive urgency  Elevated cardiac enzyme. Questionable demand ischemia. Rule out underlying coronary ischemia  Chronic kidney disease  Hyperlipidemia  Anemia     PLAN:   As always, aggressive risk factor modification is strongly recommended. We should adhere to the JNC VIII guidelines for HTN management and the NCEPATP III guidelines for LDL-C management. IV diuresis as tolerated. Monitor I's and O's and renal function. Initiated on Lasix drip at 5 mg an hour as well as HydroDIURIL IV. Nephrology recommendations  GI recommendation  Coronary valuation in the form of cardiac catheterization when clinically stable/renal function allows. Patient with multiple risk factors for CAD and acute decompensated heart failure.   Also has abnormal EKG and mildly elevated cardiac enzymes high risk for CAD  NO Norvasc secondary to lower extremity edema as well as acute decompensated heart failure  Avoid nephrotoxic agents  Monitor on telemetry  GI/DVT prophylaxis  CHF teaching  Follow-up in CHF clinic post discharge  Maintain hemoglobin greater than 7. Start Retacrit 3 times weekly.         Electronically signed by Gildardo Troy DO on 10/4/2022 at 3:30 PM

## 2022-10-04 NOTE — TELEPHONE ENCOUNTER
Reggie Come from [facility] calling to find out if Dr Dallas Castrejon still wants pt to be sent to [facility]? States that [facility] is short on beds and wanted to make sure before they send pt out to [facility].     Reggie Come # [phone]

## 2022-10-04 NOTE — PROGRESS NOTES
Nephrology Progress Note    Assessment:  CKD 3a CHAYO  Hypertension uncontrolled  Anemia essentially negative w/u [facility]  1 month ago  possibly related somewhat CKD  UA 3+ protein with hematuria      Plan:  increase BP medications   follow I&O  less edema   needs renal parameters orders    Patient Active Problem List:     CHF (congestive heart failure), NYHA class I, acute on chronic, combined (HCC)     Wheezing     Acute congestive heart failure (HCC)     Microhematuria     Diarrhea of presumed infectious origin     Hypertensive emergency     Anemia      Subjective:  Admit Date: 9/30/2022    Interval History: no issues but swelling    Medications:  Scheduled Meds:   hydrALAZINE  50 mg Oral 2 times per day    valsartan  80 mg Oral BID    isosorbide mononitrate  30 mg Oral Daily    potassium chloride  20 mEq Oral TID WC    sodium chloride flush  5-40 mL IntraVENous 2 times per day    heparin (porcine)  5,000 Units SubCUTAneous 3 times per day    atorvastatin  40 mg Oral Nightly    doxazosin  2 mg Oral Daily    levothyroxine  25 mcg Oral Daily    metoprolol  100 mg Oral BID    pantoprazole  40 mg Oral Daily    magnesium oxide  400 mg Oral Daily     Continuous Infusions:   furosemide (LASIX) infusion 5 mg/hr (10/02/22 1110)    sodium chloride         CBC:   Recent Labs     10/01/22  1453 10/03/22  0933   WBC 7.2  --    HGB 7.3* 7.5*     --      CMP:    Recent Labs     10/02/22  0520 10/03/22  0555 10/04/22  0450    141 140   K 3.0* 3.0* 3.7    102 100   CO2 26 27 27   BUN 34* 34* 37*   CREATININE 2.53* 2.38* 2.48*   GLUCOSE 111* 102* 118*   CALCIUM 8.3* 8.3* 8.5   LABGLOM 18.3* 19.7* 18.8*     Troponin:   Recent Labs     10/01/22  2249   TROPONINI 0.022*     BNP: No results for input(s): BNP in the last 72 hours. INR: No results for input(s): INR in the last 72 hours. Lipids: No results for input(s): CHOL, LDLDIRECT, TRIG, HDL, AMYLASE, LIPASE in the last 72 hours.   Liver: No results for input(s): AST, ALT, ALKPHOS, PROT, LABALBU, BILITOT in the last 72 hours. Invalid input(s): BILDIR  Iron:  No results for input(s): IRONS, FERRITIN in the last 72 hours. Invalid input(s): LABIRONS  Urinalysis: No results for input(s): UA in the last 72 hours.     Objective:  Vitals: BP (!) 192/76   Pulse 94   Temp 98 °F (36.7 °C) (Oral)   Resp 18   Ht 5' 2\" (1.575 m)   Wt 132 lb (59.9 kg)   SpO2 100%   BMI 24.14 kg/m²    Wt Readings from Last 3 Encounters:   09/30/22 132 lb (59.9 kg)   09/01/22 130 lb (59 kg)   08/10/22 134 lb (60.8 kg)      24HR INTAKE/OUTPUT:    Intake/Output Summary (Last 24 hours) at 10/4/2022 0204  Last data filed at 10/4/2022 2098  Gross per 24 hour   Intake 360 ml   Output 2300 ml   Net -1940 ml       General: alert, in no apparent distress  HEENT: normocephalic, atraumatic, anicteric  Neck: supple, no mass  Lungs: non-labored respirations, clear to auscultation bilaterally  Heart: regular rate and rhythm, no murmurs or rubs  Abdomen: soft, non-tender, non-distended  Ext: no cyanosis, 2+ peripheral edema  Neuro: alert and oriented, no gross abnormalities  Psych: normal mood and affect  Skin: no rash      Electronically signed by Naya Mcallister DO, MD

## 2022-10-04 NOTE — PROGRESS NOTES
Gastroenterology Progress Note    Emmett Hector is a 64 y.o. female patient. Hospitalization Day:3    Chief C/O: anemia    SUBJECTIVE: Seen and examined, no overt bleeding, hemoglobin 7.5, tolerating diet. ROS:  Gastrointestinal ROS: no abdominal pain, change in bowel habits, or black or bloody stools    Physical    VITALS:  BP (!) 179/67   Pulse (!) 101   Temp 97.7 °F (36.5 °C)   Resp 18   Ht 5' 2\" (1.575 m)   Wt 132 lb (59.9 kg)   SpO2 100%   BMI 24.14 kg/m²   TEMPERATURE:  Current - Temp: 97.7 °F (36.5 °C); Max - Temp  Av.9 °F (36.6 °C)  Min: 97.7 °F (36.5 °C)  Max: 98 °F (36.7 °C)    General:  Alert and oriented,  No apparent distress  Skin- without jaundice  Eyes: anicteric sclera  Cardiac: RRR, Nl s1s2, without murmurs  Lungs CTA Bilaterally, normal effort  Abdomen soft, ND, NT, no HSM, Bowel sounds normal  Ext: without edema  Neuro: no asterixis     Data    Data Review:    Recent Labs     10/01/22  1453 10/02/22  1012 10/03/22  0933   WBC 7.2  --   --    HGB 7.3*  --  7.5*   HCT 22.6* 24.0* 22.2*   MCV 83.3  --   --      --   --      Recent Labs     10/02/22  0520 10/03/22  0555 10/04/22  0450    141 140   K 3.0* 3.0* 3.7    102 100   CO2 26 27 27   BUN 34* 34* 37*   CREATININE 2.53* 2.38* 2.48*     No results for input(s): AST, ALT, ALB, BILIDIR, BILITOT, ALKPHOS in the last 72 hours. No results for input(s): LIPASE, AMYLASE in the last 72 hours. No results for input(s): PROTIME, INR in the last 72 hours. Endoscopic hx:  EGD 22 Dr Markos Garcia  Antral erosions, otherwise normal  Colonoscopy 22 Dr Markos Garcia  Sigmoid diverticulosis, otherwise normal  Bx:  Gastric and duodenal bx negative for HP or celiac  Random colon biopsies negative    ASSESSMENT:  63 y/o female admitted with CHF, noted to have microcytic anemia on arrival with hemoglobin of 8.8, baseline from 1 month prior was 11.3. At baseline patient is not on oral anticoagulation or antiplatelet therapy. No reported history of GI bleeding or chronic liver disease. Noted recent EGD and colonoscopy less than 1 month ago at [facility] by Dr. Cardoza Kins noted sigmoid diverticulosis, and antral erosions, biopsies were negative for HP, celiac and random colon biopsies were negative. Patient denies overt bleeding. 10/4/22 hemoglobin 7.5, no overt bleeding, tolerating diet, no abdominal pain or changes to bowel habits. PLAN :  -continue course of care  -serial HH trend and transfuse accordingly to cardiology recommendations  -IV PPI  -Given the absence of overt bleeding and recent <1 month ago normal EGD and colonoscopy, there is no immediate clinical indication for endoscopic investigation, this is ongoing pending clinical course.   -Could consider Push enteroscopy, however in the absence of overt blood loss an outpatient small bowel pill capsule endoscopy would yield better clinical information  -further recommendations pending clinical course   Thank you for allowing me to participate in the care of your patient. Please feel free to contact me with any concerns.     EITAN Lara - CNP

## 2022-10-04 NOTE — FLOWSHEET NOTE
4909- Shift assessment completed at this time pt alert and oriented x4, no adventitious heart sounds, denies numbness or tingling, denies SOB, pt states she is comfortable and has no pain -KG     1430- Spoke to Dr. Mariama Kumari pt plans to stay here until discharged -KG     1440- Pt awake with son at bedside, denies any needs -KG    Electronically signed by Lalo Zepeda RN on 10/4/2022

## 2022-10-04 NOTE — PROGRESS NOTES
Shift assessment completed. Patient is ANOx4 denies any complaints at this time. Up independently in the room. 0621:patient complaining of headache. Blood pressure 192/75, hr 94. Notified Dr. Abdoul Thacker. Pt has prn hydralazine on.

## 2022-10-05 LAB
ANION GAP SERPL CALCULATED.3IONS-SCNC: 12 MEQ/L (ref 9–15)
BUN BLDV-MCNC: 32 MG/DL (ref 8–23)
CALCIUM SERPL-MCNC: 8.3 MG/DL (ref 8.5–9.9)
CHLORIDE BLD-SCNC: 102 MEQ/L (ref 95–107)
CO2: 26 MEQ/L (ref 20–31)
CREAT SERPL-MCNC: 2.14 MG/DL (ref 0.5–0.9)
GFR AFRICAN AMERICAN: 26.9
GFR NON-AFRICAN AMERICAN: 22.2
GLUCOSE BLD-MCNC: 166 MG/DL (ref 70–99)
HCT VFR BLD CALC: 22.8 % (ref 37–47)
HEMOGLOBIN: 7.9 G/DL (ref 12–16)
LACTATE DEHYDROGENASE: 289 U/L (ref 135–214)
MCH RBC QN AUTO: 28.7 PG (ref 27–31.3)
MCHC RBC AUTO-ENTMCNC: 34.6 % (ref 33–37)
MCV RBC AUTO: 82.8 FL (ref 82–100)
PDW BLD-RTO: 13.6 % (ref 11.5–14.5)
PLATELET # BLD: 220 K/UL (ref 130–400)
POTASSIUM SERPL-SCNC: 3.5 MEQ/L (ref 3.4–4.9)
RBC # BLD: 2.76 M/UL (ref 4.2–5.4)
SODIUM BLD-SCNC: 140 MEQ/L (ref 135–144)
WBC # BLD: 6.3 K/UL (ref 4.8–10.8)

## 2022-10-05 PROCEDURE — 6370000000 HC RX 637 (ALT 250 FOR IP): Performed by: INTERNAL MEDICINE

## 2022-10-05 PROCEDURE — 2060000000 HC ICU INTERMEDIATE R&B

## 2022-10-05 PROCEDURE — 6360000002 HC RX W HCPCS: Performed by: INTERNAL MEDICINE

## 2022-10-05 PROCEDURE — 81001 URINALYSIS AUTO W/SCOPE: CPT

## 2022-10-05 PROCEDURE — 82570 ASSAY OF URINE CREATININE: CPT

## 2022-10-05 PROCEDURE — 86256 FLUORESCENT ANTIBODY TITER: CPT

## 2022-10-05 PROCEDURE — 2580000003 HC RX 258: Performed by: INTERNAL MEDICINE

## 2022-10-05 PROCEDURE — 80048 BASIC METABOLIC PNL TOTAL CA: CPT

## 2022-10-05 PROCEDURE — 85027 COMPLETE CBC AUTOMATED: CPT

## 2022-10-05 PROCEDURE — 36415 COLL VENOUS BLD VENIPUNCTURE: CPT

## 2022-10-05 PROCEDURE — 83615 LACTATE (LD) (LDH) ENZYME: CPT

## 2022-10-05 PROCEDURE — 82043 UR ALBUMIN QUANTITATIVE: CPT

## 2022-10-05 PROCEDURE — 83516 IMMUNOASSAY NONANTIBODY: CPT

## 2022-10-05 PROCEDURE — 99231 SBSQ HOSP IP/OBS SF/LOW 25: CPT | Performed by: NURSE PRACTITIONER

## 2022-10-05 PROCEDURE — 99233 SBSQ HOSP IP/OBS HIGH 50: CPT | Performed by: INTERNAL MEDICINE

## 2022-10-05 PROCEDURE — 86160 COMPLEMENT ANTIGEN: CPT

## 2022-10-05 RX ORDER — LIDOCAINE HYDROCHLORIDE 20 MG/ML
10 INJECTION, SOLUTION INFILTRATION; PERINEURAL
Status: DISCONTINUED | OUTPATIENT
Start: 2022-10-06 | End: 2022-10-15 | Stop reason: HOSPADM

## 2022-10-05 RX ORDER — FENTANYL CITRATE 50 UG/ML
50 INJECTION, SOLUTION INTRAMUSCULAR; INTRAVENOUS
Status: DISCONTINUED | OUTPATIENT
Start: 2022-10-06 | End: 2022-10-15 | Stop reason: HOSPADM

## 2022-10-05 RX ORDER — HYDRALAZINE HYDROCHLORIDE 100 MG/1
100 TABLET, FILM COATED ORAL EVERY 8 HOURS SCHEDULED
Status: DISCONTINUED | OUTPATIENT
Start: 2022-10-05 | End: 2022-10-15 | Stop reason: HOSPADM

## 2022-10-05 RX ORDER — MIDAZOLAM HYDROCHLORIDE 1 MG/ML
0.5 INJECTION INTRAMUSCULAR; INTRAVENOUS
Status: DISCONTINUED | OUTPATIENT
Start: 2022-10-06 | End: 2022-10-15 | Stop reason: HOSPADM

## 2022-10-05 RX ORDER — POTASSIUM CHLORIDE 20 MEQ/1
20 TABLET, EXTENDED RELEASE ORAL
Status: DISCONTINUED | OUTPATIENT
Start: 2022-10-06 | End: 2022-10-15 | Stop reason: HOSPADM

## 2022-10-05 RX ORDER — POTASSIUM CHLORIDE 20 MEQ/1
20 TABLET, EXTENDED RELEASE ORAL
Status: DISCONTINUED | OUTPATIENT
Start: 2022-10-06 | End: 2022-10-05

## 2022-10-05 RX ORDER — POTASSIUM CHLORIDE 7.45 MG/ML
10 INJECTION INTRAVENOUS
Status: COMPLETED | OUTPATIENT
Start: 2022-10-05 | End: 2022-10-05

## 2022-10-05 RX ORDER — 0.9 % SODIUM CHLORIDE 0.9 %
250 INTRAVENOUS SOLUTION INTRAVENOUS
Status: DISCONTINUED | OUTPATIENT
Start: 2022-10-06 | End: 2022-10-15 | Stop reason: HOSPADM

## 2022-10-05 RX ORDER — FUROSEMIDE 10 MG/ML
80 INJECTION INTRAMUSCULAR; INTRAVENOUS DAILY
Status: DISCONTINUED | OUTPATIENT
Start: 2022-10-05 | End: 2022-10-08

## 2022-10-05 RX ADMIN — HYDRALAZINE HYDROCHLORIDE 100 MG: 100 TABLET, FILM COATED ORAL at 20:51

## 2022-10-05 RX ADMIN — HYDRALAZINE HYDROCHLORIDE 50 MG: 50 TABLET, FILM COATED ORAL at 08:12

## 2022-10-05 RX ADMIN — HEPARIN SODIUM 5000 UNITS: 5000 INJECTION INTRAVENOUS; SUBCUTANEOUS at 05:32

## 2022-10-05 RX ADMIN — Medication 10 ML: at 21:03

## 2022-10-05 RX ADMIN — POTASSIUM CHLORIDE 10 MEQ: 7.45 INJECTION INTRAVENOUS at 13:16

## 2022-10-05 RX ADMIN — FUROSEMIDE 80 MG: 10 INJECTION, SOLUTION INTRAMUSCULAR; INTRAVENOUS at 11:09

## 2022-10-05 RX ADMIN — POTASSIUM CHLORIDE 10 MEQ: 7.45 INJECTION INTRAVENOUS at 11:23

## 2022-10-05 RX ADMIN — METOPROLOL TARTRATE 100 MG: 50 TABLET, FILM COATED ORAL at 08:12

## 2022-10-05 RX ADMIN — METOPROLOL TARTRATE 100 MG: 50 TABLET, FILM COATED ORAL at 20:51

## 2022-10-05 RX ADMIN — METHYLPREDNISOLONE SODIUM SUCCINATE 500 MG: 500 INJECTION INTRAMUSCULAR; INTRAVENOUS at 12:35

## 2022-10-05 RX ADMIN — Medication 10 ML: at 08:12

## 2022-10-05 RX ADMIN — POTASSIUM CHLORIDE 10 MEQ: 7.45 INJECTION INTRAVENOUS at 14:08

## 2022-10-05 RX ADMIN — ISOSORBIDE MONONITRATE 30 MG: 30 TABLET, EXTENDED RELEASE ORAL at 08:12

## 2022-10-05 RX ADMIN — DOXAZOSIN 2 MG: 1 TABLET ORAL at 08:12

## 2022-10-05 RX ADMIN — Medication 400 MG: at 08:12

## 2022-10-05 RX ADMIN — LEVOTHYROXINE SODIUM 25 MCG: 0.03 TABLET ORAL at 05:31

## 2022-10-05 RX ADMIN — VALSARTAN 80 MG: 80 TABLET, FILM COATED ORAL at 08:12

## 2022-10-05 RX ADMIN — ATORVASTATIN CALCIUM 40 MG: 40 TABLET, FILM COATED ORAL at 20:51

## 2022-10-05 RX ADMIN — HYDRALAZINE HYDROCHLORIDE 100 MG: 100 TABLET, FILM COATED ORAL at 14:17

## 2022-10-05 RX ADMIN — PANTOPRAZOLE SODIUM 40 MG: 40 TABLET, DELAYED RELEASE ORAL at 08:18

## 2022-10-05 RX ADMIN — POTASSIUM CHLORIDE 20 MEQ: 1500 TABLET, EXTENDED RELEASE ORAL at 08:11

## 2022-10-05 RX ADMIN — VALSARTAN 80 MG: 80 TABLET, FILM COATED ORAL at 20:51

## 2022-10-05 ASSESSMENT — PAIN SCALES - GENERAL
PAINLEVEL_OUTOF10: 0
PAINLEVEL_OUTOF10: 0

## 2022-10-05 NOTE — FLOWSHEET NOTE
2533- Assessment complete. Patient alert and oriented X 4. Medications given as ordered. Blood pressure elevated. Most the morning meds are for heart/blood pressure. Explained to the patient the BP will be rechecked after the medications start working. Call light in reach. Electronically signed by Brisa Kirby on 10/5/2022 at 8:26 AM  1210- Spoke with DR. Myers and clarified potassium orders and IV solu-medrol. Per the doctor patient to have the IV solu-medrol today and schedule a kidney biopsy for tomorrow with Dr. Adali Velásquez. Gael Magana RN with radiology on the other line and aware Dr. Amisha Grayson wants the biopsy tomorrow 10/6.  1530- Family at bedside visiting. Electronically signed by Brisa Kirby on 10/5/2022 at 4:01 PM  1800- Patient signed kidney biopsy consent and verbalized understanding. Patient aware of NPO after midnight and biopsy scheduled around noon tomorrow. Son at bedside and aware. Electronically signed by Brisa Kirby on 10/5/2022 at 6:03 PM

## 2022-10-05 NOTE — PROGRESS NOTES
Gastroenterology Progress Note    Srinivasa Milligan is a 64 y.o. female patient. Hospitalization Day:4    Chief C/O: anemia    SUBJECTIVE: Seen and examined, no acute events overnight, denies overt bleeding, reports hematuria. Tolerating diet. ROS:  Gastrointestinal ROS: no abdominal pain, change in bowel habits, or black or bloody stools    Physical    VITALS:  BP (!) 184/71   Pulse 95   Temp 98.4 °F (36.9 °C) (Oral)   Resp 18   Ht 5' 2\" (1.575 m)   Wt 132 lb (59.9 kg)   SpO2 96%   BMI 24.14 kg/m²   TEMPERATURE:  Current - Temp: 98.4 °F (36.9 °C); Max - Temp  Av °F (36.7 °C)  Min: 97.2 °F (36.2 °C)  Max: 98.4 °F (36.9 °C)    General:  Alert and oriented,  No apparent distress  Skin- without jaundice  Eyes: anicteric sclera  Cardiac: RRR, Nl s1s2, without murmurs  Lungs CTA Bilaterally, normal effort  Abdomen soft, ND, NT, no HSM, Bowel sounds normal  Ext: without edema  Neuro: no asterixis     Data    Data Review:    Recent Labs     10/03/22  0933 10/05/22  0902   WBC  --  6.3   HGB 7.5* 7.9*   HCT 22.2* 22.8*   MCV  --  82.8   PLT  --  220     Recent Labs     10/03/22  0555 10/04/22  0450 10/04/22  0849    140 139   K 3.0* 3.7 3.1*    100 100   CO2 27 27 27   BUN 34* 37* 35*   CREATININE 2.38* 2.48* 2.15*     No results for input(s): AST, ALT, ALB, BILIDIR, BILITOT, ALKPHOS in the last 72 hours. No results for input(s): LIPASE, AMYLASE in the last 72 hours. No results for input(s): PROTIME, INR in the last 72 hours. Endoscopic hx:  EGD 22 Dr Alessia Hutson  Antral erosions, otherwise normal  Colonoscopy 22 Dr Alessia Hutson  Sigmoid diverticulosis, otherwise normal  Bx:  Gastric and duodenal bx negative for HP or celiac  Random colon biopsies negative      ASSESSMENT:  65 y/o female admitted with CHF, noted to have microcytic anemia on arrival with hemoglobin of 8.8, baseline from 1 month prior was 11.3. At baseline patient is not on oral anticoagulation or antiplatelet therapy. No reported history of GI bleeding or chronic liver disease. Noted recent EGD and colonoscopy less than 1 month ago at [facility] by Dr. Javy Negrete noted sigmoid diverticulosis, and antral erosions, biopsies were negative for HP, celiac and random colon biopsies were negative. Patient denies overt bleeding. 10/4/22 hemoglobin 7.5, no overt bleeding, tolerating diet, no abdominal pain or changes to bowel habits. 10/5/22 hgb 7.9, no overt GIB, tolerating diet,  reports hematuria. PLAN :  -continue course of care  -serial HH trend and transfuse accordingly to cardiology recommendations  -IV PPI  -Given the absence of overt bleeding and recent <1 month ago normal EGD and colonoscopy, there is no immediate clinical indication for endoscopic investigation. -GI will S/O, please call if needed, pt should follow up with her primary GI as an OP for further consideration of small bowel pill capsule endoscopy. Thank you for allowing me to participate in the care of your patient. Please feel free to contact me with any concerns.     EITAN Aleman - CNP

## 2022-10-05 NOTE — PROGRESS NOTES
Chief Complaint   Patient presents with    Shortness of Breath       X 1 week with bilateral leg swelling, had chest pain yesterday, denies chest pain today          Patient is a 64 y.o. female who presents with a chief complaint of short of breath. Patient is followed on a regular basis by Dr. Zelda Gitelman, MD. patient with past medical history of chronic diastolic congestive heart failure, hypertension, hyperlipidemia, chronic kidney disease who presents with worsening shortness of breath and increased lower extremity edema. Patient generally follows with [facility] she was requesting transfer but no beds available. She denies history of cardiac catheterization or any recent stress test.  Creatinine of 2.39 with a GFR of 19.6 on admission. BNP is elevated at 16,000 with mildly elevated troponin and a flat pattern. Hemoglobin is 8.1. He was noted to have extremely elevated blood pressure on arrival initiated on nitro drip. Currently she is in the 695G systolic. Echocardiogram at [facility] in 2017 ejection fraction of 60 to 65% no significant valve abnormalities. Negative nuclear stress test in 2017 at [facility].      10/2/2022: Patient is feeling somewhat better. Renal function slightly worsening. Sinus tach on telemetry heart rate of 106 bpm.  Continues to have significant bilateral extremity edema. Patient with significant EMEA hemoglobin is 7.3 from 12 2 months ago. 10-3-22: discussed with son on phone. No CP or SOB today. On lasix gtt. Hd stable. BP elevated. + LE edema. 10/4/2022: Sounds present at the bedside. All questions answered. Remains on Lasix drip in the lower extremity edema has significantly improved. She denies any chest pain or shortness of breath. Hemodynamically stable. Creatinine remains elevated with a GFR of 22. Hemoglobin is 7.5. GI input appreciated    10-5-22: Case discussed with nephrology. Patient remains on Lasix drip. Previous records reviewed from [facility].   Patient with negative cardiac CTA in 2015. Labs today are pending. Past Medical History        Past Medical History:   Diagnosis Date    CHF (congestive heart failure) ([facility] [address]      Hypertension               Patient Active Problem List   Diagnosis    CHF (congestive heart failure), NYHA class I, acute on chronic, combined ([facility] [address]    Wheezing    Acute congestive heart failure (HCC)    Microhematuria    Diarrhea of presumed infectious origin    Hypertensive emergency         Past Surgical History         Past Surgical History:   Procedure Laterality Date    CATARACT REMOVAL Bilateral      HERNIA REPAIR        HYSTERECTOMY, TOTAL ABDOMINAL (CERVIX REMOVED)                Social History   Social History            Socioeconomic History    Marital status:        Spouse name: None    Number of children: None    Years of education: None    Highest education level: None   Tobacco Use    Smoking status: Never    Smokeless tobacco: Never   Vaping Use    Vaping Use: Never used   Substance and Sexual Activity    Alcohol use: Not Currently    Drug use: Never    Sexual activity: Never            Family History   History reviewed. No pertinent family history.         Current Facility-Administered Medications             Current Facility-Administered Medications   Medication Dose Route Frequency Provider Last Rate Last Admin    sodium chloride flush 0.9 % injection 5-40 mL  5-40 mL IntraVENous 2 times per day Guille BREWER Holiday, DO   10 mL at 10/01/22 0858    sodium chloride flush 0.9 % injection 5-40 mL  5-40 mL IntraVENous PRN Guille BREWER Holiday, DO        0.9 % sodium chloride infusion   IntraVENous PRN Guille DANIELLA Holiday, DO        ondansetron (ZOFRAN-ODT) disintegrating tablet 4 mg  4 mg Oral Q8H PRN Guille BREWER Holiday, DO         Or    ondansetron (ZOFRAN) injection 4 mg  4 mg IntraVENous Q6H PRN Guille BREWER Holiday, DO        acetaminophen (TYLENOL) tablet 650 mg  650 mg Oral Q4H PRN Guille BREWER Holiday, DO        heparin (porcine) injection 5,000 Units 5,000 Units SubCUTAneous 3 times per day Guille Finney, DO   5,000 Units at 10/01/22 0720    nitroGLYCERIN 50 mg in dextrose 5% 250 mL infusion  120 mcg/min IntraVENous Continuous Marry Bazan MD   Stopped at 10/01/22 1481             Current Outpatient Medications   Medication Sig Dispense Refill    doxazosin (CARDURA) 2 MG tablet Take 2 mg by mouth daily        Magnesium 400 MG TABS TAKE 1 TABLET BY MOUTH ONCE DAILY        levothyroxine (SYNTHROID) 25 MCG tablet 1/2 po daily 30 tablet 5    amLODIPine (NORVASC) 10 MG tablet Take by mouth        atorvastatin (LIPITOR) 20 MG tablet Take by mouth        losartan (COZAAR) 50 MG tablet          pantoprazole (PROTONIX) 40 MG tablet Take 40 mg by mouth in the morning. furosemide (LASIX) 20 MG tablet Take 1 tablet by mouth in the morning. 30 tablet 1    metoprolol (LOPRESSOR) 100 MG tablet Take 100 mg by mouth in the morning and 100 mg before bedtime. ALLERGIES: Norco [hydrocodone-acetaminophen]     Review of Systems   Constitutional: Negative. Negative for chills and fever. HENT: Negative. Eyes: Negative. Respiratory:  Positive for shortness of breath. Negative for wheezing. Cardiovascular:  Positive for chest pain and leg swelling. Negative for palpitations. Gastrointestinal: Negative. Negative for abdominal pain, nausea and vomiting. Endocrine: Negative. Genitourinary: Negative. Musculoskeletal: Negative. Skin: Negative. Negative for rash. Allergic/Immunologic: Negative. Neurological: Negative. Negative for dizziness, weakness and headaches. Hematological: Negative. Psychiatric/Behavioral: Negative. VITALS:  Blood pressure (!) 162/72, pulse (!) 108, temperature 98.1 °F (36.7 °C), temperature source Oral, resp. rate 20, height 5' 2\" (1.575 m), weight 132 lb (59.9 kg), SpO2 97 %. Body mass index is 24.14 kg/m². Physical Exam  Vitals and nursing note reviewed.    Constitutional: Appearance: She is well-developed. She is not diaphoretic. HENT:      Head: Normocephalic and atraumatic. Eyes:      Pupils: Pupils are equal, round, and reactive to light. Neck:      Thyroid: No thyromegaly. Vascular: No JVD. Trachea: No tracheal deviation. Cardiovascular:      Rate and Rhythm: Normal rate and regular rhythm. Chest Wall: PMI is not displaced. Pulses: Intact distal pulses. Heart sounds: Normal heart sounds. Heart sounds not distant. No murmur heard. No friction rub. No gallop. No S3 sounds. Pulmonary:      Effort: No respiratory distress. Breath sounds: No wheezing or rales. Chest:      Chest wall: No tenderness. Abdominal:      General: Bowel sounds are normal. There is no distension. Palpations: Abdomen is soft. There is no mass. Tenderness: There is no abdominal tenderness. There is no guarding or rebound. Musculoskeletal:         General: Normal range of motion. Cervical back: Normal range of motion and neck supple. Right lower leg: Edema present. Left lower leg: Edema present. Skin:     General: Skin is warm and dry. Coloration: Skin is not pale. Findings: No erythema or rash. Neurological:      Mental Status: She is alert and oriented to person, place, and time. Cranial Nerves: No cranial nerve deficit. Psychiatric:         Behavior: Behavior normal.         Thought Content:  Thought content normal.         Judgment: Judgment normal.         LABS:  Recent Results         Recent Results (from the past 24 hour(s))   Comprehensive Metabolic Panel     Collection Time: 09/30/22  1:00 PM   Result Value Ref Range     Sodium 141 135 - 144 mEq/L     Potassium 3.1 (L) 3.4 - 4.9 mEq/L     Chloride 105 95 - 107 mEq/L     CO2 23 20 - 31 mEq/L     Anion Gap 13 9 - 15 mEq/L     Glucose 112 (H) 70 - 99 mg/dL     BUN 35 (H) 8 - 23 mg/dL     Creatinine 2.39 (H) 0.50 - 0.90 mg/dL     GFR Non-African American 19.6 (L) >60     GFR  23.7 (L) >60     Calcium 8.2 (L) 8.5 - 9.9 mg/dL     Total Protein 5.2 (L) 6.3 - 8.0 g/dL     Albumin 3.0 (L) 3.5 - 4.6 g/dL     Total Bilirubin 0.3 0.2 - 0.7 mg/dL     Alkaline Phosphatase 126 40 - 130 U/L     ALT 15 0 - 33 U/L     AST 23 0 - 35 U/L     Globulin 2.2 (L) 2.3 - 3.5 g/dL   CBC with Auto Differential     Collection Time: 09/30/22  1:00 PM   Result Value Ref Range     WBC 6.1 4.8 - 10.8 K/uL     RBC 3.10 (L) 4.20 - 5.40 M/uL     Hemoglobin 8.8 (L) 12.0 - 16.0 g/dL     Hematocrit 25.1 (L) 37.0 - 47.0 %     MCV 80.7 (L) 82.0 - 100.0 fL     MCH 28.4 27.0 - 31.3 pg     MCHC 35.2 33.0 - 37.0 %     RDW 13.5 11.5 - 14.5 %     Platelets 968 297 - 364 K/uL     Neutrophils % 66.3 %     Lymphocytes % 16.2 %     Monocytes % 6.5 %     Eosinophils % 9.8 %     Basophils % 1.2 %     Neutrophils Absolute 4.0 1.4 - 6.5 K/uL     Lymphocytes Absolute 1.0 1.0 - 4.8 K/uL     Monocytes Absolute 0.4 0.2 - 0.8 K/uL     Eosinophils Absolute 0.6 0.0 - 0.7 K/uL     Basophils Absolute 0.1 0.0 - 0.2 K/uL   Magnesium     Collection Time: 09/30/22  1:00 PM   Result Value Ref Range     Magnesium 2.2 1.7 - 2.4 mg/dL   Troponin     Collection Time: 09/30/22  1:00 PM   Result Value Ref Range     Troponin <0.010 0.000 - 0.010 ng/mL   Urinalysis with Reflex to Culture     Collection Time: 09/30/22  1:00 PM     Specimen: Urine   Result Value Ref Range     Color, UA Yellow Straw/Yellow     Clarity, UA Clear Clear     Glucose, Ur Negative Negative mg/dL     Bilirubin Urine Negative Negative     Ketones, Urine Negative Negative mg/dL     Specific Gravity, UA 1.010 1.005 - 1.030     Blood, Urine LARGE (A) Negative     pH, UA 7.0 5.0 - 9.0     Protein, UA >=300 (A) Negative mg/dL     Urobilinogen, Urine 0.2 <2.0 E.U./dL     Nitrite, Urine Negative Negative     Leukocyte Esterase, Urine Negative Negative     Urine Reflex to Culture Not Indicated     TSH     Collection Time: 09/30/22  1:00 PM   Result Value Ref Range TSH 4.800 (H) 0.440 - 3.860 uIU/mL   Brain Natriuretic Peptide     Collection Time: 09/30/22  1:00 PM   Result Value Ref Range     Pro-BNP 14,458 pg/mL   Procalcitonin     Collection Time: 09/30/22  1:00 PM   Result Value Ref Range     Procalcitonin 0.21 (H) 0.00 - 0.15 ng/mL   Microscopic Urinalysis     Collection Time: 09/30/22  1:00 PM   Result Value Ref Range     Renal Epithelial, UA 0-2 (A) /HPF     Bacteria, UA Negative Negative /HPF     Hyaline Casts, UA 5-10 0 - 5 /HPF     WBC, UA 6-9 (A) 0 - 5 /HPF     RBC, UA >100 (H) 0 - 5 /HPF     Epithelial Cells, UA 3-5 0 - 5 /HPF   EKG 12 Lead - Chest Pain     Collection Time: 09/30/22  1:37 PM   Result Value Ref Range     Ventricular Rate 88 BPM     Atrial Rate 88 BPM     P-R Interval 172 ms     QRS Duration 74 ms     Q-T Interval 386 ms     QTc Calculation (Bazett) 467 ms     P Axis 50 degrees     R Axis 4 degrees     T Axis 31 degrees   COVID-19, Rapid     Collection Time: 09/30/22  1:51 PM     Specimen: Nasopharyngeal Swab   Result Value Ref Range     SARS-CoV-2, NAAT Not Detected Not Detected   Troponin     Collection Time: 10/01/22  5:06 AM   Result Value Ref Range     Troponin 0.019 (HH) 0.000 - 0.010 ng/mL   Troponin     Collection Time: 10/01/22  7:59 AM   Result Value Ref Range     Troponin 0.012 (HH) 0.000 - 0.010 ng/mL   CBC with Auto Differential     Collection Time: 10/01/22  7:59 AM   Result Value Ref Range     WBC 7.9 4.8 - 10.8 K/uL     RBC 2.91 (L) 4.20 - 5.40 M/uL     Hemoglobin 8.1 (L) 12.0 - 16.0 g/dL     Hematocrit 23.8 (L) 37.0 - 47.0 %     MCV 81.6 (L) 82.0 - 100.0 fL     MCH 27.7 27.0 - 31.3 pg     MCHC 34.0 33.0 - 37.0 %     RDW 13.7 11.5 - 14.5 %     Platelets 470 653 - 865 K/uL     Neutrophils % 77.5 %     Lymphocytes % 11.4 %     Monocytes % 6.3 %     Eosinophils % 3.7 %     Basophils % 1.1 %     Neutrophils Absolute 6.1 1.4 - 6.5 K/uL     Lymphocytes Absolute 0.9 (L) 1.0 - 4.8 K/uL     Monocytes Absolute 0.5 0.2 - 0.8 K/uL Eosinophils Absolute 0.3 0.0 - 0.7 K/uL     Basophils Absolute 0.1 0.0 - 0.2 K/uL   Comprehensive Metabolic Panel     Collection Time: 10/01/22  7:59 AM   Result Value Ref Range     Sodium 140 135 - 144 mEq/L     Potassium 2.7 (LL) 3.4 - 4.9 mEq/L     Chloride 103 95 - 107 mEq/L     CO2 24 20 - 31 mEq/L     Anion Gap 13 9 - 15 mEq/L     Glucose 153 (H) 70 - 99 mg/dL     BUN 34 (H) 8 - 23 mg/dL     Creatinine 2.25 (H) 0.50 - 0.90 mg/dL     GFR Non- 21.0 (L) >60     GFR  25.4 (L) >60     Calcium 8.3 (L) 8.5 - 9.9 mg/dL     Total Protein 4.7 (L) 6.3 - 8.0 g/dL     Albumin 2.7 (L) 3.5 - 4.6 g/dL     Total Bilirubin 0.4 0.2 - 0.7 mg/dL     Alkaline Phosphatase 119 40 - 130 U/L     ALT 14 0 - 33 U/L     AST 18 0 - 35 U/L     Globulin 2.0 (L) 2.3 - 3.5 g/dL   Brain Natriuretic Peptide     Collection Time: 10/01/22  7:59 AM   Result Value Ref Range     Pro-BNP 16,372 pg/mL         Troponin:         Lab Results   Component Value Date/Time     TROPONINI 0.012 10/01/2022 07:59 AM         EKG: normal sinus rhythm, nonspecific ST and T waves changes poor progression across the precordial leads        ASSESSMENT:           Active Hospital Problems     Diagnosis Date Noted    Hypertensive emergency [I16.1] 10/01/2022       Priority: Medium      Acute on chronic decompensated diastolic congestive heart failure  Hypertensive urgency  Elevated cardiac enzyme. Questionable demand ischemia. Rule out underlying coronary ischemia  Chronic kidney disease  Hyperlipidemia  Anemia     PLAN:   As always, aggressive risk factor modification is strongly recommended. We should adhere to the JNC VIII guidelines for HTN management and the NCEPATP III guidelines for LDL-C management. iuresis as tolerated. Monitor I's and O's and renal function. On Lasix drip  Nephrology recommendations  GI recommendation  Coronary valuation in the form of cardiac catheterization when clinically stable/renal function allows. Patient with multiple risk factors for CAD and acute decompensated heart failure. Also has abnormal EKG and mildly elevated cardiac enzymes high risk for CAD. We will plan for tomorrow a.m.  NO Norvasc secondary to lower extremity edema as well as acute decompensated heart failure  Avoid nephrotoxic agents  Monitor on telemetry  GI/DVT prophylaxis  CHF teaching  Follow-up in CHF clinic post discharge  Maintain hemoglobin greater than 7. Start Retacrit 3 times weekly. Consider hematology evaluation.         Electronically signed by Becky Portillo DO on 10/5/2022 at 9:16 AM

## 2022-10-05 NOTE — PROGRESS NOTES
Nephrology Progress Note    Assessment:  Alvaro etiology nephritic/nephrotic  OHDx Diastolic HF  Anemia  GI evaluation past nl recent  B12 iron  and ret count fine   check LDH  Hypertension      Plan:repeat renal parameters  today  may need kidney biopsy once BP stable  d/c lasix drip  increase apresoline dose and freq  labs daily Emperical steroid ? GN    Patient Active Problem List:     CHF (congestive heart failure), NYHA class I, acute on chronic, combined (HCC)     Wheezing     Acute congestive heart failure (HCC)     Microhematuria     Diarrhea of presumed infectious origin     Hypertensive emergency     Anemia      Subjective:  Admit Date: 9/30/2022    Interval History: much less swelling  minimal le edema    Medications:  Scheduled Meds:   hydrALAZINE  100 mg Oral 3 times per day    furosemide  80 mg IntraVENous Daily    potassium chloride  10 mEq IntraVENous Q1H    valsartan  80 mg Oral BID    epoetin rian-epbx  30 Units/kg SubCUTAneous Once per day on Mon Wed Fri    isosorbide mononitrate  30 mg Oral Daily    potassium chloride  20 mEq Oral TID WC    sodium chloride flush  5-40 mL IntraVENous 2 times per day    heparin (porcine)  5,000 Units SubCUTAneous 3 times per day    atorvastatin  40 mg Oral Nightly    doxazosin  2 mg Oral Daily    levothyroxine  25 mcg Oral Daily    metoprolol  100 mg Oral BID    pantoprazole  40 mg Oral Daily    magnesium oxide  400 mg Oral Daily     Continuous Infusions:   sodium chloride         CBC:   Recent Labs     10/03/22  0933   HGB 7.5*     CMP:    Recent Labs     10/03/22  0555 10/04/22  0450 10/04/22  0849    140 139   K 3.0* 3.7 3.1*    100 100   CO2 27 27 27   BUN 34* 37* 35*   CREATININE 2.38* 2.48* 2.15*   GLUCOSE 102* 118* 165*   CALCIUM 8.3* 8.5 8.4*   LABGLOM 19.7* 18.8* 22.1*     Troponin: No results for input(s): TROPONINI in the last 72 hours. BNP: No results for input(s): BNP in the last 72 hours.   INR: No results for input(s): INR in the last 72 hours. Lipids: No results for input(s): CHOL, LDLDIRECT, TRIG, HDL, AMYLASE, LIPASE in the last 72 hours. Liver: No results for input(s): AST, ALT, ALKPHOS, PROT, LABALBU, BILITOT in the last 72 hours. Invalid input(s): BILDIR  Iron:  No results for input(s): IRONS, FERRITIN in the last 72 hours. Invalid input(s): LABIRONS  Urinalysis: No results for input(s): UA in the last 72 hours.     Objective:  Vitals: BP (!) 184/71   Pulse 95   Temp 98.4 °F (36.9 °C) (Oral)   Resp 18   Ht 5' 2\" (1.575 m)   Wt 132 lb (59.9 kg)   SpO2 96%   BMI 24.14 kg/m²    Wt Readings from Last 3 Encounters:   09/30/22 132 lb (59.9 kg)   09/01/22 130 lb (59 kg)   08/10/22 134 lb (60.8 kg)      24HR INTAKE/OUTPUT:    Intake/Output Summary (Last 24 hours) at 10/5/2022 0008  Last data filed at 10/5/2022 3460  Gross per 24 hour   Intake 320 ml   Output 1100 ml   Net -780 ml       General: alert, in no apparent distress  HEENT: normocephalic, atraumatic, anicteric  Neck: supple, no mass  Lungs: non-labored respirations, clear to auscultation bilaterally  Heart: regular rate and rhythm, no murmurs or rubs  Abdomen: soft, non-tender, non-distended  Ext: no cyanosis, no peripheral edema  Neuro: alert and oriented, no gross abnormalities  Psych: normal mood and affect  Skin: no rash      Electronically signed by Dominguez Hassan DO, MD

## 2022-10-06 ENCOUNTER — APPOINTMENT (OUTPATIENT)
Dept: CT IMAGING | Age: 77
DRG: 291 | End: 2022-10-06
Payer: MEDICARE

## 2022-10-06 ENCOUNTER — HOSPITAL ENCOUNTER (OUTPATIENT)
Dept: CT IMAGING | Age: 77
Discharge: HOME | DRG: 291 | End: 2022-10-08
Payer: MEDICARE

## 2022-10-06 VITALS
SYSTOLIC BLOOD PRESSURE: 165 MMHG | HEART RATE: 77 BPM | OXYGEN SATURATION: 95 % | RESPIRATION RATE: 16 BRPM | DIASTOLIC BLOOD PRESSURE: 70 MMHG

## 2022-10-06 PROBLEM — N17.9 AKI (ACUTE KIDNEY INJURY) (HCC): Status: ACTIVE | Noted: 2022-10-06

## 2022-10-06 LAB
COMPLEMENT C3: 105 MG/DL (ref 90–180)
COMPLEMENT C4: <2 MG/DL (ref 10–40)
HCT VFR BLD CALC: 24.7 % (ref 37–47)
HEMOGLOBIN: 7.8 G/DL (ref 12–16)
MCH RBC QN AUTO: 26.7 PG (ref 27–31.3)
MCHC RBC AUTO-ENTMCNC: 31.8 % (ref 33–37)
MCV RBC AUTO: 84 FL (ref 82–100)
PDW BLD-RTO: 13.8 % (ref 11.5–14.5)
PLATELET # BLD: 256 K/UL (ref 130–400)
RBC # BLD: 2.94 M/UL (ref 4.2–5.4)
WBC # BLD: 11.9 K/UL (ref 4.8–10.8)

## 2022-10-06 PROCEDURE — 2060000000 HC ICU INTERMEDIATE R&B

## 2022-10-06 PROCEDURE — 2709999900 CT BIOPSY RENAL

## 2022-10-06 PROCEDURE — 99222 1ST HOSP IP/OBS MODERATE 55: CPT | Performed by: RADIOLOGY

## 2022-10-06 PROCEDURE — 88300 SURGICAL PATH GROSS: CPT

## 2022-10-06 PROCEDURE — 6370000000 HC RX 637 (ALT 250 FOR IP): Performed by: INTERNAL MEDICINE

## 2022-10-06 PROCEDURE — 36415 COLL VENOUS BLD VENIPUNCTURE: CPT

## 2022-10-06 PROCEDURE — 77012 CT SCAN FOR NEEDLE BIOPSY: CPT

## 2022-10-06 PROCEDURE — 99152 MOD SED SAME PHYS/QHP 5/>YRS: CPT | Performed by: RADIOLOGY

## 2022-10-06 PROCEDURE — 6360000002 HC RX W HCPCS: Performed by: INTERNAL MEDICINE

## 2022-10-06 PROCEDURE — 2580000003 HC RX 258: Performed by: INTERNAL MEDICINE

## 2022-10-06 PROCEDURE — 2500000003 HC RX 250 WO HCPCS: Performed by: RADIOLOGY

## 2022-10-06 PROCEDURE — 99233 SBSQ HOSP IP/OBS HIGH 50: CPT | Performed by: INTERNAL MEDICINE

## 2022-10-06 PROCEDURE — 77012 CT SCAN FOR NEEDLE BIOPSY: CPT | Performed by: RADIOLOGY

## 2022-10-06 PROCEDURE — 74176 CT ABD & PELVIS W/O CONTRAST: CPT

## 2022-10-06 PROCEDURE — 85027 COMPLETE CBC AUTOMATED: CPT

## 2022-10-06 PROCEDURE — 74176 CT ABD & PELVIS W/O CONTRAST: CPT | Performed by: RADIOLOGY

## 2022-10-06 PROCEDURE — 50200 RENAL BIOPSY PERQ: CPT | Performed by: RADIOLOGY

## 2022-10-06 PROCEDURE — 2580000003 HC RX 258: Performed by: RADIOLOGY

## 2022-10-06 PROCEDURE — 6360000002 HC RX W HCPCS: Performed by: RADIOLOGY

## 2022-10-06 PROCEDURE — 6370000000 HC RX 637 (ALT 250 FOR IP): Performed by: RADIOLOGY

## 2022-10-06 RX ORDER — BACITRACIN, NEOMYCIN, POLYMYXIN B 400; 3.5; 5 [USP'U]/G; MG/G; [USP'U]/G
OINTMENT TOPICAL
Status: COMPLETED | OUTPATIENT
Start: 2022-10-06 | End: 2022-10-06

## 2022-10-06 RX ORDER — FENTANYL CITRATE 50 UG/ML
INJECTION, SOLUTION INTRAMUSCULAR; INTRAVENOUS
Status: COMPLETED | OUTPATIENT
Start: 2022-10-06 | End: 2022-10-06

## 2022-10-06 RX ORDER — MYCOPHENOLATE MOFETIL 250 MG/1
1000 CAPSULE ORAL 2 TIMES DAILY
Status: DISCONTINUED | OUTPATIENT
Start: 2022-10-06 | End: 2022-10-15 | Stop reason: HOSPADM

## 2022-10-06 RX ORDER — 0.9 % SODIUM CHLORIDE 0.9 %
INTRAVENOUS SOLUTION INTRAVENOUS CONTINUOUS PRN
Status: COMPLETED | OUTPATIENT
Start: 2022-10-06 | End: 2022-10-06

## 2022-10-06 RX ORDER — MIDAZOLAM HYDROCHLORIDE 2 MG/2ML
INJECTION, SOLUTION INTRAMUSCULAR; INTRAVENOUS
Status: COMPLETED | OUTPATIENT
Start: 2022-10-06 | End: 2022-10-06

## 2022-10-06 RX ORDER — LIDOCAINE HYDROCHLORIDE 20 MG/ML
INJECTION, SOLUTION INFILTRATION; PERINEURAL
Status: COMPLETED | OUTPATIENT
Start: 2022-10-06 | End: 2022-10-06

## 2022-10-06 RX ORDER — VALSARTAN 80 MG/1
80 TABLET ORAL DAILY
Status: DISCONTINUED | OUTPATIENT
Start: 2022-10-06 | End: 2022-10-15

## 2022-10-06 RX ADMIN — FUROSEMIDE 80 MG: 10 INJECTION, SOLUTION INTRAMUSCULAR; INTRAVENOUS at 13:56

## 2022-10-06 RX ADMIN — DOXAZOSIN 2 MG: 1 TABLET ORAL at 10:14

## 2022-10-06 RX ADMIN — MYCOPHENOLATE MOFETIL 1000 MG: 250 CAPSULE ORAL at 10:22

## 2022-10-06 RX ADMIN — METOPROLOL TARTRATE 100 MG: 50 TABLET, FILM COATED ORAL at 21:06

## 2022-10-06 RX ADMIN — HYDRALAZINE HYDROCHLORIDE 100 MG: 100 TABLET, FILM COATED ORAL at 05:01

## 2022-10-06 RX ADMIN — Medication 10 ML: at 10:14

## 2022-10-06 RX ADMIN — POTASSIUM CHLORIDE 20 MEQ: 1500 TABLET, EXTENDED RELEASE ORAL at 13:55

## 2022-10-06 RX ADMIN — MIDAZOLAM HYDROCHLORIDE 1 MG: 1 INJECTION, SOLUTION INTRAMUSCULAR; INTRAVENOUS at 11:13

## 2022-10-06 RX ADMIN — SODIUM CHLORIDE 250 ML: 9 INJECTION, SOLUTION INTRAVENOUS at 11:13

## 2022-10-06 RX ADMIN — ATORVASTATIN CALCIUM 40 MG: 40 TABLET, FILM COATED ORAL at 21:06

## 2022-10-06 RX ADMIN — HYDRALAZINE HYDROCHLORIDE 100 MG: 100 TABLET, FILM COATED ORAL at 21:06

## 2022-10-06 RX ADMIN — LEVOTHYROXINE SODIUM 25 MCG: 0.03 TABLET ORAL at 05:01

## 2022-10-06 RX ADMIN — MYCOPHENOLATE MOFETIL 1000 MG: 250 CAPSULE ORAL at 21:06

## 2022-10-06 RX ADMIN — HYDRALAZINE HYDROCHLORIDE 100 MG: 100 TABLET, FILM COATED ORAL at 13:55

## 2022-10-06 RX ADMIN — PANTOPRAZOLE SODIUM 40 MG: 40 TABLET, DELAYED RELEASE ORAL at 10:14

## 2022-10-06 RX ADMIN — GELATIN ABSORBABLE SPONGE 12-7 MM 1 EACH: 12-7 MISC at 11:39

## 2022-10-06 RX ADMIN — VALSARTAN 80 MG: 80 TABLET, FILM COATED ORAL at 10:14

## 2022-10-06 RX ADMIN — BACITRACIN ZINC, NEOMYCIN SULFATE, POLYMYXIN B SULFATE 1 G: 3.5; 5000; 4 OINTMENT TOPICAL at 11:40

## 2022-10-06 RX ADMIN — Medication 10 ML: at 21:07

## 2022-10-06 RX ADMIN — SODIUM CHLORIDE 500 MG: 9 INJECTION, SOLUTION INTRAVENOUS at 14:14

## 2022-10-06 RX ADMIN — ISOSORBIDE MONONITRATE 30 MG: 30 TABLET, EXTENDED RELEASE ORAL at 10:14

## 2022-10-06 RX ADMIN — LIDOCAINE HYDROCHLORIDE 10 ML: 20 INJECTION, SOLUTION INFILTRATION; PERINEURAL at 11:19

## 2022-10-06 RX ADMIN — METOPROLOL TARTRATE 100 MG: 50 TABLET, FILM COATED ORAL at 10:14

## 2022-10-06 RX ADMIN — Medication 400 MG: at 10:14

## 2022-10-06 RX ADMIN — FENTANYL CITRATE 100 MCG: 50 INJECTION, SOLUTION INTRAMUSCULAR; INTRAVENOUS at 11:13

## 2022-10-06 ASSESSMENT — ENCOUNTER SYMPTOMS
WHEEZING: 0
NAUSEA: 0
VOMITING: 0
DIARRHEA: 0
BACK PAIN: 0
EYES NEGATIVE: 1
COUGH: 0
CONSTIPATION: 0
SHORTNESS OF BREATH: 1
PHOTOPHOBIA: 0
GASTROINTESTINAL NEGATIVE: 1
ABDOMINAL PAIN: 0

## 2022-10-06 ASSESSMENT — PAIN SCALES - GENERAL
PAINLEVEL_OUTOF10: 0

## 2022-10-06 NOTE — FLOWSHEET NOTE
1152 - Received patient back to CT holding via cart, post random renal biopsy. Bandaid to right lower back site clean / dry / intact, no bleeding / drainage / swelling noted. Pt denies pain, A&Ox4. VSS, on RA. Drinking gingerale per request and tolerating well. Lunch tray ordered. Pt to have repeat CT scan at 1230 prior to return to inpatient room. 1220 - Pt dozing on cart, no distress. VSS. Report called to Sergio guthrie RN on [address] [facility] [place] taken to CT scan for additional image. 1243 - Pt back to CT holding. Scan completed and pt assisted to bathroom to void afterwards. VS remain stable. Band aid to right lower back WNL. Dr. Mouna Malloy that CT scan completed. 26 - Dr. Lane Neville at cart side to assess patient, reviewed CT images - clear to return to room. Band aid again assessed and remains WNL. Pt set up to eat lunch tray that was delivered and transport requested to return pt to her room.  Electronically signed by Flavio Patrick RN on 10/6/2022 at 1:18 PM

## 2022-10-06 NOTE — PROGRESS NOTES
Chief Complaint   Patient presents with    Shortness of Breath       X 1 week with bilateral leg swelling, had chest pain yesterday, denies chest pain today          Patient is a 64 y.o. female who presents with a chief complaint of short of breath. Patient is followed on a regular basis by Dr. Radames Lopez MD. patient with past medical history of chronic diastolic congestive heart failure, hypertension, hyperlipidemia, chronic kidney disease who presents with worsening shortness of breath and increased lower extremity edema. Patient generally follows with [facility] she was requesting transfer but no beds available. She denies history of cardiac catheterization or any recent stress test.  Creatinine of 2.39 with a GFR of 19.6 on admission. BNP is elevated at 16,000 with mildly elevated troponin and a flat pattern. Hemoglobin is 8.1. He was noted to have extremely elevated blood pressure on arrival initiated on nitro drip. Currently she is in the 722P systolic. Echocardiogram at [facility] in 2017 ejection fraction of 60 to 65% no significant valve abnormalities. Negative nuclear stress test in 2017 at [facility].      10/2/2022: Patient is feeling somewhat better. Renal function slightly worsening. Sinus tach on telemetry heart rate of 106 bpm.  Continues to have significant bilateral extremity edema. Patient with significant EMEA hemoglobin is 7.3 from 12 2 months ago. 10-3-22: discussed with son on phone. No CP or SOB today. On lasix gtt. Hd stable. BP elevated. + LE edema. 10/4/2022: Sounds present at the bedside. All questions answered. Remains on Lasix drip in the lower extremity edema has significantly improved. She denies any chest pain or shortness of breath. Hemodynamically stable. Creatinine remains elevated with a GFR of 22. Hemoglobin is 7.5. GI input appreciated    10-5-22: Case discussed with nephrology. Patient remains on Lasix drip. Previous records reviewed from [facility].   [facility] injection 4 mg  4 mg IntraVENous Q6H PRN Guille Arciniegaiday, DO        acetaminophen (TYLENOL) tablet 650 mg  650 mg Oral Q4H PRN Guille Arciniegaiday, DO        heparin (porcine) injection 5,000 Units  5,000 Units SubCUTAneous 3 times per day Guille Finney, DO   5,000 Units at 10/01/22 0720    nitroGLYCERIN 50 mg in dextrose 5% 250 mL infusion  120 mcg/min IntraVENous Continuous Micheline Louie MD   Stopped at 10/01/22 6640             Current Outpatient Medications   Medication Sig Dispense Refill    doxazosin (CARDURA) 2 MG tablet Take 2 mg by mouth daily        Magnesium 400 MG TABS TAKE 1 TABLET BY MOUTH ONCE DAILY        levothyroxine (SYNTHROID) 25 MCG tablet 1/2 po daily 30 tablet 5    amLODIPine (NORVASC) 10 MG tablet Take by mouth        atorvastatin (LIPITOR) 20 MG tablet Take by mouth        losartan (COZAAR) 50 MG tablet          pantoprazole (PROTONIX) 40 MG tablet Take 40 mg by mouth in the morning. furosemide (LASIX) 20 MG tablet Take 1 tablet by mouth in the morning. 30 tablet 1    metoprolol (LOPRESSOR) 100 MG tablet Take 100 mg by mouth in the morning and 100 mg before bedtime. ALLERGIES: Norco [hydrocodone-acetaminophen]     Review of Systems   Constitutional: Negative. Negative for chills and fever. HENT: Negative. Eyes: Negative. Respiratory:  Positive for shortness of breath. Negative for wheezing. Cardiovascular:  Positive for chest pain and leg swelling. Negative for palpitations. Gastrointestinal: Negative. Negative for abdominal pain, nausea and vomiting. Endocrine: Negative. Genitourinary: Negative. Musculoskeletal: Negative. Skin: Negative. Negative for rash. Allergic/Immunologic: Negative. Neurological: Negative. Negative for dizziness, weakness and headaches. Hematological: Negative. Psychiatric/Behavioral: Negative.            VITALS:  Blood pressure (!) 162/72, pulse (!) 108, temperature 98.1 °F (36.7 °C), temperature source Oral, resp. rate 20, height 5' 2\" (1.575 m), weight 132 lb (59.9 kg), SpO2 97 %. Body mass index is 24.14 kg/m². Physical Exam  Vitals and nursing note reviewed. Constitutional:       Appearance: She is well-developed. She is not diaphoretic. HENT:      Head: Normocephalic and atraumatic. Eyes:      Pupils: Pupils are equal, round, and reactive to light. Neck:      Thyroid: No thyromegaly. Vascular: No JVD. Trachea: No tracheal deviation. Cardiovascular:      Rate and Rhythm: Normal rate and regular rhythm. Chest Wall: PMI is not displaced. Pulses: Intact distal pulses. Heart sounds: Normal heart sounds. Heart sounds not distant. No murmur heard. No friction rub. No gallop. No S3 sounds. Pulmonary:      Effort: No respiratory distress. Breath sounds: No wheezing or rales. Chest:      Chest wall: No tenderness. Abdominal:      General: Bowel sounds are normal. There is no distension. Palpations: Abdomen is soft. There is no mass. Tenderness: There is no abdominal tenderness. There is no guarding or rebound. Musculoskeletal:         General: Normal range of motion. Cervical back: Normal range of motion and neck supple. Right lower leg: Edema present. Left lower leg: Edema present. Skin:     General: Skin is warm and dry. Coloration: Skin is not pale. Findings: No erythema or rash. Neurological:      Mental Status: She is alert and oriented to person, place, and time. Cranial Nerves: No cranial nerve deficit. Psychiatric:         Behavior: Behavior normal.         Thought Content:  Thought content normal.         Judgment: Judgment normal.         LABS:  Recent Results         Recent Results (from the past 24 hour(s))   Comprehensive Metabolic Panel     Collection Time: 09/30/22  1:00 PM   Result Value Ref Range     Sodium 141 135 - 144 mEq/L     Potassium 3.1 (L) 3.4 - 4.9 mEq/L     Chloride 105 95 - 107 mEq/L CO2 23 20 - 31 mEq/L     Anion Gap 13 9 - 15 mEq/L     Glucose 112 (H) 70 - 99 mg/dL     BUN 35 (H) 8 - 23 mg/dL     Creatinine 2.39 (H) 0.50 - 0.90 mg/dL     GFR Non- 19.6 (L) >60     GFR  23.7 (L) >60     Calcium 8.2 (L) 8.5 - 9.9 mg/dL     Total Protein 5.2 (L) 6.3 - 8.0 g/dL     Albumin 3.0 (L) 3.5 - 4.6 g/dL     Total Bilirubin 0.3 0.2 - 0.7 mg/dL     Alkaline Phosphatase 126 40 - 130 U/L     ALT 15 0 - 33 U/L     AST 23 0 - 35 U/L     Globulin 2.2 (L) 2.3 - 3.5 g/dL   CBC with Auto Differential     Collection Time: 09/30/22  1:00 PM   Result Value Ref Range     WBC 6.1 4.8 - 10.8 K/uL     RBC 3.10 (L) 4.20 - 5.40 M/uL     Hemoglobin 8.8 (L) 12.0 - 16.0 g/dL     Hematocrit 25.1 (L) 37.0 - 47.0 %     MCV 80.7 (L) 82.0 - 100.0 fL     MCH 28.4 27.0 - 31.3 pg     MCHC 35.2 33.0 - 37.0 %     RDW 13.5 11.5 - 14.5 %     Platelets 939 324 - 577 K/uL     Neutrophils % 66.3 %     Lymphocytes % 16.2 %     Monocytes % 6.5 %     Eosinophils % 9.8 %     Basophils % 1.2 %     Neutrophils Absolute 4.0 1.4 - 6.5 K/uL     Lymphocytes Absolute 1.0 1.0 - 4.8 K/uL     Monocytes Absolute 0.4 0.2 - 0.8 K/uL     Eosinophils Absolute 0.6 0.0 - 0.7 K/uL     Basophils Absolute 0.1 0.0 - 0.2 K/uL   Magnesium     Collection Time: 09/30/22  1:00 PM   Result Value Ref Range     Magnesium 2.2 1.7 - 2.4 mg/dL   Troponin     Collection Time: 09/30/22  1:00 PM   Result Value Ref Range     Troponin <0.010 0.000 - 0.010 ng/mL   Urinalysis with Reflex to Culture     Collection Time: 09/30/22  1:00 PM     Specimen: Urine   Result Value Ref Range     Color, UA Yellow Straw/Yellow     Clarity, UA Clear Clear     Glucose, Ur Negative Negative mg/dL     Bilirubin Urine Negative Negative     Ketones, Urine Negative Negative mg/dL     Specific Gravity, UA 1.010 1.005 - 1.030     Blood, Urine LARGE (A) Negative     pH, UA 7.0 5.0 - 9.0     Protein, UA >=300 (A) Negative mg/dL     Urobilinogen, Urine 0.2 <2.0 E.U./dL Nitrite, Urine Negative Negative     Leukocyte Esterase, Urine Negative Negative     Urine Reflex to Culture Not Indicated     TSH     Collection Time: 09/30/22  1:00 PM   Result Value Ref Range     TSH 4.800 (H) 0.440 - 3.860 uIU/mL   Brain Natriuretic Peptide     Collection Time: 09/30/22  1:00 PM   Result Value Ref Range     Pro-BNP 14,458 pg/mL   Procalcitonin     Collection Time: 09/30/22  1:00 PM   Result Value Ref Range     Procalcitonin 0.21 (H) 0.00 - 0.15 ng/mL   Microscopic Urinalysis     Collection Time: 09/30/22  1:00 PM   Result Value Ref Range     Renal Epithelial, UA 0-2 (A) /HPF     Bacteria, UA Negative Negative /HPF     Hyaline Casts, UA 5-10 0 - 5 /HPF     WBC, UA 6-9 (A) 0 - 5 /HPF     RBC, UA >100 (H) 0 - 5 /HPF     Epithelial Cells, UA 3-5 0 - 5 /HPF   EKG 12 Lead - Chest Pain     Collection Time: 09/30/22  1:37 PM   Result Value Ref Range     Ventricular Rate 88 BPM     Atrial Rate 88 BPM     P-R Interval 172 ms     QRS Duration 74 ms     Q-T Interval 386 ms     QTc Calculation (Bazett) 467 ms     P Axis 50 degrees     R Axis 4 degrees     T Axis 31 degrees   COVID-19, Rapid     Collection Time: 09/30/22  1:51 PM     Specimen: Nasopharyngeal Swab   Result Value Ref Range     SARS-CoV-2, NAAT Not Detected Not Detected   Troponin     Collection Time: 10/01/22  5:06 AM   Result Value Ref Range     Troponin 0.019 (HH) 0.000 - 0.010 ng/mL   Troponin     Collection Time: 10/01/22  7:59 AM   Result Value Ref Range     Troponin 0.012 (HH) 0.000 - 0.010 ng/mL   CBC with Auto Differential     Collection Time: 10/01/22  7:59 AM   Result Value Ref Range     WBC 7.9 4.8 - 10.8 K/uL     RBC 2.91 (L) 4.20 - 5.40 M/uL     Hemoglobin 8.1 (L) 12.0 - 16.0 g/dL     Hematocrit 23.8 (L) 37.0 - 47.0 %     MCV 81.6 (L) 82.0 - 100.0 fL     MCH 27.7 27.0 - 31.3 pg     MCHC 34.0 33.0 - 37.0 %     RDW 13.7 11.5 - 14.5 %     Platelets 428 937 - 084 K/uL     Neutrophils % 77.5 %     Lymphocytes % 11.4 %     Monocytes % 6.3 %     Eosinophils % 3.7 %     Basophils % 1.1 %     Neutrophils Absolute 6.1 1.4 - 6.5 K/uL     Lymphocytes Absolute 0.9 (L) 1.0 - 4.8 K/uL     Monocytes Absolute 0.5 0.2 - 0.8 K/uL     Eosinophils Absolute 0.3 0.0 - 0.7 K/uL     Basophils Absolute 0.1 0.0 - 0.2 K/uL   Comprehensive Metabolic Panel     Collection Time: 10/01/22  7:59 AM   Result Value Ref Range     Sodium 140 135 - 144 mEq/L     Potassium 2.7 (LL) 3.4 - 4.9 mEq/L     Chloride 103 95 - 107 mEq/L     CO2 24 20 - 31 mEq/L     Anion Gap 13 9 - 15 mEq/L     Glucose 153 (H) 70 - 99 mg/dL     BUN 34 (H) 8 - 23 mg/dL     Creatinine 2.25 (H) 0.50 - 0.90 mg/dL     GFR Non- 21.0 (L) >60     GFR  25.4 (L) >60     Calcium 8.3 (L) 8.5 - 9.9 mg/dL     Total Protein 4.7 (L) 6.3 - 8.0 g/dL     Albumin 2.7 (L) 3.5 - 4.6 g/dL     Total Bilirubin 0.4 0.2 - 0.7 mg/dL     Alkaline Phosphatase 119 40 - 130 U/L     ALT 14 0 - 33 U/L     AST 18 0 - 35 U/L     Globulin 2.0 (L) 2.3 - 3.5 g/dL   Brain Natriuretic Peptide     Collection Time: 10/01/22  7:59 AM   Result Value Ref Range     Pro-BNP 16,372 pg/mL         Troponin:         Lab Results   Component Value Date/Time     TROPONINI 0.012 10/01/2022 07:59 AM         EKG: normal sinus rhythm, nonspecific ST and T waves changes poor progression across the precordial leads        ASSESSMENT:           Active Hospital Problems     Diagnosis Date Noted    Hypertensive emergency [I16.1] 10/01/2022       Priority: Medium      Acute on chronic decompensated diastolic congestive heart failure  Hypertensive urgency  Elevated cardiac enzyme. Questionable demand ischemia. Rule out underlying coronary ischemia  Chronic kidney disease  Hyperlipidemia  Anemia     PLAN:   As always, aggressive risk factor modification is strongly recommended. We should adhere to the JNC VIII guidelines for HTN management and the NCEPATP III guidelines for LDL-C management. Diuresis as tolerated.   Monitor I's and O's and renal function. Okay for kidney biopsy today for  Nephrology recommendations  GI recommendation  Coronary valuation in the form of cardiac catheterization when clinically stable/renal function allows. Patient with multiple risk factors for CAD and acute decompensated heart failure. Also has abnormal EKG and mildly elevated cardiac enzymes high risk for CAD. We will perform when clinically feasible  NO Norvasc secondary to lower extremity edema as well as acute decompensated heart failure  Avoid nephrotoxic agents  Monitor on telemetry  GI/DVT prophylaxis  CHF teaching  Follow-up in CHF clinic post discharge  Maintain hemoglobin greater than 7. Start Retacrit 3 times weekly. Consider hematology evaluation.         Electronically signed by Angela Kenny DO on 10/6/2022 at 9:03 AM

## 2022-10-06 NOTE — DISCHARGE INSTRUCTIONS
BIOPSY DISCHARGE INSTRUCTIONS    ACTIVITY:   Rest today. Expect to be sore for 1 to 2 days. No heavy bending, lifting , stretching, pushing or pulling for at least 24 hours. Do not lift anything over 10 pounds for the next 24 - 48 hours. Do not drive today. BATHING:   May shower, bathe, or swim after 24 hours. Pat the site dry. May remove large band aid after 24 hours. DIET:   May resume your normal diet. MEDICATION:  * Resume home medication unless otherwise instructed by your physician. * (If Applicable) If taking any blood thinners or Aspirin, hold for 24 hours after biopsy. * May take mild pain reliever, as needed, such as Acetaminophen or Ibuprofen. COMPLICATIONS RELATED TO BIOPSY:   - Dizziness, weakness, fatigue  - Bruising/firmness/ and/or pain at the site. - Extreme pain after leaving the hospital.   - Large or heavy bleeding at biopsy site. IF THESE SYMPTOMS OCCUR AND BECOME SEVERE, REPORT TO THE EMERGENCY ROOM FOR FURTHER EVALUATION. For the next 48 hours watch site for redness, drainage, swelling , fever (temp above 101 degrees F), or chills. If these signs of infection occur contact DR. Shane Gonzalez at [phone].

## 2022-10-06 NOTE — PROGRESS NOTES
Nephrology Progress Note    Assessment:  CHAYO suspect underlying acute glomerular injury  low C4 slight elevation LDH  Hypertension better control    OHDx Diastolic HF  Anemia      Plan: repeat Solu medrol 500mg IV  await complement driven GN  IgA vasculitis GBM  MPGN etc    Patient Active Problem List:     CHF (congestive heart failure), NYHA class I, acute on chronic, combined (HCC)     Wheezing     Acute congestive heart failure (HCC)     Microhematuria     Diarrhea of presumed infectious origin     Hypertensive emergency     Anemia      Subjective:  Admit Date: 9/30/2022    Interval History: stable    Medications:  Scheduled Meds:   hydrALAZINE  100 mg Oral 3 times per day    furosemide  80 mg IntraVENous Daily    potassium chloride  20 mEq Oral Daily with breakfast    valsartan  80 mg Oral BID    epoetin rian-epbx  30 Units/kg SubCUTAneous Once per day on Mon Wed Fri    isosorbide mononitrate  30 mg Oral Daily    sodium chloride flush  5-40 mL IntraVENous 2 times per day    atorvastatin  40 mg Oral Nightly    doxazosin  2 mg Oral Daily    levothyroxine  25 mcg Oral Daily    metoprolol  100 mg Oral BID    pantoprazole  40 mg Oral Daily    magnesium oxide  400 mg Oral Daily     Continuous Infusions:   sodium chloride         CBC:   Recent Labs     10/03/22  0933 10/05/22  0902   WBC  --  6.3   HGB 7.5* 7.9*   PLT  --  220     CMP:    Recent Labs     10/04/22  0450 10/04/22  0849 10/05/22  0902    139 140   K 3.7 3.1* 3.5    100 102   CO2 27 27 26   BUN 37* 35* 32*   CREATININE 2.48* 2.15* 2.14*   GLUCOSE 118* 165* 166*   CALCIUM 8.5 8.4* 8.3*   LABGLOM 18.8* 22.1* 22.2*     Troponin: No results for input(s): TROPONINI in the last 72 hours. BNP: No results for input(s): BNP in the last 72 hours. INR: No results for input(s): INR in the last 72 hours. Lipids: No results for input(s): CHOL, LDLDIRECT, TRIG, HDL, AMYLASE, LIPASE in the last 72 hours.   Liver: No results for input(s): AST, ALT, ALKPHOS, PROT, LABALBU, BILITOT in the last 72 hours. Invalid input(s): BILDIR  Iron:  No results for input(s): IRONS, FERRITIN in the last 72 hours. Invalid input(s): LABIRONS  Urinalysis: No results for input(s): UA in the last 72 hours.     Objective:  Vitals: /77   Pulse (!) 105   Temp 97.8 °F (36.6 °C) (Oral)   Resp 18   Ht 5' 2\" (1.575 m)   Wt 132 lb (59.9 kg)   SpO2 97%   BMI 24.14 kg/m²    Wt Readings from Last 3 Encounters:   09/30/22 132 lb (59.9 kg)   09/01/22 130 lb (59 kg)   08/10/22 134 lb (60.8 kg)      24HR INTAKE/OUTPUT:    Intake/Output Summary (Last 24 hours) at 10/6/2022 0809  Last data filed at 10/5/2022 1822  Gross per 24 hour   Intake 1475.22 ml   Output 1500 ml   Net -24.78 ml       General: alert, in no apparent distress  HEENT: normocephalic, atraumatic, anicteric  Neck: supple, no mass  Lungs: non-labored respirations, clear to auscultation bilaterally  Heart: regular rate and rhythm, no murmurs or rubs  Abdomen: soft, non-tender, non-distended  Ext: no cyanosis, no peripheral edema  Neuro: alert and oriented, no gross abnormalities  Psych: normal mood and affect  Skin: no rash      Electronically signed by Kezia Cabrera DO, MD

## 2022-10-06 NOTE — OR NURSING
SEDATION ADMINISTERED    1155 Patient positioned prone on CT table. Vitals Monitor applied. VSS. CT scans done. 200 Timeout completed for cat scan guided random renal biopsy with conscious sedation. Versed 1 mg IV and Fentanyl 100 mcg IV sedation administered. Dr Aristeo Valdez reviewed scans, right renal site marked, prepped with Chloraprep and draped with sterile drape and towels. 1119 Skin numbed with Lidocaine 2% by Dr Aristeo aVldez. 1125 CT Fluoro guidance used to place Introducer from kit. [address] disposable core biopsy needle kit 18 g x 16 cm used to obtain a total of 3 samples. Samples placed into saline moistened gauze in sterile cup. Verbal and tactile reassurance provided throughout. 1139 Gelfoam inserted,  Introducer withdrawn, pressure held then neosporin and bandaid applied. Patient tolerated well. Pt to go ct holding, and return for repeat ct scan at 1230. Specimen taken to lab.      Total Sedation Given:  Versed:     1 mg       Fentanyl:    100 mcg

## 2022-10-06 NOTE — FLOWSHEET NOTE
1050 - Pt arrived to CT holding via cart from room # 188 for random renal biopsy. A&Ox4. Denies pain or SOB. VS & tele monitors applied, on RA. Procedure explained and consent verified. 1103 - Pt taken to CT scan for biopsy procedure via cart.  Electronically signed by Héctor Maldonado RN on 10/6/2022 at 11:07 AM

## 2022-10-06 NOTE — PROGRESS NOTES
Nutrition Assessment     Type and Reason for Visit: RD Nutrition Re-Screen/LOS    Nutrition Recommendations/Plan:   Continue current plan of care     Malnutrition Assessment:  Malnutrition Status: No malnutrition    Nutrition Assessment:  Nutritional status appears adequate based on available  information, anticipate weight loss as edema resolves      Nutrition Related Findings:   \"presents with a complaint of short of breath. ..past medical history of chronic diastolic congestive heart failure, hypertension, hyperlipidemia, chronic kidney disease who presents with worsening shortness of breath and increased lower extremity edema. Patient generally follows with [facility] she was requesting transfer but no beds available. \" Off floor for kidney biopsy at time of visit, per EMR, 1+ generalized edema per nursing, No GI & /or nutrition related comlaints, noted elevated glucose, no Hx of DM Wound Type: None    Current Nutrition Therapies:    ADULT DIET; Regular;  Low Sodium (2 gm); 1800 ml    Anthropometric Measures:  Height: 5' 2\" (157.5 cm)  Current Body Wt:  (off floor at time of visit, * edema)   BMI:  est 24    Nutrition Diagnosis:   No nutrition diagnosis at this time   Nutrition Interventions:   Food and/or Nutrient Delivery: Continue Current Diet  Nutrition Education/Counseling: No recommendation at this time  Coordination of Nutrition Care: Continue to monitor while inpatient       Goals:     Goals: PO intake 75% or greater, by next RD assessment       Nutrition Monitoring and Evaluation:   Behavioral-Environmental Outcomes: None Identified  Food/Nutrient Intake Outcomes: Food and Nutrient Intake  Physical Signs/Symptoms Outcomes: Weight    Discharge Planning:    No discharge needs at this time     Nghia Mcbride RD, LD

## 2022-10-06 NOTE — CONSULTS
CC:   Chief Complaint   Patient presents with    Shortness of Breath     X 1 week with bilateral leg swelling, had chest pain yesterday, denies chest pain today        HPI:  Patient is a pleasant 70-year-old woman who presented to the hospital 5 days ago due to shortness of breath. Patient has a past medical history of diastolic congestive heart failure, hypertension, hyperlipidemia, chronic kidney disease and increasing lower extremity edema. Patient is typically followed at the [facility]. Upon admission, she was also noted to be in acute renal failure. Nephrology was consulted and requested consult from interventional radiology for kidney diagnosis to assess for medical renal disease. History reviewed. No pertinent family history. Past Surgical History:   Procedure Laterality Date    CATARACT REMOVAL Bilateral     HERNIA REPAIR      HYSTERECTOMY, TOTAL ABDOMINAL (CERVIX REMOVED)      OTHER SURGICAL HISTORY Right 10/06/2022    right renal random biopsy performed by Dr. Harpreet Oviedo        Past Medical History:   Diagnosis Date    CHF (congestive heart failure) ([facility] [address]     Hypertension        Social History     Socioeconomic History    Marital status:      Spouse name: None    Number of children: None    Years of education: None    Highest education level: None   Tobacco Use    Smoking status: Never    Smokeless tobacco: Never   Vaping Use    Vaping Use: Never used   Substance and Sexual Activity    Alcohol use: Not Currently    Drug use: Never    Sexual activity: Never       Allergies   Allergen Reactions    Norco [Hydrocodone-Acetaminophen]        No current facility-administered medications on file prior to encounter.      Current Outpatient Medications on File Prior to Encounter   Medication Sig Dispense Refill    hydrALAZINE (APRESOLINE) 10 MG tablet Take 10 mg by mouth 4 times daily      isosorbide mononitrate (IMDUR) 60 MG extended release tablet Take 120 mg by mouth daily      torsemide (DEMADEX) 20 MG tablet Take 20 mg by mouth 2 times daily      doxazosin (CARDURA) 2 MG tablet Take 2 mg by mouth daily (Patient not taking: Reported on 10/1/2022)      Magnesium 400 MG TABS TAKE 1 TABLET BY MOUTH ONCE DAILY      levothyroxine (SYNTHROID) 25 MCG tablet 1/2 po daily 30 tablet 5    amLODIPine (NORVASC) 10 MG tablet Take by mouth (Patient not taking: Reported on 10/1/2022)      atorvastatin (LIPITOR) 20 MG tablet Take by mouth      losartan (COZAAR) 50 MG tablet  (Patient not taking: Reported on 10/1/2022)      pantoprazole (PROTONIX) 40 MG tablet Take 40 mg by mouth in the morning. furosemide (LASIX) 20 MG tablet Take 1 tablet by mouth in the morning. (Patient not taking: Reported on 10/1/2022) 30 tablet 1    metoprolol (LOPRESSOR) 100 MG tablet Take 100 mg by mouth in the morning and 100 mg before bedtime. Review of Systems   Constitutional: Negative. Negative for chills, diaphoresis and fever. HENT: Negative. Negative for congestion, ear pain, hearing loss and tinnitus. Eyes: Negative. Negative for photophobia. Respiratory:  Positive for shortness of breath. Negative for cough and wheezing. Cardiovascular:  Positive for leg swelling. Negative for chest pain and palpitations. Gastrointestinal: Negative. Negative for abdominal pain, constipation, diarrhea, nausea and vomiting. Genitourinary: Negative. Negative for dysuria, flank pain, frequency, hematuria and urgency. Musculoskeletal: Negative. Negative for back pain and neck pain. Skin: Negative. Negative for rash. Allergic/Immunologic: Negative for environmental allergies. Neurological: Negative. Negative for dizziness, tremors, weakness and headaches. Hematological:  Does not bruise/bleed easily. Psychiatric/Behavioral: Negative. Negative for hallucinations and suicidal ideas. The patient is not nervous/anxious.       OBJECTIVE:  BP (!) 173/76   Pulse (!) 110   Temp 97.8 °F (36.6 °C) (Oral)   Resp 18 Ht 5' 2\" (1.575 m)   Wt 132 lb (59.9 kg)   SpO2 97%   BMI 24.14 kg/m²     Physical Exam  Constitutional:       General: She is not in acute distress. Appearance: She is well-developed. She is not diaphoretic. HENT:      Head: Normocephalic and atraumatic. Nose: Nose normal.      Mouth/Throat:      Pharynx: No oropharyngeal exudate. Eyes:      General: No scleral icterus. Right eye: No discharge. Left eye: No discharge. Conjunctiva/sclera: Conjunctivae normal.   Neck:      Thyroid: No thyromegaly. Vascular: No JVD. Trachea: No tracheal deviation. Cardiovascular:      Rate and Rhythm: Normal rate and regular rhythm. Heart sounds: Normal heart sounds. No murmur heard. No friction rub. No gallop. Pulmonary:      Effort: No respiratory distress. Breath sounds: No stridor. No wheezing or rales. Chest:      Chest wall: No tenderness. Abdominal:      General: Bowel sounds are normal. There is no distension. Palpations: Abdomen is soft. There is no mass. Tenderness: There is no abdominal tenderness. There is no guarding or rebound. Hernia: No hernia is present. Musculoskeletal:         General: No tenderness or deformity. Cervical back: Neck supple. Skin:     General: Skin is dry. Coloration: Skin is not pale. Findings: No erythema or rash. Neurological:      Mental Status: She is alert and oriented to person, place, and time. Cranial Nerves: No cranial nerve deficit. Psychiatric:         Behavior: Behavior normal.         Thought Content: Thought content normal.         Judgment: Judgment normal.       Lab Results   Component Value Date/Time    WBC 6.3 10/05/2022 09:02 AM    HGB 7.9 10/05/2022 09:02 AM    HCT 22.8 10/05/2022 09:02 AM     10/05/2022 09:02 AM    MCV 82.8 10/05/2022 09:02 AM       CT ABDOMEN PELVIS WO CONTRAST Additional Contrast? None    Result Date: 10/6/2022  1. Again seen is a very small amount of postprocedural hemorrhage posterior to the right kidney. This is unchanged when compared to prior study one hour ago during the procedure. 2.Note is made of bilateral small pleural effusions, unchanged since prior study dating July 18, 2022. Of note is a right costophrenic angle medial density, which is again felt to represent likely compressive atelectasis, though appears more plump than prior. Attention on follow-up is recommended. 3.Calcified right liver lobe density, may represent an old granuloma. This is also unchanged 4. Calcific densities are noted within the colonic wall near the cecum. These may be dystrophic calcification such as sequelae of old inflammation or infection such as epiploic appendicitis versus infection, though alternatively premalignant and malignancies can also present in such a fashion. Please correlate with colonoscopy and other workup as clinically indicated. Discussed with Dr. Vanita Reyna. COMPARISON: Same day study, one hour prior during procedure, and CT from July 18, 2022 DIAGNOSIS: Small postprocedural hemorrhage posterior to the right kidney COMMENTS: I13.10 Cardiorenal disease ICD10 TECHNIQUE: Spiral scanning of the abdomen and pelvis was performed after the administration of oral contrast .   Intravenous contrast was not administered as per the referring physician. CT Dose-Length Product (estimate related to radiation exposure from this exam):  130.68  mGy*cm. CT ABDOMEN: Note is made of bilateral small pleural effusions, unchanged since July. Note is made of bibasilar atelectasis. Some of the areas of atelectasis appear to be more plump such as in the right base at the costophrenic angle, attention on follow-up is recommended. Calcific density is again seen in the right liver lobe, this may represent an old granuloma. The spleen is of normal size and attenuation without focal lesions. Pancreas shows no signs of focal mass or peripancreatic fluid collection.    Kidneys show no contour deforming renal masses or hydronephrosis. A very small amount of postprocedural biopsy bleed is seen posterior to the right kidney, this small and unchanged from 1 hour prior. Adrenal glands are unremarkable. Aorta is normal in caliber without  signs of aneurysmal dilatation. No significant retroperitoneal adenopathy or ascites is identified. Calcific densities are noted within the colonic wall near the cecum. These may be dystrophic calcification such as sequelae of old inflammation or infection such as epiploic appendicitis versus infection, though alternatively premalignant and malignancies can also present in such a fashion. Please correlate with colonoscopy and other workup as clinically indicated. CT PELVIS:  Scanning of the pelvis shows no signs of pelvic mass or pelvic fluid collection. Diverticulosis of the colon is noted. ASSESSMENT ANDPLAN:    ASSESSMENT: Patient with acute kidney failure, of unclear etiology. PLAN: CT-guided renal parenchyma biopsy. The risks, benefits, and alternatives including bleeding, infection, damage to adjacent structures was explained to the patient, patient and written proceed.     Naren Richards MD, [facility]

## 2022-10-06 NOTE — BRIEF OP NOTE
Preliminary  Procedure Note, Full Note To Follow in PACS  Vascular and Interventional Radiology      Bela Roland  1945  [id number]    Date of Procedure: 10/06/22    Physician: Princess Quiles MD, DABR    Pre-Op Diagnosis: Kidney failure    Post-Op Diagnosis: Same       Procedure: Right kidney biopsy    Estimated Blood Loss (mL): Approximately 20 cc    Complications: None    Specimens: Renal parenchyma    Implants: None    Drains: None    Findings: Right renal parenchyma biopsy. Three core specimens sent to lab. Small bleed seen post biopsy stopped spontaneously.      Electronically signed by Otilio Zuniga MD on 10/6/2022 at 1:44 PM

## 2022-10-06 NOTE — FLOWSHEET NOTE
1027- Assessment complete. Patient alert and oriented X 4. Medications given as ordered. Lasix and kcl held until patient returns from [facility]. Report already given to [facility] from [facility]. Patient to be taken down soon. Patient aware. Call light in reach. Electronically signed by Swapnil Osborne on 10/6/2022 at 10:29 AM  1330- Patient returned from [facility]. Vitals stable. Bandage to right lower back intact. 1645- Patient sitting up eating dinner. 1830- Family at bedside visiting. Patient denies needs. Electronically signed by Swapnil Osborne on 10/6/2022 at 6:39 PM

## 2022-10-07 LAB
ANION GAP SERPL CALCULATED.3IONS-SCNC: 13 MEQ/L (ref 9–15)
ANION GAP SERPL CALCULATED.3IONS-SCNC: 17 MEQ/L (ref 9–15)
BACTERIA: NEGATIVE /HPF
BILIRUBIN URINE: NEGATIVE
BLOOD, URINE: ABNORMAL
BUN BLDV-MCNC: 49 MG/DL (ref 8–23)
BUN BLDV-MCNC: 52 MG/DL (ref 8–23)
CALCIUM SERPL-MCNC: 8.2 MG/DL (ref 8.5–9.9)
CALCIUM SERPL-MCNC: 8.5 MG/DL (ref 8.5–9.9)
CHLORIDE BLD-SCNC: 97 MEQ/L (ref 95–107)
CHLORIDE BLD-SCNC: 99 MEQ/L (ref 95–107)
CLARITY: CLEAR
CO2: 22 MEQ/L (ref 20–31)
CO2: 25 MEQ/L (ref 20–31)
COLOR: YELLOW
CREAT SERPL-MCNC: 2.56 MG/DL (ref 0.5–0.9)
CREAT SERPL-MCNC: 2.71 MG/DL (ref 0.5–0.9)
CREATININE URINE: 72.8 MG/DL
EPITHELIAL CELLS, UA: ABNORMAL /HPF (ref 0–5)
GFR AFRICAN AMERICAN: 20.5
GFR AFRICAN AMERICAN: 21.9
GFR NON-AFRICAN AMERICAN: 16.9
GFR NON-AFRICAN AMERICAN: 18.1
GLUCOSE BLD-MCNC: 163 MG/DL (ref 70–99)
GLUCOSE BLD-MCNC: 195 MG/DL (ref 70–99)
GLUCOSE URINE: NEGATIVE MG/DL
HCT VFR BLD CALC: 21.6 % (ref 37–47)
HCT VFR BLD CALC: 24.9 % (ref 37–47)
HEMOGLOBIN: 7.3 G/DL (ref 12–16)
HEMOGLOBIN: 8.1 G/DL (ref 12–16)
HYALINE CASTS: ABNORMAL /HPF (ref 0–5)
KETONES, URINE: NEGATIVE MG/DL
LEUKOCYTE ESTERASE, URINE: NEGATIVE
MCH RBC QN AUTO: 27.3 PG (ref 27–31.3)
MCH RBC QN AUTO: 28.3 PG (ref 27–31.3)
MCHC RBC AUTO-ENTMCNC: 32.4 % (ref 33–37)
MCHC RBC AUTO-ENTMCNC: 33.9 % (ref 33–37)
MCV RBC AUTO: 83.7 FL (ref 82–100)
MCV RBC AUTO: 84.3 FL (ref 82–100)
MICROALBUMIN UR-MCNC: 141.5 MG/DL
MICROALBUMIN/CREAT UR-RTO: 1943.7 MG/G (ref 0–30)
NITRITE, URINE: NEGATIVE
PDW BLD-RTO: 13.8 % (ref 11.5–14.5)
PDW BLD-RTO: 14.1 % (ref 11.5–14.5)
PH UA: 5.5 (ref 5–9)
PLATELET # BLD: 266 K/UL (ref 130–400)
PLATELET # BLD: 330 K/UL (ref 130–400)
POTASSIUM SERPL-SCNC: 3.7 MEQ/L (ref 3.4–4.9)
POTASSIUM SERPL-SCNC: 4.2 MEQ/L (ref 3.4–4.9)
PROTEIN UA: >=300 MG/DL
RBC # BLD: 2.58 M/UL (ref 4.2–5.4)
RBC # BLD: 2.96 M/UL (ref 4.2–5.4)
RBC UA: >100 /HPF (ref 0–5)
RENAL EPITHELIAL, UA: ABNORMAL /HPF
SODIUM BLD-SCNC: 136 MEQ/L (ref 135–144)
SODIUM BLD-SCNC: 137 MEQ/L (ref 135–144)
SPECIFIC GRAVITY UA: 1.01 (ref 1–1.03)
UROBILINOGEN, URINE: 0.2 E.U./DL
WBC # BLD: 10.9 K/UL (ref 4.8–10.8)
WBC # BLD: 14.7 K/UL (ref 4.8–10.8)
WBC UA: ABNORMAL /HPF (ref 0–5)

## 2022-10-07 PROCEDURE — APPSS30 APP SPLIT SHARED TIME 16-30 MINUTES: Performed by: PHYSICIAN ASSISTANT

## 2022-10-07 PROCEDURE — 85027 COMPLETE CBC AUTOMATED: CPT

## 2022-10-07 PROCEDURE — 80048 BASIC METABOLIC PNL TOTAL CA: CPT

## 2022-10-07 PROCEDURE — 99232 SBSQ HOSP IP/OBS MODERATE 35: CPT | Performed by: NURSE PRACTITIONER

## 2022-10-07 PROCEDURE — 2580000003 HC RX 258: Performed by: INTERNAL MEDICINE

## 2022-10-07 PROCEDURE — 6360000002 HC RX W HCPCS: Performed by: INTERNAL MEDICINE

## 2022-10-07 PROCEDURE — 6370000000 HC RX 637 (ALT 250 FOR IP): Performed by: INTERNAL MEDICINE

## 2022-10-07 PROCEDURE — 2060000000 HC ICU INTERMEDIATE R&B

## 2022-10-07 PROCEDURE — 99232 SBSQ HOSP IP/OBS MODERATE 35: CPT | Performed by: INTERNAL MEDICINE

## 2022-10-07 PROCEDURE — 36415 COLL VENOUS BLD VENIPUNCTURE: CPT

## 2022-10-07 PROCEDURE — 2700000000 HC OXYGEN THERAPY PER DAY

## 2022-10-07 RX ORDER — ISOSORBIDE MONONITRATE 60 MG/1
60 TABLET, EXTENDED RELEASE ORAL DAILY
Status: DISCONTINUED | OUTPATIENT
Start: 2022-10-08 | End: 2022-10-15 | Stop reason: HOSPADM

## 2022-10-07 RX ADMIN — SODIUM CHLORIDE 500 MG: 9 INJECTION, SOLUTION INTRAVENOUS at 09:35

## 2022-10-07 RX ADMIN — Medication 10 ML: at 09:13

## 2022-10-07 RX ADMIN — MYCOPHENOLATE MOFETIL 1000 MG: 250 CAPSULE ORAL at 21:09

## 2022-10-07 RX ADMIN — MYCOPHENOLATE MOFETIL 1000 MG: 250 CAPSULE ORAL at 09:13

## 2022-10-07 RX ADMIN — Medication 10 ML: at 21:10

## 2022-10-07 RX ADMIN — ISOSORBIDE MONONITRATE 30 MG: 30 TABLET, EXTENDED RELEASE ORAL at 09:12

## 2022-10-07 RX ADMIN — EPOETIN ALFA-EPBX 1800 UNITS: 2000 INJECTION, SOLUTION INTRAVENOUS; SUBCUTANEOUS at 09:30

## 2022-10-07 RX ADMIN — FUROSEMIDE 80 MG: 10 INJECTION, SOLUTION INTRAMUSCULAR; INTRAVENOUS at 09:12

## 2022-10-07 RX ADMIN — ATORVASTATIN CALCIUM 40 MG: 40 TABLET, FILM COATED ORAL at 21:09

## 2022-10-07 RX ADMIN — HYDRALAZINE HYDROCHLORIDE 100 MG: 100 TABLET, FILM COATED ORAL at 21:09

## 2022-10-07 RX ADMIN — LEVOTHYROXINE SODIUM 25 MCG: 0.03 TABLET ORAL at 05:52

## 2022-10-07 RX ADMIN — ACETAMINOPHEN 650 MG: 325 TABLET ORAL at 05:56

## 2022-10-07 RX ADMIN — POTASSIUM CHLORIDE 20 MEQ: 1500 TABLET, EXTENDED RELEASE ORAL at 09:12

## 2022-10-07 RX ADMIN — DOXAZOSIN 2 MG: 1 TABLET ORAL at 09:12

## 2022-10-07 RX ADMIN — VALSARTAN 80 MG: 80 TABLET, FILM COATED ORAL at 09:12

## 2022-10-07 RX ADMIN — Medication 400 MG: at 09:12

## 2022-10-07 RX ADMIN — PANTOPRAZOLE SODIUM 40 MG: 40 TABLET, DELAYED RELEASE ORAL at 09:12

## 2022-10-07 RX ADMIN — HYDRALAZINE HYDROCHLORIDE 100 MG: 100 TABLET, FILM COATED ORAL at 05:52

## 2022-10-07 RX ADMIN — METOPROLOL TARTRATE 100 MG: 50 TABLET, FILM COATED ORAL at 09:12

## 2022-10-07 RX ADMIN — HYDRALAZINE HYDROCHLORIDE 100 MG: 100 TABLET, FILM COATED ORAL at 14:49

## 2022-10-07 RX ADMIN — METOPROLOL TARTRATE 100 MG: 50 TABLET, FILM COATED ORAL at 21:09

## 2022-10-07 ASSESSMENT — PAIN SCALES - GENERAL
PAINLEVEL_OUTOF10: 7
PAINLEVEL_OUTOF10: 5

## 2022-10-07 ASSESSMENT — ENCOUNTER SYMPTOMS
GASTROINTESTINAL NEGATIVE: 1
BACK PAIN: 1
COUGH: 0
EYES NEGATIVE: 1
SHORTNESS OF BREATH: 0
TROUBLE SWALLOWING: 0
ABDOMINAL PAIN: 0
SHORTNESS OF BREATH: 1
SORE THROAT: 0
CHEST TIGHTNESS: 1
WHEEZING: 0
VOMITING: 0
DIARRHEA: 0
NAUSEA: 0
COLOR CHANGE: 0
RESPIRATORY NEGATIVE: 1

## 2022-10-07 ASSESSMENT — PAIN DESCRIPTION - LOCATION: LOCATION: FLANK;ABDOMEN

## 2022-10-07 NOTE — FLOWSHEET NOTE
3145- Assessment complete. Patient alert and oriented X 4. Medications given as ordered. Dr. Macy Nichols already in to see patient this morning. Patient denies needs. Call light in reach. Electronically signed by Shruti Masters on 10/7/2022 at 9:39 AM  1200- Patient sitting up eating lunch. [address] Evelia Clifford in to see patient. Vital signs obtained and stable. Hydralazine given per order. Family at bedside visiting. 1645- Urine collected and sent to lab. Patient sitting up in bed eating dinner. Family at bedside. Electronically signed by Shruti Masters on 10/7/2022 at 5:03 PM

## 2022-10-07 NOTE — PROGRESS NOTES
Nephrology Progress Note    Assessment:  Suspecting active GN/vasculitis  Biopsy  kidney yesterday no labs back today    OHD diastlic HG history  Anemia related to GN?   Hypertension improved (didn't receive meds this am)        Plan: will call  to see if light microscopy out yet  Empircally 3rd dose solu medrol IV and cellcept 100mg bid patient aware 2ND URINE STILL NOT SENT FROM 10/5/2022  ANCA along with C3  normal  Anti  Dna pending doubt SLE clinically    Patient Active Problem List:     CHF (congestive heart failure), NYHA class I, acute on chronic, combined (HCC)     Wheezing     Acute congestive heart failure ([facility] [address]     Microhematuria     Diarrhea of presumed infectious origin     Hypertensive emergency     Anemia     CHAYO (acute kidney injury) ([facility] [address]      Subjective:  Admit Date: 9/30/2022    Interval History: no major changes  breathing fine  trace edema ankles  quite edEmatous admission    Medications:  Scheduled Meds:   methylPREDNISolone  500 mg IntraVENous Once    methylPREDNISolone  500 mg IntraVENous Daily    valsartan  80 mg Oral Daily    mycophenolate  1,000 mg Oral BID    hydrALAZINE  100 mg Oral 3 times per day    furosemide  80 mg IntraVENous Daily    potassium chloride  20 mEq Oral Daily with breakfast    epoetin rian-epbx  30 Units/kg SubCUTAneous Once per day on Mon Wed Fri    isosorbide mononitrate  30 mg Oral Daily    sodium chloride flush  5-40 mL IntraVENous 2 times per day    atorvastatin  40 mg Oral Nightly    doxazosin  2 mg Oral Daily    levothyroxine  25 mcg Oral Daily    metoprolol  100 mg Oral BID    pantoprazole  40 mg Oral Daily    magnesium oxide  400 mg Oral Daily     Continuous Infusions:   sodium chloride         CBC:   Recent Labs     10/05/22  0902 10/06/22  0937   WBC 6.3 11.9*   HGB 7.9* 7.8*    256     CMP:    Recent Labs     10/04/22  0849 10/05/22  0902    140   K 3.1* 3.5    102   CO2 27 26   BUN 35* 32*   CREATININE 2.15* 2.14*   GLUCOSE 165* 166* CALCIUM 8.4* 8.3*   LABGLOM 22.1* 22.2*     Troponin: No results for input(s): TROPONINI in the last 72 hours. BNP: No results for input(s): BNP in the last 72 hours. INR: No results for input(s): INR in the last 72 hours. Lipids: No results for input(s): CHOL, LDLDIRECT, TRIG, HDL, AMYLASE, LIPASE in the last 72 hours. Liver: No results for input(s): AST, ALT, ALKPHOS, PROT, LABALBU, BILITOT in the last 72 hours. Invalid input(s): BILDIR  Iron:  No results for input(s): IRONS, FERRITIN in the last 72 hours. Invalid input(s): LABIRONS  Urinalysis: No results for input(s): UA in the last 72 hours.     Objective:  Vitals: BP (!) 186/81   Pulse 100   Temp 97.7 °F (36.5 °C)   Resp 18   Ht 5' 2\" (1.575 m)   Wt 132 lb (59.9 kg)   SpO2 94%   BMI 24.14 kg/m²    Wt Readings from Last 3 Encounters:   09/30/22 132 lb (59.9 kg)   09/01/22 130 lb (59 kg)   08/10/22 134 lb (60.8 kg)      24HR INTAKE/OUTPUT:    Intake/Output Summary (Last 24 hours) at 10/7/2022 0756  Last data filed at 10/6/2022 1730  Gross per 24 hour   Intake 440 ml   Output 700 ml   Net -260 ml       General: alert, in no apparent distress  HEENT: normocephalic, atraumatic, anicteric  Neck: supple, no mass  Lungs: non-labored respirations, clear to auscultation bilaterally  Heart: regular rate and rhythm, no murmurs or rubs  Abdomen: soft, non-tender, non-distended  Ext: no cyanosis, 1+ peripheral edema  Neuro: alert and oriented, no gross abnormalities  Psych: normal mood and affect  Skin: no rash      Electronically signed by Kezia Cabrera DO, MD

## 2022-10-07 NOTE — PROGRESS NOTES
Progress Note  Patient: Arti Torres  Unit/Bed: N128/P060-07  YOB: 1945  MRN: [id number]  Acct: [de-identified]   Admitting Diagnosis: Cardiorenal disease [I13.10]  CHAYO (acute kidney injury) ([facility] [address] [N17.9]  Hypertensive emergency [I16.1]  Acute on chronic diastolic (congestive) heart failure ([facility] [I50.33]  Date:  9/30/2022  Hospital Day: 6    Chief Complaint:  Shortness of breath    Subjective      10/7/22: Resting comfortably in bed in no acute distress. After taking morning medications, patient states she developed an episode of left-sided chest pain/pressure which lasted approximately 10 minutes before resolving. She continues to experience shortness of breath with exertion. She is maintaining adequate SPO2 on room air. She underwent right kidney biopsy yesterday with IR and results are pending. She remains on Lasix 80 mg IV daily with approximately 2.2 L negative fluid balance since admission. Renal function remains relatively unchanged with creatinine of 2.56, BUN elevated 52 and GFR low at 18.1. Worsening in leukocytosis this morning with WBC of 14.7 compared to 11.9 yesterday. Currently on telemetry she is sinus rhythm with heart rate in the 80s. 10/6/22: Patient going for kidney biopsy today. Denies any chest pain. No significant shortness of breath. Sinus rhythm/sinus tachycardia on telemetry. Labs are pending today. Off of Lasix drip    10/5/22: Case discussed with nephrology. Patient remains on Lasix drip. Previous records reviewed from [facility]. Patient with negative cardiac CTA in 2015. Labs today are pending    10/4/22:  Sounds present at the bedside. All questions answered. Remains on Lasix drip in the lower extremity edema has significantly improved. She denies any chest pain or shortness of breath. Hemodynamically stable. Creatinine remains elevated with a GFR of 22. Hemoglobin is 7.5. GI input appreciated    10/3/22: discussed with son on phone.  No CP or SOB today. On lasix gtt. Hd stable. BP elevated. + LE edema. 10/2/22: Patient is feeling somewhat better. Renal function slightly worsening. Sinus tach on telemetry heart rate of 106 bpm.  Continues to have significant bilateral extremity edema. Patient with significant EMEA hemoglobin is 7.3 from 12 2 months ago. 10/1/22:  Patient is a 64 y.o. female who presents with a chief complaint of short of breath. Patient is followed on a regular basis by Dr. Lori Galvan MD. patient with past medical history of chronic diastolic congestive heart failure, hypertension, hyperlipidemia, chronic kidney disease who presents with worsening shortness of breath and increased lower extremity edema. Patient generally follows with F she was requesting transfer but no beds available. She denies history of cardiac catheterization or any recent stress test.  Creatinine of 2.39 with a GFR of 19.6 on admission. BNP is elevated at 16,000 with mildly elevated troponin and a flat pattern. Hemoglobin is 8.1. He was noted to have extremely elevated blood pressure on arrival initiated on nitro drip. Currently she is in the 724Q systolic. Echocardiogram at [facility] in 2017 ejection fraction of 60 to 65% no significant valve abnormalities. Negative nuclear stress test in 2017 at [facility]. Review of Systems:   Review of Systems   Constitutional:  Negative for fever. HENT:  Negative for congestion. Respiratory:  Positive for chest tightness and shortness of breath. Cardiovascular:  Positive for chest pain. Negative for palpitations and leg swelling. Gastrointestinal:  Negative for nausea and vomiting. Musculoskeletal:  Negative for arthralgias. Skin:  Negative for color change. Neurological:  Negative for dizziness, syncope and light-headedness. Psychiatric/Behavioral:  Negative for agitation.         Physical Examination:    BP (!) 166/77   Pulse (!) 105   Temp 97.9 °F (36.6 °C) (Oral)   Resp 18   Ht 5' 2\" (1.575 m)   Wt 132 lb (59.9 kg)   SpO2 96%   BMI 24.14 kg/m²    Physical Exam  Constitutional:       General: She is not in acute distress. HENT:      Head: Normocephalic and atraumatic. Cardiovascular:      Rate and Rhythm: Normal rate and regular rhythm. Pulmonary:      Effort: Pulmonary effort is normal. No respiratory distress. Breath sounds: No wheezing, rhonchi or rales. Abdominal:      Palpations: Abdomen is soft. Musculoskeletal:         General: Normal range of motion. Cervical back: Normal range of motion and neck supple. Right lower leg: Edema (trace) present. Left lower leg: Edema (trace) present. Skin:     General: Skin is warm and dry. Neurological:      General: No focal deficit present. Mental Status: She is alert and oriented to person, place, and time. Cranial Nerves: No cranial nerve deficit.    Psychiatric:         Mood and Affect: Mood normal.         Behavior: Behavior normal.       LABS:  CBC:   Lab Results   Component Value Date/Time    WBC 14.7 10/07/2022 08:39 AM    RBC 2.96 10/07/2022 08:39 AM    HGB 8.1 10/07/2022 08:39 AM    HCT 24.9 10/07/2022 08:39 AM    MCV 84.3 10/07/2022 08:39 AM    MCH 27.3 10/07/2022 08:39 AM    MCHC 32.4 10/07/2022 08:39 AM    RDW 14.1 10/07/2022 08:39 AM     10/07/2022 08:39 AM    MPV 8.7 12/26/2013 08:34 AM     CBC with Differential:   Lab Results   Component Value Date/Time    WBC 14.7 10/07/2022 08:39 AM    RBC 2.96 10/07/2022 08:39 AM    HGB 8.1 10/07/2022 08:39 AM    HCT 24.9 10/07/2022 08:39 AM     10/07/2022 08:39 AM    MCV 84.3 10/07/2022 08:39 AM    MCH 27.3 10/07/2022 08:39 AM    MCHC 32.4 10/07/2022 08:39 AM    RDW 14.1 10/07/2022 08:39 AM    LYMPHOPCT 11.4 10/01/2022 07:59 AM    MONOPCT 6.3 10/01/2022 07:59 AM    BASOPCT 1.1 10/01/2022 07:59 AM    MONOSABS 0.5 10/01/2022 07:59 AM    LYMPHSABS 0.9 10/01/2022 07:59 AM    EOSABS 0.3 10/01/2022 07:59 AM    BASOSABS 0.1 10/01/2022 07:59 AM CMP:    Lab Results   Component Value Date/Time     10/07/2022 08:39 AM    K 3.7 10/07/2022 08:39 AM    K 5.1 07/18/2022 10:25 AM    CL 97 10/07/2022 08:39 AM    CO2 22 10/07/2022 08:39 AM    BUN 52 10/07/2022 08:39 AM    CREATININE 2.56 10/07/2022 08:39 AM    GFRAA 21.9 10/07/2022 08:39 AM    LABGLOM 18.1 10/07/2022 08:39 AM    GLUCOSE 195 10/07/2022 08:39 AM    PROT 4.7 10/01/2022 07:59 AM    LABALBU 2.7 10/01/2022 07:59 AM    CALCIUM 8.5 10/07/2022 08:39 AM    BILITOT 0.4 10/01/2022 07:59 AM    ALKPHOS 119 10/01/2022 07:59 AM    AST 18 10/01/2022 07:59 AM    ALT 14 10/01/2022 07:59 AM     BMP:    Lab Results   Component Value Date/Time     10/07/2022 08:39 AM    K 3.7 10/07/2022 08:39 AM    K 5.1 07/18/2022 10:25 AM    CL 97 10/07/2022 08:39 AM    CO2 22 10/07/2022 08:39 AM    BUN 52 10/07/2022 08:39 AM    LABALBU 2.7 10/01/2022 07:59 AM    CREATININE 2.56 10/07/2022 08:39 AM    CALCIUM 8.5 10/07/2022 08:39 AM    GFRAA 21.9 10/07/2022 08:39 AM    LABGLOM 18.1 10/07/2022 08:39 AM    GLUCOSE 195 10/07/2022 08:39 AM     Magnesium:    Lab Results   Component Value Date/Time    MG 2.0 10/03/2022 05:55 AM     Troponin:    Lab Results   Component Value Date/Time    TROPONINI 0.022 10/01/2022 10:49 PM       Radiology:  CT ABDOMEN PELVIS WO CONTRAST Additional Contrast? None    Result Date: 10/6/2022  1. Again seen is a very small amount of postprocedural hemorrhage posterior to the right kidney. This is unchanged when compared to prior study one hour ago during the procedure. 2.Note is made of bilateral small pleural effusions, unchanged since prior study dating July 18, 2022. Of note is a right costophrenic angle medial density, which is again felt to represent likely compressive atelectasis, though appears more plump than prior. Attention on follow-up is recommended. 3.Calcified right liver lobe density, may represent an old granuloma. This is also unchanged 4. Calcific densities are noted within the colonic wall near the cecum. These may be dystrophic calcification such as sequelae of old inflammation or infection such as epiploic appendicitis versus infection, though alternatively premalignant and malignancies can also present in such a fashion. Please correlate with colonoscopy and other workup as clinically indicated. Discussed with Dr. Macy Nichols. COMPARISON: Same day study, one hour prior during procedure, and CT from July 18, 2022 DIAGNOSIS: Small postprocedural hemorrhage posterior to the right kidney COMMENTS: I13.10 Cardiorenal disease ICD10 TECHNIQUE: Spiral scanning of the abdomen and pelvis was performed after the administration of oral contrast .   Intravenous contrast was not administered as per the referring physician. CT Dose-Length Product (estimate related to radiation exposure from this exam):  130.68  mGy*cm. CT ABDOMEN: Note is made of bilateral small pleural effusions, unchanged since July. Note is made of bibasilar atelectasis. Some of the areas of atelectasis appear to be more plump such as in the right base at the costophrenic angle, attention on follow-up is recommended. Calcific density is again seen in the right liver lobe, this may represent an old granuloma. The spleen is of normal size and attenuation without focal lesions. Pancreas shows no signs of focal mass or peripancreatic fluid collection. Kidneys show no contour deforming renal masses or hydronephrosis. A very small amount of postprocedural biopsy bleed is seen posterior to the right kidney, this small and unchanged from 1 hour prior. Adrenal glands are unremarkable. Aorta is normal in caliber without  signs of aneurysmal dilatation. No significant retroperitoneal adenopathy or ascites is identified. Calcific densities are noted within the colonic wall near the cecum.  These may be dystrophic calcification such as sequelae of old inflammation or infection such as epiploic appendicitis versus infection, though alternatively premalignant and malignancies can also present in such a fashion. Please correlate with colonoscopy and other workup as clinically indicated. CT PELVIS:  Scanning of the pelvis shows no signs of pelvic mass or pelvic fluid collection. Diverticulosis of the colon is noted. CT GUIDED NEEDLE PLACEMENT    Result Date: 10/6/2022  1. Successful core biopsy of right inferior pole renal parenchyma. 2.2 Gelfoam torpedoes were injected after biopsy for immediate hemostasis. During biopsy and prior to insertion of Gelfoam torpedoes, a small amount of bleeding was seen adjacent to the needle entry site which stopped and did not grow any further. HISTORY: Lalo Panchla is a Female of 64 years age. DIAGNOSIS:   random renal biopsy COMPARISON: None available. CT Dose-Length Product (estimate related to radiation exposure from this exam):  532.69  mGy*cm. PROCEDURE: Following the discussion of the procedure, alternatives, risks versus benefits, informed consent was obtained from the patient. Specifically, risks of after-biopsy pain at the site, rare possibility of excessive hemorrhage, infection, injury to the adjacent organs were discussed and the patient verbalized understanding. Pre-procedure evaluation confirmed that the patient was an appropriate candidate for conscious sedation. Adequate sedation was maintained during the entire procedure. Vital signs, pulse oximetry, and response to verbal commands were monitored and recorded by the nurse throughout the procedure and the recovery period. Medical information was entered in the medical record including the medications and dosages used. The patient returned to baseline neurologic and physiologic status prior to leaving the department. No immediate sedation related complications were noted. Medication for conscious sedation was administered via IV route. 45 minutes of conscious sedation was provided.  Following universal protocol, patient and site verification was performed with a \"timeout\" prior to the procedure. The patient was placed on the CT table in prone position and the right flank area was prepped and draped in usual sterile fashion. Using the usual sterile conditions, lidocaine and CT guidance, the inferior pole of the right kidney was accessed using an  18-guage coaxial biopsy needle system. After confirmation of appropriate localization of the needle, total of 3 samples were obtained and sent for pathological analysis. After biopsy, a small amount of blood was seen posterior to the right kidney. 2 Gelfoam torpedoes were injected through the coaxial needle for hemostasis. No further bleeding was seen. The coaxial needle system was removed and hemostasis was achieved by direct digital compression. The patient tolerated the procedure well. No immediate complications identified. The patient left the CT suite in supine position to the recovery room in stable condition. Total local anesthetic used: lidocaine, approximately 8 mL. Estimated blood loss:  Approximately 20 mL. The patient was observed in the recovery room for two hours after the procedure and discharged in stable condition. CT BIOPSY RENAL    Result Date: 10/6/2022  1. Successful core biopsy of right inferior pole renal parenchyma. 2.2 Gelfoam torpedoes were injected after biopsy for immediate hemostasis. During biopsy and prior to insertion of Gelfoam torpedoes, a small amount of bleeding was seen adjacent to the needle entry site which stopped and did not grow any further. HISTORY: Gabriela Sánchez is a Female of 64 years age. DIAGNOSIS:   random renal biopsy COMPARISON: None available. CT Dose-Length Product (estimate related to radiation exposure from this exam):  532.69  mGy*cm. PROCEDURE: Following the discussion of the procedure, alternatives, risks versus benefits, informed consent was obtained from the patient.   Specifically, risks of after-biopsy pain at the site, rare possibility of excessive hemorrhage, infection, injury to the adjacent organs were discussed and the patient verbalized understanding. Pre-procedure evaluation confirmed that the patient was an appropriate candidate for conscious sedation. Adequate sedation was maintained during the entire procedure. Vital signs, pulse oximetry, and response to verbal commands were monitored and recorded by the nurse throughout the procedure and the recovery period. Medical information was entered in the medical record including the medications and dosages used. The patient returned to baseline neurologic and physiologic status prior to leaving the department. No immediate sedation related complications were noted. Medication for conscious sedation was administered via IV route. 45 minutes of conscious sedation was provided. Following universal protocol, patient and site verification was performed with a \"timeout\" prior to the procedure. The patient was placed on the CT table in prone position and the right flank area was prepped and draped in usual sterile fashion. Using the usual sterile conditions, lidocaine and CT guidance, the inferior pole of the right kidney was accessed using an  18-guage coaxial biopsy needle system. After confirmation of appropriate localization of the needle, total of 3 samples were obtained and sent for pathological analysis. After biopsy, a small amount of blood was seen posterior to the right kidney. 2 Gelfoam torpedoes were injected through the coaxial needle for hemostasis. No further bleeding was seen. The coaxial needle system was removed and hemostasis was achieved by direct digital compression. The patient tolerated the procedure well. No immediate complications identified. The patient left the CT suite in supine position to the recovery room in stable condition. Total local anesthetic used: lidocaine, approximately 8 mL. Estimated blood loss:  Approximately 20 mL.  The patient was observed in the recovery room for two hours after the procedure and discharged in stable condition. Echocardiogram 7/19/22:  Conclusions   Summary   Normal left ventricle structure and function. Left ventricular ejection fraction is visually estimated at 75%. Moderate concentric LVH   Borderline diastolic dysfunction   There is DUST (discrete upper septal thickening) without evidence of   outflow tract obstruction. Normal right ventricle structure and function. Right ventricular systolic pressure of 37 mm Hg consistent with mild   pulmonary hypertension. Normal mitral valve structure and function. 1+ MR   MAC   Normal aortic valve structure and function. Mild aortic sclerosis   Normal tricuspid valve structure and function. Mild tricuspid regurgitation. Mildly dilated left atrium.       Signature   ----------------------------------------------------------------   Electronically signed by Sourav Arteaga MD(Interpreting   physician) on 07/19/2022 12:11 PM   ----------------------------------------------------------------    EKG on 9/30/22: SR 88, no acute ischemic changes, QTc 467ms    Telemetry 10/7/22: SR 80s      Assessment:    Active Hospital Problems    Diagnosis Date Noted    CHAYO (acute kidney injury) ([facility] [address] [N17.9] 10/06/2022     Priority: Medium    Anemia [D64.9] 10/03/2022     Priority: Medium    Hypertensive emergency [I16.1] 10/01/2022     Priority: Medium     Acute on chronic heart failure with preserved EF  Normal LVF EF 75% per echo 7/19/22  Chest pain r/o cardiac ischemia  HTN urgency--improving  Elevated troponin  Moderate concentric LVH  Dyslipidemia  CHAYO on CKD--s/p right kidney biopsy on 10/6/22   Anemia  Leukocytosis     Plan:  Continue current medications-Lopressor 100 mg p.o. twice daily, hydralazine 100 mg p.o. every 8 hours, Lipitor 40 mg p.o. nightly, magnesium oxide 400 mg p.o. daily, Lasix 80 mg IV daily, increase Imdur 60 mg p.o. daily, Diovan 80 mg p.o. daily, levothyroxine 25 mcg p.o. daily, potassium chloride 20 mEq p.o. daily, Protonix 40 mg p.o. daily, CellCept 1000 mg p.o. twice daily, Solu-Medrol 500 mg IV daily  Cardiac/less than 2 g sodium diet recommended  Check daily weight and strict intake and output  Recommend 2000 mL daily fluid restriction  Monitor on telemetry for any tachycardia or bradycardia arrhythmias  Maintain potassium greater than 4, magnesium greater than 2  GI/DVT prophylaxis  Gastroenterology recommendations regarding anemia--status post recent EGD and colonoscopy less than 1 month ago which were normal.  No overt bleeding noted and therefore no immediate clinical indication for endoscopic investigation. Recommend follow-up outpatient and consideration for small bowel pill capsule endoscopy  Nephrology recommendations  Coronary evaluation/cardiac catheterization in future when feasible/renal function allows as patient with multiple risk factors for CAD, acute decompensated heart failure and mildly elevated cardiac enzymes. Will need follow-up with CHF clinic upon discharge  Further recommendations to follow    Electronically signed by CARMELITA Cruz on 10/7/2022 at 11:56 AM    Attending Supervising [de-identified] Attestation Statement  The patient is a 64 y.o. female. I have performed a history and physical examination of the patient. I discussed the case with the physician assistant. I reviewed the patient's Past Medical History, Past Surgical History, Medications, and Allergies.      Physical Exam:  Vitals:    10/06/22 1916 10/07/22 0553 10/07/22 0811 10/07/22 1444   BP: (!) 141/89 (!) 186/81 (!) 166/77 (!) 150/97   Pulse: 99 100 (!) 105 98   Resp: 18  18 18   Temp: 97.7 °F (36.5 °C) 97.7 °F (36.5 °C) 97.9 °F (36.6 °C) 97.7 °F (36.5 °C)   TempSrc: Oral  Oral Oral   SpO2: 95% 94% 96% 98%   Weight:       Height:               Pulmonary/Chest: clear to auscultation bilaterally- no wheezes, rales or rhonchi, normal air movement, no respiratory distress  Cardiovascular: normal rate, normal S1 and S2, no gallops, intact distal pulses, and no carotid bruits  Abdomen: soft, non-tender, non-distended, normal bowel sounds, no masses or organomegaly    Active Hospital Problems    Diagnosis Date Noted    CHAYO (acute kidney injury) ([facility] [address] [N17.9] 10/06/2022     Priority: Medium    Anemia [D64.9] 10/03/2022     Priority: Medium    Hypertensive emergency [I16.1] 10/01/2022     Priority: Medium        I reviewed and agree with the findings and plan documented in her note . ASSESSMENT:               Active Hospital Problems     Diagnosis Date Noted    Hypertensive emergency [I16.1] 10/01/2022       Priority: Medium      Acute on chronic decompensated diastolic congestive heart failure  Hypertensive urgency  Elevated cardiac enzyme. Questionable demand ischemia. Rule out underlying coronary ischemia  Chronic kidney disease  Hyperlipidemia  Anemia  Leukocytosis- likely reactive due to renal biopsy. No signs of infection. PLAN:   As always, aggressive risk factor modification is strongly recommended. We should adhere to the JNC VIII guidelines for HTN management and the NCEPATP III guidelines for LDL-C management. Diuresis as tolerated. Monitor I's and O's and renal function. Nephrology recommendations  GI recommendation  Coronary valuation in the form of cardiac catheterization when clinically stable/renal function allows. Patient with multiple risk factors for CAD and acute decompensated heart failure. Also has abnormal EKG and mildly elevated cardiac enzymes high risk for CAD. We will perform when clinically feasible. Can be done as outpatient as well. NO Norvasc secondary to lower extremity edema as well as acute decompensated heart failure  Avoid nephrotoxic agents  Monitor on telemetry  GI/DVT prophylaxis  CHF teaching  Follow-up in CHF clinic post discharge  Maintain hemoglobin greater than 7. Retacrit 3 times weekly.   Consider hematology evaluation. Increase Imdur to 60mg daily for BP control.              Electronically signed by Coco Silverio DO on 10/7/22 at 4:11 PM EDT

## 2022-10-08 LAB
ANION GAP SERPL CALCULATED.3IONS-SCNC: 13 MEQ/L (ref 9–15)
BUN BLDV-MCNC: 59 MG/DL (ref 8–23)
CALCIUM SERPL-MCNC: 8.3 MG/DL (ref 8.5–9.9)
CHLORIDE BLD-SCNC: 104 MEQ/L (ref 95–107)
CO2: 24 MEQ/L (ref 20–31)
CREAT SERPL-MCNC: 2.32 MG/DL (ref 0.5–0.9)
CREATININE URINE: 20.1 MG/DL
DSDNA ANTIBODY TITER: NORMAL
GFR AFRICAN AMERICAN: 24.5
GFR NON-AFRICAN AMERICAN: 20.3
GLUCOSE BLD-MCNC: 123 MG/DL (ref 70–99)
HCT VFR BLD CALC: 22.3 % (ref 37–47)
HCT VFR BLD CALC: 22.7 % (ref 37–47)
HEMOGLOBIN: 7.5 G/DL (ref 12–16)
MCH RBC QN AUTO: 27.3 PG (ref 27–31.3)
MCHC RBC AUTO-ENTMCNC: 32.9 % (ref 33–37)
MCV RBC AUTO: 82.8 FL (ref 82–100)
MICROALBUMIN UR-MCNC: 61.3 MG/DL
MICROALBUMIN/CREAT UR-RTO: 3049.8 MG/G (ref 0–30)
MYELOPEROXIDASE AB: 0 AU/ML (ref 0–19)
PDW BLD-RTO: 13.9 % (ref 11.5–14.5)
PLATELET # BLD: 305 K/UL (ref 130–400)
POTASSIUM SERPL-SCNC: 3.9 MEQ/L (ref 3.4–4.9)
RBC # BLD: 2.74 M/UL (ref 4.2–5.4)
RETICULOCYTE ABSOLUTE COUNT: 0.14 M/CUMM (ref 0.02–0.11)
RETICULOCYTE COUNT PCT: 5.1 % (ref 0.6–2.2)
SERINE PROTEASE 3 AB: 0 AU/ML (ref 0–19)
SODIUM BLD-SCNC: 141 MEQ/L (ref 135–144)
TSH REFLEX: 2.2 UIU/ML (ref 0.44–3.86)
WBC # BLD: 12.3 K/UL (ref 4.8–10.8)

## 2022-10-08 PROCEDURE — 6370000000 HC RX 637 (ALT 250 FOR IP): Performed by: INTERNAL MEDICINE

## 2022-10-08 PROCEDURE — 82043 UR ALBUMIN QUANTITATIVE: CPT

## 2022-10-08 PROCEDURE — 6370000000 HC RX 637 (ALT 250 FOR IP): Performed by: PHYSICIAN ASSISTANT

## 2022-10-08 PROCEDURE — 6360000002 HC RX W HCPCS: Performed by: INTERNAL MEDICINE

## 2022-10-08 PROCEDURE — 85046 RETICYTE/HGB CONCENTRATE: CPT

## 2022-10-08 PROCEDURE — 80048 BASIC METABOLIC PNL TOTAL CA: CPT

## 2022-10-08 PROCEDURE — 86160 COMPLEMENT ANTIGEN: CPT

## 2022-10-08 PROCEDURE — 36415 COLL VENOUS BLD VENIPUNCTURE: CPT

## 2022-10-08 PROCEDURE — 2060000000 HC ICU INTERMEDIATE R&B

## 2022-10-08 PROCEDURE — 82746 ASSAY OF FOLIC ACID SERUM: CPT

## 2022-10-08 PROCEDURE — 82570 ASSAY OF URINE CREATININE: CPT

## 2022-10-08 PROCEDURE — 85027 COMPLETE CBC AUTOMATED: CPT

## 2022-10-08 PROCEDURE — 82607 VITAMIN B-12: CPT

## 2022-10-08 PROCEDURE — 84443 ASSAY THYROID STIM HORMONE: CPT

## 2022-10-08 PROCEDURE — 99232 SBSQ HOSP IP/OBS MODERATE 35: CPT | Performed by: INTERNAL MEDICINE

## 2022-10-08 PROCEDURE — 82728 ASSAY OF FERRITIN: CPT

## 2022-10-08 PROCEDURE — 2580000003 HC RX 258: Performed by: INTERNAL MEDICINE

## 2022-10-08 RX ORDER — FUROSEMIDE 10 MG/ML
60 INJECTION INTRAMUSCULAR; INTRAVENOUS DAILY
Status: DISCONTINUED | OUTPATIENT
Start: 2022-10-09 | End: 2022-10-10

## 2022-10-08 RX ADMIN — LEVOTHYROXINE SODIUM 25 MCG: 0.03 TABLET ORAL at 06:33

## 2022-10-08 RX ADMIN — ISOSORBIDE MONONITRATE 60 MG: 60 TABLET, EXTENDED RELEASE ORAL at 08:33

## 2022-10-08 RX ADMIN — Medication 400 MG: at 08:34

## 2022-10-08 RX ADMIN — MYCOPHENOLATE MOFETIL 1000 MG: 250 CAPSULE ORAL at 08:33

## 2022-10-08 RX ADMIN — HYDRALAZINE HYDROCHLORIDE 100 MG: 100 TABLET, FILM COATED ORAL at 06:33

## 2022-10-08 RX ADMIN — METOPROLOL TARTRATE 100 MG: 50 TABLET, FILM COATED ORAL at 22:05

## 2022-10-08 RX ADMIN — Medication 10 ML: at 08:32

## 2022-10-08 RX ADMIN — MYCOPHENOLATE MOFETIL 1000 MG: 250 CAPSULE ORAL at 22:05

## 2022-10-08 RX ADMIN — ACETAMINOPHEN 650 MG: 325 TABLET ORAL at 06:36

## 2022-10-08 RX ADMIN — PREDNISONE 60 MG: 50 TABLET ORAL at 10:54

## 2022-10-08 RX ADMIN — Medication 10 ML: at 22:05

## 2022-10-08 RX ADMIN — POTASSIUM CHLORIDE 20 MEQ: 1500 TABLET, EXTENDED RELEASE ORAL at 08:34

## 2022-10-08 RX ADMIN — DOXAZOSIN 2 MG: 1 TABLET ORAL at 08:33

## 2022-10-08 RX ADMIN — ATORVASTATIN CALCIUM 40 MG: 40 TABLET, FILM COATED ORAL at 22:05

## 2022-10-08 RX ADMIN — HYDRALAZINE HYDROCHLORIDE 100 MG: 100 TABLET, FILM COATED ORAL at 22:05

## 2022-10-08 RX ADMIN — PANTOPRAZOLE SODIUM 40 MG: 40 TABLET, DELAYED RELEASE ORAL at 08:33

## 2022-10-08 RX ADMIN — HYDRALAZINE HYDROCHLORIDE 100 MG: 100 TABLET, FILM COATED ORAL at 14:06

## 2022-10-08 RX ADMIN — METOPROLOL TARTRATE 100 MG: 50 TABLET, FILM COATED ORAL at 08:34

## 2022-10-08 RX ADMIN — FUROSEMIDE 80 MG: 10 INJECTION, SOLUTION INTRAMUSCULAR; INTRAVENOUS at 08:32

## 2022-10-08 RX ADMIN — VALSARTAN 80 MG: 80 TABLET, FILM COATED ORAL at 08:34

## 2022-10-08 ASSESSMENT — ENCOUNTER SYMPTOMS
COLOR CHANGE: 0
NAUSEA: 0
SHORTNESS OF BREATH: 1
VOMITING: 0
CHEST TIGHTNESS: 1

## 2022-10-08 ASSESSMENT — PAIN SCALES - GENERAL
PAINLEVEL_OUTOF10: 0
PAINLEVEL_OUTOF10: 0

## 2022-10-08 NOTE — PROGRESS NOTES
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   Hypertension improved (didn't receive meds this am)           Plan: will call [facility] to see if light microscopy out yet  Empircally 3rd dose solu medrol IV and cellcept 100mg bid patient aware 2ND URINE STILL NOT SENT FROM 10/5/2022  ANCA along with C3  normal  Anti  Dna pending doubt SLE clinically    10/8/2022  Patient seen and examined  -Clinically euvolemic nonoliguric nonpolyuric with net -2.8 L since admission with no clinical evidence of increased extracellular fluid volume  -No joint or skin involvement  -Normotensive 129/64 134/60  -No major electrolyte or acid-base imbalance  -Improving GFR  -Profound anemia hemoglobin going down from 8.1-7.5 despite negative fluid balance  -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 the significance of the C1q GN is its association with lymphoma or myeloproliferative disease  -Nephrotic range proteinuria and nephritic urine sediment  -Serology pending ANCA myeloperoxidase is negative as well as protease    No therapeutic intervention for today  Work-up anemia    Patient Active Problem List:     CHF (congestive heart failure), NYHA class I, acute on chronic, combined (HCC)     Wheezing     Acute congestive heart failure (HCC)     Microhematuria     Diarrhea of presumed infectious origin     Hypertensive emergency     Anemia     CHAYO (acute kidney injury) ([facility] [address]      Subjective:   Admit Date: 9/30/2022    Interval History: Seen and examined uneventful night expressed after taking some pills that she does not know what was it she started to feel the epigastric discomfort reflecting possible pill induced gastritis physical exam did not show any tenderness over epigastrium or chest pain negative Mckenzie sign      Medications:   Scheduled Meds:   predniSONE  60 mg Oral Daily    [START ON 10/9/2022] furosemide  60 mg IntraVENous Daily    isosorbide mononitrate  60 mg Oral Daily    valsartan  80 mg Oral Daily    mycophenolate  1,000 mg Oral BID    hydrALAZINE  100 mg Oral 3 times per day    potassium chloride  20 mEq Oral Daily with breakfast    epoetin rian-epbx  30 Units/kg SubCUTAneous Once per day on Mon Wed Fri    sodium chloride flush  5-40 mL IntraVENous 2 times per day    atorvastatin  40 mg Oral Nightly    doxazosin  2 mg Oral Daily    levothyroxine  25 mcg Oral Daily    metoprolol  100 mg Oral BID    pantoprazole  40 mg Oral Daily    magnesium oxide  400 mg Oral Daily     Continuous Infusions:   sodium chloride         CBC:   Recent Labs     10/07/22  0839 10/08/22  0526   WBC 14.7* 12.3*   HGB 8.1* 7.5*    305     CMP:    Recent Labs     10/07/22  0351 10/07/22  0839 10/08/22  0526    136 141   K 4.2 3.7 3.9   CL 99 97 104   CO2 25 22 24   BUN 49* 52* 59*   CREATININE 2.71* 2.56* 2.32*   GLUCOSE 163* 195* 123*   CALCIUM 8.2* 8.5 8.3*   LABGLOM 16.9* 18.1* 20.3*     Troponin: No results for input(s): TROPONINI in the last 72 hours. BNP: No results for input(s): BNP in the last 72 hours. INR: No results for input(s): INR in the last 72 hours. Lipids: No results for input(s): CHOL, LDLDIRECT, TRIG, HDL, AMYLASE, LIPASE in the last 72 hours. Liver: No results for input(s): AST, ALT, ALKPHOS, PROT, LABALBU, BILITOT in the last 72 hours. Invalid input(s): BILDIR  Iron:  No results for input(s): IRONS, FERRITIN in the last 72 hours. Invalid input(s): LABIRONS  Urinalysis: No results for input(s): UA in the last 72 hours.     Objective:   Vitals: /64   Pulse 87   Temp 98.2 °F (36.8 °C) (Oral)   Resp 16   Ht 5' 2\" (1.575 m)   Wt 132 lb (59.9 kg)   SpO2 97%   BMI 24.14 kg/m²    Wt Readings from Last 3 Encounters:   09/30/22 132 lb (59.9 kg)   09/01/22 130 lb (59 kg)   08/10/22 134 lb (60.8 kg)      24HR INTAKE/OUTPUT:    Intake/Output Summary (Last 24 hours) at 10/8/2022 1551  Last data filed at 10/8/2022 1420  Gross per 24 hour   Intake 600 ml   Output 800 ml   Net -200 ml       Constitutional:  Alert, awake, no apparent distress   Skin:normal, no rash  HEENT:sclera anicteric.   Head atraumatic normocephalic  Neck:supple with no thyromegally  Cardiovascular:  S1, S2 without m/r/g   Respiratory:  CTA B without w/r/r   Abdomen: +bs, soft, nt  Ext: No LE edema  Musculoskeletal:Intact  Neuro:Alert and oriented with no deficit      Electronically signed by Freddy Holley MD on 10/8/2022 at 3:51 PM

## 2022-10-08 NOTE — PROGRESS NOTES
is 7. 5. GI input appreciated    10/3/22: discussed with son on phone. No CP or SOB today. On lasix gtt. Hd stable. BP elevated. + LE edema. 10/2/22: Patient is feeling somewhat better. Renal function slightly worsening. Sinus tach on telemetry heart rate of 106 bpm.  Continues to have significant bilateral extremity edema. Patient with significant EMEA hemoglobin is 7.3 from 12 2 months ago. 10/1/22:  Patient is a 64 y.o. female who presents with a chief complaint of short of breath. Patient is followed on a regular basis by Dr. Syeda Go MD. patient with past medical history of chronic diastolic congestive heart failure, hypertension, hyperlipidemia, chronic kidney disease who presents with worsening shortness of breath and increased lower extremity edema. Patient generally follows with F she was requesting transfer but no beds available. She denies history of cardiac catheterization or any recent stress test.  Creatinine of 2.39 with a GFR of 19.6 on admission. BNP is elevated at 16,000 with mildly elevated troponin and a flat pattern. Hemoglobin is 8.1. He was noted to have extremely elevated blood pressure on arrival initiated on nitro drip. Currently she is in the 860Y systolic. Echocardiogram at [facility] in 2017 ejection fraction of 60 to 65% no significant valve abnormalities. Negative nuclear stress test in 2017 at [facility]. Review of Systems:   Review of Systems   Constitutional:  Negative for fever. HENT:  Negative for congestion. Respiratory:  Positive for chest tightness and shortness of breath. Cardiovascular:  Positive for chest pain. Negative for palpitations and leg swelling. Gastrointestinal:  Negative for nausea and vomiting. Musculoskeletal:  Negative for arthralgias. Skin:  Negative for color change. Neurological:  Negative for dizziness, syncope and light-headedness. Psychiatric/Behavioral:  Negative for agitation.         Physical Examination:    BP 129/64   Pulse 87   Temp 98.2 °F (36.8 °C) (Oral)   Resp 16   Ht 5' 2\" (1.575 m)   Wt 132 lb (59.9 kg)   SpO2 97%   BMI 24.14 kg/m²    Physical Exam  Constitutional:       General: She is not in acute distress. HENT:      Head: Normocephalic and atraumatic. Cardiovascular:      Rate and Rhythm: Normal rate and regular rhythm. Pulmonary:      Effort: Pulmonary effort is normal. No respiratory distress. Breath sounds: No wheezing, rhonchi or rales. Abdominal:      Palpations: Abdomen is soft. Musculoskeletal:         General: Normal range of motion. Cervical back: Normal range of motion and neck supple. Right lower leg: Edema (trace) present. Left lower leg: Edema (trace) present. Skin:     General: Skin is warm and dry. Neurological:      General: No focal deficit present. Mental Status: She is alert and oriented to person, place, and time. Cranial Nerves: No cranial nerve deficit.    Psychiatric:         Mood and Affect: Mood normal.         Behavior: Behavior normal.       LABS:  CBC:   Lab Results   Component Value Date/Time    WBC 12.3 10/08/2022 05:26 AM    RBC 2.74 10/08/2022 05:26 AM    HGB 7.5 10/08/2022 05:26 AM    HCT 22.7 10/08/2022 05:26 AM    MCV 82.8 10/08/2022 05:26 AM    MCH 27.3 10/08/2022 05:26 AM    MCHC 32.9 10/08/2022 05:26 AM    RDW 13.9 10/08/2022 05:26 AM     10/08/2022 05:26 AM    MPV 8.7 12/26/2013 08:34 AM     CBC with Differential:   Lab Results   Component Value Date/Time    WBC 12.3 10/08/2022 05:26 AM    RBC 2.74 10/08/2022 05:26 AM    HGB 7.5 10/08/2022 05:26 AM    HCT 22.7 10/08/2022 05:26 AM     10/08/2022 05:26 AM    MCV 82.8 10/08/2022 05:26 AM    MCH 27.3 10/08/2022 05:26 AM    MCHC 32.9 10/08/2022 05:26 AM    RDW 13.9 10/08/2022 05:26 AM    LYMPHOPCT 11.4 10/01/2022 07:59 AM    MONOPCT 6.3 10/01/2022 07:59 AM    BASOPCT 1.1 10/01/2022 07:59 AM    MONOSABS 0.5 10/01/2022 07:59 AM    LYMPHSABS 0.9 10/01/2022 07:59 AM EOSABS 0.3 10/01/2022 07:59 AM    BASOSABS 0.1 10/01/2022 07:59 AM     CMP:    Lab Results   Component Value Date/Time     10/08/2022 05:26 AM    K 3.9 10/08/2022 05:26 AM    K 5.1 07/18/2022 10:25 AM     10/08/2022 05:26 AM    CO2 24 10/08/2022 05:26 AM    BUN 59 10/08/2022 05:26 AM    CREATININE 2.32 10/08/2022 05:26 AM    GFRAA 24.5 10/08/2022 05:26 AM    LABGLOM 20.3 10/08/2022 05:26 AM    GLUCOSE 123 10/08/2022 05:26 AM    PROT 4.7 10/01/2022 07:59 AM    LABALBU 2.7 10/01/2022 07:59 AM    CALCIUM 8.3 10/08/2022 05:26 AM    BILITOT 0.4 10/01/2022 07:59 AM    ALKPHOS 119 10/01/2022 07:59 AM    AST 18 10/01/2022 07:59 AM    ALT 14 10/01/2022 07:59 AM     BMP:    Lab Results   Component Value Date/Time     10/08/2022 05:26 AM    K 3.9 10/08/2022 05:26 AM    K 5.1 07/18/2022 10:25 AM     10/08/2022 05:26 AM    CO2 24 10/08/2022 05:26 AM    BUN 59 10/08/2022 05:26 AM    LABALBU 2.7 10/01/2022 07:59 AM    CREATININE 2.32 10/08/2022 05:26 AM    CALCIUM 8.3 10/08/2022 05:26 AM    GFRAA 24.5 10/08/2022 05:26 AM    LABGLOM 20.3 10/08/2022 05:26 AM    GLUCOSE 123 10/08/2022 05:26 AM     Magnesium:    Lab Results   Component Value Date/Time    MG 2.0 10/03/2022 05:55 AM     Troponin:    Lab Results   Component Value Date/Time    TROPONINI 0.022 10/01/2022 10:49 PM       Radiology:  CT ABDOMEN PELVIS WO CONTRAST Additional Contrast? None    Result Date: 10/6/2022  1. Again seen is a very small amount of postprocedural hemorrhage posterior to the right kidney. This is unchanged when compared to prior study one hour ago during the procedure. 2.Note is made of bilateral small pleural effusions, unchanged since prior study dating July 18, 2022. Of note is a right costophrenic angle medial density, which is again felt to represent likely compressive atelectasis, though appears more plump than prior. Attention on follow-up is recommended. 3.Calcified right liver lobe density, may represent an old granuloma. This is also unchanged 4. Calcific densities are noted within the colonic wall near the cecum. These may be dystrophic calcification such as sequelae of old inflammation or infection such as epiploic appendicitis versus infection, though alternatively premalignant and malignancies can also present in such a fashion. Please correlate with colonoscopy and other workup as clinically indicated. Discussed with Dr. Luca Flores. COMPARISON: Same day study, one hour prior during procedure, and CT from July 18, 2022 DIAGNOSIS: Small postprocedural hemorrhage posterior to the right kidney COMMENTS: I13.10 Cardiorenal disease ICD10 TECHNIQUE: Spiral scanning of the abdomen and pelvis was performed after the administration of oral contrast .   Intravenous contrast was not administered as per the referring physician. CT Dose-Length Product (estimate related to radiation exposure from this exam):  130.68  mGy*cm. CT ABDOMEN: Note is made of bilateral small pleural effusions, unchanged since July. Note is made of bibasilar atelectasis. Some of the areas of atelectasis appear to be more plump such as in the right base at the costophrenic angle, attention on follow-up is recommended. Calcific density is again seen in the right liver lobe, this may represent an old granuloma. The spleen is of normal size and attenuation without focal lesions. Pancreas shows no signs of focal mass or peripancreatic fluid collection. Kidneys show no contour deforming renal masses or hydronephrosis. A very small amount of postprocedural biopsy bleed is seen posterior to the right kidney, this small and unchanged from 1 hour prior. Adrenal glands are unremarkable. Aorta is normal in caliber without  signs of aneurysmal dilatation. No significant retroperitoneal adenopathy or ascites is identified. Calcific densities are noted within the colonic wall near the cecum.  These may be dystrophic calcification such as sequelae of old inflammation or infection such as epiploic appendicitis versus infection, though alternatively premalignant and malignancies can also present in such a fashion. Please correlate with colonoscopy and other workup as clinically indicated. CT PELVIS:  Scanning of the pelvis shows no signs of pelvic mass or pelvic fluid collection. Diverticulosis of the colon is noted. CT GUIDED NEEDLE PLACEMENT    Result Date: 10/6/2022  1. Successful core biopsy of right inferior pole renal parenchyma. 2.2 Gelfoam torpedoes were injected after biopsy for immediate hemostasis. During biopsy and prior to insertion of Gelfoam torpedoes, a small amount of bleeding was seen adjacent to the needle entry site which stopped and did not grow any further. HISTORY: Monster Morel is a Female of 64 years age. DIAGNOSIS:   random renal biopsy COMPARISON: None available. CT Dose-Length Product (estimate related to radiation exposure from this exam):  532.69  mGy*cm. PROCEDURE: Following the discussion of the procedure, alternatives, risks versus benefits, informed consent was obtained from the patient. Specifically, risks of after-biopsy pain at the site, rare possibility of excessive hemorrhage, infection, injury to the adjacent organs were discussed and the patient verbalized understanding. Pre-procedure evaluation confirmed that the patient was an appropriate candidate for conscious sedation. Adequate sedation was maintained during the entire procedure. Vital signs, pulse oximetry, and response to verbal commands were monitored and recorded by the nurse throughout the procedure and the recovery period. Medical information was entered in the medical record including the medications and dosages used. The patient returned to baseline neurologic and physiologic status prior to leaving the department. No immediate sedation related complications were noted. Medication for conscious sedation was administered via IV route. 45 minutes of conscious sedation was provided. Following universal protocol, patient and site verification was performed with a \"timeout\" prior to the procedure. The patient was placed on the CT table in prone position and the right flank area was prepped and draped in usual sterile fashion. Using the usual sterile conditions, lidocaine and CT guidance, the inferior pole of the right kidney was accessed using an  18-guage coaxial biopsy needle system. After confirmation of appropriate localization of the needle, total of 3 samples were obtained and sent for pathological analysis. After biopsy, a small amount of blood was seen posterior to the right kidney. 2 Gelfoam torpedoes were injected through the coaxial needle for hemostasis. No further bleeding was seen. The coaxial needle system was removed and hemostasis was achieved by direct digital compression. The patient tolerated the procedure well. No immediate complications identified. The patient left the CT suite in supine position to the recovery room in stable condition. Total local anesthetic used: lidocaine, approximately 8 mL. Estimated blood loss:  Approximately 20 mL. The patient was observed in the recovery room for two hours after the procedure and discharged in stable condition. CT BIOPSY RENAL    Result Date: 10/6/2022  1. Successful core biopsy of right inferior pole renal parenchyma. 2.2 Gelfoam torpedoes were injected after biopsy for immediate hemostasis. During biopsy and prior to insertion of Gelfoam torpedoes, a small amount of bleeding was seen adjacent to the needle entry site which stopped and did not grow any further. HISTORY: Alyson Butler is a Female of 64 years age. DIAGNOSIS:   random renal biopsy COMPARISON: None available. CT Dose-Length Product (estimate related to radiation exposure from this exam):  532.69  mGy*cm. PROCEDURE: Following the discussion of the procedure, alternatives, risks versus benefits, informed consent was obtained from the patient.   Specifically, risks of after-biopsy pain at the site, rare possibility of excessive hemorrhage, infection, injury to the adjacent organs were discussed and the patient verbalized understanding. Pre-procedure evaluation confirmed that the patient was an appropriate candidate for conscious sedation. Adequate sedation was maintained during the entire procedure. Vital signs, pulse oximetry, and response to verbal commands were monitored and recorded by the nurse throughout the procedure and the recovery period. Medical information was entered in the medical record including the medications and dosages used. The patient returned to baseline neurologic and physiologic status prior to leaving the department. No immediate sedation related complications were noted. Medication for conscious sedation was administered via IV route. 45 minutes of conscious sedation was provided. Following universal protocol, patient and site verification was performed with a \"timeout\" prior to the procedure. The patient was placed on the CT table in prone position and the right flank area was prepped and draped in usual sterile fashion. Using the usual sterile conditions, lidocaine and CT guidance, the inferior pole of the right kidney was accessed using an  18-guage coaxial biopsy needle system. After confirmation of appropriate localization of the needle, total of 3 samples were obtained and sent for pathological analysis. After biopsy, a small amount of blood was seen posterior to the right kidney. 2 Gelfoam torpedoes were injected through the coaxial needle for hemostasis. No further bleeding was seen. The coaxial needle system was removed and hemostasis was achieved by direct digital compression. The patient tolerated the procedure well. No immediate complications identified. The patient left the CT suite in supine position to the recovery room in stable condition. Total local anesthetic used: lidocaine, approximately 8 mL.  Estimated blood loss: Approximately 20 mL. The patient was observed in the recovery room for two hours after the procedure and discharged in stable condition. Echocardiogram 7/19/22:  Conclusions   Summary   Normal left ventricle structure and function. Left ventricular ejection fraction is visually estimated at 75%. Moderate concentric LVH   Borderline diastolic dysfunction   There is DUST (discrete upper septal thickening) without evidence of   outflow tract obstruction. Normal right ventricle structure and function. Right ventricular systolic pressure of 37 mm Hg consistent with mild   pulmonary hypertension. Normal mitral valve structure and function. 1+ MR   MAC   Normal aortic valve structure and function. Mild aortic sclerosis   Normal tricuspid valve structure and function. Mild tricuspid regurgitation. Mildly dilated left atrium. Signature   ----------------------------------------------------------------   Electronically signed by Nuria Hallman MD(Interpreting   physician) on 07/19/2022 12:11 PM   ----------------------------------------------------------------    EKG on 9/30/22: SR 88, no acute ischemic changes, QTc 467ms    Telemetry 10/8/22: Sinus rhythm       Assessment:    Acute on chronic decompensated diastolic congestive heart failure  Hypertensive urgency  Elevated cardiac enzyme. Questionable demand ischemia. Rule out underlying coronary ischemia  Chronic kidney disease  Hyperlipidemia  Anemia  Leukocytosis- likely reactive due to renal biopsy. No signs of infection. PLAN:   As always, aggressive risk factor modification is strongly recommended. We should adhere to the JNC VIII guidelines for HTN management and the NCEPATP III guidelines for LDL-C management. Diuresis as tolerated. Monitor I's and O's and renal function. Nephrology recommendations  GI recommendation  Coronary valuation in the form of cardiac catheterization when clinically stable/renal function stable.   Patient with multiple risk factors for CAD and acute decompensated heart failure. Also has abnormal EKG and mildly elevated cardiac enzymes high risk for CAD. We will perform possibly Monday if cleared by nephology. NO Norvasc secondary to lower extremity edema as well as acute decompensated heart failure  Avoid nephrotoxic agents  Monitor on telemetry  GI/DVT prophylaxis  CHF teaching  Follow-up in CHF clinic post discharge  Maintain hemoglobin greater than 7. Retacrit 3 times weekly. Consider hematology evaluation. Continue Imdur to 60mg daily for BP control.

## 2022-10-08 NOTE — FLOWSHEET NOTE
0800 patient resting in bed. Denies pain, N/V, SOB. Up independently, on room air. Lungs clear. Skin intact. Trace bilat LE edema. Call light in reach. 1200 patient resting in bed. No needs at this time. Call light in reach. 1600 patient resting in bed. Complaining of upset stomach after meds. Gave ginger ale and crackers. No other needs. Call light in reach.

## 2022-10-08 NOTE — PROGRESS NOTES
PROGRESS NOTE:    Vascular Medicine and Interventional Radiology      PROGRESS NOTE:       Cielo Espinoza  : 1945  MR #: [id number]       PCP:  Delfino Galloway MD     Attending Physician: Aline Garcia MD    Date of Admission: 2022 12:37 PM     Chief Complaint:   Chief Complaint   Patient presents with    Shortness of Breath     X 1 week with bilateral leg swelling, had chest pain yesterday, denies chest pain today        SUBJECTIVE:   Cielo Espinoza S/P day one Percutaneous CT Guided right random renal core needle biopsy. Developed post operative hemorrhage. She reports some mild discomfort at needle site to right mid back. This pain has lessened biopsy. States had some right abdominal discomfort early this am which was resolved with tylenol. Has had no further abdominal pain. Reports appetite good. Up ambulating. Denies nausea, vomiting. Past Medical History:   has a past medical history of CHF (congestive heart failure) ([facility] [address] and Hypertension. Past SurgicalHistory:   has a past surgical history that includes Hysterectomy, total abdominal; hernia repair; Cataract removal (Bilateral); other surgical history (Right, 10/06/2022); and CT BIOPSY RENAL (10/6/2022). Allergies:Norco [hydrocodone-acetaminophen]    Home Medications:   Prior to Admission medications    Medication Sig Start Date End Date Taking?  Authorizing Provider   hydrALAZINE (APRESOLINE) 10 MG tablet Take 10 mg by mouth 4 times daily   Yes Historical Provider, MD   isosorbide mononitrate (IMDUR) 60 MG extended release tablet Take 120 mg by mouth daily   Yes Historical Provider, MD   torsemide (DEMADEX) 20 MG tablet Take 20 mg by mouth 2 times daily   Yes Historical Provider, MD   doxazosin (CARDURA) 2 MG tablet Take 2 mg by mouth daily  Patient not taking: Reported on 10/1/2022 8/23/22   Historical Provider, MD   Magnesium 400 MG TABS TAKE 1 TABLET BY MOUTH ONCE DAILY 22   Historical Provider, MD   levothyroxine (SYNTHROID) 25 MCG tablet 1/2 po daily 9/1/22   Woody Goldman MD   amLODIPine (NORVASC) 10 MG tablet Take by mouth  Patient not taking: Reported on 10/1/2022 6/9/22   Historical Provider, MD   atorvastatin (LIPITOR) 20 MG tablet Take by mouth 6/3/22   Historical Provider, MD   losartan (COZAAR) 50 MG tablet  6/16/22   Historical Provider, MD   pantoprazole (PROTONIX) 40 MG tablet Take 40 mg by mouth in the morning. 3/7/22 3/7/23  Historical Provider, MD   furosemide (LASIX) 20 MG tablet Take 1 tablet by mouth in the morning. Patient not taking: Reported on 10/1/2022 7/20/22   Betty Fitzpatrick MD   metoprolol (LOPRESSOR) 100 MG tablet Take 100 mg by mouth in the morning and 100 mg before bedtime. Historical Provider, MD        Family History: History reviewed. No pertinent family history. SocialHistory:    Social History     Socioeconomic History    Marital status:      Spouse name: Not on file    Number of children: Not on file    Years of education: Not on file    Highest education level: Not on file   Occupational History    Not on file   Tobacco Use    Smoking status: Never    Smokeless tobacco: Never   Vaping Use    Vaping Use: Never used   Substance and Sexual Activity    Alcohol use: Not Currently    Drug use: Never    Sexual activity: Never   Other Topics Concern    Not on file   Social History Narrative    Not on file     Social Determinants of Health     Financial Resource Strain: Not on file   Food Insecurity: Not on file   Transportation Needs: Not on file   Physical Activity: Not on file   Stress: Not on file   Social Connections: Not on file   Intimate Partner Violence: Not on file   Housing Stability: Not on file        ROS:   Review of Systems   Constitutional: Negative. Negative for chills, fatigue and fever. HENT: Negative. Negative for congestion, ear pain, sore throat and trouble swallowing. Eyes: Negative. Negative for visual disturbance. Respiratory: Negative.   Negative for cough, shortness of breath and wheezing. Cardiovascular: Negative. Negative for chest pain, palpitations and leg swelling. Gastrointestinal: Negative. Negative for abdominal pain, diarrhea, nausea and vomiting. Endocrine: Negative. Genitourinary: Negative. Negative for difficulty urinating, dysuria and hematuria. Musculoskeletal:  Positive for back pain (right mid back discomfort). Skin: Negative. Negative for color change, rash and wound. Neurological: Negative. Negative for dizziness, weakness, light-headedness, numbness and headaches. Hematological: Negative. Does not bruise/bleed easily. Psychiatric/Behavioral: Negative. The patient is not nervous/anxious. All other systems reviewed and are negative. Objective:   Vitals: BP (!) 150/97   Pulse 98   Temp 97.7 °F (36.5 °C) (Oral)   Resp 18   Ht 5' 2\" (1.575 m)   Wt 132 lb (59.9 kg)   SpO2 98%   BMI 24.14 kg/m²      Physical Examination:      Physical Exam  Constitutional:       General: She is not in acute distress. Appearance: Normal appearance. She is not ill-appearing. HENT:      Head: Normocephalic. Nose: No congestion. Cardiovascular:      Rate and Rhythm: Normal rate. Heart sounds: Normal heart sounds. Pulmonary:      Effort: Pulmonary effort is normal.   Abdominal:      General: Bowel sounds are normal.      Palpations: Abdomen is soft. Musculoskeletal:         General: Tenderness (right mid back with palpation at biopsy site. no erythema, bruising, swelling, drainage, no skin discoloration.) present. Normal range of motion. Cervical back: Normal range of motion. Right lower leg: No edema. Left lower leg: No edema. Skin:     General: Skin is warm and dry. Neurological:      Mental Status: She is alert and oriented to person, place, and time. Psychiatric:         Mood and Affect: Mood normal.         Behavior: Behavior normal.       10/6/2022:   Impression   1. Again seen is a very small amount of postprocedural hemorrhage posterior to the right kidney. This is unchanged when compared to prior study one hour ago during the procedure. 2.Note is made of bilateral small pleural effusions, unchanged since prior study dating July 18, 2022. Of note is a right costophrenic angle medial density, which is again felt to represent likely compressive atelectasis, though appears more plump than    prior. Attention on follow-up is recommended. 3.Calcified right liver lobe density, may represent an old granuloma. This is also unchanged   4. Calcific densities are noted within the colonic wall near the cecum. These may be dystrophic calcification such as sequelae of old inflammation or infection such as epiploic appendicitis versus infection, though alternatively premalignant and    malignancies can also present in such a fashion. Please correlate with colonoscopy and other workup as clinically indicated. Discussed with Dr. Bryanna Lopez. COMPARISON: Same day study, one hour prior during procedure, and CT from July 18, 2022       DIAGNOSIS: Small postprocedural hemorrhage posterior to the right kidney       COMMENTS: I13.10 Cardiorenal disease ICD10       TECHNIQUE: Spiral scanning of the abdomen and pelvis was performed after the administration of oral contrast .   Intravenous contrast was not administered as per the referring physician. CT Dose-Length Product (estimate related to radiation exposure from this exam):  130.68  mGy*cm. CT ABDOMEN:        Note is made of bilateral small pleural effusions, unchanged since July. Note is made of bibasilar atelectasis. Some of the areas of atelectasis appear to be more plump such as in the right base at the costophrenic angle, attention on follow-up is    recommended. Calcific density is again seen in the right liver lobe, this may represent an old granuloma. The spleen is of normal size and attenuation without focal lesions.   Pancreas shows no signs of focal mass or peripancreatic fluid collection. Kidneys show    no contour deforming renal masses or hydronephrosis. A very small amount of postprocedural biopsy bleed is seen posterior to the right kidney, this small and unchanged from 1 hour prior. Adrenal glands are unremarkable. Aorta is normal in caliber without    signs of aneurysmal dilatation. No significant retroperitoneal adenopathy or ascites is identified. Calcific densities are noted within the colonic wall near the cecum. These may be dystrophic calcification such as sequelae of old inflammation or    infection such as epiploic appendicitis versus infection, though alternatively premalignant and malignancies can also present in such a fashion. Please correlate with colonoscopy and other workup as clinically indicated. CT PELVIS:    Scanning of the pelvis shows no signs of pelvic mass or pelvic fluid collection. Diverticulosis of the colon is noted. Right mid back biopsy site intact band aid without drainage removed. ASSESSMENT:  Suspected CHAYO on CKD. S/P day one CT Guided needle random right renal for diagnosis with development of post operative hemorrhage. Biopsy done per Dr. Darleen Modi. PLAN:   As she reports some right mid back pain  and given post op hemorrhage, will keep on IR service and evaluate Monday. If no further pain or post operative renal biopsy complications, will discharge. If pain persists, may consider another abdominal CT.      Electronically signed by EITAN Pacheco CNP on 10/7/22 at 9:24 PM EDT

## 2022-10-09 LAB
ANION GAP SERPL CALCULATED.3IONS-SCNC: 12 MEQ/L (ref 9–15)
BUN BLDV-MCNC: 57 MG/DL (ref 8–23)
CALCIUM SERPL-MCNC: 8.2 MG/DL (ref 8.5–9.9)
CHLORIDE BLD-SCNC: 103 MEQ/L (ref 95–107)
CO2: 24 MEQ/L (ref 20–31)
COMPLEMENT C3: 87 MG/DL (ref 90–180)
CREAT SERPL-MCNC: 2.13 MG/DL (ref 0.5–0.9)
FERRITIN: 193 NG/ML (ref 13–150)
FERRITIN: 600 NG/ML (ref 13–150)
FOLATE: 6.4 NG/ML
GFR AFRICAN AMERICAN: 27.1
GFR NON-AFRICAN AMERICAN: 22.4
GLUCOSE BLD-MCNC: 128 MG/DL (ref 70–99)
HCT VFR BLD CALC: 22.7 % (ref 37–47)
HCT VFR BLD CALC: 23.5 % (ref 37–47)
HEMOGLOBIN: 7.8 G/DL (ref 12–16)
IRON SATURATION: 28 % (ref 20–55)
IRON: 66 UG/DL (ref 37–145)
MCH RBC QN AUTO: 27.5 PG (ref 27–31.3)
MCHC RBC AUTO-ENTMCNC: 33.2 % (ref 33–37)
MCV RBC AUTO: 82.7 FL (ref 82–100)
PDW BLD-RTO: 13.7 % (ref 11.5–14.5)
PLATELET # BLD: 325 K/UL (ref 130–400)
POTASSIUM SERPL-SCNC: 4 MEQ/L (ref 3.4–4.9)
RBC # BLD: 2.84 M/UL (ref 4.2–5.4)
RETICULOCYTE ABSOLUTE COUNT: 0.12 M/CUMM (ref 0.02–0.11)
RETICULOCYTE COUNT PCT: 4.5 % (ref 0.6–2.2)
SODIUM BLD-SCNC: 139 MEQ/L (ref 135–144)
TOTAL IRON BINDING CAPACITY: 239 UG/DL (ref 250–450)
UNSATURATED IRON BINDING CAPACITY: 173 UG/DL (ref 112–347)
VITAMIN B-12: 549 PG/ML (ref 232–1245)
WBC # BLD: 10.1 K/UL (ref 4.8–10.8)

## 2022-10-09 PROCEDURE — 99232 SBSQ HOSP IP/OBS MODERATE 35: CPT | Performed by: INTERNAL MEDICINE

## 2022-10-09 PROCEDURE — 6370000000 HC RX 637 (ALT 250 FOR IP): Performed by: INTERNAL MEDICINE

## 2022-10-09 PROCEDURE — 83550 IRON BINDING TEST: CPT

## 2022-10-09 PROCEDURE — 2060000000 HC ICU INTERMEDIATE R&B

## 2022-10-09 PROCEDURE — 36415 COLL VENOUS BLD VENIPUNCTURE: CPT

## 2022-10-09 PROCEDURE — 85027 COMPLETE CBC AUTOMATED: CPT

## 2022-10-09 PROCEDURE — 2580000003 HC RX 258: Performed by: INTERNAL MEDICINE

## 2022-10-09 PROCEDURE — 80048 BASIC METABOLIC PNL TOTAL CA: CPT

## 2022-10-09 PROCEDURE — 82728 ASSAY OF FERRITIN: CPT

## 2022-10-09 PROCEDURE — 6360000002 HC RX W HCPCS: Performed by: INTERNAL MEDICINE

## 2022-10-09 PROCEDURE — 85046 RETICYTE/HGB CONCENTRATE: CPT

## 2022-10-09 PROCEDURE — 6370000000 HC RX 637 (ALT 250 FOR IP): Performed by: PHYSICIAN ASSISTANT

## 2022-10-09 PROCEDURE — 83540 ASSAY OF IRON: CPT

## 2022-10-09 RX ADMIN — HYDRALAZINE HYDROCHLORIDE 10 MG: 20 INJECTION INTRAMUSCULAR; INTRAVENOUS at 06:46

## 2022-10-09 RX ADMIN — MYCOPHENOLATE MOFETIL 1000 MG: 250 CAPSULE ORAL at 21:11

## 2022-10-09 RX ADMIN — METOPROLOL TARTRATE 100 MG: 50 TABLET, FILM COATED ORAL at 10:10

## 2022-10-09 RX ADMIN — HYDRALAZINE HYDROCHLORIDE 100 MG: 100 TABLET, FILM COATED ORAL at 13:43

## 2022-10-09 RX ADMIN — PANTOPRAZOLE SODIUM 40 MG: 40 TABLET, DELAYED RELEASE ORAL at 10:07

## 2022-10-09 RX ADMIN — HYDRALAZINE HYDROCHLORIDE 100 MG: 100 TABLET, FILM COATED ORAL at 06:38

## 2022-10-09 RX ADMIN — PREDNISONE 60 MG: 50 TABLET ORAL at 10:09

## 2022-10-09 RX ADMIN — ACETAMINOPHEN 650 MG: 325 TABLET ORAL at 06:46

## 2022-10-09 RX ADMIN — ISOSORBIDE MONONITRATE 60 MG: 60 TABLET, EXTENDED RELEASE ORAL at 10:09

## 2022-10-09 RX ADMIN — HYDRALAZINE HYDROCHLORIDE 100 MG: 100 TABLET, FILM COATED ORAL at 21:11

## 2022-10-09 RX ADMIN — METOPROLOL TARTRATE 100 MG: 50 TABLET, FILM COATED ORAL at 21:11

## 2022-10-09 RX ADMIN — DOXAZOSIN 2 MG: 1 TABLET ORAL at 10:07

## 2022-10-09 RX ADMIN — FUROSEMIDE 60 MG: 10 INJECTION, SOLUTION INTRAMUSCULAR; INTRAVENOUS at 10:06

## 2022-10-09 RX ADMIN — Medication 10 ML: at 10:10

## 2022-10-09 RX ADMIN — LEVOTHYROXINE SODIUM 25 MCG: 0.03 TABLET ORAL at 06:38

## 2022-10-09 RX ADMIN — Medication 10 ML: at 21:11

## 2022-10-09 RX ADMIN — VALSARTAN 80 MG: 80 TABLET, FILM COATED ORAL at 10:09

## 2022-10-09 RX ADMIN — POTASSIUM CHLORIDE 20 MEQ: 1500 TABLET, EXTENDED RELEASE ORAL at 10:08

## 2022-10-09 RX ADMIN — Medication 400 MG: at 10:09

## 2022-10-09 RX ADMIN — ATORVASTATIN CALCIUM 40 MG: 40 TABLET, FILM COATED ORAL at 21:11

## 2022-10-09 ASSESSMENT — PAIN SCALES - GENERAL
PAINLEVEL_OUTOF10: 0
PAINLEVEL_OUTOF10: 6

## 2022-10-09 ASSESSMENT — ENCOUNTER SYMPTOMS
NAUSEA: 0
VOMITING: 0
SHORTNESS OF BREATH: 1
COLOR CHANGE: 0
CHEST TIGHTNESS: 1

## 2022-10-09 ASSESSMENT — PAIN DESCRIPTION - DESCRIPTORS: DESCRIPTORS: ACHING

## 2022-10-09 ASSESSMENT — PAIN DESCRIPTION - LOCATION: LOCATION: HEAD

## 2022-10-09 NOTE — FLOWSHEET NOTE
Patient alert, calm. VSS. Patient denies any pain or discomfort at this time. No change in condition noted, no distress noted. POC, safety maintained. Family at bedside. Call light in reach. All aware.

## 2022-10-09 NOTE — PROGRESS NOTES
07:30am Report from [facility]. Alert and oriented. BP elevated 180's-190s medicated per night shift Rn.  MP-SR no complaints at this time  1000am Up to bathroom gait steady.  No complaints

## 2022-10-09 NOTE — FLOWSHEET NOTE
1900 resting in bed family at bedside    2200 assessment complete bp high medication given. No complaints of headache or pain. Call light within reach.  Will recheck vitals    0300 resting in bed    0640 bp 196/84 iv hydralzine given pt co headache resting in bed no ss of distress

## 2022-10-09 NOTE — PROGRESS NOTES
Progress Note  Patient: Margaret Yan  Unit/Bed: P269/L727-03  YOB: 1945  MRN: [id number]  Acct: [de-identified]   Admitting Diagnosis: Cardiorenal disease [I13.10]  CHAYO (acute kidney injury) ([facility] [address] [N17.9]  Hypertensive emergency [I16.1]  Acute on chronic diastolic (congestive) heart failure ([facility] [I50.33]  Date:  9/30/2022  Hospital Day: 8    Chief Complaint:  Shortness of breath    Subjective  10/9/22: Feels better. No chest pain overnight. Renal biopsy results still pending. Creatinine slightly better today, 2.13.     10/8/22: Headache earlier now resolved. No further chest pain but contineus to have SILVA. 10/7/22: Resting comfortably in bed in no acute distress. After taking morning medications, patient states she developed an episode of left-sided chest pain/pressure which lasted approximately 10 minutes before resolving. She continues to experience shortness of breath with exertion. She is maintaining adequate SPO2 on room air. She underwent right kidney biopsy yesterday with IR and results are pending. She remains on Lasix 80 mg IV daily with approximately 2.2 L negative fluid balance since admission. Renal function remains relatively unchanged with creatinine of 2.56, BUN elevated 52 and GFR low at 18.1. Worsening in leukocytosis this morning with WBC of 14.7 compared to 11.9 yesterday. Currently on telemetry she is sinus rhythm with heart rate in the 80s. 10/6/22: Patient going for kidney biopsy today. Denies any chest pain. No significant shortness of breath. Sinus rhythm/sinus tachycardia on telemetry. Labs are pending today. Off of Lasix drip    10/5/22: Case discussed with nephrology. Patient remains on Lasix drip. Previous records reviewed from [facility]. Patient with negative cardiac CTA in 2015. Labs today are pending    10/4/22:  Sounds present at the bedside. All questions answered. Remains on Lasix drip in the lower extremity edema has significantly improved.   She denies any chest pain or shortness of breath. Hemodynamically stable. Creatinine remains elevated with a GFR of 22. Hemoglobin is 7.5. GI input appreciated    10/3/22: discussed with son on phone. No CP or SOB today. On lasix gtt. Hd stable. BP elevated. + LE edema. 10/2/22: Patient is feeling somewhat better. Renal function slightly worsening. Sinus tach on telemetry heart rate of 106 bpm.  Continues to have significant bilateral extremity edema. Patient with significant EMEA hemoglobin is 7.3 from 12 2 months ago. 10/1/22:  Patient is a 64 y.o. female who presents with a chief complaint of short of breath. Patient is followed on a regular basis by Dr. Barbara Almeida MD. patient with past medical history of chronic diastolic congestive heart failure, hypertension, hyperlipidemia, chronic kidney disease who presents with worsening shortness of breath and increased lower extremity edema. Patient generally follows with [facility] she was requesting transfer but no beds available. She denies history of cardiac catheterization or any recent stress test.  Creatinine of 2.39 with a GFR of 19.6 on admission. BNP is elevated at 16,000 with mildly elevated troponin and a flat pattern. Hemoglobin is 8.1. He was noted to have extremely elevated blood pressure on arrival initiated on nitro drip. Currently she is in the 240V systolic. Echocardiogram at [facility] in 2017 ejection fraction of 60 to 65% no significant valve abnormalities. Negative nuclear stress test in 2017 at [facility]. Review of Systems:   Review of Systems   Constitutional:  Negative for fever. HENT:  Negative for congestion. Respiratory:  Positive for chest tightness and shortness of breath. Cardiovascular:  Positive for chest pain. Negative for palpitations and leg swelling. Gastrointestinal:  Negative for nausea and vomiting. Musculoskeletal:  Negative for arthralgias. Skin:  Negative for color change.    Neurological:  Negative for dizziness, syncope and light-headedness. Psychiatric/Behavioral:  Negative for agitation. Physical Examination:    BP (!) 142/55   Pulse 82   Temp 97.4 °F (36.3 °C) (Oral)   Resp 18   Ht 5' 2\" (1.575 m)   Wt 132 lb (59.9 kg)   SpO2 97%   BMI 24.14 kg/m²    Physical Exam  Constitutional:       General: She is not in acute distress. HENT:      Head: Normocephalic and atraumatic. Cardiovascular:      Rate and Rhythm: Normal rate and regular rhythm. Pulmonary:      Effort: Pulmonary effort is normal. No respiratory distress. Breath sounds: No wheezing, rhonchi or rales. Abdominal:      Palpations: Abdomen is soft. Musculoskeletal:         General: Normal range of motion. Cervical back: Normal range of motion and neck supple. Right lower leg: Edema (trace) present. Left lower leg: Edema (trace) present. Skin:     General: Skin is warm and dry. Neurological:      General: No focal deficit present. Mental Status: She is alert and oriented to person, place, and time. Cranial Nerves: No cranial nerve deficit.    Psychiatric:         Mood and Affect: Mood normal.         Behavior: Behavior normal.       LABS:  CBC:   Lab Results   Component Value Date/Time    WBC 10.1 10/09/2022 04:48 AM    RBC 2.84 10/09/2022 04:48 AM    HGB 7.8 10/09/2022 04:48 AM    HCT 22.7 10/09/2022 04:02 PM    MCV 82.7 10/09/2022 04:48 AM    MCH 27.5 10/09/2022 04:48 AM    MCHC 33.2 10/09/2022 04:48 AM    RDW 13.7 10/09/2022 04:48 AM     10/09/2022 04:48 AM    MPV 8.7 12/26/2013 08:34 AM     CBC with Differential:   Lab Results   Component Value Date/Time    WBC 10.1 10/09/2022 04:48 AM    RBC 2.84 10/09/2022 04:48 AM    HGB 7.8 10/09/2022 04:48 AM    HCT 22.7 10/09/2022 04:02 PM     10/09/2022 04:48 AM    MCV 82.7 10/09/2022 04:48 AM    MCH 27.5 10/09/2022 04:48 AM    MCHC 33.2 10/09/2022 04:48 AM    RDW 13.7 10/09/2022 04:48 AM    LYMPHOPCT 11.4 10/01/2022 07:59 AM    MONOPCT 6.3 10/01/2022 07:59 AM    BASOPCT 1.1 10/01/2022 07:59 AM    MONOSABS 0.5 10/01/2022 07:59 AM    LYMPHSABS 0.9 10/01/2022 07:59 AM    EOSABS 0.3 10/01/2022 07:59 AM    BASOSABS 0.1 10/01/2022 07:59 AM     CMP:    Lab Results   Component Value Date/Time     10/09/2022 04:48 AM    K 4.0 10/09/2022 04:48 AM    K 5.1 07/18/2022 10:25 AM     10/09/2022 04:48 AM    CO2 24 10/09/2022 04:48 AM    BUN 57 10/09/2022 04:48 AM    CREATININE 2.13 10/09/2022 04:48 AM    GFRAA 27.1 10/09/2022 04:48 AM    LABGLOM 22.4 10/09/2022 04:48 AM    GLUCOSE 128 10/09/2022 04:48 AM    PROT 4.7 10/01/2022 07:59 AM    LABALBU 2.7 10/01/2022 07:59 AM    CALCIUM 8.2 10/09/2022 04:48 AM    BILITOT 0.4 10/01/2022 07:59 AM    ALKPHOS 119 10/01/2022 07:59 AM    AST 18 10/01/2022 07:59 AM    ALT 14 10/01/2022 07:59 AM     BMP:    Lab Results   Component Value Date/Time     10/09/2022 04:48 AM    K 4.0 10/09/2022 04:48 AM    K 5.1 07/18/2022 10:25 AM     10/09/2022 04:48 AM    CO2 24 10/09/2022 04:48 AM    BUN 57 10/09/2022 04:48 AM    LABALBU 2.7 10/01/2022 07:59 AM    CREATININE 2.13 10/09/2022 04:48 AM    CALCIUM 8.2 10/09/2022 04:48 AM    GFRAA 27.1 10/09/2022 04:48 AM    LABGLOM 22.4 10/09/2022 04:48 AM    GLUCOSE 128 10/09/2022 04:48 AM     Magnesium:    Lab Results   Component Value Date/Time    MG 2.0 10/03/2022 05:55 AM     Troponin:    Lab Results   Component Value Date/Time    TROPONINI 0.022 10/01/2022 10:49 PM       Radiology:  CT ABDOMEN PELVIS WO CONTRAST Additional Contrast? None    Result Date: 10/6/2022  1. Again seen is a very small amount of postprocedural hemorrhage posterior to the right kidney. This is unchanged when compared to prior study one hour ago during the procedure. 2.Note is made of bilateral small pleural effusions, unchanged since prior study dating July 18, 2022.  Of note is a right costophrenic angle medial density, which is again felt to represent likely compressive atelectasis, though appears more plump than prior. Attention on follow-up is recommended. 3.Calcified right liver lobe density, may represent an old granuloma. This is also unchanged 4. Calcific densities are noted within the colonic wall near the cecum. These may be dystrophic calcification such as sequelae of old inflammation or infection such as epiploic appendicitis versus infection, though alternatively premalignant and malignancies can also present in such a fashion. Please correlate with colonoscopy and other workup as clinically indicated. Discussed with Dr. Luca Flores. COMPARISON: Same day study, one hour prior during procedure, and CT from July 18, 2022 DIAGNOSIS: Small postprocedural hemorrhage posterior to the right kidney COMMENTS: I13.10 Cardiorenal disease ICD10 TECHNIQUE: Spiral scanning of the abdomen and pelvis was performed after the administration of oral contrast .   Intravenous contrast was not administered as per the referring physician. CT Dose-Length Product (estimate related to radiation exposure from this exam):  130.68  mGy*cm. CT ABDOMEN: Note is made of bilateral small pleural effusions, unchanged since July. Note is made of bibasilar atelectasis. Some of the areas of atelectasis appear to be more plump such as in the right base at the costophrenic angle, attention on follow-up is recommended. Calcific density is again seen in the right liver lobe, this may represent an old granuloma. The spleen is of normal size and attenuation without focal lesions. Pancreas shows no signs of focal mass or peripancreatic fluid collection. Kidneys show no contour deforming renal masses or hydronephrosis. A very small amount of postprocedural biopsy bleed is seen posterior to the right kidney, this small and unchanged from 1 hour prior. Adrenal glands are unremarkable. Aorta is normal in caliber without  signs of aneurysmal dilatation. No significant retroperitoneal adenopathy or ascites is identified.  Calcific densities are noted within the colonic wall near the cecum. These may be dystrophic calcification such as sequelae of old inflammation or infection such as epiploic appendicitis versus infection, though alternatively premalignant and malignancies can also present in such a fashion. Please correlate with colonoscopy and other workup as clinically indicated. CT PELVIS:  Scanning of the pelvis shows no signs of pelvic mass or pelvic fluid collection. Diverticulosis of the colon is noted. CT GUIDED NEEDLE PLACEMENT    Result Date: 10/6/2022  1. Successful core biopsy of right inferior pole renal parenchyma. 2.2 Gelfoam torpedoes were injected after biopsy for immediate hemostasis. During biopsy and prior to insertion of Gelfoam torpedoes, a small amount of bleeding was seen adjacent to the needle entry site which stopped and did not grow any further. HISTORY: Raghu Lane is a Female of 64 years age. DIAGNOSIS:   random renal biopsy COMPARISON: None available. CT Dose-Length Product (estimate related to radiation exposure from this exam):  532.69  mGy*cm. PROCEDURE: Following the discussion of the procedure, alternatives, risks versus benefits, informed consent was obtained from the patient. Specifically, risks of after-biopsy pain at the site, rare possibility of excessive hemorrhage, infection, injury to the adjacent organs were discussed and the patient verbalized understanding. Pre-procedure evaluation confirmed that the patient was an appropriate candidate for conscious sedation. Adequate sedation was maintained during the entire procedure. Vital signs, pulse oximetry, and response to verbal commands were monitored and recorded by the nurse throughout the procedure and the recovery period. Medical information was entered in the medical record including the medications and dosages used. The patient returned to baseline neurologic and physiologic status prior to leaving the department.  No immediate sedation related complications were noted. Medication for conscious sedation was administered via IV route. 45 minutes of conscious sedation was provided. Following universal protocol, patient and site verification was performed with a \"timeout\" prior to the procedure. The patient was placed on the CT table in prone position and the right flank area was prepped and draped in usual sterile fashion. Using the usual sterile conditions, lidocaine and CT guidance, the inferior pole of the right kidney was accessed using an  18-guage coaxial biopsy needle system. After confirmation of appropriate localization of the needle, total of 3 samples were obtained and sent for pathological analysis. After biopsy, a small amount of blood was seen posterior to the right kidney. 2 Gelfoam torpedoes were injected through the coaxial needle for hemostasis. No further bleeding was seen. The coaxial needle system was removed and hemostasis was achieved by direct digital compression. The patient tolerated the procedure well. No immediate complications identified. The patient left the CT suite in supine position to the recovery room in stable condition. Total local anesthetic used: lidocaine, approximately 8 mL. Estimated blood loss:  Approximately 20 mL. The patient was observed in the recovery room for two hours after the procedure and discharged in stable condition. CT BIOPSY RENAL    Result Date: 10/6/2022  1. Successful core biopsy of right inferior pole renal parenchyma. 2.2 Gelfoam torpedoes were injected after biopsy for immediate hemostasis. During biopsy and prior to insertion of Gelfoam torpedoes, a small amount of bleeding was seen adjacent to the needle entry site which stopped and did not grow any further. HISTORY: Marysol Webster is a Female of 64 years age. DIAGNOSIS:   random renal biopsy COMPARISON: None available. CT Dose-Length Product (estimate related to radiation exposure from this exam):  532.69  mGy*cm.  PROCEDURE: Following the discussion of the procedure, alternatives, risks versus benefits, informed consent was obtained from the patient. Specifically, risks of after-biopsy pain at the site, rare possibility of excessive hemorrhage, infection, injury to the adjacent organs were discussed and the patient verbalized understanding. Pre-procedure evaluation confirmed that the patient was an appropriate candidate for conscious sedation. Adequate sedation was maintained during the entire procedure. Vital signs, pulse oximetry, and response to verbal commands were monitored and recorded by the nurse throughout the procedure and the recovery period. Medical information was entered in the medical record including the medications and dosages used. The patient returned to baseline neurologic and physiologic status prior to leaving the department. No immediate sedation related complications were noted. Medication for conscious sedation was administered via IV route. 45 minutes of conscious sedation was provided. Following universal protocol, patient and site verification was performed with a \"timeout\" prior to the procedure. The patient was placed on the CT table in prone position and the right flank area was prepped and draped in usual sterile fashion. Using the usual sterile conditions, lidocaine and CT guidance, the inferior pole of the right kidney was accessed using an  18-guage coaxial biopsy needle system. After confirmation of appropriate localization of the needle, total of 3 samples were obtained and sent for pathological analysis. After biopsy, a small amount of blood was seen posterior to the right kidney. 2 Gelfoam torpedoes were injected through the coaxial needle for hemostasis. No further bleeding was seen. The coaxial needle system was removed and hemostasis was achieved by direct digital compression. The patient tolerated the procedure well. No immediate complications identified.   The patient left the CT suite in supine position to the recovery room in stable condition. Total local anesthetic used: lidocaine, approximately 8 mL. Estimated blood loss:  Approximately 20 mL. The patient was observed in the recovery room for two hours after the procedure and discharged in stable condition. Echocardiogram 7/19/22:  Conclusions   Summary   Normal left ventricle structure and function. Left ventricular ejection fraction is visually estimated at 75%. Moderate concentric LVH   Borderline diastolic dysfunction   There is DUST (discrete upper septal thickening) without evidence of   outflow tract obstruction. Normal right ventricle structure and function. Right ventricular systolic pressure of 37 mm Hg consistent with mild   pulmonary hypertension. Normal mitral valve structure and function. 1+ MR   MAC   Normal aortic valve structure and function. Mild aortic sclerosis   Normal tricuspid valve structure and function. Mild tricuspid regurgitation. Mildly dilated left atrium. Signature   ----------------------------------------------------------------   Electronically signed by Mirna Dalton MD(Interpreting   physician) on 07/19/2022 12:11 PM   ----------------------------------------------------------------    EKG on 9/30/22: SR 88, no acute ischemic changes, QTc 467ms    Telemetry 10/8/22: Sinus rhythm       Assessment:    Acute on chronic decompensated diastolic congestive heart failure  Hypertensive urgency  Elevated cardiac enzyme. Questionable demand ischemia. 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 We should adhere to the JNC VIII guidelines for HTN management and the NCEPATP III guidelines for LDL-C management. Diuresis as tolerated. Monitor I's and O's and renal function.    Nephrology recommendations/await renal biopsty. GI recommendation  Coronary valuation in the form of cardiac catheterization when clinically stable/renal function stable. Patient with multiple risk factors for CAD and acute decompensated heart failure. Also has abnormal EKG and mildly elevated cardiac enzymes high risk for CAD. Possible LHC tomorrow. Dr. Kana Avendaño to evaluate patient tomorrow morning and decide. Keep NPO after midnight just in case. NO Norvasc secondary to lower extremity edema as well as acute decompensated heart failure  Avoid nephrotoxic agents  Monitor on telemetry  GI/DVT prophylaxis  CHF teaching  Follow-up in CHF clinic post discharge  Maintain hemoglobin greater than 7. Retacrit 3 times weekly. Consider hematology evaluation. Continue Imdur to 60mg daily for BP control.          Xavier Butler DO, [facility] [address] [facility]

## 2022-10-09 NOTE — PROGRESS NOTES
Renal Progress Note    Assessment:  Suspecting active GN/vasculitis  Biopsy  kidney yesterday no labs back today     OHD diastlic HG history  Anemia related to GN?   Hypertension improved (didn't receive meds this am)           Plan: will call [facility] to see if light microscopy out yet  Empircally 3rd dose solu medrol IV and cellcept 100mg bid patient aware 2ND URINE STILL NOT SENT FROM 10/5/2022  ANCA along with C3  normal  Anti  Dna pending doubt SLE clinically     10/8/2022  Patient seen and examined  -Clinically euvolemic nonoliguric nonpolyuric with net -2.8 L since admission with no clinical evidence of increased extracellular fluid volume  -No joint or skin involvement  -Normotensive 129/64 134/60  -No major electrolyte or acid-base imbalance  -Improving GFR  -Profound anemia hemoglobin going down from 8.1-7.5 despite negative fluid balance  -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 the significance of the 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 GN is its association with lymphoma or myeloproliferative disease  -Nephrotic range proteinuria and nephritic urine sediment  -Serology pending ANCA myeloperoxidase is negative as well as protease     No therapeutic intervention for today  Work-up anemia    10/9/2022  Wound significant change hemodynamically clinically observe wide fluctuation of blood pressure patient is in a net negative fluid balance nonoliguric nonoliguric  30 improvement of her GFR with no associated electrolyte or acid-base imbalance  Double-stranded DNA less than 1:10  Normal TSH  Will reorder anemia work-up    Patient Active Problem List:     CHF (congestive heart failure), NYHA class I, acute on chronic, combined (HCC)     Wheezing     Acute congestive heart failure (HCC)     Microhematuria     Diarrhea of presumed infectious origin     Hypertensive emergency     Anemia     CHAYO (acute kidney injury) ([facility] [address]      Subjective:   Admit Date: 9/30/2022    Interval History: Seen and examined uneventful night denied any uremic related or fluid volume overload related symptoms      Medications:   Scheduled Meds:   predniSONE  60 mg Oral Daily    furosemide  60 mg IntraVENous Daily    isosorbide mononitrate  60 mg Oral Daily    valsartan  80 mg Oral Daily    mycophenolate  1,000 mg Oral BID    hydrALAZINE  100 mg Oral 3 times per day    potassium chloride  20 mEq Oral Daily with breakfast    epoetin rian-epbx  30 Units/kg SubCUTAneous Once per day on Mon Wed Fri    sodium chloride flush  5-40 mL IntraVENous 2 times per day    atorvastatin  40 mg Oral Nightly    doxazosin  2 mg Oral Daily    levothyroxine  25 mcg Oral Daily    metoprolol  100 mg Oral BID    pantoprazole  40 mg Oral Daily    magnesium oxide  400 mg Oral Daily     Continuous Infusions:   sodium chloride         CBC:   Recent Labs     10/08/22  0526 10/09/22  0448   WBC 12.3* 10.1   HGB 7.5* 7.8*    325     CMP:    Recent Labs     10/07/22  0839 10/08/22  0526 10/09/22  0448    141 139   K 3.7 3.9 4.0   CL 97 104 103   CO2 22 24 24   BUN 52* 59* 57*   CREATININE 2.56* 2.32* 2.13*   GLUCOSE 195* 123* 128*   CALCIUM 8.5 8.3* 8.2*   LABGLOM 18.1* 20.3* 22.4*     Troponin: No results for input(s): TROPONINI in the last 72 hours. BNP: No results for input(s): BNP in the last 72 hours. INR: No results for input(s): INR in the last 72 hours. Lipids: No results for input(s): CHOL, LDLDIRECT, TRIG, HDL, AMYLASE, LIPASE in the last 72 hours. Liver: No results for input(s): AST, ALT, ALKPHOS, PROT, LABALBU, BILITOT in the last 72 hours. Invalid input(s): BILDIR  Iron:  No results for input(s): IRONS, FERRITIN in the last 72 hours. Invalid input(s): LABIRONS  Urinalysis: No results for input(s): UA in the last 72 hours.     Objective:   Vitals: BP (!) 139/54   Pulse 82   Temp 97.4 °F (36.3 °C) (Oral)   Resp 18   Ht 5' 2\" (1.575 m)   Wt 132 lb (59.9 kg)   SpO2 97%   BMI 24.14 kg/m²    Wt Readings from Last 3 Encounters:   09/30/22 132 lb (59.9 kg)   09/01/22 130 lb (59 kg) 08/10/22 134 lb (60.8 kg)      24HR INTAKE/OUTPUT:    Intake/Output Summary (Last 24 hours) at 10/9/2022 1257  Last data filed at 10/9/2022 1151  Gross per 24 hour   Intake 560 ml   Output 1800 ml   Net -1240 ml       Constitutional:  Alert, awake, no apparent distress   Skin:normal, no rash  HEENT:sclera anicteric.   Head atraumatic normocephalic  Neck:supple with no thyromegally  Cardiovascular:  S1, S2 without m/r/g   Respiratory:  CTA B without w/r/r   Abdomen: +bs, soft, nt  Ext: No LE edema  Musculoskeletal:Intact  Neuro:Alert and oriented with no deficit      Electronically signed by Arline Clemente MD on 10/9/2022 at 12:57 PM

## 2022-10-10 LAB
ANION GAP SERPL CALCULATED.3IONS-SCNC: 10 MEQ/L (ref 9–15)
BUN BLDV-MCNC: 56 MG/DL (ref 8–23)
CALCIUM SERPL-MCNC: 8 MG/DL (ref 8.5–9.9)
CHLORIDE BLD-SCNC: 104 MEQ/L (ref 95–107)
CO2: 25 MEQ/L (ref 20–31)
CREAT SERPL-MCNC: 1.9 MG/DL (ref 0.5–0.9)
GFR AFRICAN AMERICAN: 30.9
GFR NON-AFRICAN AMERICAN: 25.5
GLUCOSE BLD-MCNC: 181 MG/DL (ref 70–99)
POTASSIUM SERPL-SCNC: 4.2 MEQ/L (ref 3.4–4.9)
SODIUM BLD-SCNC: 139 MEQ/L (ref 135–144)

## 2022-10-10 PROCEDURE — 36415 COLL VENOUS BLD VENIPUNCTURE: CPT

## 2022-10-10 PROCEDURE — 6370000000 HC RX 637 (ALT 250 FOR IP): Performed by: INTERNAL MEDICINE

## 2022-10-10 PROCEDURE — 6360000002 HC RX W HCPCS: Performed by: INTERNAL MEDICINE

## 2022-10-10 PROCEDURE — 99232 SBSQ HOSP IP/OBS MODERATE 35: CPT | Performed by: INTERNAL MEDICINE

## 2022-10-10 PROCEDURE — 2580000003 HC RX 258: Performed by: INTERNAL MEDICINE

## 2022-10-10 PROCEDURE — 80048 BASIC METABOLIC PNL TOTAL CA: CPT

## 2022-10-10 PROCEDURE — 6370000000 HC RX 637 (ALT 250 FOR IP): Performed by: PHYSICIAN ASSISTANT

## 2022-10-10 PROCEDURE — 2500000003 HC RX 250 WO HCPCS: Performed by: INTERNAL MEDICINE

## 2022-10-10 PROCEDURE — 83540 ASSAY OF IRON: CPT

## 2022-10-10 PROCEDURE — 83550 IRON BINDING TEST: CPT

## 2022-10-10 PROCEDURE — 2060000000 HC ICU INTERMEDIATE R&B

## 2022-10-10 RX ORDER — SODIUM CHLORIDE 450 MG/100ML
INJECTION, SOLUTION INTRAVENOUS CONTINUOUS
Status: DISCONTINUED | OUTPATIENT
Start: 2022-10-10 | End: 2022-10-15 | Stop reason: HOSPADM

## 2022-10-10 RX ORDER — CLONIDINE HYDROCHLORIDE 0.1 MG/1
0.2 TABLET ORAL 2 TIMES DAILY
Status: DISCONTINUED | OUTPATIENT
Start: 2022-10-10 | End: 2022-10-15 | Stop reason: HOSPADM

## 2022-10-10 RX ADMIN — LEVOTHYROXINE SODIUM 25 MCG: 0.03 TABLET ORAL at 06:10

## 2022-10-10 RX ADMIN — CLONIDINE HYDROCHLORIDE 0.2 MG: 0.1 TABLET ORAL at 08:55

## 2022-10-10 RX ADMIN — VALSARTAN 80 MG: 80 TABLET, FILM COATED ORAL at 08:57

## 2022-10-10 RX ADMIN — ATORVASTATIN CALCIUM 40 MG: 40 TABLET, FILM COATED ORAL at 21:15

## 2022-10-10 RX ADMIN — ISOSORBIDE MONONITRATE 60 MG: 60 TABLET, EXTENDED RELEASE ORAL at 08:57

## 2022-10-10 RX ADMIN — LABETALOL HYDROCHLORIDE 10 MG: 5 INJECTION, SOLUTION INTRAVENOUS at 08:39

## 2022-10-10 RX ADMIN — PREDNISONE 60 MG: 50 TABLET ORAL at 08:56

## 2022-10-10 RX ADMIN — EPOETIN ALFA-EPBX 1800 UNITS: 2000 INJECTION, SOLUTION INTRAVENOUS; SUBCUTANEOUS at 08:58

## 2022-10-10 RX ADMIN — HYDRALAZINE HYDROCHLORIDE 100 MG: 100 TABLET, FILM COATED ORAL at 13:54

## 2022-10-10 RX ADMIN — MYCOPHENOLATE MOFETIL 1000 MG: 250 CAPSULE ORAL at 21:15

## 2022-10-10 RX ADMIN — METOPROLOL TARTRATE 100 MG: 50 TABLET, FILM COATED ORAL at 21:16

## 2022-10-10 RX ADMIN — DOXAZOSIN 2 MG: 1 TABLET ORAL at 08:57

## 2022-10-10 RX ADMIN — HYDRALAZINE HYDROCHLORIDE 100 MG: 100 TABLET, FILM COATED ORAL at 21:15

## 2022-10-10 RX ADMIN — Medication 10 ML: at 09:20

## 2022-10-10 RX ADMIN — MYCOPHENOLATE MOFETIL 1000 MG: 250 CAPSULE ORAL at 08:56

## 2022-10-10 RX ADMIN — HYDRALAZINE HYDROCHLORIDE 100 MG: 100 TABLET, FILM COATED ORAL at 06:10

## 2022-10-10 RX ADMIN — POTASSIUM CHLORIDE 20 MEQ: 1500 TABLET, EXTENDED RELEASE ORAL at 08:56

## 2022-10-10 RX ADMIN — SODIUM CHLORIDE: 4.5 INJECTION, SOLUTION INTRAVENOUS at 08:55

## 2022-10-10 RX ADMIN — Medication 400 MG: at 08:57

## 2022-10-10 RX ADMIN — METOPROLOL TARTRATE 100 MG: 50 TABLET, FILM COATED ORAL at 08:57

## 2022-10-10 RX ADMIN — PANTOPRAZOLE SODIUM 40 MG: 40 TABLET, DELAYED RELEASE ORAL at 08:57

## 2022-10-10 RX ADMIN — Medication 10 ML: at 21:18

## 2022-10-10 RX ADMIN — CLONIDINE HYDROCHLORIDE 0.2 MG: 0.1 TABLET ORAL at 21:16

## 2022-10-10 ASSESSMENT — ENCOUNTER SYMPTOMS
CHEST TIGHTNESS: 1
VOMITING: 0
SHORTNESS OF BREATH: 1
COLOR CHANGE: 0
NAUSEA: 0

## 2022-10-10 ASSESSMENT — PAIN SCALES - GENERAL: PAINLEVEL_OUTOF10: 0

## 2022-10-10 NOTE — CARE COORDINATION
PER CARDIOLOGY NO CATH AS OF YET. DC PLAN REMAINS HOME WITH CHF CLINIC FOLLOW UP ONCE CLEARED BY DRS.

## 2022-10-10 NOTE — FLOWSHEET NOTE
2100-HS assessment completed. Vitals stable although /75. Scheduled HS meds giving. Pt denies any CP and SOB at this time. Denies any other needs. Call light in reach. Team to continue to monitor.  Electronically signed by Michelle Rubalcava RN on 10/9/2022 at 9:45 PM

## 2022-10-10 NOTE — FLOWSHEET NOTE
AM assessment completed. Pt remains NPO in anticipation of a cardiac cath. Pt's BP elevated at 192/80. Administered 10 mg PRN IV labetalol. AM medications administered. Call light within reach of pt.

## 2022-10-10 NOTE — PROGRESS NOTES
Progress Note  Patient: Elizabeth Connelly  Unit/Bed: V852/E984-09  YOB: 1945  MRN: [id number]  Acct: [de-identified]   Admitting Diagnosis: Cardiorenal disease [I13.10]  HCAYO (acute kidney injury) ([facility] [address] [N17.9]  Hypertensive emergency [I16.1]  Acute on chronic diastolic (congestive) heart failure ([facility] [address] [I50.33]  Date:  9/30/2022  Hospital Day: 9    Chief Complaint:  Shortness of breath    Subjective    10/10/2022: Patient is resting,. Denies any chest pain or shortness of breath. Renal biopsy still pending. Discussed with nephrology. Holding on cardiac catheterization for now. 10/9/22: Feels better. No chest pain overnight. Renal biopsy results still pending. Creatinine slightly better today, 2.13.     10/8/22: Headache earlier now resolved. No further chest pain but contineus to have SILVA. 10/7/22: Resting comfortably in bed in no acute distress. After taking morning medications, patient states she developed an episode of left-sided chest pain/pressure which lasted approximately 10 minutes before resolving. She continues to experience shortness of breath with exertion. She is maintaining adequate SPO2 on room air. She underwent right kidney biopsy yesterday with IR and results are pending. She remains on Lasix 80 mg IV daily with approximately 2.2 L negative fluid balance since admission. Renal function remains relatively unchanged with creatinine of 2.56, BUN elevated 52 and GFR low at 18.1. Worsening in leukocytosis this morning with WBC of 14.7 compared to 11.9 yesterday. Currently on telemetry she is sinus rhythm with heart rate in the 80s. 10/6/22: Patient going for kidney biopsy today. Denies any chest pain. No significant shortness of breath. Sinus rhythm/sinus tachycardia on telemetry. Labs are pending today. Off of Lasix drip    10/5/22: Case discussed with nephrology. Patient remains on Lasix drip. Previous records reviewed from [facility].   Patient with negative cardiac CTA in 2015. Labs today are pending    10/4/22:  Sounds present at the bedside. All questions answered. Remains on Lasix drip in the lower extremity edema has significantly improved. She denies any chest pain or shortness of breath. Hemodynamically stable. Creatinine remains elevated with a GFR of 22. Hemoglobin is 7.5. GI input appreciated    10/3/22: discussed with son on phone. No CP or SOB today. On lasix gtt. Hd stable. BP elevated. + LE edema. 10/2/22: Patient is feeling somewhat better. Renal function slightly worsening. Sinus tach on telemetry heart rate of 106 bpm.  Continues to have significant bilateral extremity edema. Patient with significant EMEA hemoglobin is 7.3 from 12 2 months ago. 10/1/22:  Patient is a 64 y.o. female who presents with a chief complaint of short of breath. Patient is followed on a regular basis by Dr. Kasandra Steele MD. patient with past medical history of chronic diastolic congestive heart failure, hypertension, hyperlipidemia, chronic kidney disease who presents with worsening shortness of breath and increased lower extremity edema. Patient generally follows with F she was requesting transfer but no beds available. She denies history of cardiac catheterization or any recent stress test.  Creatinine of 2.39 with a GFR of 19.6 on admission. BNP is elevated at 16,000 with mildly elevated troponin and a flat pattern. Hemoglobin is 8.1. He was noted to have extremely elevated blood pressure on arrival initiated on nitro drip. Currently she is in the 926E systolic. Echocardiogram at [facility] in 2017 ejection fraction of 60 to 65% no significant valve abnormalities. Negative nuclear stress test in 2017 at [facility]. Review of Systems:   Review of Systems   Constitutional:  Negative for fever. HENT:  Negative for congestion. Respiratory:  Positive for chest tightness and shortness of breath. Cardiovascular:  Positive for chest pain.  Negative for 325 10/09/2022 04:48 AM    MCV 82.7 10/09/2022 04:48 AM    MCH 27.5 10/09/2022 04:48 AM    MCHC 33.2 10/09/2022 04:48 AM    RDW 13.7 10/09/2022 04:48 AM    LYMPHOPCT 11.4 10/01/2022 07:59 AM    MONOPCT 6.3 10/01/2022 07:59 AM    BASOPCT 1.1 10/01/2022 07:59 AM    MONOSABS 0.5 10/01/2022 07:59 AM    LYMPHSABS 0.9 10/01/2022 07:59 AM    EOSABS 0.3 10/01/2022 07:59 AM    BASOSABS 0.1 10/01/2022 07:59 AM     CMP:    Lab Results   Component Value Date/Time     10/10/2022 05:09 AM    K 4.2 10/10/2022 05:09 AM    K 5.1 07/18/2022 10:25 AM     10/10/2022 05:09 AM    CO2 25 10/10/2022 05:09 AM    BUN 56 10/10/2022 05:09 AM    CREATININE 1.90 10/10/2022 05:09 AM    GFRAA 30.9 10/10/2022 05:09 AM    LABGLOM 25.5 10/10/2022 05:09 AM    GLUCOSE 181 10/10/2022 05:09 AM    PROT 4.7 10/01/2022 07:59 AM    LABALBU 2.7 10/01/2022 07:59 AM    CALCIUM 8.0 10/10/2022 05:09 AM    BILITOT 0.4 10/01/2022 07:59 AM    ALKPHOS 119 10/01/2022 07:59 AM    AST 18 10/01/2022 07:59 AM    ALT 14 10/01/2022 07:59 AM     BMP:    Lab Results   Component Value Date/Time     10/10/2022 05:09 AM    K 4.2 10/10/2022 05:09 AM    K 5.1 07/18/2022 10:25 AM     10/10/2022 05:09 AM    CO2 25 10/10/2022 05:09 AM    BUN 56 10/10/2022 05:09 AM    LABALBU 2.7 10/01/2022 07:59 AM    CREATININE 1.90 10/10/2022 05:09 AM    CALCIUM 8.0 10/10/2022 05:09 AM    GFRAA 30.9 10/10/2022 05:09 AM    LABGLOM 25.5 10/10/2022 05:09 AM    GLUCOSE 181 10/10/2022 05:09 AM     Magnesium:    Lab Results   Component Value Date/Time    MG 2.0 10/03/2022 05:55 AM     Troponin:    Lab Results   Component Value Date/Time    TROPONINI 0.022 10/01/2022 10:49 PM       Radiology:  CT ABDOMEN PELVIS WO CONTRAST Additional Contrast? None    Result Date: 10/6/2022  1. Again seen is a very small amount of postprocedural hemorrhage posterior to the right kidney. This is unchanged when compared to prior study one hour ago during the procedure.  2.Note is made of bilateral small pleural effusions, unchanged since prior study dating July 18, 2022. Of note is a right costophrenic angle medial density, which is again felt to represent likely compressive atelectasis, though appears more plump than prior. Attention on follow-up is recommended. 3.Calcified right liver lobe density, may represent an old granuloma. This is also unchanged 4. Calcific densities are noted within the colonic wall near the cecum. These may be dystrophic calcification such as sequelae of old inflammation or infection such as epiploic appendicitis versus infection, though alternatively premalignant and malignancies can also present in such a fashion. Please correlate with colonoscopy and other workup as clinically indicated. Discussed with Dr. Cecy Michel. COMPARISON: Same day study, one hour prior during procedure, and CT from July 18, 2022 DIAGNOSIS: Small postprocedural hemorrhage posterior to the right kidney COMMENTS: I13.10 Cardiorenal disease ICD10 TECHNIQUE: Spiral scanning of the abdomen and pelvis was performed after the administration of oral contrast .   Intravenous contrast was not administered as per the referring physician. CT Dose-Length Product (estimate related to radiation exposure from this exam):  130.68  mGy*cm. CT ABDOMEN: Note is made of bilateral small pleural effusions, unchanged since July. Note is made of bibasilar atelectasis. Some of the areas of atelectasis appear to be more plump such as in the right base at the costophrenic angle, attention on follow-up is recommended. Calcific density is again seen in the right liver lobe, this may represent an old granuloma. The spleen is of normal size and attenuation without focal lesions. Pancreas shows no signs of focal mass or peripancreatic fluid collection. Kidneys show no contour deforming renal masses or hydronephrosis.  A very small amount of postprocedural biopsy bleed is seen posterior to the right kidney, this small and unchanged from 1 hour prior. Adrenal glands are unremarkable. Aorta is normal in caliber without  signs of aneurysmal dilatation. No significant retroperitoneal adenopathy or ascites is identified. Calcific densities are noted within the colonic wall near the cecum. These may be dystrophic calcification such as sequelae of old inflammation or infection such as epiploic appendicitis versus infection, though alternatively premalignant and malignancies can also present in such a fashion. Please correlate with colonoscopy and other workup as clinically indicated. CT PELVIS:  Scanning of the pelvis shows no signs of pelvic mass or pelvic fluid collection. Diverticulosis of the colon is noted. CT GUIDED NEEDLE PLACEMENT    Result Date: 10/6/2022  1. Successful core biopsy of right inferior pole renal parenchyma. 2.2 Gelfoam torpedoes were injected after biopsy for immediate hemostasis. During biopsy and prior to insertion of Gelfoam torpedoes, a small amount of bleeding was seen adjacent to the needle entry site which stopped and did not grow any further. HISTORY: Corinna Saucedo is a Female of 64 years age. DIAGNOSIS:   random renal biopsy COMPARISON: None available. CT Dose-Length Product (estimate related to radiation exposure from this exam):  532.69  mGy*cm. PROCEDURE: Following the discussion of the procedure, alternatives, risks versus benefits, informed consent was obtained from the patient. Specifically, risks of after-biopsy pain at the site, rare possibility of excessive hemorrhage, infection, injury to the adjacent organs were discussed and the patient verbalized understanding. Pre-procedure evaluation confirmed that the patient was an appropriate candidate for conscious sedation. Adequate sedation was maintained during the entire procedure. Vital signs, pulse oximetry, and response to verbal commands were monitored and recorded by the nurse throughout the procedure and the recovery period.  Medical information was entered in the medical record including the medications and dosages used. The patient returned to baseline neurologic and physiologic status prior to leaving the department. No immediate sedation related complications were noted. Medication for conscious sedation was administered via IV route. 45 minutes of conscious sedation was provided. Following universal protocol, patient and site verification was performed with a \"timeout\" prior to the procedure. The patient was placed on the CT table in prone position and the right flank area was prepped and draped in usual sterile fashion. Using the usual sterile conditions, lidocaine and CT guidance, the inferior pole of the right kidney was accessed using an  18-guage coaxial biopsy needle system. After confirmation of appropriate localization of the needle, total of 3 samples were obtained and sent for pathological analysis. After biopsy, a small amount of blood was seen posterior to the right kidney. 2 Gelfoam torpedoes were injected through the coaxial needle for hemostasis. No further bleeding was seen. The coaxial needle system was removed and hemostasis was achieved by direct digital compression. The patient tolerated the procedure well. No immediate complications identified. The patient left the CT suite in supine position to the recovery room in stable condition. Total local anesthetic used: lidocaine, approximately 8 mL. Estimated blood loss:  Approximately 20 mL. The patient was observed in the recovery room for two hours after the procedure and discharged in stable condition. CT BIOPSY RENAL    Result Date: 10/6/2022  1. Successful core biopsy of right inferior pole renal parenchyma. 2.2 Gelfoam torpedoes were injected after biopsy for immediate hemostasis. During biopsy and prior to insertion of Gelfoam torpedoes, a small amount of bleeding was seen adjacent to the needle entry site which stopped and did not grow any further.  HISTORY: Nagi Pena is a Female of 64 years age. DIAGNOSIS:   random renal biopsy COMPARISON: None available. CT Dose-Length Product (estimate related to radiation exposure from this exam):  532.69  mGy*cm. PROCEDURE: Following the discussion of the procedure, alternatives, risks versus benefits, informed consent was obtained from the patient. Specifically, risks of after-biopsy pain at the site, rare possibility of excessive hemorrhage, infection, injury to the adjacent organs were discussed and the patient verbalized understanding. Pre-procedure evaluation confirmed that the patient was an appropriate candidate for conscious sedation. Adequate sedation was maintained during the entire procedure. Vital signs, pulse oximetry, and response to verbal commands were monitored and recorded by the nurse throughout the procedure and the recovery period. Medical information was entered in the medical record including the medications and dosages used. The patient returned to baseline neurologic and physiologic status prior to leaving the department. No immediate sedation related complications were noted. Medication for conscious sedation was administered via IV route. 45 minutes of conscious sedation was provided. Following universal protocol, patient and site verification was performed with a \"timeout\" prior to the procedure. The patient was placed on the CT table in prone position and the right flank area was prepped and draped in usual sterile fashion. Using the usual sterile conditions, lidocaine and CT guidance, the inferior pole of the right kidney was accessed using an  18-guage coaxial biopsy needle system. After confirmation of appropriate localization of the needle, total of 3 samples were obtained and sent for pathological analysis. After biopsy, a small amount of blood was seen posterior to the right kidney. 2 Gelfoam torpedoes were injected through the coaxial needle for hemostasis. No further bleeding was seen.  The coaxial needle system was removed and hemostasis was achieved by direct digital compression. The patient tolerated the procedure well. No immediate complications identified. The patient left the CT suite in supine position to the recovery room in stable condition. Total local anesthetic used: lidocaine, approximately 8 mL. Estimated blood loss:  Approximately 20 mL. The patient was observed in the recovery room for two hours after the procedure and discharged in stable condition. Echocardiogram 7/19/22:  Conclusions   Summary   Normal left ventricle structure and function. Left ventricular ejection fraction is visually estimated at 75%. Moderate concentric LVH   Borderline diastolic dysfunction   There is DUST (discrete upper septal thickening) without evidence of   outflow tract obstruction. Normal right ventricle structure and function. Right ventricular systolic pressure of 37 mm Hg consistent with mild   pulmonary hypertension. Normal mitral valve structure and function. 1+ MR   MAC   Normal aortic valve structure and function. Mild aortic sclerosis   Normal tricuspid valve structure and function. Mild tricuspid regurgitation. Mildly dilated left atrium. Signature   ----------------------------------------------------------------   Electronically signed by Jena Glover MD(Interpreting   physician) on 07/19/2022 12:11 PM   ----------------------------------------------------------------    EKG on 9/30/22: SR 88, no acute ischemic changes, QTc 467ms    Telemetry 10/8/22: Sinus rhythm       Assessment:    Acute on chronic decompensated diastolic congestive heart failure  Hypertensive urgency  Elevated cardiac enzyme. Questionable demand ischemia. 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  We should adhere to the JNC VIII guidelines for HTN management and the NCEPATP III guidelines for LDL-C management. Diuresis as tolerated. Monitor I's and O's and renal function. Nephrology recommendations/await renal biopsty. GI recommendation  Coronary valuation in the form of cardiac catheterization when clinically stable/renal function stable. Patient with multiple risk factors for CAD and acute decompensated heart failure. Also has abnormal EKG and mildly elevated cardiac enzymes high risk for CAD. Will await clearance from nephrology. NO Norvasc secondary to lower extremity edema as well as acute decompensated heart failure  Avoid nephrotoxic agents  Monitor on telemetry  GI/DVT prophylaxis  CHF teaching  Follow-up in CHF clinic post discharge  Maintain hemoglobin greater than 7. Retacrit 3 times weekly. Consider hematology evaluation. Continue Imdur to 60mg daily for BP control.          Electronically signed by Saranya Lucia DO on 10/10/2022 at 10:45 AM

## 2022-10-10 NOTE — PROGRESS NOTES
Nephrology Progress Note    Assessment:  Nephritic/Nephrotic  AGN/ Vascilitis  OHDx HF  Anemia        Plan:  awaiting  kidney b iopsy results  add clonidine 0.1 bid  IV to be started for cath possibly today   hold lasix  limit dye    Patient Active Problem List:     CHF (congestive heart failure), NYHA class I, acute on chronic, combined (HCC)     Wheezing     Acute congestive heart failure (HCC)     Microhematuria     Diarrhea of presumed infectious origin     Hypertensive emergency     Anemia     CHAYO (acute kidney injury) ([facility] [address]      Subjective:  Admit Date: 9/30/2022    Interval History: no sob   mild leg edema    Medications:  Scheduled Meds:   cloNIDine  0.2 mg Oral BID    predniSONE  60 mg Oral Daily    furosemide  60 mg IntraVENous Daily    isosorbide mononitrate  60 mg Oral Daily    valsartan  80 mg Oral Daily    mycophenolate  1,000 mg Oral BID    hydrALAZINE  100 mg Oral 3 times per day    potassium chloride  20 mEq Oral Daily with breakfast    epoetin rian-epbx  30 Units/kg SubCUTAneous Once per day on Mon Wed Fri    sodium chloride flush  5-40 mL IntraVENous 2 times per day    atorvastatin  40 mg Oral Nightly    doxazosin  2 mg Oral Daily    levothyroxine  25 mcg Oral Daily    metoprolol  100 mg Oral BID    pantoprazole  40 mg Oral Daily    magnesium oxide  400 mg Oral Daily     Continuous Infusions:   sodium chloride         CBC:   Recent Labs     10/08/22  0526 10/09/22  0448   WBC 12.3* 10.1   HGB 7.5* 7.8*    325     CMP:    Recent Labs     10/08/22  0526 10/09/22  0448 10/10/22  0509    139 139   K 3.9 4.0 4.2    103 104   CO2 24 24 25   BUN 59* 57* 56*   CREATININE 2.32* 2.13* 1.90*   GLUCOSE 123* 128* 181*   CALCIUM 8.3* 8.2* 8.0*   LABGLOM 20.3* 22.4* 25.5*     Troponin: No results for input(s): TROPONINI in the last 72 hours. BNP: No results for input(s): BNP in the last 72 hours. INR: No results for input(s): INR in the last 72 hours.   Lipids: No results for input(s):

## 2022-10-11 PROBLEM — I13.10 CARDIORENAL DISEASE: Status: ACTIVE | Noted: 2022-10-11

## 2022-10-11 PROBLEM — I50.33 ACUTE ON CHRONIC DIASTOLIC (CONGESTIVE) HEART FAILURE (HCC): Status: ACTIVE | Noted: 2022-07-18

## 2022-10-11 LAB
HCT VFR BLD CALC: 23.7 % (ref 37–47)
HEMOGLOBIN: 7.6 G/DL (ref 12–16)
MCH RBC QN AUTO: 27.2 PG (ref 27–31.3)
MCHC RBC AUTO-ENTMCNC: 32.2 % (ref 33–37)
MCV RBC AUTO: 84.7 FL (ref 82–100)
PDW BLD-RTO: 14 % (ref 11.5–14.5)
PLATELET # BLD: 299 K/UL (ref 130–400)
RBC # BLD: 2.8 M/UL (ref 4.2–5.4)
WBC # BLD: 11.2 K/UL (ref 4.8–10.8)

## 2022-10-11 PROCEDURE — 99232 SBSQ HOSP IP/OBS MODERATE 35: CPT | Performed by: NURSE PRACTITIONER

## 2022-10-11 PROCEDURE — 6370000000 HC RX 637 (ALT 250 FOR IP): Performed by: INTERNAL MEDICINE

## 2022-10-11 PROCEDURE — 99232 SBSQ HOSP IP/OBS MODERATE 35: CPT | Performed by: INTERNAL MEDICINE

## 2022-10-11 PROCEDURE — 2580000003 HC RX 258: Performed by: INTERNAL MEDICINE

## 2022-10-11 PROCEDURE — 2060000000 HC ICU INTERMEDIATE R&B

## 2022-10-11 PROCEDURE — APPSS30 APP SPLIT SHARED TIME 16-30 MINUTES: Performed by: PHYSICIAN ASSISTANT

## 2022-10-11 PROCEDURE — 97161 PT EVAL LOW COMPLEX 20 MIN: CPT

## 2022-10-11 PROCEDURE — 6370000000 HC RX 637 (ALT 250 FOR IP): Performed by: PHYSICIAN ASSISTANT

## 2022-10-11 PROCEDURE — 36415 COLL VENOUS BLD VENIPUNCTURE: CPT

## 2022-10-11 PROCEDURE — 85027 COMPLETE CBC AUTOMATED: CPT

## 2022-10-11 PROCEDURE — 6360000002 HC RX W HCPCS: Performed by: INTERNAL MEDICINE

## 2022-10-11 RX ORDER — LEVALBUTEROL TARTRATE 45 UG/1
1 AEROSOL, METERED ORAL EVERY 6 HOURS PRN
Status: DISCONTINUED | OUTPATIENT
Start: 2022-10-11 | End: 2022-10-15 | Stop reason: HOSPADM

## 2022-10-11 RX ORDER — LACTULOSE 10 G/15ML
20 SOLUTION ORAL ONCE
Status: COMPLETED | OUTPATIENT
Start: 2022-10-11 | End: 2022-10-11

## 2022-10-11 RX ADMIN — CLONIDINE HYDROCHLORIDE 0.2 MG: 0.1 TABLET ORAL at 20:04

## 2022-10-11 RX ADMIN — METOPROLOL TARTRATE 100 MG: 50 TABLET, FILM COATED ORAL at 08:24

## 2022-10-11 RX ADMIN — PREDNISONE 60 MG: 50 TABLET ORAL at 08:22

## 2022-10-11 RX ADMIN — MYCOPHENOLATE MOFETIL 1000 MG: 250 CAPSULE ORAL at 20:04

## 2022-10-11 RX ADMIN — METOPROLOL TARTRATE 100 MG: 50 TABLET, FILM COATED ORAL at 20:04

## 2022-10-11 RX ADMIN — VALSARTAN 80 MG: 80 TABLET, FILM COATED ORAL at 08:23

## 2022-10-11 RX ADMIN — Medication 10 ML: at 08:27

## 2022-10-11 RX ADMIN — HYDRALAZINE HYDROCHLORIDE 100 MG: 100 TABLET, FILM COATED ORAL at 12:00

## 2022-10-11 RX ADMIN — POTASSIUM CHLORIDE 20 MEQ: 1500 TABLET, EXTENDED RELEASE ORAL at 08:23

## 2022-10-11 RX ADMIN — HYDRALAZINE HYDROCHLORIDE 100 MG: 100 TABLET, FILM COATED ORAL at 07:09

## 2022-10-11 RX ADMIN — ATORVASTATIN CALCIUM 40 MG: 40 TABLET, FILM COATED ORAL at 20:04

## 2022-10-11 RX ADMIN — DOXAZOSIN 2 MG: 1 TABLET ORAL at 08:23

## 2022-10-11 RX ADMIN — PANTOPRAZOLE SODIUM 40 MG: 40 TABLET, DELAYED RELEASE ORAL at 08:22

## 2022-10-11 RX ADMIN — Medication 400 MG: at 08:23

## 2022-10-11 RX ADMIN — CLONIDINE HYDROCHLORIDE 0.2 MG: 0.1 TABLET ORAL at 08:23

## 2022-10-11 RX ADMIN — ISOSORBIDE MONONITRATE 60 MG: 60 TABLET, EXTENDED RELEASE ORAL at 08:22

## 2022-10-11 RX ADMIN — HYDRALAZINE HYDROCHLORIDE 100 MG: 100 TABLET, FILM COATED ORAL at 21:29

## 2022-10-11 RX ADMIN — Medication 10 ML: at 20:03

## 2022-10-11 RX ADMIN — MYCOPHENOLATE MOFETIL 1000 MG: 250 CAPSULE ORAL at 11:48

## 2022-10-11 RX ADMIN — LEVOTHYROXINE SODIUM 25 MCG: 0.03 TABLET ORAL at 07:09

## 2022-10-11 RX ADMIN — LACTULOSE 20 G: 20 SOLUTION ORAL at 11:48

## 2022-10-11 ASSESSMENT — PAIN SCALES - GENERAL
PAINLEVEL_OUTOF10: 0

## 2022-10-11 ASSESSMENT — ENCOUNTER SYMPTOMS
GASTROINTESTINAL NEGATIVE: 1
SHORTNESS OF BREATH: 1
VOMITING: 0
NAUSEA: 0
RESPIRATORY NEGATIVE: 1
COUGH: 0
ABDOMINAL PAIN: 0
SHORTNESS OF BREATH: 0
EYES NEGATIVE: 1
BACK PAIN: 0
TROUBLE SWALLOWING: 0
CHEST TIGHTNESS: 1
DIARRHEA: 0
WHEEZING: 0
COLOR CHANGE: 0
SORE THROAT: 0

## 2022-10-11 ASSESSMENT — PAIN DESCRIPTION - LOCATION
LOCATION: GENERALIZED

## 2022-10-11 ASSESSMENT — PAIN DESCRIPTION - DESCRIPTORS: DESCRIPTORS: DULL;DISCOMFORT

## 2022-10-11 NOTE — PROGRESS NOTES
Nephrology Progress Note    Assessment:  CHAYO   AGN nephrotic/Nephritic ( waiting on )  Dx pauci immune GN    needs anti gbm ab titer pending    OHD Diastolic HF  Hypertension fair control    Call Dr Ran Hughes in morning path results kidney    crescent GN pauci immune [id number] pathology #      Plan: need H&H today pending  GFR stablized improved. Discussed timing of cath with Dr Kalie Ramsay  if imperative than proceed otherwise hold on timing . pathology  crescent GN    Patient Active Problem List:     CHF (congestive heart failure), NYHA class I, acute on chronic, combined ([facility] [address]     Wheezing     Acute congestive heart failure ([facility] [address]     Microhematuria     Diarrhea of presumed infectious origin     Hypertensive emergency     Anemia     CHAYO (acute kidney injury) ([facility] [address]      Subjective:  Admit Date: 9/30/2022    Interval History: frustrated still with GN treatment  wants to know about her heart before discharge    Medications:  Scheduled Meds:   cloNIDine  0.2 mg Oral BID    predniSONE  60 mg Oral Daily    isosorbide mononitrate  60 mg Oral Daily    valsartan  80 mg Oral Daily    mycophenolate  1,000 mg Oral BID    hydrALAZINE  100 mg Oral 3 times per day    potassium chloride  20 mEq Oral Daily with breakfast    epoetin rian-epbx  30 Units/kg SubCUTAneous Once per day on Mon Wed Fri    sodium chloride flush  5-40 mL IntraVENous 2 times per day    atorvastatin  40 mg Oral Nightly    doxazosin  2 mg Oral Daily    levothyroxine  25 mcg Oral Daily    metoprolol  100 mg Oral BID    pantoprazole  40 mg Oral Daily    magnesium oxide  400 mg Oral Daily     Continuous Infusions:   sodium chloride 100 mL/hr at 10/10/22 0855    sodium chloride         CBC:   Recent Labs     10/09/22  0448   WBC 10.1   HGB 7.8*        CMP:    Recent Labs     10/09/22  0448 10/10/22  0509    139   K 4.0 4.2    104   CO2 24 25   BUN 57* 56*   CREATININE 2.13* 1.90*   GLUCOSE 128* 181*   CALCIUM 8.2* 8.0*   LABGLOM 22.4* 25.5* Troponin: No results for input(s): TROPONINI in the last 72 hours. BNP: No results for input(s): BNP in the last 72 hours. INR: No results for input(s): INR in the last 72 hours. Lipids: No results for input(s): CHOL, LDLDIRECT, TRIG, HDL, AMYLASE, LIPASE in the last 72 hours. Liver: No results for input(s): AST, ALT, ALKPHOS, PROT, LABALBU, BILITOT in the last 72 hours. Invalid input(s): BILDIR  Iron:    Recent Labs     10/09/22  1602   FERRITIN 193*     Urinalysis: No results for input(s): UA in the last 72 hours.     Objective:  Vitals: BP (!) 181/73   Pulse 95   Temp 97.9 °F (36.6 °C) (Oral)   Resp 18   Ht 5' 2\" (1.575 m)   Wt 120 lb 11.2 oz (54.7 kg)   SpO2 97%   BMI 22.08 kg/m²    Wt Readings from Last 3 Encounters:   10/10/22 120 lb 11.2 oz (54.7 kg)   09/01/22 130 lb (59 kg)   08/10/22 134 lb (60.8 kg)      24HR INTAKE/OUTPUT:  No intake or output data in the 24 hours ending 10/11/22 0756    General: alert, in no apparent distress  HEENT: normocephalic, atraumatic, anicteric  Neck: supple, no mass  Lungs: non-labored respirations, clear to auscultation bilaterally  Heart: regular rate and rhythm, no murmurs or rubs  Abdomen: soft, non-tender, non-distended  Ext: no cyanosis, no peripheral edema  Neuro: alert and oriented, no gross abnormalities  Psych: normal mood and affect  Skin: no rash      Electronically signed by Fanta Salmeron DO, MD

## 2022-10-11 NOTE — PROGRESS NOTES
[facility] AT [facility] Respiratory Therapy Evaluation   Current Order:  prn ZOPENEX      Home Regimen: NO      Ordering Physician: Holiday  Re-evaluation Date:  na     Diagnosis: CHF      Patient Status: Stable / Unstable + Physician notified    The following MDI Criteria must be met in order to convert aerosol to MDI with spacer. If unable to meet, MDI will be converted to aerosol:  []  Patient able to demonstrate the ability to use MDI effectively  []  Patient alert and cooperative  []  Patient able to take deep breath with 5-10 second hold  []  Medication(s) available in this delivery method   []  Peak flow greater than or equal to 200 ml/min            Current Order Substituted To  (same drug, same frequency)   Aerosol to MDI [] Albuterol Sulfate 0.083% unit dose by aerosol Albuterol Sulfate MDI 2 puffs by inhalation with spacer    [] Levalbuterol 1.25 mg unit dose by aerosol Levalbuterol MDI 2 puffs by inhalation with spacer    [] Levalbuterol 0.63 mg unit dose by aerosol Levalbuterol MDI 2 puffs by inhalation with spacer    [] Ipratropium Bromide 0.02% unit dose by aerosol Ipratropium Bromide MDI 2 puffs by inhalation with spacer    [] Duoneb (Ipratropium + Albuterol) unit dose by aerosol Ipratropium MDI + Albuterol MDI 2 puffs by inhalation w/spacer   MDI to Aerosol [] Albuterol Sulfate MDI Albuterol Sulfate 0.083% unit dose by aerosol    [] Levalbuterol MDI 2 puffs by inhalation Levalbuterol 1.25 mg unit dose by aerosol    [] Ipratropium Bromide MDI by inhalation Ipratropium Bromide 0.02% unit dose by aerosol    [] Combivent (Ipratropium + Albuterol) MDI by inhalation Duoneb (Ipratropium + Albuterol) unit dose by aerosol       Treatment Assessment [Frequency/Schedule]:  Change frequency to: _______no change___________________________________________per Protocol, P&T, MEC      Points 0 1 2 3 4   Pulmonary Status  Non-Smoker  [x]   Smoking history   < 20 pack years  []   Smoking history  ?  20 pack years  []   Pulmonary Disorder  (acute or chronic)  []   Severe or Chronic w/ Exacerbation  []     Surgical Status No [x]   Surgeries     General []   Surgery Lower []   Abdominal Thoracic or []   Upper Abdominal Thoracic with  PulmonaryDisorder  []     Chest X-ray Clear/Not  Ordered     [x]  Chronic Changes  Results Pending  []  Infiltrates, atelectasis, pleural effusion, or edema  []  Infiltrates in more than one lobe []  Infiltrate + Atelectasis, &/or pleural effusion  []    Respiratory Pattern Regular,  RR = 12-20 [x]  Increased,  RR = 21-25 []  SILVA, irregular,  or RR = 26-30 []  Decreased FEV1  or RR = 31-35 []  Severe SOB, use  of accessory muscles, or RR ? 35  []    Mental Status Alert, oriented,  Cooperative [x]  Confused but Follows commands []  Lethargic or unable to follow commands []  Obtunded  []  Comatose  []    Breath Sounds Clear to  auscultation  [x]  Decreased unilaterally or  in bases only []  Decreased  bilaterally  []  Crackles or intermittent wheezes []  Wheezes []    Cough Strong, Spontan., & nonproductive [x]  Strong,  spontaneous, &  productive []  Weak,  Nonproductive []  Weak, productive or  with wheezes []  No spontaneous  cough or may require suctioning []    Level of Activity Ambulatory [x]  Ambulatory w/ Assist  []  Non-ambulatory []  Paraplegic []  Quadriplegic []    Total    Score:____0___     Triage Score:___5_____      Tri       Triage:     1. (>20) Freq: Q3    2. (16-20) Freq: Q4   3. (11-15) Freq: QID & Albuterol Q2 PRN    4. (6-10) Freq: TID & Albuterol Q2 PRN    5. (0-5) Freq Q4prn

## 2022-10-11 NOTE — PROGRESS NOTES
Narrative Notes:  0900 AM: initial assessment completed; see flow sheet for details. Patient denies any CP or acute SOB. Lungs clear to auscultation. PP+1. Trace BLE edema. MP SR. Stable. No syncope. Dr. Shrestha [place] rounding; orders received. No complaints. Will note deviation. Call light in reach. 1200 PM: Family at bedside. No new complaints. Stable. No CP/SOB. Call light in reach. No N/V. Will note deviation.

## 2022-10-11 NOTE — PROGRESS NOTES
Physical Therapy Med Surg Initial Assessment  Facility/Department: [facility]  Room: Z4/N425-98     NAME: Porfirio Machado  : 1945 (64 y.o.)  MRN: [id number]  CODE STATUS: Full Code    Date of Service: 10/11/2022    Patient Diagnosis(es): Cardiorenal disease [I13.10]  CHAYO (acute kidney injury) ([facility] [N17.9]  Hypertensive emergency [I16.1]  Acute on chronic diastolic (congestive) heart failure ([facility] [I50.33]   Chief Complaint   Patient presents with    Shortness of Breath     X 1 week with bilateral leg swelling, had chest pain yesterday, denies chest pain today     Patient Active Problem List    Diagnosis Date Noted    Cardiorenal disease 10/11/2022    CHAYO (acute kidney injury) ([facility] 10/06/2022    Anemia 10/03/2022    Hypertensive emergency 10/01/2022    Diarrhea of presumed infectious origin 2022    Acute congestive heart failure ([facility] 2022    Microhematuria 2022    Acute on chronic diastolic (congestive) heart failure ([facility] 2022    Wheezing 2022        Past Medical History:   Diagnosis Date    CHF (congestive heart failure) (HCC)     Hypertension      Past Surgical History:   Procedure Laterality Date    CATARACT REMOVAL Bilateral     CT BIOPSY RENAL  10/6/2022    CT BIOPSY RENAL 10/6/2022 [facility] CT SCAN    HERNIA REPAIR      HYSTERECTOMY, TOTAL ABDOMINAL (CERVIX REMOVED)      OTHER SURGICAL HISTORY Right 10/06/2022    right renal random biopsy performed by Dr. Claudette Cumming     Chief Reason: General Medical  Chart Reviewed: Yes  Patient assessed for rehabilitation services?: Yes  Family / Caregiver Present: No    Restrictions:  Restrictions/Precautions: Up Ad Padmini     SUBJECTIVE:   Subjective: Pt denies pain currently, no SOB at rest, reports SOB with exertion    Pain  Pre treatment screening:denies  Post treatment screening: denies    Prior Level of Function:  Social/Functional History  Lives With: Significant other  Type of Home: Apartment  Home Layout: Two level (13 steps with handrail)  Bathroom Shower/Tub: Tub/Shower unit  ADL Assistance: [address]: Independent  Homemaking Responsibilities: Yes  Ambulation Assistance: Independent (no AD)  Transfer Assistance: Independent  Active : Yes    OBJECTIVE:   Vision  Vision: Impaired  Vision Exceptions: Wears glasses for reading  Hearing: Within functional limits    Cognition:  Orientation Level: Oriented to place, Oriented to time, Oriented to situation, Oriented to person  Follows Commands: Within Functional Limits  Overall Cognitive Status: WFL    Observation/Palpation  Observation: pleasant, cooperative, no acute distress noted, onRA    ROM:  AROM: Within functional limits  PROM: Within functional limits    Strength:  Strength:  Within functional limits    Neuro:  Balance  Sitting - Static: Good  Sitting - Dynamic: Good  Standing - Static: Good  Standing - Dynamic: Good   Tone: Normal  Coordination: Within functional limits    Sensation: Intact    Bed mobility  Supine to Sit: Modified independent  Sit to Supine: Modified independent    Transfers  Sit to Stand: Independent  Stand to Sit: Independent    Ambulation  Surface: Level tile  Device: No Device  Assistance: Modified Independent  Distance: 120ft, no observed dyspnea, on RA     Activity Tolerance  Activity Tolerance: Patient tolerated evaluation without incident  Activity Tolerance Comments: mild dyspnea reported, no observed distress      Patient Education  Education Given To: Patient  Education Provided: Plan of Care  Education Provided Comments: benefits of progressive ambulation program, encourage daily walks during admission, place chair at top of stairs for activity pacing  Education Method: Verbal  Barriers to Learning: None  Education Outcome: Verbalized understanding       ASSESSMENT:   Decision Making: Low Complexity  History: med  Exam: low  Clinical Presentation: low    Therapy Prognosis: Good  Barriers to Learning: none     DISCHARGE RECOMMENDATIONS:  No Skilled PT: Independent with functional mobility     Assessment: Pt demonstrates indep with all functional mobility including ambulation 120ft without AD with normalized gait. Pt's only c/o is mild dyspnea upon exertion. Pt ed in activity pacing strategies and benefits of progressive ambulation program. Pt demonstates no further acute PT needs. Requires PT Follow-Up: No       PLAN OF CARE:  Physcial Therapy Plan  General Plan: Discharge with evaluation only    Safety Devices  Type of Devices: Left in bed, Call light within reach    Goals:  Patient Goals : to go home    [facility] (6 CLICK) [address] Mobility Raw Score : 23     Therapy Time:   Individual   Time In 1541   Time Out 1550   Minutes [address], [place], 10/11/22 at 3:56 PM     Definitions for assistance levels  Independent = pt does not require any physical supervision or assistance from another person for activity completion. Device may be needed.   Stand by assistance = pt requires verbal cues or instructions from another person, close to but not touching, to perform the activity  Minimal assistance= pt performs 75% or more of the activity; assistance is required to complete the activity  Moderate assistance= pt performs 50% of the activity; assistance is required to complete the activity  Maximal assistance = pt performs 25% of the activity; assistance is required to complete the activity  Dependent = pt requires total physical assistance to accomplish the task

## 2022-10-11 NOTE — PROGRESS NOTES
Progress Note  Patient: [facility]  Unit/Bed: P557/F345-55  YOB: 1945  MRN: [id number]  Acct: [de-identified]   Admitting Diagnosis: Cardiorenal disease [I13.10]  CHAYO (acute kidney injury) ([facility] [address] [N17.9]  Hypertensive emergency [I16.1]  Acute on chronic diastolic (congestive) heart failure ([facility] [address] [I50.33]  Date:  9/30/2022  Hospital Day: 10    Chief Complaint:  Shortness of breath    Subjective      10/11/22: Resting comfortably in bed in no acute distress. Had mild headache this morning. Continues to have intermittent left-sided chest pressure but none currently. Still experiencing shortness of breath with exertion. Renal function is improving. Persistent anemia with hemoglobin low at 7.6. Still awaiting renal biopsy results from [facility] but patient with suspected acute glomerulonephritis/nephrotic/nephritic syndrome. BP better controlled on multiple antihypertensive medications. Currently on telemetry she is maintaining sinus rhythm with heart rate in the 70s.    10/10/22: Patient is resting,. Denies any chest pain or shortness of breath. Renal biopsy still pending. Discussed with nephrology. Holding on cardiac catheterization for now. 10/9/22: Feels better. No chest pain overnight. Renal biopsy results still pending. Creatinine slightly better today, 2.13.    10/8/22: Headache earlier now resolved. No further chest pain but contineus to have SILVA    10/7/22: Resting comfortably in bed in no acute distress. After taking morning medications, patient states she developed an episode of left-sided chest pain/pressure which lasted approximately 10 minutes before resolving. She continues to experience shortness of breath with exertion. She is maintaining adequate SPO2 on room air. She underwent right kidney biopsy yesterday with IR and results are pending. She remains on Lasix 80 mg IV daily with approximately 2.2 L negative fluid balance since admission.   Renal function remains relatively unchanged with creatinine of 2.56, BUN elevated 52 and GFR low at 18.1. Worsening in leukocytosis this morning with WBC of 14.7 compared to 11.9 yesterday. Currently on telemetry she is sinus rhythm with heart rate in the 80s. 10/6/22: Patient going for kidney biopsy today. Denies any chest pain. No significant shortness of breath. Sinus rhythm/sinus tachycardia on telemetry. Labs are pending today. Off of Lasix drip    10/5/22: Case discussed with nephrology. Patient remains on Lasix drip. Previous records reviewed from [facility]. Patient with negative cardiac CTA in 2015. Labs today are pending    10/4/22:  Sounds present at the bedside. All questions answered. Remains on Lasix drip in the lower extremity edema has significantly improved. She denies any chest pain or shortness of breath. Hemodynamically stable. Creatinine remains elevated with a GFR of 22. Hemoglobin is 7.5. GI input appreciated    10/3/22: discussed with son on phone. No CP or SOB today. On lasix gtt. Hd stable. BP elevated. + LE edema. 10/2/22: Patient is feeling somewhat better. Renal function slightly worsening. Sinus tach on telemetry heart rate of 106 bpm.  Continues to have significant bilateral extremity edema. Patient with significant EMEA hemoglobin is 7.3 from 12 2 months ago. 10/1/22:  Patient is a 64 y.o. female who presents with a chief complaint of short of breath. Patient is followed on a regular basis by Dr. Kasandra Steele MD. patient with past medical history of chronic diastolic congestive heart failure, hypertension, hyperlipidemia, chronic kidney disease who presents with worsening shortness of breath and increased lower extremity edema. Patient generally follows with [facility] she was requesting transfer but no beds available. She denies history of cardiac catheterization or any recent stress test.  Creatinine of 2.39 with a GFR of 19.6 on admission.   BNP is elevated at 16,000 with mildly elevated troponin and a flat pattern. Hemoglobin is 8.1. He was noted to have extremely elevated blood pressure on arrival initiated on nitro drip. Currently she is in the 653R systolic. Echocardiogram at [facility] in 2017 ejection fraction of 60 to 65% no significant valve abnormalities. Negative nuclear stress test in 2017 at [facility]. Review of Systems:   Review of Systems   Constitutional:  Negative for fever. HENT:  Negative for congestion. Respiratory:  Positive for chest tightness (intermittent) and shortness of breath (with exertion). Cardiovascular:  Positive for chest pain (intermittent). Negative for palpitations and leg swelling. Gastrointestinal:  Negative for nausea and vomiting. Musculoskeletal:  Negative for arthralgias. Skin:  Negative for color change. Neurological:  Negative for dizziness, syncope and light-headedness. Psychiatric/Behavioral:  Negative for agitation. Physical Examination:    BP (!) 140/57   Pulse 88   Temp 97.9 °F (36.6 °C) (Oral)   Resp 18   Ht 5' 2\" (1.575 m)   Wt 120 lb 11.2 oz (54.7 kg)   SpO2 99%   BMI 22.08 kg/m²    Physical Exam  Constitutional:       General: She is not in acute distress. HENT:      Head: Normocephalic and atraumatic. Cardiovascular:      Rate and Rhythm: Normal rate and regular rhythm. Pulmonary:      Effort: Pulmonary effort is normal. No respiratory distress. Breath sounds: No wheezing, rhonchi or rales. Abdominal:      Palpations: Abdomen is soft. Musculoskeletal:         General: Normal range of motion. Cervical back: Normal range of motion and neck supple. Right lower leg: Edema (trace) present. Left lower leg: No edema. Skin:     General: Skin is warm and dry. Neurological:      General: No focal deficit present. Mental Status: She is alert and oriented to person, place, and time. Cranial Nerves: No cranial nerve deficit.    Psychiatric:         Mood and Affect: Mood normal. Behavior: Behavior normal.       LABS:  CBC:   Lab Results   Component Value Date/Time    WBC 11.2 10/11/2022 08:22 AM    RBC 2.80 10/11/2022 08:22 AM    HGB 7.6 10/11/2022 08:22 AM    HCT 23.7 10/11/2022 08:22 AM    MCV 84.7 10/11/2022 08:22 AM    MCH 27.2 10/11/2022 08:22 AM    MCHC 32.2 10/11/2022 08:22 AM    RDW 14.0 10/11/2022 08:22 AM     10/11/2022 08:22 AM    MPV 8.7 12/26/2013 08:34 AM     CBC with Differential:   Lab Results   Component Value Date/Time    WBC 11.2 10/11/2022 08:22 AM    RBC 2.80 10/11/2022 08:22 AM    HGB 7.6 10/11/2022 08:22 AM    HCT 23.7 10/11/2022 08:22 AM     10/11/2022 08:22 AM    MCV 84.7 10/11/2022 08:22 AM    MCH 27.2 10/11/2022 08:22 AM    MCHC 32.2 10/11/2022 08:22 AM    RDW 14.0 10/11/2022 08:22 AM    LYMPHOPCT 11.4 10/01/2022 07:59 AM    MONOPCT 6.3 10/01/2022 07:59 AM    BASOPCT 1.1 10/01/2022 07:59 AM    MONOSABS 0.5 10/01/2022 07:59 AM    LYMPHSABS 0.9 10/01/2022 07:59 AM    EOSABS 0.3 10/01/2022 07:59 AM    BASOSABS 0.1 10/01/2022 07:59 AM     CMP:    Lab Results   Component Value Date/Time     10/10/2022 05:09 AM    K 4.2 10/10/2022 05:09 AM    K 5.1 07/18/2022 10:25 AM     10/10/2022 05:09 AM    CO2 25 10/10/2022 05:09 AM    BUN 56 10/10/2022 05:09 AM    CREATININE 1.90 10/10/2022 05:09 AM    GFRAA 30.9 10/10/2022 05:09 AM    LABGLOM 25.5 10/10/2022 05:09 AM    GLUCOSE 181 10/10/2022 05:09 AM    PROT 4.7 10/01/2022 07:59 AM    LABALBU 2.7 10/01/2022 07:59 AM    CALCIUM 8.0 10/10/2022 05:09 AM    BILITOT 0.4 10/01/2022 07:59 AM    ALKPHOS 119 10/01/2022 07:59 AM    AST 18 10/01/2022 07:59 AM    ALT 14 10/01/2022 07:59 AM     BMP:    Lab Results   Component Value Date/Time     10/10/2022 05:09 AM    K 4.2 10/10/2022 05:09 AM    K 5.1 07/18/2022 10:25 AM     10/10/2022 05:09 AM    CO2 25 10/10/2022 05:09 AM    BUN 56 10/10/2022 05:09 AM    LABALBU 2.7 10/01/2022 07:59 AM    CREATININE 1.90 10/10/2022 05:09 AM    CALCIUM 8.0 10/10/2022 05:09 AM    GFRAA 30.9 10/10/2022 05:09 AM    LABGLOM 25.5 10/10/2022 05:09 AM    GLUCOSE 181 10/10/2022 05:09 AM     Magnesium:    Lab Results   Component Value Date/Time    MG 2.0 10/03/2022 05:55 AM     Troponin:    Lab Results   Component Value Date/Time    TROPONINI 0.022 10/01/2022 10:49 PM       Radiology:  CT ABDOMEN PELVIS WO CONTRAST Additional Contrast? None    Result Date: 10/6/2022  1. Again seen is a very small amount of postprocedural hemorrhage posterior to the right kidney. This is unchanged when compared to prior study one hour ago during the procedure. 2.Note is made of bilateral small pleural effusions, unchanged since prior study dating July 18, 2022. Of note is a right costophrenic angle medial density, which is again felt to represent likely compressive atelectasis, though appears more plump than prior. Attention on follow-up is recommended. 3.Calcified right liver lobe density, may represent an old granuloma. This is also unchanged 4. Calcific densities are noted within the colonic wall near the cecum. These may be dystrophic calcification such as sequelae of old inflammation or infection such as epiploic appendicitis versus infection, though alternatively premalignant and malignancies can also present in such a fashion. Please correlate with colonoscopy and other workup as clinically indicated. Discussed with Dr. Macy Nichols. COMPARISON: Same day study, one hour prior during procedure, and CT from July 18, 2022 DIAGNOSIS: Small postprocedural hemorrhage posterior to the right kidney COMMENTS: I13.10 Cardiorenal disease ICD10 TECHNIQUE: Spiral scanning of the abdomen and pelvis was performed after the administration of oral contrast .   Intravenous contrast was not administered as per the referring physician. CT Dose-Length Product (estimate related to radiation exposure from this exam):  130.68  mGy*cm. CT ABDOMEN: Note is made of bilateral small pleural effusions, unchanged since July.  Note is made of bibasilar atelectasis. Some of the areas of atelectasis appear to be more plump such as in the right base at the costophrenic angle, attention on follow-up is recommended. Calcific density is again seen in the right liver lobe, this may represent an old granuloma. The spleen is of normal size and attenuation without focal lesions. Pancreas shows no signs of focal mass or peripancreatic fluid collection. Kidneys show no contour deforming renal masses or hydronephrosis. A very small amount of postprocedural biopsy bleed is seen posterior to the right kidney, this small and unchanged from 1 hour prior. Adrenal glands are unremarkable. Aorta is normal in caliber without  signs of aneurysmal dilatation. No significant retroperitoneal adenopathy or ascites is identified. Calcific densities are noted within the colonic wall near the cecum. These may be dystrophic calcification such as sequelae of old inflammation or infection such as epiploic appendicitis versus infection, though alternatively premalignant and malignancies can also present in such a fashion. Please correlate with colonoscopy and other workup as clinically indicated. CT PELVIS:  Scanning of the pelvis shows no signs of pelvic mass or pelvic fluid collection. Diverticulosis of the colon is noted. CT GUIDED NEEDLE PLACEMENT    Result Date: 10/6/2022  1. Successful core biopsy of right inferior pole renal parenchyma. 2.2 Gelfoam torpedoes were injected after biopsy for immediate hemostasis. During biopsy and prior to insertion of Gelfoam torpedoes, a small amount of bleeding was seen adjacent to the needle entry site which stopped and did not grow any further. HISTORY: Alyson Butler is a Female of 64 years age. DIAGNOSIS:   random renal biopsy COMPARISON: None available. CT Dose-Length Product (estimate related to radiation exposure from this exam):  532.69  mGy*cm.  PROCEDURE: Following the discussion of the procedure, alternatives, risks versus benefits, informed consent was obtained from the patient. Specifically, risks of after-biopsy pain at the site, rare possibility of excessive hemorrhage, infection, injury to the adjacent organs were discussed and the patient verbalized understanding. Pre-procedure evaluation confirmed that the patient was an appropriate candidate for conscious sedation. Adequate sedation was maintained during the entire procedure. Vital signs, pulse oximetry, and response to verbal commands were monitored and recorded by the nurse throughout the procedure and the recovery period. Medical information was entered in the medical record including the medications and dosages used. The patient returned to baseline neurologic and physiologic status prior to leaving the department. No immediate sedation related complications were noted. Medication for conscious sedation was administered via IV route. 45 minutes of conscious sedation was provided. Following universal protocol, patient and site verification was performed with a \"timeout\" prior to the procedure. The patient was placed on the CT table in prone position and the right flank area was prepped and draped in usual sterile fashion. Using the usual sterile conditions, lidocaine and CT guidance, the inferior pole of the right kidney was accessed using an  18-guage coaxial biopsy needle system. After confirmation of appropriate localization of the needle, total of 3 samples were obtained and sent for pathological analysis. After biopsy, a small amount of blood was seen posterior to the right kidney. 2 Gelfoam torpedoes were injected through the coaxial needle for hemostasis. No further bleeding was seen. The coaxial needle system was removed and hemostasis was achieved by direct digital compression. The patient tolerated the procedure well. No immediate complications identified. The patient left the CT suite in supine position to the recovery room in stable condition. Total local anesthetic used: lidocaine, approximately 8 mL. Estimated blood loss:  Approximately 20 mL. The patient was observed in the recovery room for two hours after the procedure and discharged in stable condition. CT BIOPSY RENAL    Result Date: 10/6/2022  1. Successful core biopsy of right inferior pole renal parenchyma. 2.2 Gelfoam torpedoes were injected after biopsy for immediate hemostasis. During biopsy and prior to insertion of Gelfoam torpedoes, a small amount of bleeding was seen adjacent to the needle entry site which stopped and did not grow any further. HISTORY: Zoraida Prince is a Female of 64 years age. DIAGNOSIS:   random renal biopsy COMPARISON: None available. CT Dose-Length Product (estimate related to radiation exposure from this exam):  532.69  mGy*cm. PROCEDURE: Following the discussion of the procedure, alternatives, risks versus benefits, informed consent was obtained from the patient. Specifically, risks of after-biopsy pain at the site, rare possibility of excessive hemorrhage, infection, injury to the adjacent organs were discussed and the patient verbalized understanding. Pre-procedure evaluation confirmed that the patient was an appropriate candidate for conscious sedation. Adequate sedation was maintained during the entire procedure. Vital signs, pulse oximetry, and response to verbal commands were monitored and recorded by the nurse throughout the procedure and the recovery period. Medical information was entered in the medical record including the medications and dosages used. The patient returned to baseline neurologic and physiologic status prior to leaving the department. No immediate sedation related complications were noted. Medication for conscious sedation was administered via IV route. 45 minutes of conscious sedation was provided. Following universal protocol, patient and site verification was performed with a \"timeout\" prior to the procedure.  The patient was placed on the CT table in prone position and the right flank area was prepped and draped in usual sterile fashion. Using the usual sterile conditions, lidocaine and CT guidance, the inferior pole of the right kidney was accessed using an  18-guage coaxial biopsy needle system. After confirmation of appropriate localization of the needle, total of 3 samples were obtained and sent for pathological analysis. After biopsy, a small amount of blood was seen posterior to the right kidney. 2 Gelfoam torpedoes were injected through the coaxial needle for hemostasis. No further bleeding was seen. The coaxial needle system was removed and hemostasis was achieved by direct digital compression. The patient tolerated the procedure well. No immediate complications identified. The patient left the CT suite in supine position to the recovery room in stable condition. Total local anesthetic used: lidocaine, approximately 8 mL. Estimated blood loss:  Approximately 20 mL. The patient was observed in the recovery room for two hours after the procedure and discharged in stable condition. Echocardiogram 7/19/22:  Conclusions   Summary   Normal left ventricle structure and function. Left ventricular ejection fraction is visually estimated at 75%. Moderate concentric LVH   Borderline diastolic dysfunction   There is DUST (discrete upper septal thickening) without evidence of   outflow tract obstruction. Normal right ventricle structure and function. Right ventricular systolic pressure of 37 mm Hg consistent with mild   pulmonary hypertension. Normal mitral valve structure and function. 1+ MR   MAC   Normal aortic valve structure and function. Mild aortic sclerosis   Normal tricuspid valve structure and function. Mild tricuspid regurgitation. Mildly dilated left atrium.       Signature   ----------------------------------------------------------------   Electronically signed by Luis Key MD(Interpreting physician) on 07/19/2022 12:11 PM   ----------------------------------------------------------------    EKG on 9/30/22: SR 88, no acute ischemic changes, QTc 467ms    Telemetry 10/7/22: SR 80s  Telemetry 10/11/22: SR 70s      Assessment:    Active Hospital Problems    Diagnosis Date Noted    CHAYO (acute kidney injury) ([facility] [address] [N17.9] 10/06/2022     Priority: Medium    Anemia [D64.9] 10/03/2022     Priority: Medium    Hypertensive emergency [I16.1] 10/01/2022     Priority: Medium     Acute on chronic heart failure with preserved EF--appears euvolemic now  Normal LVF EF 75% per echo 7/19/22  Chest pain r/o cardiac ischemia  Dyspnea on exertion  HTN urgency--improving  Elevated troponin  Moderate concentric LVH  Dyslipidemia  CHAYO on CKD--s/p right kidney biopsy on 10/6/22. Improving   Anemia--remains low but stable  Leukocytosis     Plan:  Continue current medications-Lopressor 100 mg p.o. twice daily, hydralazine 100 mg p.o. every 8 hours, Imdur 60 mg p.o. daily, Diovan 80 mg p.o. daily, Clonidine 0.2mg PO BID,Lipitor 40 mg p.o. nightly, magnesium oxide 400 mg p.o. daily, levothyroxine 25 mcg p.o. daily, potassium chloride 20 mEq p.o. daily, Protonix 40 mg p.o. daily, CellCept 1000 mg p.o. twice daily, prednisone 60mg PO daily, Retacrit 1800 units 3 x weekly  Cardiac/less than 2 g sodium diet recommended  Check daily weight and strict intake and output  Recommend 2000 mL daily fluid restriction  Monitor on telemetry for any tachycardia or bradycardia arrhythmias  Maintain potassium greater than 4, magnesium greater than 2  GI/DVT prophylaxis  Gastroenterology recommendations regarding anemia--status post recent EGD and colonoscopy less than 1 month ago which were normal.  No overt bleeding noted and therefore no immediate clinical indication for endoscopic investigation. Recommend follow-up outpatient and consideration for small bowel pill capsule endoscopy  Nephrology recommendations--awaiting renal biopsy results. Suspected acute glomerulonephritis/nephrotic//nephritic syndrome  Coronary evaluation/cardiac catheterization in future when feasible/renal function allows as patient with multiple risk factors for CAD, acute decompensated heart failure and mildly elevated cardiac enzymes. Check chest x-ray in AM given ongoing dyspnea on exertion complaints  Consider pulmonology consult for evaluation of persistent shortness of breath   Will need follow-up with CHF clinic upon discharge  Further recommendations to follow    Electronically signed by CARMELITA Velasco on 10/11/2022 at 12:40 PM      Attending Supervising [de-identified] Attestation Statement  The patient is a 64 y.o. female. I have performed a history and physical examination of the patient. I discussed the case with the physician assistant. I reviewed the patient's Past Medical History, Past Surgical History, Medications, and Allergies.      Physical Exam:  Vitals:    10/11/22 0809 10/11/22 1000 10/11/22 1406 10/11/22 2012   BP: (!) 140/57  (!) 124/57 (!) 146/61   Pulse: 86 88 74 84   Resp: 18  18 18   Temp: 97.9 °F (36.6 °C)  97.7 °F (36.5 °C) 97.7 °F (36.5 °C)   TempSrc: Oral  Oral Oral   SpO2: 99%  97% 100%   Weight:       Height:           Review of Systems - Respiratory ROS: no cough, shortness of breath, or wheezing  Cardiovascular ROS: no chest pain or dyspnea on exertion  Gastrointestinal ROS: no abdominal pain, change in bowel habits, or black or bloody stools    Pulmonary/Chest: clear to auscultation bilaterally- no wheezes, rales or rhonchi, normal air movement, no respiratory distress  Cardiovascular: normal rate, normal S1 and S2, no gallops, intact distal pulses, and no carotid bruits  Abdomen: soft, non-tender, non-distended, normal bowel sounds, no masses or organomegaly    Active Hospital Problems    Diagnosis Date Noted    Cardiorenal disease [I13.10] 10/11/2022     Priority: Medium    CHAYO (acute kidney injury) ([facility] [address] [N17.9] 10/06/2022 Priority: Medium    Anemia [D64.9] 10/03/2022     Priority: Medium    Hypertensive emergency [I16.1] 10/01/2022     Priority: Medium    Acute on chronic diastolic (congestive) heart failure ([facility] [address] [I50.33] 07/18/2022     Priority: Medium        I reviewed and agree with the findings and plan documented in her note . Assessment:        Acute on chronic decompensated diastolic congestive heart failure  Hypertensive urgency  Elevated cardiac enzyme. Questionable demand ischemia. 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 We should adhere to the JNC VIII guidelines for HTN management and the NCEPATP III guidelines for LDL-C management. Diuresis as tolerated. Monitor I's and O's and renal function. Nephrology recommendations/await renal biopsty. Check EKG  Consider pulmonary evaluation  Coronary valuation in the form of cardiac catheterization when clinically stable/renal function stable. Patient with multiple risk factors for CAD and acute decompensated heart failure. Also has abnormal EKG and mildly elevated cardiac enzymes high risk for CAD. Will await clearance from nephrology. NO Norvasc secondary to lower extremity edema as well as acute decompensated heart failure  Avoid nephrotoxic agents  Monitor on telemetry  GI/DVT prophylaxis  CHF teaching  Follow-up in CHF clinic post discharge  Maintain hemoglobin greater than 7. Retacrit 3 times weekly. Consider hematology evaluation. Continue Imdur to 60mg daily for BP control.         Electronically signed by Cong Rubin, DO on 10/11/22 at 8:26 PM EDT

## 2022-10-11 NOTE — PROGRESS NOTES
PROGRESS NOTE:    Vascular Medicine and Interventional Radiology      PROGRESS NOTE:       Hanh Somers  : 1945  MR #: [id number]       PCP:  Genna Swartz MD     Attending Physician: Jayjay Baker MD    Date of Admission: 2022 12:37 PM     Chief Complaint:   Chief Complaint   Patient presents with    Shortness of Breath     X 1 week with bilateral leg swelling, had chest pain yesterday, denies chest pain today        SUBJECTIVE:   Hanh Somers S/P 10/6/22 Percutaneous CT Guided right random renal core needle biopsy by Dr. Jennifer Sarmiento. Developed post operative small hemorrhage. Follow up CT done within one hour post op showed no progression of bleed. Reports no further back or abdominal pain. Feeling much better. Reports appetite good. Up ambulating. Denies nausea, vomiting. Past Medical History:   has a past medical history of CHF (congestive heart failure) ([facility] [address] and Hypertension. Past SurgicalHistory:   has a past surgical history that includes Hysterectomy, total abdominal; hernia repair; Cataract removal (Bilateral); other surgical history (Right, 10/06/2022); and CT BIOPSY RENAL (10/6/2022). Allergies:Norco [hydrocodone-acetaminophen]    Home Medications:   Prior to Admission medications    Medication Sig Start Date End Date Taking?  Authorizing Provider   hydrALAZINE (APRESOLINE) 10 MG tablet Take 10 mg by mouth 4 times daily   Yes Historical Provider, MD   isosorbide mononitrate (IMDUR) 60 MG extended release tablet Take 120 mg by mouth daily   Yes Historical Provider, MD   torsemide (DEMADEX) 20 MG tablet Take 20 mg by mouth 2 times daily   Yes Historical Provider, MD   doxazosin (CARDURA) 2 MG tablet Take 2 mg by mouth daily  Patient not taking: Reported on 10/1/2022 8/23/22   Historical Provider, MD   Magnesium 400 MG TABS TAKE 1 TABLET BY MOUTH ONCE DAILY 22   Historical Provider, MD   levothyroxine (SYNTHROID) 25 MCG tablet 1/2 po daily 22   Kamila Cano MD amLODIPine (NORVASC) 10 MG tablet Take by mouth  Patient not taking: Reported on 10/1/2022 6/9/22   Historical Provider, MD   atorvastatin (LIPITOR) 20 MG tablet Take by mouth 6/3/22   Historical Provider, MD   losartan (COZAAR) 50 MG tablet  6/16/22   Historical Provider, MD   pantoprazole (PROTONIX) 40 MG tablet Take 40 mg by mouth in the morning. 3/7/22 3/7/23  Historical Provider, MD   furosemide (LASIX) 20 MG tablet Take 1 tablet by mouth in the morning. Patient not taking: Reported on 10/1/2022 7/20/22   Chava Perez MD   metoprolol (LOPRESSOR) 100 MG tablet Take 100 mg by mouth in the morning and 100 mg before bedtime. Historical Provider, MD        Family History: History reviewed. No pertinent family history. SocialHistory:    Social History     Socioeconomic History    Marital status:      Spouse name: Not on file    Number of children: Not on file    Years of education: Not on file    Highest education level: Not on file   Occupational History    Not on file   Tobacco Use    Smoking status: Never    Smokeless tobacco: Never   Vaping Use    Vaping Use: Never used   Substance and Sexual Activity    Alcohol use: Not Currently    Drug use: Never    Sexual activity: Never   Other Topics Concern    Not on file   Social History Narrative    Not on file     Social Determinants of Health     Financial Resource Strain: Not on file   Food Insecurity: Not on file   Transportation Needs: Not on file   Physical Activity: Not on file   Stress: Not on file   Social Connections: Not on file   Intimate Partner Violence: Not on file   Housing Stability: Not on file        ROS:   Review of Systems   Constitutional: Negative. Negative for chills, fatigue and fever. HENT: Negative. Negative for congestion, ear pain, sore throat and trouble swallowing. Eyes: Negative. Negative for visual disturbance. Respiratory: Negative. Negative for cough, shortness of breath and wheezing.     Cardiovascular: Negative. Negative for chest pain, palpitations and leg swelling. Gastrointestinal: Negative. Negative for abdominal pain, diarrhea, nausea and vomiting. Endocrine: Negative. Genitourinary: Negative. Negative for difficulty urinating, dysuria and hematuria. Musculoskeletal:  Negative for back pain. Skin: Negative. Negative for color change, rash and wound. Neurological: Negative. Negative for dizziness, weakness, light-headedness, numbness and headaches. Hematological: Negative. Does not bruise/bleed easily. Psychiatric/Behavioral: Negative. The patient is not nervous/anxious. All other systems reviewed and are negative. Objective:   Vitals: BP (!) 124/57   Pulse 74   Temp 97.7 °F (36.5 °C) (Oral)   Resp 18   Ht 5' 2\" (1.575 m)   Wt 120 lb 11.2 oz (54.7 kg)   SpO2 97%   BMI 22.08 kg/m²      Physical Examination:      Physical Exam  Constitutional:       General: She is not in acute distress. Appearance: Normal appearance. She is not ill-appearing. HENT:      Head: Normocephalic. Nose: No congestion. Cardiovascular:      Rate and Rhythm: Normal rate. Heart sounds: Normal heart sounds. Pulmonary:      Effort: Pulmonary effort is normal.   Abdominal:      General: Bowel sounds are normal.      Palpations: Abdomen is soft. Musculoskeletal:         General: No tenderness. Normal range of motion. Cervical back: Normal range of motion. Right lower leg: No edema. Left lower leg: No edema. Skin:     General: Skin is warm and dry. Neurological:      Mental Status: She is alert and oriented to person, place, and time. Psychiatric:         Mood and Affect: Mood normal.         Behavior: Behavior normal.       10/6/2022:   Impression   1. Again seen is a very small amount of postprocedural hemorrhage posterior to the right kidney. This is unchanged when compared to prior study one hour ago during the procedure.    2.Note is made of bilateral small posterior to the right kidney, this small and unchanged from 1 hour prior. Adrenal glands are unremarkable. Aorta is normal in caliber without    signs of aneurysmal dilatation. No significant retroperitoneal adenopathy or ascites is identified. Calcific densities are noted within the colonic wall near the cecum. These may be dystrophic calcification such as sequelae of old inflammation or    infection such as epiploic appendicitis versus infection, though alternatively premalignant and malignancies can also present in such a fashion. Please correlate with colonoscopy and other workup as clinically indicated. CT PELVIS:    Scanning of the pelvis shows no signs of pelvic mass or pelvic fluid collection. Diverticulosis of the colon is noted. 10/6/2022:   Impression   1. Successful core biopsy of right inferior pole renal parenchyma. 2.2 Gelfoam torpedoes were injected after biopsy for immediate hemostasis. During biopsy and prior to insertion of Gelfoam torpedoes, a small amount of bleeding was seen adjacent to the needle entry site which stopped and did not grow any further. HISTORY: Mirza Santa is a Female of 64 years age. DIAGNOSIS:   random renal biopsy        COMPARISON: None available. Right mid back biopsy site healed without complications. ASSESSMENT:  Suspected CHAYO on CKD. S/P 10/6/22 CT Guided needle random right renal biopsy done by Dr. Svitlana Esposito for diagnosis with development of post operative hemorrhage. No further abdominal or back pain. Feeling much better. PLAN:     As abdominal and back pain have resolved, no further follow up care is needed by IR. Plan to sign of IR services and patient can be discharged when appropriate by Primary.      Electronically signed by EITAN Jackson CNP on 10/11/22 at 2:51 PM EDT

## 2022-10-11 NOTE — PLAN OF CARE
Problem: ABCDS Injury Assessment  Goal: Absence of physical injury  Outcome: Progressing     Problem: Skin/Tissue Integrity  Goal: Absence of new skin breakdown  Description: 1. Monitor for areas of redness and/or skin breakdown  2. Assess vascular access sites hourly  3. Every 4-6 hours minimum:  Change oxygen saturation probe site  4. Every 4-6 hours:  If on nasal continuous positive airway pressure, respiratory therapy assess nares and determine need for appliance change or resting period.   Outcome: Progressing     Problem: Chronic Conditions and Co-morbidities  Goal: Patient's chronic conditions and co-morbidity symptoms are monitored and maintained or improved  Outcome: Progressing  Flowsheets (Taken 10/10/2022 2130)  Care Plan - Patient's Chronic Conditions and Co-Morbidity Symptoms are Monitored and Maintained or Improved: Monitor and assess patient's chronic conditions and comorbid symptoms for stability, deterioration, or improvement

## 2022-10-12 ENCOUNTER — APPOINTMENT (OUTPATIENT)
Dept: GENERAL RADIOLOGY | Age: 77
DRG: 291 | End: 2022-10-12
Payer: MEDICARE

## 2022-10-12 LAB
ANION GAP SERPL CALCULATED.3IONS-SCNC: 11 MEQ/L (ref 9–15)
BUN BLDV-MCNC: 55 MG/DL (ref 8–23)
CALCIUM SERPL-MCNC: 7.8 MG/DL (ref 8.5–9.9)
CHLORIDE BLD-SCNC: 102 MEQ/L (ref 95–107)
CO2: 20 MEQ/L (ref 20–31)
CREAT SERPL-MCNC: 1.5 MG/DL (ref 0.5–0.9)
GFR AFRICAN AMERICAN: 40.5
GFR NON-AFRICAN AMERICAN: 33.5
GLUCOSE BLD-MCNC: 299 MG/DL (ref 70–99)
HCT VFR BLD CALC: 21.9 % (ref 37–47)
HEMOGLOBIN: 7.5 G/DL (ref 12–16)
MCH RBC QN AUTO: 29.4 PG (ref 27–31.3)
MCHC RBC AUTO-ENTMCNC: 34.2 % (ref 33–37)
MCV RBC AUTO: 86 FL (ref 82–100)
PDW BLD-RTO: 14.6 % (ref 11.5–14.5)
PLATELET # BLD: 298 K/UL (ref 130–400)
POTASSIUM SERPL-SCNC: 5.3 MEQ/L (ref 3.4–4.9)
RBC # BLD: 2.55 M/UL (ref 4.2–5.4)
SODIUM BLD-SCNC: 133 MEQ/L (ref 135–144)
WBC # BLD: 9.9 K/UL (ref 4.8–10.8)

## 2022-10-12 PROCEDURE — 6360000002 HC RX W HCPCS: Performed by: INTERNAL MEDICINE

## 2022-10-12 PROCEDURE — 6370000000 HC RX 637 (ALT 250 FOR IP): Performed by: INTERNAL MEDICINE

## 2022-10-12 PROCEDURE — 36415 COLL VENOUS BLD VENIPUNCTURE: CPT

## 2022-10-12 PROCEDURE — 2060000000 HC ICU INTERMEDIATE R&B

## 2022-10-12 PROCEDURE — 94762 N-INVAS EAR/PLS OXIMTRY CONT: CPT

## 2022-10-12 PROCEDURE — 6370000000 HC RX 637 (ALT 250 FOR IP): Performed by: PHYSICIAN ASSISTANT

## 2022-10-12 PROCEDURE — 99223 1ST HOSP IP/OBS HIGH 75: CPT | Performed by: INTERNAL MEDICINE

## 2022-10-12 PROCEDURE — 71046 X-RAY EXAM CHEST 2 VIEWS: CPT

## 2022-10-12 PROCEDURE — 85027 COMPLETE CBC AUTOMATED: CPT

## 2022-10-12 PROCEDURE — 94150 VITAL CAPACITY TEST: CPT

## 2022-10-12 PROCEDURE — 80048 BASIC METABOLIC PNL TOTAL CA: CPT

## 2022-10-12 PROCEDURE — 2580000003 HC RX 258: Performed by: INTERNAL MEDICINE

## 2022-10-12 RX ADMIN — CLONIDINE HYDROCHLORIDE 0.2 MG: 0.1 TABLET ORAL at 08:09

## 2022-10-12 RX ADMIN — PREDNISONE 60 MG: 50 TABLET ORAL at 08:09

## 2022-10-12 RX ADMIN — VALSARTAN 80 MG: 80 TABLET, FILM COATED ORAL at 08:09

## 2022-10-12 RX ADMIN — METOPROLOL TARTRATE 100 MG: 50 TABLET, FILM COATED ORAL at 21:16

## 2022-10-12 RX ADMIN — HYDRALAZINE HYDROCHLORIDE 100 MG: 100 TABLET, FILM COATED ORAL at 21:15

## 2022-10-12 RX ADMIN — Medication 10 ML: at 08:10

## 2022-10-12 RX ADMIN — ATORVASTATIN CALCIUM 40 MG: 40 TABLET, FILM COATED ORAL at 21:15

## 2022-10-12 RX ADMIN — METOPROLOL TARTRATE 100 MG: 50 TABLET, FILM COATED ORAL at 08:10

## 2022-10-12 RX ADMIN — PANTOPRAZOLE SODIUM 40 MG: 40 TABLET, DELAYED RELEASE ORAL at 08:09

## 2022-10-12 RX ADMIN — MYCOPHENOLATE MOFETIL 1000 MG: 250 CAPSULE ORAL at 08:19

## 2022-10-12 RX ADMIN — ISOSORBIDE MONONITRATE 60 MG: 60 TABLET, EXTENDED RELEASE ORAL at 08:09

## 2022-10-12 RX ADMIN — LEVOTHYROXINE SODIUM 25 MCG: 0.03 TABLET ORAL at 06:12

## 2022-10-12 RX ADMIN — CLONIDINE HYDROCHLORIDE 0.2 MG: 0.1 TABLET ORAL at 21:16

## 2022-10-12 RX ADMIN — DOXAZOSIN 2 MG: 1 TABLET ORAL at 08:10

## 2022-10-12 RX ADMIN — Medication 5 ML: at 22:42

## 2022-10-12 RX ADMIN — EPOETIN ALFA-EPBX 1800 UNITS: 2000 INJECTION, SOLUTION INTRAVENOUS; SUBCUTANEOUS at 08:21

## 2022-10-12 RX ADMIN — Medication 400 MG: at 08:09

## 2022-10-12 RX ADMIN — MYCOPHENOLATE MOFETIL 1000 MG: 250 CAPSULE ORAL at 21:06

## 2022-10-12 RX ADMIN — POTASSIUM CHLORIDE 20 MEQ: 1500 TABLET, EXTENDED RELEASE ORAL at 08:10

## 2022-10-12 RX ADMIN — HYDRALAZINE HYDROCHLORIDE 100 MG: 100 TABLET, FILM COATED ORAL at 06:12

## 2022-10-12 RX ADMIN — HYDRALAZINE HYDROCHLORIDE 100 MG: 100 TABLET, FILM COATED ORAL at 14:37

## 2022-10-12 ASSESSMENT — PAIN SCALES - GENERAL
PAINLEVEL_OUTOF10: 0

## 2022-10-12 ASSESSMENT — PAIN DESCRIPTION - PAIN TYPE
TYPE: CHRONIC PAIN
TYPE: CHRONIC PAIN

## 2022-10-12 ASSESSMENT — PAIN DESCRIPTION - LOCATION
LOCATION: GENERALIZED

## 2022-10-12 ASSESSMENT — PAIN DESCRIPTION - DESCRIPTORS: DESCRIPTORS: DISCOMFORT;DULL

## 2022-10-12 ASSESSMENT — PAIN - FUNCTIONAL ASSESSMENT
PAIN_FUNCTIONAL_ASSESSMENT: ACTIVITIES ARE NOT PREVENTED
PAIN_FUNCTIONAL_ASSESSMENT: ACTIVITIES ARE NOT PREVENTED

## 2022-10-12 NOTE — CARE COORDINATION
MET WITH PATIENT TO DISCUSS DC PLAN AND SHE STILL PLANS TO GO HOME . SHE STATES SHE GETS UP AND GOES TO BATHROOM AND FEELS SAFE ABOUT PLAN FOR HOME. SHE DOES HAVE HELP IF SHE NEEDS IT AND STATES SHE DOES NOT NEED ANY AT THIS TIME.

## 2022-10-12 NOTE — PROGRESS NOTES
Nephrology Progress Note    Assessment:  CHAYO   AGN nephrotic/Nephritic ( waiting on )  Dx pauci immune GN    needs anti gbm ab titer pending    OHD Diastolic HF  Hypertension fair control    Plan: need H&H today pending  GFR stablized improved. Discussed timing of cath with Dr Mariama Kumari  if imperative than proceed otherwise hold on timing . pathology  crescent GN      Patient Active Problem List:     CHF (congestive heart failure), NYHA class I, acute on chronic, combined ([facility] [address]     Wheezing     Acute congestive heart failure ([facility] [address]     Microhematuria     Diarrhea of presumed infectious origin     Hypertensive emergency     Anemia     CHAYO (acute kidney injury) ([facility] [address]      Subjective:  Admit Date: 9/30/2022    Interval History: no BMP, no acute overnight events     Medications:  Scheduled Meds:   cloNIDine  0.2 mg Oral BID    predniSONE  60 mg Oral Daily    isosorbide mononitrate  60 mg Oral Daily    valsartan  80 mg Oral Daily    mycophenolate  1,000 mg Oral BID    hydrALAZINE  100 mg Oral 3 times per day    potassium chloride  20 mEq Oral Daily with breakfast    epoetin rian-epbx  30 Units/kg SubCUTAneous Once per day on Mon Wed Fri    sodium chloride flush  5-40 mL IntraVENous 2 times per day    atorvastatin  40 mg Oral Nightly    doxazosin  2 mg Oral Daily    levothyroxine  25 mcg Oral Daily    metoprolol  100 mg Oral BID    pantoprazole  40 mg Oral Daily    magnesium oxide  400 mg Oral Daily     Continuous Infusions:   sodium chloride 100 mL/hr at 10/10/22 0855    sodium chloride         CBC:   Recent Labs     10/11/22  0822   WBC 11.2*   HGB 7.6*          CMP:    Recent Labs     10/10/22  0509      K 4.2      CO2 25   BUN 56*   CREATININE 1.90*   GLUCOSE 181*   CALCIUM 8.0*   LABGLOM 25.5*       Troponin: No results for input(s): TROPONINI in the last 72 hours. BNP: No results for input(s): BNP in the last 72 hours. INR: No results for input(s): INR in the last 72 hours.   Lipids: No results for input(s): CHOL, LDLDIRECT, TRIG, HDL, AMYLASE, LIPASE in the last 72 hours. Liver: No results for input(s): AST, ALT, ALKPHOS, PROT, LABALBU, BILITOT in the last 72 hours. Invalid input(s): BILDIR  Iron:    No results for input(s): IRONS, FERRITIN in the last 72 hours. Invalid input(s): LABIRONS    Urinalysis: No results for input(s): UA in the last 72 hours.     Objective:  Vitals: BP (!) 125/56   Pulse 73   Temp 97.7 °F (36.5 °C) (Oral)   Resp 18   Ht 5' 2\" (1.575 m)   Wt 128 lb (58.1 kg)   SpO2 98%   BMI 23.41 kg/m²    Wt Readings from Last 3 Encounters:   10/12/22 128 lb (58.1 kg)   09/01/22 130 lb (59 kg)   08/10/22 134 lb (60.8 kg)      24HR INTAKE/OUTPUT:    Intake/Output Summary (Last 24 hours) at 10/12/2022 1953  Last data filed at 10/12/2022 1200  Gross per 24 hour   Intake 720 ml   Output --   Net 720 ml       General: alert, in no apparent distress  HEENT: normocephalic, atraumatic, anicteric  Lungs: non-labored respirations, clear to auscultation bilaterally  Heart: regular rate and rhythm, no murmurs or rubs  Abdomen: soft, non-tender, non-distended  Ext: no cyanosis, no peripheral edema  Neuro: alert and oriented, no gross abnormalities      Electronically signed by Adwoa Covarrubias MD, MD

## 2022-10-12 NOTE — PROGRESS NOTES
Narrative Notes:    0900 AM: initial assessment completed; see flow sheet for details. Patient denies any acute CP or acute SOB. Lungs clear to auscultation. PP+1. Trace BLE edema. MP SR. Stable. No syncope. Patient went via wc to xray as ordered this morning. No complaints voiced. Will note deviation. Call light in reach. SR up X2. Advised to call if any questions related to plan of care. 1200 PM: Both Dr. Sekou Mondragon and CHANTALE MAE sent perfect serve regarding results/findings from this morning ordered XRAY. Patient without any acute resp distress or discomfort. Resting in intervals. No SOB. No CP. Stable at this time. Orders received for Pulmonology Consult. No distress. Will note changes. Call light in reach. 1500 PM: No acute changes from above assessment. Patient resting comfortably in bed; no distress. No CP or acute SOB. VSS. Will note changes. Call light in reach. Family at bedside.

## 2022-10-12 NOTE — CONSULTS
Pulmonary and Critical Care Medicine  Consult Note  Encounter Date: 10/12/2022 2:53 PM    Ms. Baltazar Hernandez is a 64 y.o. female  : 1945  Requesting Provider: Shanna Schlatter, DO    Reason for request: Shortness of breath            HISTORY OF PRESENT ILLNESS:    Patient is 64 y.o. presents with shortness of breath, patient was admitted on  with shortness of breath and worsening lower extremity edema. Diagnosed with acute decompensated diastolic heart failure, treated appropriately, continues to have dyspnea on exertion, walking to the bathroom she gets very short of breath, she report occasional wheezing, minimal dry cough, no fever or chills, no lower extremity edema, she never smoked, denies choking or aspiration while eating. Past Medical History:        Diagnosis Date    CHF (congestive heart failure) (HCC)     Hypertension        Past Surgical History:        Procedure Laterality Date    CATARACT REMOVAL Bilateral     CT BIOPSY RENAL  10/6/2022    CT BIOPSY RENAL 10/6/2022 [facility] CT SCAN    HERNIA REPAIR      HYSTERECTOMY, TOTAL ABDOMINAL (CERVIX REMOVED)      OTHER SURGICAL HISTORY Right 10/06/2022    right renal random biopsy performed by Dr. Milton Scales History:     reports that she has never smoked. She has never used smokeless tobacco. She reports that she does not currently use alcohol. She reports that she does not use drugs. Family History:   History reviewed. No pertinent family history.   No family history of lung disease  Allergies:  Norco [hydrocodone-acetaminophen]        MEDICATIONS during current hospitalization:    Continuous Infusions:   sodium chloride 100 mL/hr at 10/10/22 0855    sodium chloride         Scheduled Meds:   cloNIDine  0.2 mg Oral BID    predniSONE  60 mg Oral Daily    isosorbide mononitrate  60 mg Oral Daily    valsartan  80 mg Oral Daily    mycophenolate  1,000 mg Oral BID    hydrALAZINE  100 mg Oral 3 times per day    potassium chloride  20 mEq Oral Daily with breakfast    epoetin rian-epbx  30 Units/kg SubCUTAneous Once per day on Mon Wed Fri    sodium chloride flush  5-40 mL IntraVENous 2 times per day    atorvastatin  40 mg Oral Nightly    doxazosin  2 mg Oral Daily    levothyroxine  25 mcg Oral Daily    metoprolol  100 mg Oral BID    pantoprazole  40 mg Oral Daily    magnesium oxide  400 mg Oral Daily       PRN Meds:levalbuterol, sodium chloride, fentanNYL, lidocaine, midazolam, sodium chloride flush, sodium chloride, ondansetron **OR** ondansetron, acetaminophen, labetalol, hydrALAZINE        REVIEW OF SYSTEMS:  ROS: 10 organs review of system is done including general, psychological, ENT, hematological, endocrine, respiratory, cardiovascular, gastrointestinal, musculoskeletal, neurological,  allergy and Immunology is done and is otherwise negative. PHYSICAL EXAM:    Vitals:  BP (!) 125/56   Pulse 73   Temp 97.7 °F (36.5 °C)   Resp 18   Ht 5' 2\" (1.575 m)   Wt 128 lb (58.1 kg)   SpO2 98%   BMI 23.41 kg/m²     General: alert, cooperative, no distress  Head: normocephalic, atraumatic  Eyes:No gross abnormalities. ENT:  MMM no lesions  Neck:  supple and no masses  Chest : clear to auscultation bilaterally- no wheezes, rales or rhonchi, normal air movement, no respiratory distress  Heart[de-identified] Heart sounds are normal.  Regular rate and rhythm without murmur, gallop or rub. ABD:  symmetric, soft, non-tender  Musculoskeletal : no cyanosis, no clubbing, and no edema  Neuro:  Grossly normal  Skin: No rashes or nodules noted. Lymph node:  no cervical nodes  Urology: No Pinedo   Psychiatric: appropriate        Data Review  Recent Labs     10/09/22  1602 10/11/22  0822   WBC  --  11.2*   HGB  --  7.6*   HCT 22.7* 23.7*   PLT  --  299      Recent Labs     10/10/22  0509      K 4.2      CO2 25   BUN 56*   CREATININE 1.90*   GLUCOSE 181*       MV Settings:           ABGs: No results for input(s): PHART, TMY4TXH, PO2ART, OGV6PWB, BEART, [id number],

## 2022-10-13 PROBLEM — R06.02 SHORTNESS OF BREATH: Status: ACTIVE | Noted: 2022-10-13

## 2022-10-13 PROBLEM — R93.89 ABNORMAL CHEST X-RAY: Status: ACTIVE | Noted: 2022-10-13

## 2022-10-13 LAB
ANION GAP SERPL CALCULATED.3IONS-SCNC: 10 MEQ/L (ref 9–15)
BUN BLDV-MCNC: 55 MG/DL (ref 8–23)
CALCIUM SERPL-MCNC: 7.9 MG/DL (ref 8.5–9.9)
CHLORIDE BLD-SCNC: 105 MEQ/L (ref 95–107)
CO2: 20 MEQ/L (ref 20–31)
CREAT SERPL-MCNC: 1.39 MG/DL (ref 0.5–0.9)
GFR AFRICAN AMERICAN: 44.3
GFR NON-AFRICAN AMERICAN: 36.6
GLUCOSE BLD-MCNC: 139 MG/DL (ref 70–99)
POTASSIUM SERPL-SCNC: 4.9 MEQ/L (ref 3.4–4.9)
SODIUM BLD-SCNC: 135 MEQ/L (ref 135–144)

## 2022-10-13 PROCEDURE — 2580000003 HC RX 258: Performed by: INTERNAL MEDICINE

## 2022-10-13 PROCEDURE — 6370000000 HC RX 637 (ALT 250 FOR IP): Performed by: INTERNAL MEDICINE

## 2022-10-13 PROCEDURE — 99232 SBSQ HOSP IP/OBS MODERATE 35: CPT | Performed by: INTERNAL MEDICINE

## 2022-10-13 PROCEDURE — 6370000000 HC RX 637 (ALT 250 FOR IP): Performed by: PHYSICIAN ASSISTANT

## 2022-10-13 PROCEDURE — 6360000002 HC RX W HCPCS: Performed by: INTERNAL MEDICINE

## 2022-10-13 PROCEDURE — 80048 BASIC METABOLIC PNL TOTAL CA: CPT

## 2022-10-13 PROCEDURE — 36415 COLL VENOUS BLD VENIPUNCTURE: CPT

## 2022-10-13 PROCEDURE — 2060000000 HC ICU INTERMEDIATE R&B

## 2022-10-13 RX ADMIN — LEVOTHYROXINE SODIUM 25 MCG: 0.03 TABLET ORAL at 06:12

## 2022-10-13 RX ADMIN — VALSARTAN 80 MG: 80 TABLET, FILM COATED ORAL at 08:36

## 2022-10-13 RX ADMIN — PANTOPRAZOLE SODIUM 40 MG: 40 TABLET, DELAYED RELEASE ORAL at 08:36

## 2022-10-13 RX ADMIN — POTASSIUM CHLORIDE 20 MEQ: 1500 TABLET, EXTENDED RELEASE ORAL at 08:36

## 2022-10-13 RX ADMIN — ISOSORBIDE MONONITRATE 60 MG: 60 TABLET, EXTENDED RELEASE ORAL at 08:35

## 2022-10-13 RX ADMIN — HYDRALAZINE HYDROCHLORIDE 100 MG: 100 TABLET, FILM COATED ORAL at 22:16

## 2022-10-13 RX ADMIN — Medication 10 ML: at 08:36

## 2022-10-13 RX ADMIN — HYDRALAZINE HYDROCHLORIDE 100 MG: 100 TABLET, FILM COATED ORAL at 06:11

## 2022-10-13 RX ADMIN — Medication 400 MG: at 08:36

## 2022-10-13 RX ADMIN — Medication 10 ML: at 22:22

## 2022-10-13 RX ADMIN — MYCOPHENOLATE MOFETIL 1000 MG: 250 CAPSULE ORAL at 23:23

## 2022-10-13 RX ADMIN — METOPROLOL TARTRATE 100 MG: 50 TABLET, FILM COATED ORAL at 22:16

## 2022-10-13 RX ADMIN — ACETAMINOPHEN 650 MG: 325 TABLET ORAL at 18:43

## 2022-10-13 RX ADMIN — CLONIDINE HYDROCHLORIDE 0.2 MG: 0.1 TABLET ORAL at 22:16

## 2022-10-13 RX ADMIN — CLONIDINE HYDROCHLORIDE 0.2 MG: 0.1 TABLET ORAL at 08:35

## 2022-10-13 RX ADMIN — MYCOPHENOLATE MOFETIL 1000 MG: 250 CAPSULE ORAL at 08:42

## 2022-10-13 RX ADMIN — METOPROLOL TARTRATE 100 MG: 50 TABLET, FILM COATED ORAL at 08:36

## 2022-10-13 RX ADMIN — ATORVASTATIN CALCIUM 40 MG: 40 TABLET, FILM COATED ORAL at 22:16

## 2022-10-13 RX ADMIN — PREDNISONE 60 MG: 50 TABLET ORAL at 08:35

## 2022-10-13 RX ADMIN — DOXAZOSIN 2 MG: 1 TABLET ORAL at 08:35

## 2022-10-13 RX ADMIN — HYDRALAZINE HYDROCHLORIDE 100 MG: 100 TABLET, FILM COATED ORAL at 14:21

## 2022-10-13 ASSESSMENT — ENCOUNTER SYMPTOMS
SHORTNESS OF BREATH: 1
COLOR CHANGE: 0
NAUSEA: 0
CHEST TIGHTNESS: 1
VOMITING: 0

## 2022-10-13 ASSESSMENT — PAIN DESCRIPTION - DESCRIPTORS: DESCRIPTORS: ACHING;TIGHTNESS

## 2022-10-13 ASSESSMENT — PAIN SCALES - GENERAL
PAINLEVEL_OUTOF10: 0
PAINLEVEL_OUTOF10: 7

## 2022-10-13 ASSESSMENT — PAIN DESCRIPTION - LOCATION: LOCATION: HEAD

## 2022-10-13 NOTE — PROGRESS NOTES
Progress Note  Patient: Cody Dawkins  Unit/Bed: U563/A826-15  YOB: 1945  MRN: [id number]  Acct: [de-identified]   Admitting Diagnosis: Cardiorenal disease [I13.10]  CHAYO (acute kidney injury) ([facility] [N17.9]  Hypertensive emergency [I16.1]  Acute on chronic diastolic (congestive) heart failure ([facility] [I50.33]  Date:  9/30/2022  Hospital Day: 12    Chief Complaint:  Shortness of breath    Subjective      10-13-20 22: Patient is doing well overall. Denies any chest pain or shortness of breath. Sinus rhythm on telemetry. Awaiting full biopsy result. 10/11/22: Resting comfortably in bed in no acute distress. Had mild headache this morning. Continues to have intermittent left-sided chest pressure but none currently. Still experiencing shortness of breath with exertion. Renal function is improving. Persistent anemia with hemoglobin low at 7.6. Still awaiting renal biopsy results from [facility] but patient with suspected acute glomerulonephritis/nephrotic/nephritic syndrome. BP better controlled on multiple antihypertensive medications. Currently on telemetry she is maintaining sinus rhythm with heart rate in the 70s.    10/10/22: Patient is resting,. Denies any chest pain or shortness of breath. Renal biopsy still pending. Discussed with nephrology. Holding on cardiac catheterization for now. 10/9/22: Feels better. No chest pain overnight. Renal biopsy results still pending. Creatinine slightly better today, 2.13.    10/8/22: Headache earlier now resolved. No further chest pain but contineus to have SILVA    10/7/22: Resting comfortably in bed in no acute distress. After taking morning medications, patient states she developed an episode of left-sided chest pain/pressure which lasted approximately 10 minutes before resolving. She continues to experience shortness of breath with exertion. She is maintaining adequate SPO2 on room air.   She underwent right kidney biopsy yesterday with IR and results are pending. She remains on Lasix 80 mg IV daily with approximately 2.2 L negative fluid balance since admission. Renal function remains relatively unchanged with creatinine of 2.56, BUN elevated 52 and GFR low at 18.1. Worsening in leukocytosis this morning with WBC of 14.7 compared to 11.9 yesterday. Currently on telemetry she is sinus rhythm with heart rate in the 80s. 10/6/22: Patient going for kidney biopsy today. Denies any chest pain. No significant shortness of breath. Sinus rhythm/sinus tachycardia on telemetry. Labs are pending today. Off of Lasix drip    10/5/22: Case discussed with nephrology. Patient remains on Lasix drip. Previous records reviewed from [facility]. Patient with negative cardiac CTA in 2015. Labs today are pending    10/4/22:  Sounds present at the bedside. All questions answered. Remains on Lasix drip in the lower extremity edema has significantly improved. She denies any chest pain or shortness of breath. Hemodynamically stable. Creatinine remains elevated with a GFR of 22. Hemoglobin is 7.5. GI input appreciated    10/3/22: discussed with son on phone. No CP or SOB today. On lasix gtt. Hd stable. BP elevated. + LE edema. 10/2/22: Patient is feeling somewhat better. Renal function slightly worsening. Sinus tach on telemetry heart rate of 106 bpm.  Continues to have significant bilateral extremity edema. Patient with significant EMEA hemoglobin is 7.3 from 12 2 months ago. 10/1/22:  Patient is a 64 y.o. female who presents with a chief complaint of short of breath. Patient is followed on a regular basis by Dr. Zelda Gitelman, MD. patient with past medical history of chronic diastolic congestive heart failure, hypertension, hyperlipidemia, chronic kidney disease who presents with worsening shortness of breath and increased lower extremity edema. Patient generally follows with [facility] she was requesting transfer but no beds available.   She denies history of cardiac catheterization or any recent stress test.  Creatinine of 2.39 with a GFR of 19.6 on admission. BNP is elevated at 16,000 with mildly elevated troponin and a flat pattern. Hemoglobin is 8.1. He was noted to have extremely elevated blood pressure on arrival initiated on nitro drip. Currently she is in the 189H systolic. Echocardiogram at [facility] in 2017 ejection fraction of 60 to 65% no significant valve abnormalities. Negative nuclear stress test in 2017 at [facility]. Review of Systems:   Review of Systems   Constitutional:  Negative for fever. HENT:  Negative for congestion. Respiratory:  Positive for chest tightness (intermittent) and shortness of breath (with exertion). Cardiovascular:  Positive for chest pain (intermittent). Negative for palpitations and leg swelling. Gastrointestinal:  Negative for nausea and vomiting. Musculoskeletal:  Negative for arthralgias. Skin:  Negative for color change. Neurological:  Negative for dizziness, syncope and light-headedness. Psychiatric/Behavioral:  Negative for agitation. Physical Examination:    BP (!) 159/63   Pulse 80   Temp 97.7 °F (36.5 °C)   Resp 18   Ht 5' 2\" (1.575 m)   Wt 128 lb (58.1 kg)   SpO2 98%   BMI 23.41 kg/m²    Physical Exam  Constitutional:       General: She is not in acute distress. HENT:      Head: Normocephalic and atraumatic. Cardiovascular:      Rate and Rhythm: Normal rate and regular rhythm. Pulmonary:      Effort: Pulmonary effort is normal. No respiratory distress. Breath sounds: No wheezing, rhonchi or rales. Abdominal:      Palpations: Abdomen is soft. Musculoskeletal:         General: Normal range of motion. Cervical back: Normal range of motion and neck supple. Right lower leg: Edema (trace) present. Left lower leg: No edema. Skin:     General: Skin is warm and dry. Neurological:      General: No focal deficit present.       Mental Status: She is alert and oriented to person, place, and time. Cranial Nerves: No cranial nerve deficit.    Psychiatric:         Mood and Affect: Mood normal.         Behavior: Behavior normal.       LABS:  CBC:   Lab Results   Component Value Date/Time    WBC 9.9 10/12/2022 07:46 PM    RBC 2.55 10/12/2022 07:46 PM    HGB 7.5 10/12/2022 07:46 PM    HCT 21.9 10/12/2022 07:46 PM    MCV 86.0 10/12/2022 07:46 PM    MCH 29.4 10/12/2022 07:46 PM    MCHC 34.2 10/12/2022 07:46 PM    RDW 14.6 10/12/2022 07:46 PM     10/12/2022 07:46 PM    MPV 8.7 12/26/2013 08:34 AM     CBC with Differential:   Lab Results   Component Value Date/Time    WBC 9.9 10/12/2022 07:46 PM    RBC 2.55 10/12/2022 07:46 PM    HGB 7.5 10/12/2022 07:46 PM    HCT 21.9 10/12/2022 07:46 PM     10/12/2022 07:46 PM    MCV 86.0 10/12/2022 07:46 PM    MCH 29.4 10/12/2022 07:46 PM    MCHC 34.2 10/12/2022 07:46 PM    RDW 14.6 10/12/2022 07:46 PM    LYMPHOPCT 11.4 10/01/2022 07:59 AM    MONOPCT 6.3 10/01/2022 07:59 AM    BASOPCT 1.1 10/01/2022 07:59 AM    MONOSABS 0.5 10/01/2022 07:59 AM    LYMPHSABS 0.9 10/01/2022 07:59 AM    EOSABS 0.3 10/01/2022 07:59 AM    BASOSABS 0.1 10/01/2022 07:59 AM     CMP:    Lab Results   Component Value Date/Time     10/13/2022 04:57 AM    K 4.9 10/13/2022 04:57 AM    K 5.1 07/18/2022 10:25 AM     10/13/2022 04:57 AM    CO2 20 10/13/2022 04:57 AM    BUN 55 10/13/2022 04:57 AM    CREATININE 1.39 10/13/2022 04:57 AM    GFRAA 44.3 10/13/2022 04:57 AM    LABGLOM 36.6 10/13/2022 04:57 AM    GLUCOSE 139 10/13/2022 04:57 AM    PROT 4.7 10/01/2022 07:59 AM    LABALBU 2.7 10/01/2022 07:59 AM    CALCIUM 7.9 10/13/2022 04:57 AM    BILITOT 0.4 10/01/2022 07:59 AM    ALKPHOS 119 10/01/2022 07:59 AM    AST 18 10/01/2022 07:59 AM    ALT 14 10/01/2022 07:59 AM     BMP:    Lab Results   Component Value Date/Time     10/13/2022 04:57 AM    K 4.9 10/13/2022 04:57 AM    K 5.1 07/18/2022 10:25 AM     10/13/2022 04:57 AM    CO2 20 10/13/2022 04:57 AM BUN 55 10/13/2022 04:57 AM    LABALBU 2.7 10/01/2022 07:59 AM    CREATININE 1.39 10/13/2022 04:57 AM    CALCIUM 7.9 10/13/2022 04:57 AM    GFRAA 44.3 10/13/2022 04:57 AM    LABGLOM 36.6 10/13/2022 04:57 AM    GLUCOSE 139 10/13/2022 04:57 AM     Magnesium:    Lab Results   Component Value Date/Time    MG 2.0 10/03/2022 05:55 AM     Troponin:    Lab Results   Component Value Date/Time    TROPONINI 0.022 10/01/2022 10:49 PM       Radiology:  CT ABDOMEN PELVIS WO CONTRAST Additional Contrast? None    Result Date: 10/6/2022  1. Again seen is a very small amount of postprocedural hemorrhage posterior to the right kidney. This is unchanged when compared to prior study one hour ago during the procedure. 2.Note is made of bilateral small pleural effusions, unchanged since prior study dating July 18, 2022. Of note is a right costophrenic angle medial density, which is again felt to represent likely compressive atelectasis, though appears more plump than prior. Attention on follow-up is recommended. 3.Calcified right liver lobe density, may represent an old granuloma. This is also unchanged 4. Calcific densities are noted within the colonic wall near the cecum. These may be dystrophic calcification such as sequelae of old inflammation or infection such as epiploic appendicitis versus infection, though alternatively premalignant and malignancies can also present in such a fashion. Please correlate with colonoscopy and other workup as clinically indicated. Discussed with Dr. Luca Flores. COMPARISON: Same day study, one hour prior during procedure, and CT from July 18, 2022 DIAGNOSIS: Small postprocedural hemorrhage posterior to the right kidney COMMENTS: I13.10 Cardiorenal disease ICD10 TECHNIQUE: Spiral scanning of the abdomen and pelvis was performed after the administration of oral contrast .   Intravenous contrast was not administered as per the referring physician.  CT Dose-Length Product (estimate related to radiation exposure from this exam):  130.68  mGy*cm. CT ABDOMEN: Note is made of bilateral small pleural effusions, unchanged since July. Note is made of bibasilar atelectasis. Some of the areas of atelectasis appear to be more plump such as in the right base at the costophrenic angle, attention on follow-up is recommended. Calcific density is again seen in the right liver lobe, this may represent an old granuloma. The spleen is of normal size and attenuation without focal lesions. Pancreas shows no signs of focal mass or peripancreatic fluid collection. Kidneys show no contour deforming renal masses or hydronephrosis. A very small amount of postprocedural biopsy bleed is seen posterior to the right kidney, this small and unchanged from 1 hour prior. Adrenal glands are unremarkable. Aorta is normal in caliber without  signs of aneurysmal dilatation. No significant retroperitoneal adenopathy or ascites is identified. Calcific densities are noted within the colonic wall near the cecum. These may be dystrophic calcification such as sequelae of old inflammation or infection such as epiploic appendicitis versus infection, though alternatively premalignant and malignancies can also present in such a fashion. Please correlate with colonoscopy and other workup as clinically indicated. CT PELVIS:  Scanning of the pelvis shows no signs of pelvic mass or pelvic fluid collection. Diverticulosis of the colon is noted. CT GUIDED NEEDLE PLACEMENT    Result Date: 10/6/2022  1. Successful core biopsy of right inferior pole renal parenchyma. 2.2 Gelfoam torpedoes were injected after biopsy for immediate hemostasis. During biopsy and prior to insertion of Gelfoam torpedoes, a small amount of bleeding was seen adjacent to the needle entry site which stopped and did not grow any further. HISTORY: Larissa Case is a Female of 64 years age. DIAGNOSIS:   random renal biopsy COMPARISON: None available.  CT Dose-Length Product (estimate related to radiation exposure from this exam):  532.69  mGy*cm. PROCEDURE: Following the discussion of the procedure, alternatives, risks versus benefits, informed consent was obtained from the patient. Specifically, risks of after-biopsy pain at the site, rare possibility of excessive hemorrhage, infection, injury to the adjacent organs were discussed and the patient verbalized understanding. Pre-procedure evaluation confirmed that the patient was an appropriate candidate for conscious sedation. Adequate sedation was maintained during the entire procedure. Vital signs, pulse oximetry, and response to verbal commands were monitored and recorded by the nurse throughout the procedure and the recovery period. Medical information was entered in the medical record including the medications and dosages used. The patient returned to baseline neurologic and physiologic status prior to leaving the department. No immediate sedation related complications were noted. Medication for conscious sedation was administered via IV route. 45 minutes of conscious sedation was provided. Following universal protocol, patient and site verification was performed with a \"timeout\" prior to the procedure. The patient was placed on the CT table in prone position and the right flank area was prepped and draped in usual sterile fashion. Using the usual sterile conditions, lidocaine and CT guidance, the inferior pole of the right kidney was accessed using an  18-guage coaxial biopsy needle system. After confirmation of appropriate localization of the needle, total of 3 samples were obtained and sent for pathological analysis. After biopsy, a small amount of blood was seen posterior to the right kidney. 2 Gelfoam torpedoes were injected through the coaxial needle for hemostasis. No further bleeding was seen. The coaxial needle system was removed and hemostasis was achieved by direct digital compression. The patient tolerated the procedure well.   No immediate complications identified. The patient left the CT suite in supine position to the recovery room in stable condition. Total local anesthetic used: lidocaine, approximately 8 mL. Estimated blood loss:  Approximately 20 mL. The patient was observed in the recovery room for two hours after the procedure and discharged in stable condition. CT BIOPSY RENAL    Result Date: 10/6/2022  1. Successful core biopsy of right inferior pole renal parenchyma. 2.2 Gelfoam torpedoes were injected after biopsy for immediate hemostasis. During biopsy and prior to insertion of Gelfoam torpedoes, a small amount of bleeding was seen adjacent to the needle entry site which stopped and did not grow any further. HISTORY: Cordell Porras is a Female of 64 years age. DIAGNOSIS:   random renal biopsy COMPARISON: None available. CT Dose-Length Product (estimate related to radiation exposure from this exam):  532.69  mGy*cm. PROCEDURE: Following the discussion of the procedure, alternatives, risks versus benefits, informed consent was obtained from the patient. Specifically, risks of after-biopsy pain at the site, rare possibility of excessive hemorrhage, infection, injury to the adjacent organs were discussed and the patient verbalized understanding. Pre-procedure evaluation confirmed that the patient was an appropriate candidate for conscious sedation. Adequate sedation was maintained during the entire procedure. Vital signs, pulse oximetry, and response to verbal commands were monitored and recorded by the nurse throughout the procedure and the recovery period. Medical information was entered in the medical record including the medications and dosages used. The patient returned to baseline neurologic and physiologic status prior to leaving the department. No immediate sedation related complications were noted. Medication for conscious sedation was administered via IV route. 45 minutes of conscious sedation was provided. Signature   ----------------------------------------------------------------   Electronically signed by Dorothy Kiran MD(Interpreting   physician) on 07/19/2022 12:11 PM   ----------------------------------------------------------------    EKG on 9/30/22: SR 88, no acute ischemic changes, QTc 467ms    Telemetry 10/7/22: SR 80s  Telemetry 10/11/22: SR 70s      Assessment:    Active Hospital Problems    Diagnosis Date Noted    Cardiorenal disease [I13.10] 10/11/2022     Priority: Medium    CHAYO (acute kidney injury) ([facility] [address] [N17.9] 10/06/2022     Priority: Medium    Anemia [D64.9] 10/03/2022     Priority: Medium    Hypertensive emergency [I16.1] 10/01/2022     Priority: Medium    Acute on chronic diastolic (congestive) heart failure ([facility] [address] [I50.33] 07/18/2022     Priority: Medium     Acute on chronic heart failure with preserved EF--appears euvolemic now  Normal LVF EF 75% per echo 7/19/22  Chest pain r/o cardiac ischemia  Dyspnea on exertion  HTN urgency--improving  Elevated troponin  Moderate concentric LVH  Dyslipidemia  CHAYO on CKD--s/p right kidney biopsy on 10/6/22.  Improving   Anemia--remains low but stable  Leukocytosis     Plan:  Continue current medications-Lopressor 100 mg p.o. twice daily, hydralazine 100 mg p.o. every 8 hours, Imdur 60 mg p.o. daily, Diovan 80 mg p.o. daily, Clonidine 0.2mg PO BID,Lipitor 40 mg p.o. nightly, magnesium oxide 400 mg p.o. daily, levothyroxine 25 mcg p.o. daily, potassium chloride 20 mEq p.o. daily, Protonix 40 mg p.o. daily, CellCept 1000 mg p.o. twice daily, prednisone 60mg PO daily, Retacrit 1800 units 3 x weekly  Cardiac/less than 2 g sodium diet recommended  Check daily weight and strict intake and output  Recommend 2000 mL daily fluid restriction  Monitor on telemetry for any tachycardia or bradycardia arrhythmias  Maintain potassium greater than 4, magnesium greater than 2  GI/DVT prophylaxis  Gastroenterology recommendations regarding anemia--status post recent EGD and colonoscopy less than 1 month ago which were normal.  No overt bleeding noted and therefore no immediate clinical indication for endoscopic investigation. Recommend follow-up outpatient and consideration for small bowel pill capsule endoscopy  Nephrology recommendations--awaiting renal biopsy results. Suspected acute glomerulonephritis/nephrotic//nephritic syndrome  Coronary evaluation/cardiac catheterization in future when feasible/renal function allows as patient with multiple risk factors for CAD, acute decompensated heart failure and mildly elevated cardiac enzymes. Check chest x-ray in AM given ongoing dyspnea on exertion complaints  Consider pulmonology consult for evaluation of persistent shortness of breath   Will need follow-up with CHF clinic upon discharge  Further recommendations to follow    Electronically signed by Natalie Canchola DO on 10/13/2022 at 11:01 AM      Attending Supervising [de-identified] Attestation Statement  The patient is a 64 y.o. female. I have performed a history and physical examination of the patient. I discussed the case with the physician assistant. I reviewed the patient's Past Medical History, Past Surgical History, Medications, and Allergies.      Physical Exam:  Vitals:    10/12/22 1000 10/12/22 1429 10/12/22 2008 10/13/22 0804   BP:  (!) 125/56 (!) 143/64 (!) 159/63   Pulse: 87 73 73 80   Resp:  18 18    Temp:  97.7 °F (36.5 °C) 97.3 °F (36.3 °C) 97.7 °F (36.5 °C)   TempSrc:  Oral Oral    SpO2:  98% 98%    Weight:       Height:           Review of Systems - Respiratory ROS: no cough, shortness of breath, or wheezing  Cardiovascular ROS: no chest pain or dyspnea on exertion  Gastrointestinal ROS: no abdominal pain, change in bowel habits, or black or bloody stools    Pulmonary/Chest: clear to auscultation bilaterally- no wheezes, rales or rhonchi, normal air movement, no respiratory distress  Cardiovascular: normal rate, normal S1 and S2, no gallops, intact distal pulses, and no carotid bruits  Abdomen: soft, non-tender, non-distended, normal bowel sounds, no masses or organomegaly    Active Hospital Problems    Diagnosis Date Noted    Cardiorenal disease [I13.10] 10/11/2022     Priority: Medium    CHAYO (acute kidney injury) ([facility] [N17.9] 10/06/2022     Priority: Medium    Anemia [D64.9] 10/03/2022     Priority: Medium    Hypertensive emergency [I16.1] 10/01/2022     Priority: Medium    Acute on chronic diastolic (congestive) heart failure ([facility] [address] [I50.33] 07/18/2022     Priority: Medium        I reviewed and agree with the findings and plan documented in her note . Assessment:        Acute on chronic decompensated diastolic congestive heart failure  Hypertensive urgency  Elevated cardiac enzyme. Questionable demand ischemia. 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 We should adhere to the JNC VIII guidelines for HTN management and the NCEPATP III guidelines for LDL-C management. Diuresis as tolerated. Monitor I's and O's and renal function. Nephrology recommendations/await renal biopsty result. Consider pulmonary evaluation  Coronary valuation in the form of cardiac catheterization when clinically stable/renal function stable. Patient with multiple risk factors for CAD and acute decompensated heart failure. Also has abnormal EKG and mildly elevated cardiac enzymes high risk for CAD. Will await clearance from nephrology. Can be performed as outpatient. NO Norvasc secondary to lower extremity edema as well as acute decompensated heart failure  Avoid nephrotoxic agents  Monitor on telemetry  GI/DVT prophylaxis  CHF teaching  Follow-up in CHF clinic post discharge  Maintain hemoglobin greater than 7. Retacrit 3 times weekly. Consider hematology evaluation.   Continue Imdur to 60mg daily for BP control.         Electronically signed by [id number] GERA Rivas DO on 10/13/2022 at 11:02 AM

## 2022-10-13 NOTE — CARE COORDINATION
PT DID NOT QUALIFY FOR NOCTURNAL O2. STILL AWAITING KIDNEY BIOPSY RESULTS. PER DR ABDULLAHI NOTE, CATH MAY BE OP. DC PLAN REMAINS HOME WITH CHF CLINIC FOLLOW UP ONCE OK WITH DRS.

## 2022-10-13 NOTE — PROGRESS NOTES
INPATIENT PROGRESS NOTES    PATIENT NAME: Baltazar Hrenandez  MRN: [id number]  SERVICE DATE:  October 13, 2022   SERVICE TIME:  6:57 PM      PRIMARY SERVICE: Pulmonary Disease    CHIEF COMPLAIN: Shortness of breath      INTERVAL HPI: Patient seen and examined at bedside, Interval Notes, orders reviewed. Nursing notes noted  She is having short of breath with any exertion. Occasional wheezing and dry cough. No fever or chills. No chest pain. No nausea vomiting diarrhea or abdominal pain. OBJECTIVE    Body mass index is 23.41 kg/m². PHYSICAL EXAM:  Vitals:  BP (!) 159/63   Pulse 80   Temp 97.7 °F (36.5 °C) (Oral)   Resp 18   Ht 5' 2\" (1.575 m)   Wt 128 lb (58.1 kg)   SpO2 98%   BMI 23.41 kg/m²   General: Alert, awake . comfortable in bed, No distress. Head: Atraumatic , Normocephalic   Eyes: PERRL. No sclera icterus. No conjunctival injection. No discharge   ENT: No nasal  discharge. Pharynx clear. Neck:  Trachea midline. No thyromegaly, no JVD, No cervical adenopathy. Chest : Bilaterally symmetrical ,Normal effort,  No accessory muscle use  Lung : . Fair BS bilateral, decreased BS at bases. No Rales. No wheezing. No rhonchi. Heart[de-identified] Normal  rate. Regular rhythm. No mumur ,  Rub or gallop  ABD: Non-tender. Non-distended. No masses. No organmegaly. Normal bowel sounds. No hernia. Ext : No Pitting both leg , No Cyanosis No clubbing  Neuro: no focal weakness          DATA:   Recent Labs     10/11/22  0822 10/12/22  1946   WBC 11.2* 9.9   HGB 7.6* 7.5*   HCT 23.7* 21.9*   MCV 84.7 86.0    298     Recent Labs     10/12/22  1946 10/13/22  0457   * 135   K 5.3* 4.9    105   CO2 20 20   BUN 55* 55*   CREATININE 1.50* 1.39*   GLUCOSE 299* 139*   CALCIUM 7.8* 7.9*   LABGLOM 33.5* 36.6*   GFRAA 40.5* 44.3*       MV Settings:          No results for input(s): PHART, [id number], PO2ART, SFJ3ZEI, BEART, X8YXFYTM in the last 72 hours. O2 Device: None (Room air)    ADULT DIET;  Regular; Low Fat/Low Chol/High Fiber/DAMIAN     MEDICATIONS during current hospitalization:    Continuous Infusions:   sodium chloride 100 mL/hr at 10/10/22 0855    sodium chloride         Scheduled Meds:   cloNIDine  0.2 mg Oral BID    predniSONE  60 mg Oral Daily    isosorbide mononitrate  60 mg Oral Daily    valsartan  80 mg Oral Daily    mycophenolate  1,000 mg Oral BID    hydrALAZINE  100 mg Oral 3 times per day    potassium chloride  20 mEq Oral Daily with breakfast    epoetin rian-epbx  30 Units/kg SubCUTAneous Once per day on Mon Wed Fri    sodium chloride flush  5-40 mL IntraVENous 2 times per day    atorvastatin  40 mg Oral Nightly    doxazosin  2 mg Oral Daily    levothyroxine  25 mcg Oral Daily    metoprolol  100 mg Oral BID    pantoprazole  40 mg Oral Daily    magnesium oxide  400 mg Oral Daily       PRN Meds:levalbuterol, sodium chloride, fentanNYL, lidocaine, midazolam, sodium chloride flush, sodium chloride, ondansetron **OR** ondansetron, acetaminophen, labetalol, hydrALAZINE    Radiology  CT ABDOMEN PELVIS WO CONTRAST Additional Contrast? None    Result Date: 10/6/2022  1. Again seen is a very small amount of postprocedural hemorrhage posterior to the right kidney. This is unchanged when compared to prior study one hour ago during the procedure. 2.Note is made of bilateral small pleural effusions, unchanged since prior study dating July 18, 2022. Of note is a right costophrenic angle medial density, which is again felt to represent likely compressive atelectasis, though appears more plump than prior. Attention on follow-up is recommended. 3.Calcified right liver lobe density, may represent an old granuloma. This is also unchanged 4. Calcific densities are noted within the colonic wall near the cecum. These may be dystrophic calcification such as sequelae of old inflammation or infection such as epiploic appendicitis versus infection, though alternatively premalignant and malignancies can also present in such a fashion. Please correlate with colonoscopy and other workup as clinically indicated. Discussed with Dr. Jocelin Alanis. COMPARISON: Same day study, one hour prior during procedure, and CT from July 18, 2022 DIAGNOSIS: Small postprocedural hemorrhage posterior to the right kidney COMMENTS: I13.10 Cardiorenal disease ICD10 TECHNIQUE: Spiral scanning of the abdomen and pelvis was performed after the administration of oral contrast .   Intravenous contrast was not administered as per the referring physician. CT Dose-Length Product (estimate related to radiation exposure from this exam):  130.68  mGy*cm. CT ABDOMEN: Note is made of bilateral small pleural effusions, unchanged since July. Note is made of bibasilar atelectasis. Some of the areas of atelectasis appear to be more plump such as in the right base at the costophrenic angle, attention on follow-up is recommended. Calcific density is again seen in the right liver lobe, this may represent an old granuloma. The spleen is of normal size and attenuation without focal lesions. Pancreas shows no signs of focal mass or peripancreatic fluid collection. Kidneys show no contour deforming renal masses or hydronephrosis. A very small amount of postprocedural biopsy bleed is seen posterior to the right kidney, this small and unchanged from 1 hour prior. Adrenal glands are unremarkable. Aorta is normal in caliber without  signs of aneurysmal dilatation. No significant retroperitoneal adenopathy or ascites is identified. Calcific densities are noted within the colonic wall near the cecum. These may be dystrophic calcification such as sequelae of old inflammation or infection such as epiploic appendicitis versus infection, though alternatively premalignant and malignancies can also present in such a fashion. Please correlate with colonoscopy and other workup as clinically indicated. CT PELVIS:  Scanning of the pelvis shows no signs of pelvic mass or pelvic fluid collection.  Diverticulosis of the colon is noted. XR CHEST (2 VW)    Result Date: 10/12/2022  EXAMINATION: TWO XRAY VIEWS OF THE CHEST 10/12/2022 8:28 am COMPARISON: None. HISTORY: ORDERING SYSTEM PROVIDED HISTORY: dyspnea TECHNOLOGIST PROVIDED HISTORY: Reason for exam:->dyspnea What reading provider will be dictating this exam?->CRC FINDINGS: In the lateral view there is focal localized increased density in the posterior costophrenic sulcus more likely in the right lower lobe, of about 3 cm size area. It represents a peripheral subpleural opacity. These can be manifestation of focal pneumonia, organized pneumonia, even a localized pulmonary infarct. However, cannot exclude a small underlying mass lesion. Correlation with clinical data is needed. If patient has clinical symptomatology for a lower respiratory tract infection follow-up study following a short clinical treatment trial in 10-14 days is recommended, otherwise correlation with CTA scan of the chest could be considered. The cardiac area has borderline size. CTR: 14.4/25.8 cm. There is no perihilar vascular congestion. There is no pleural effusions. The left lung appears to be clear. No pneumothorax seen. Mediastinum appears unremarkable. Localized focal pulmonary parenchymal opacity in the right posterior costophrenic sulcus as above discussed. See above comments and recommendations. CT GUIDED NEEDLE PLACEMENT    1. Successful core biopsy of right inferior pole renal parenchyma. 2.2 Gelfoam torpedoes were injected after biopsy for immediate hemostasis. During biopsy and prior to insertion of Gelfoam torpedoes, a small amount of bleeding was seen adjacent to the needle entry site which stopped and did not grow any further. HISTORY: Yasmin Campbell is a Female of 64 years age. DIAGNOSIS:   random renal biopsy COMPARISON: None available. CT Dose-Length Product (estimate related to radiation exposure from this exam):  532.69  mGy*cm.  PROCEDURE: Following the discussion of the procedure, alternatives, risks versus benefits, informed consent was obtained from the patient. Specifically, risks of after-biopsy pain at the site, rare possibility of excessive hemorrhage, infection, injury to the adjacent organs were discussed and the patient verbalized understanding. Pre-procedure evaluation confirmed that the patient was an appropriate candidate for conscious sedation. Adequate sedation was maintained during the entire procedure. Vital signs, pulse oximetry, and response to verbal commands were monitored and recorded by the nurse throughout the procedure and the recovery period. Medical information was entered in the medical record including the medications and dosages used. The patient returned to baseline neurologic and physiologic status prior to leaving the department. No immediate sedation related complications were noted. Medication for conscious sedation was administered via IV route. 45 minutes of conscious sedation was provided. Following universal protocol, patient and site verification was performed with a \"timeout\" prior to the procedure. The patient was placed on the CT table in prone position and the right flank area was prepped and draped in usual sterile fashion. Using the usual sterile conditions, lidocaine and CT guidance, the inferior pole of the right kidney was accessed using an  18-guage coaxial biopsy needle system. After confirmation of appropriate localization of the needle, total of 3 samples were obtained and sent for pathological analysis. After biopsy, a small amount of blood was seen posterior to the right kidney. 2 Gelfoam torpedoes were injected through the coaxial needle for hemostasis. No further bleeding was seen. The coaxial needle system was removed and hemostasis was achieved by direct digital compression. The patient tolerated the procedure well. No immediate complications identified.   The patient left the CT suite in supine position to the recovery room in stable condition. Total local anesthetic used: lidocaine, approximately 8 mL. Estimated blood loss:  Approximately 20 mL. The patient was observed in the recovery room for two hours after the procedure and discharged in stable condition. XR CHEST PORTABLE    Result Date: 9/30/2022  EXAMINATION: ONE XRAY VIEW OF THE CHEST 9/30/2022 2:06 pm COMPARISON: None. HISTORY: ORDERING SYSTEM PROVIDED HISTORY: sob TECHNOLOGIST PROVIDED HISTORY: Reason for exam:->sob What reading provider will be dictating this exam?->CRC FINDINGS: The heart is within normals. There are findings of mild vascular congestion. There are trace bilateral pleural effusions. There are no findings of pneumonia     1. Trace bilateral pleural effusions 2. Mild vascular congestion     US DUP ABD PEL RETRO SCROT COMPLETE    Result Date: 10/4/2022  EXAMINATION: DOPPLER EVALUATION OF THE PELVIS 10/3/2022 6:50 pm COMPARISON: 07/16/2022 HISTORY: ORDERING SYSTEM PROVIDED HISTORY: CHAYO, CHF TECHNOLOGIST PROVIDED HISTORY: Complete study for renal artery stenosis and renal size. RADIOLOGY TO READ Reason for exam:->CHAYO, CHF What reading provider will be dictating this exam?->MERCY FINDINGS: The right kidney demonstrates unremarkable echogenicity and unremarkable vascularity. No evidence of right hydronephrosis is seen. No evidence of solid right renal mass is seen. No evidence of hyperechoic stones is seen. The right kidney measures 9.0 x 5.2 x 5.2 cm. The right renal cortex measures 1.4 cm. The left kidney demonstrates unremarkable echogenicity and unremarkable vascularity. No evidence of left hydronephrosis is seen. No evidence of solid left renal mass is seen. No evidence of hyperechoic stones is seen. . Simple cyst visualized in the lower pole of the left kidney measuring 1.4 x 0.8 x 0.8 cm. The left kidney measures 8.6 x 4.0 x 5.0 cm. The left renal cortex measures 1.4 cm. No evidence of free fluid is seen.  Doppler: Abdominal aorta: The abdominal aorta demonstrates atherosclerotic calcifications but no evidence of aneurysmal dilatation or arterial dissection is seen. Abdominal aorta peak systolic velocity at the level of the SMA measures 223 cm/s. Right Renal artery: Origin peak systolic velocity 86.9 cm/s, end-diastolic velocity of 16.1 cm/s and resistive index 0.73 Proximal peak systolic velocity 72.8 cm/s, end-diastolic velocity of 13.7 cm/s and resistive index 2.27 Mid peak systolic velocity 60.2 cm/s, end-diastolic velocity of 54.7 cm/s and resistive index 0.72 Distal peak systolic velocity 75.4 cm/s, end-diastolic velocity of 72.1 cm/s and resistive index 0.78 Right RAR: 0.4 Right kidney resistive index and acceleration times Superior pole 42 and 233 cm/s2 Mid pole 34 and 220 cm/s2 Inferior pole 28 and 199 cm/s2 Left Renal artery: Origin peak systolic velocity 53.6 cm/s, end-diastolic velocity of 00.6 cm/s and resistive index 0.79 Proximal peak systolic velocity 00.0 cm/s, end-diastolic velocity of 78.1 cm/s and resistive index 4.46 Mid peak systolic velocity 29.8 cm/s, end-diastolic velocity of 2.95 cm/s and resistive index 0.89 Distal peak systolic velocity 96.2 cm/s, end-diastolic velocity of 66.7 cm/s and resistive index 0.78 Left RAR:  0.3 Left kidney resistive index and acceleration times Superior pole 75 and 228 cm/s2 Mid pole 13 and 220 cm/s2 Inferior pole 28 and 123 cm/s2     No evidence of significant arterial stenosis is seen. Grayscale imaging demonstrates no acute pathology. Simple cyst visualized in the left kidney measuring 1.4 cm. CT BIOPSY RENAL    1. Successful core biopsy of right inferior pole renal parenchyma. 2.2 Gelfoam torpedoes were injected after biopsy for immediate hemostasis. During biopsy and prior to insertion of Gelfoam torpedoes, a small amount of bleeding was seen adjacent to the needle entry site which stopped and did not grow any further. HISTORY: Shar Saravia is a Female of 64 years age. DIAGNOSIS:   random renal biopsy COMPARISON: None available. CT Dose-Length Product (estimate related to radiation exposure from this exam):  532.69  mGy*cm. PROCEDURE: Following the discussion of the procedure, alternatives, risks versus benefits, informed consent was obtained from the patient. Specifically, risks of after-biopsy pain at the site, rare possibility of excessive hemorrhage, infection, injury to the adjacent organs were discussed and the patient verbalized understanding. Pre-procedure evaluation confirmed that the patient was an appropriate candidate for conscious sedation. Adequate sedation was maintained during the entire procedure. Vital signs, pulse oximetry, and response to verbal commands were monitored and recorded by the nurse throughout the procedure and the recovery period. Medical information was entered in the medical record including the medications and dosages used. The patient returned to baseline neurologic and physiologic status prior to leaving the department. No immediate sedation related complications were noted. Medication for conscious sedation was administered via IV route. 45 minutes of conscious sedation was provided. Following universal protocol, patient and site verification was performed with a \"timeout\" prior to the procedure. The patient was placed on the CT table in prone position and the right flank area was prepped and draped in usual sterile fashion. Using the usual sterile conditions, lidocaine and CT guidance, the inferior pole of the right kidney was accessed using an  18-guage coaxial biopsy needle system. After confirmation of appropriate localization of the needle, total of 3 samples were obtained and sent for pathological analysis. After biopsy, a small amount of blood was seen posterior to the right kidney. 2 Gelfoam torpedoes were injected through the coaxial needle for hemostasis. No further bleeding was seen.  The coaxial needle system was removed and hemostasis was achieved by direct digital compression. The patient tolerated the procedure well. No immediate complications identified. The patient left the CT suite in supine position to the recovery room in stable condition. Total local anesthetic used: lidocaine, approximately 8 mL. Estimated blood loss:  Approximately 20 mL. The patient was observed in the recovery room for two hours after the procedure and discharged in stable condition. IMPRESSION AND SUGGESTION:  Exertional shortness of breath, possible reactive airway disease  Diastolic heart failure  Abnormal chest x-ray    Recommend PFT as outpatient. Consider ICS/LABA after PFT. Overnight pulse oximetry. Home O2 evaluation prior to discharge. Incentive spirometer. Chest x-ray showing localized focal pulmonary parenchymal opacity in the right posterior costophrenic sulcus, recommend follow-up chest x-ray or CT chest for further evaluation. CT abdominal pelvis with base of the lungs showing no lung mass or nodule. NOTE: This report was transcribed using voice recognition software. Every effort was made to ensure accuracy; however, inadvertent computerized transcription errors may be present.       Electronically signed by Gricelda Lozano MD, [facility] on 10/13/2022 at 6:57 PM

## 2022-10-13 NOTE — PROGRESS NOTES
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, anti-GBM pending, ANCA, ARNIE, low R0/O3    OHD Diastolic HF  Hypertension fair control    Plan:  - continue prednisone 60 mg QD       Patient Active Problem List:     CHF (congestive heart failure), NYHA class I, acute on chronic, combined ([facility] [address]     Wheezing     Acute congestive heart failure ([facility]     Microhematuria     Diarrhea of presumed infectious origin     Hypertensive emergency     Anemia     CHAYO (acute kidney injury) ([facility]      Subjective:  Admit Date: 9/30/2022    Interval History: BMP improving, BP better     Medications:  Scheduled Meds:   cloNIDine  0.2 mg Oral BID    predniSONE  60 mg Oral Daily    isosorbide mononitrate  60 mg Oral Daily    valsartan  80 mg Oral Daily    mycophenolate  1,000 mg Oral BID    hydrALAZINE  100 mg Oral 3 times per day    potassium chloride  20 mEq Oral Daily with breakfast    epoetin rian-epbx  30 Units/kg SubCUTAneous Once per day on Mon Wed Fri    sodium chloride flush  5-40 mL IntraVENous 2 times per day    atorvastatin  40 mg Oral Nightly    doxazosin  2 mg Oral Daily    levothyroxine  25 mcg Oral Daily    metoprolol  100 mg Oral BID    pantoprazole  40 mg Oral Daily    magnesium oxide  400 mg Oral Daily     Continuous Infusions:   sodium chloride 100 mL/hr at 10/10/22 0855    sodium chloride         CBC:   Recent Labs     10/11/22  0822 10/12/22  1946   WBC 11.2* 9.9   HGB 7.6* 7.5*    298       CMP:    Recent Labs     10/12/22  1946 10/13/22  0457   * 135   K 5.3* 4.9    105   CO2 20 20   BUN 55* 55*   CREATININE 1.50* 1.39*   GLUCOSE 299* 139*   CALCIUM 7.8* 7.9*   LABGLOM 33.5* 36.6*       Troponin: No results for input(s): TROPONINI in the last 72 hours. BNP: No results for input(s): BNP in the last 72 hours. INR: No results for input(s): INR in the last 72 hours.   Lipids: No results for input(s): CHOL, LDLDIRECT, TRIG, HDL, AMYLASE, LIPASE in the last 72 hours.  Liver: No results for input(s): AST, ALT, ALKPHOS, PROT, LABALBU, BILITOT in the last 72 hours. Invalid input(s): BILDIR  Iron:    No results for input(s): IRONS, FERRITIN in the last 72 hours. Invalid input(s): LABIRONS    Urinalysis: No results for input(s): UA in the last 72 hours.     Objective:  Vitals: BP (!) 159/63   Pulse 80   Temp 97.7 °F (36.5 °C) (Oral)   Resp 18   Ht 5' 2\" (1.575 m)   Wt 128 lb (58.1 kg)   SpO2 98%   BMI 23.41 kg/m²    Wt Readings from Last 3 Encounters:   10/12/22 128 lb (58.1 kg)   09/01/22 130 lb (59 kg)   08/10/22 134 lb (60.8 kg)      24HR INTAKE/OUTPUT:    Intake/Output Summary (Last 24 hours) at 10/13/2022 1115  Last data filed at 10/12/2022 1200  Gross per 24 hour   Intake 360 ml   Output --   Net 360 ml         General: alert, in no apparent distress  HEENT: normocephalic, atraumatic, anicteric  Lungs: non-labored respirations, clear to auscultation bilaterally  Heart: regular rate and rhythm, no murmurs or rubs  Abdomen: soft, non-tender, non-distended  Ext: no cyanosis, no peripheral edema  Neuro: alert and oriented, no gross abnormalities      Electronically signed by Yousuf Nicholas MD, MD

## 2022-10-13 NOTE — OR NURSING
Pt alert, able to voice needs and follow commands. Pt denies any pain at this time, displays no s/s of discomfort, call light in reach, pt will continue to be monitored.

## 2022-10-13 NOTE — PROGRESS NOTES
This  visited with the patient and her . Patient was resting in bed with her  in the corner on a chair. Room was dark and TV playing in background. Little conversation between spouses. Patient presents with flat affect and uninterested in visit or conversation. She is [employer], but does not attend [employer]. Patient is not interested in communion.  offered a prayer and encouragement.  expressed his appreciation for the visit.

## 2022-10-14 LAB
IRON SATURATION: 27 % (ref 20–55)
IRON: 56 UG/DL (ref 37–145)
TOTAL IRON BINDING CAPACITY: 204 UG/DL (ref 250–450)
UNSATURATED IRON BINDING CAPACITY: 148 UG/DL (ref 112–347)

## 2022-10-14 PROCEDURE — 6360000002 HC RX W HCPCS: Performed by: INTERNAL MEDICINE

## 2022-10-14 PROCEDURE — 6370000000 HC RX 637 (ALT 250 FOR IP): Performed by: INTERNAL MEDICINE

## 2022-10-14 PROCEDURE — 6370000000 HC RX 637 (ALT 250 FOR IP): Performed by: PHYSICIAN ASSISTANT

## 2022-10-14 PROCEDURE — 2060000000 HC ICU INTERMEDIATE R&B

## 2022-10-14 PROCEDURE — 94060 EVALUATION OF WHEEZING: CPT

## 2022-10-14 PROCEDURE — 99232 SBSQ HOSP IP/OBS MODERATE 35: CPT | Performed by: INTERNAL MEDICINE

## 2022-10-14 PROCEDURE — 94729 DIFFUSING CAPACITY: CPT

## 2022-10-14 PROCEDURE — 2580000003 HC RX 258: Performed by: INTERNAL MEDICINE

## 2022-10-14 PROCEDURE — 94726 PLETHYSMOGRAPHY LUNG VOLUMES: CPT

## 2022-10-14 RX ORDER — ALBUTEROL SULFATE 2.5 MG/3ML
2.5 SOLUTION RESPIRATORY (INHALATION) ONCE
Status: COMPLETED | OUTPATIENT
Start: 2022-10-14 | End: 2022-10-14

## 2022-10-14 RX ADMIN — VALSARTAN 80 MG: 80 TABLET, FILM COATED ORAL at 10:09

## 2022-10-14 RX ADMIN — ALBUTEROL SULFATE 2.5 MG: 2.5 SOLUTION RESPIRATORY (INHALATION) at 11:24

## 2022-10-14 RX ADMIN — EPOETIN ALFA-EPBX 1800 UNITS: 2000 INJECTION, SOLUTION INTRAVENOUS; SUBCUTANEOUS at 10:28

## 2022-10-14 RX ADMIN — PREDNISONE 60 MG: 50 TABLET ORAL at 10:09

## 2022-10-14 RX ADMIN — POTASSIUM CHLORIDE 20 MEQ: 1500 TABLET, EXTENDED RELEASE ORAL at 10:09

## 2022-10-14 RX ADMIN — METOPROLOL TARTRATE 100 MG: 50 TABLET, FILM COATED ORAL at 20:54

## 2022-10-14 RX ADMIN — DOXAZOSIN 2 MG: 1 TABLET ORAL at 10:10

## 2022-10-14 RX ADMIN — Medication 5 ML: at 09:00

## 2022-10-14 RX ADMIN — HYDRALAZINE HYDROCHLORIDE 100 MG: 100 TABLET, FILM COATED ORAL at 20:53

## 2022-10-14 RX ADMIN — MYCOPHENOLATE MOFETIL 1000 MG: 250 CAPSULE ORAL at 20:53

## 2022-10-14 RX ADMIN — LEVOTHYROXINE SODIUM 25 MCG: 0.03 TABLET ORAL at 06:20

## 2022-10-14 RX ADMIN — Medication 10 ML: at 20:56

## 2022-10-14 RX ADMIN — ATORVASTATIN CALCIUM 40 MG: 40 TABLET, FILM COATED ORAL at 20:54

## 2022-10-14 RX ADMIN — MYCOPHENOLATE MOFETIL 1000 MG: 250 CAPSULE ORAL at 10:29

## 2022-10-14 RX ADMIN — HYDRALAZINE HYDROCHLORIDE 100 MG: 100 TABLET, FILM COATED ORAL at 15:54

## 2022-10-14 RX ADMIN — Medication 400 MG: at 10:09

## 2022-10-14 RX ADMIN — ISOSORBIDE MONONITRATE 60 MG: 60 TABLET, EXTENDED RELEASE ORAL at 10:09

## 2022-10-14 RX ADMIN — METOPROLOL TARTRATE 100 MG: 50 TABLET, FILM COATED ORAL at 10:09

## 2022-10-14 RX ADMIN — HYDRALAZINE HYDROCHLORIDE 100 MG: 100 TABLET, FILM COATED ORAL at 06:20

## 2022-10-14 RX ADMIN — CLONIDINE HYDROCHLORIDE 0.2 MG: 0.1 TABLET ORAL at 20:53

## 2022-10-14 RX ADMIN — PANTOPRAZOLE SODIUM 40 MG: 40 TABLET, DELAYED RELEASE ORAL at 10:09

## 2022-10-14 RX ADMIN — CLONIDINE HYDROCHLORIDE 0.2 MG: 0.1 TABLET ORAL at 10:10

## 2022-10-14 ASSESSMENT — ENCOUNTER SYMPTOMS
CHEST TIGHTNESS: 1
COLOR CHANGE: 0
NAUSEA: 0
VOMITING: 0
SHORTNESS OF BREATH: 1

## 2022-10-14 NOTE — PROGRESS NOTES
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, anti-GBM pending, ANCA, ARNIE, low C3/C4, baseline Scr ~1.1-1.2 w/ eGFR low 50s back in 7/22, function started worsening thereafter     OHD Diastolic HF  Hypertension, improving     Plan:  - continue prednisone 60 mg QD   - will f/u regarding renal biopsy results today  - cardiac evaluation pending improvement in renal function       Patient Active Problem List:     CHF (congestive heart failure), NYHA class I, acute on chronic, combined ([facility] [address]     Wheezing     [facility]     Microhematuria     Diarrhea of presumed infectious origin     Hypertensive emergency     Anemia     CHAYO (acute kidney injury) ([facility]      Subjective:  Admit Date: 9/30/2022    Interval History: function improving, BP better as well, no complaints     Medications:  Scheduled Meds:   cloNIDine  0.2 mg Oral BID    predniSONE  60 mg Oral Daily    isosorbide mononitrate  60 mg Oral Daily    valsartan  80 mg Oral Daily    mycophenolate  1,000 mg Oral BID    hydrALAZINE  100 mg Oral 3 times per day    potassium chloride  20 mEq Oral Daily with breakfast    epoetin rian-epbx  30 Units/kg SubCUTAneous Once per day on Mon Wed Fri    sodium chloride flush  5-40 mL IntraVENous 2 times per day    atorvastatin  40 mg Oral Nightly    doxazosin  2 mg Oral Daily    levothyroxine  25 mcg Oral Daily    metoprolol  100 mg Oral BID    pantoprazole  40 mg Oral Daily    magnesium oxide  400 mg Oral Daily     Continuous Infusions:   sodium chloride 100 mL/hr at 10/10/22 0855    sodium chloride         CBC:   Recent Labs     10/12/22  1946   WBC 9.9   HGB 7.5*          CMP:    Recent Labs     10/12/22  1946 10/13/22  0457   * 135   K 5.3* 4.9    105   CO2 20 20   BUN 55* 55*   CREATININE 1.50* 1.39*   GLUCOSE 299* 139*   CALCIUM 7.8* 7.9*   LABGLOM 33.5* 36.6*       Troponin: No results for input(s): TROPONINI in the last 72 hours.   BNP: No

## 2022-10-14 NOTE — PROGRESS NOTES
Patient independent in room. No needs at this time.  at bedside. Patient went for a scan. Returned to unit.

## 2022-10-14 NOTE — PROGRESS NOTES
INPATIENT PROGRESS NOTES    PATIENT NAME: Jacinto Wahl  MRN: [id number]  SERVICE DATE:  October 14, 2022   SERVICE TIME:  2:49 PM      PRIMARY SERVICE: Pulmonary Disease    CHIEF COMPLAIN: Shortness of breath      INTERVAL HPI: Patient seen and examined at bedside, Interval Notes, orders reviewed. Nursing notes noted    She is having short of breath with any exertion but less than before. Occasional wheezing and dry cough. No fever or chills. No chest pain. No nausea vomiting diarrhea or abdominal pain. OBJECTIVE    Body mass index is 23.41 kg/m². PHYSICAL EXAM:  Vitals:  BP (!) 146/61   Pulse 83   Temp 97.6 °F (36.4 °C) (Oral)   Resp 18   Ht 5' 2\" (1.575 m)   Wt 128 lb (58.1 kg)   SpO2 99%   BMI 23.41 kg/m²   General: Alert, awake . comfortable in bed, No distress. Head: Atraumatic , Normocephalic   Eyes: PERRL. No sclera icterus. No conjunctival injection. No discharge   ENT: No nasal  discharge. Pharynx clear. Neck:  Trachea midline. No thyromegaly, no JVD, No cervical adenopathy. Chest : Bilaterally symmetrical ,Normal effort,  No accessory muscle use  Lung : . Fair BS bilateral, decreased BS at bases. No Rales. No wheezing. No rhonchi. Heart[de-identified] Normal  rate. Regular rhythm. No mumur ,  Rub or gallop  ABD: Non-tender. Non-distended. No masses. No organmegaly. Normal bowel sounds. No hernia. Ext : No Pitting both leg , No Cyanosis No clubbing  Neuro: no focal weakness          DATA:   Recent Labs     10/12/22  1946   WBC 9.9   HGB 7.5*   HCT 21.9*   MCV 86.0        Recent Labs     10/12/22  1946 10/13/22  0457   * 135   K 5.3* 4.9    105   CO2 20 20   BUN 55* 55*   CREATININE 1.50* 1.39*   GLUCOSE 299* 139*   CALCIUM 7.8* 7.9*   LABGLOM 33.5* 36.6*   GFRAA 40.5* 44.3*       MV Settings:          No results for input(s): PHART, RXK5CPH, PO2ART, XQA9EEV, BEART, Q8HOADXR in the last 72 hours. O2 Device: None (Room air)    ADULT DIET;  Regular; Low Fat/Low Chol/High Fiber/DAMIAN     MEDICATIONS during current hospitalization:    Continuous Infusions:   sodium chloride 100 mL/hr at 10/10/22 0855    sodium chloride         Scheduled Meds:   cloNIDine  0.2 mg Oral BID    predniSONE  60 mg Oral Daily    isosorbide mononitrate  60 mg Oral Daily    valsartan  80 mg Oral Daily    mycophenolate  1,000 mg Oral BID    hydrALAZINE  100 mg Oral 3 times per day    potassium chloride  20 mEq Oral Daily with breakfast    epoetin rian-epbx  30 Units/kg SubCUTAneous Once per day on Mon Wed Fri    sodium chloride flush  5-40 mL IntraVENous 2 times per day    atorvastatin  40 mg Oral Nightly    doxazosin  2 mg Oral Daily    levothyroxine  25 mcg Oral Daily    metoprolol  100 mg Oral BID    pantoprazole  40 mg Oral Daily    magnesium oxide  400 mg Oral Daily       PRN Meds:levalbuterol, sodium chloride, fentanNYL, lidocaine, midazolam, sodium chloride flush, sodium chloride, ondansetron **OR** ondansetron, acetaminophen, labetalol, hydrALAZINE    Radiology  CT ABDOMEN PELVIS WO CONTRAST Additional Contrast? None    Result Date: 10/6/2022  1. Again seen is a very small amount of postprocedural hemorrhage posterior to the right kidney. This is unchanged when compared to prior study one hour ago during the procedure. 2.Note is made of bilateral small pleural effusions, unchanged since prior study dating July 18, 2022. Of note is a right costophrenic angle medial density, which is again felt to represent likely compressive atelectasis, though appears more plump than prior. Attention on follow-up is recommended. 3.Calcified right liver lobe density, may represent an old granuloma. This is also unchanged 4. Calcific densities are noted within the colonic wall near the cecum. These may be dystrophic calcification such as sequelae of old inflammation or infection such as epiploic appendicitis versus infection, though alternatively premalignant and malignancies can also present in such a fashion.  Please correlate with colonoscopy and other workup as clinically indicated. Discussed with Dr. David Akers. COMPARISON: Same day study, one hour prior during procedure, and CT from July 18, 2022 DIAGNOSIS: Small postprocedural hemorrhage posterior to the right kidney COMMENTS: I13.10 Cardiorenal disease ICD10 TECHNIQUE: Spiral scanning of the abdomen and pelvis was performed after the administration of oral contrast .   Intravenous contrast was not administered as per the referring physician. CT Dose-Length Product (estimate related to radiation exposure from this exam):  130.68  mGy*cm. CT ABDOMEN: Note is made of bilateral small pleural effusions, unchanged since July. Note is made of bibasilar atelectasis. Some of the areas of atelectasis appear to be more plump such as in the right base at the costophrenic angle, attention on follow-up is recommended. Calcific density is again seen in the right liver lobe, this may represent an old granuloma. The spleen is of normal size and attenuation without focal lesions. Pancreas shows no signs of focal mass or peripancreatic fluid collection. Kidneys show no contour deforming renal masses or hydronephrosis. A very small amount of postprocedural biopsy bleed is seen posterior to the right kidney, this small and unchanged from 1 hour prior. Adrenal glands are unremarkable. Aorta is normal in caliber without  signs of aneurysmal dilatation. No significant retroperitoneal adenopathy or ascites is identified. Calcific densities are noted within the colonic wall near the cecum. These may be dystrophic calcification such as sequelae of old inflammation or infection such as epiploic appendicitis versus infection, though alternatively premalignant and malignancies can also present in such a fashion. Please correlate with colonoscopy and other workup as clinically indicated. CT PELVIS:  Scanning of the pelvis shows no signs of pelvic mass or pelvic fluid collection.  Diverticulosis of the colon is noted. XR CHEST (2 VW)    Result Date: 10/12/2022  EXAMINATION: TWO XRAY VIEWS OF THE CHEST 10/12/2022 8:28 am COMPARISON: None. HISTORY: ORDERING SYSTEM PROVIDED HISTORY: dyspnea TECHNOLOGIST PROVIDED HISTORY: Reason for exam:->dyspnea What reading provider will be dictating this exam?->CRC FINDINGS: In the lateral view there is focal localized increased density in the posterior costophrenic sulcus more likely in the right lower lobe, of about 3 cm size area. It represents a peripheral subpleural opacity. These can be manifestation of focal pneumonia, organized pneumonia, even a localized pulmonary infarct. However, cannot exclude a small underlying mass lesion. Correlation with clinical data is needed. If patient has clinical symptomatology for a lower respiratory tract infection follow-up study following a short clinical treatment trial in 10-14 days is recommended, otherwise correlation with CTA scan of the chest could be considered. The cardiac area has borderline size. CTR: 14.4/25.8 cm. There is no perihilar vascular congestion. There is no pleural effusions. The left lung appears to be clear. No pneumothorax seen. Mediastinum appears unremarkable. Localized focal pulmonary parenchymal opacity in the right posterior costophrenic sulcus as above discussed. See above comments and recommendations. CT GUIDED NEEDLE PLACEMENT    1. Successful core biopsy of right inferior pole renal parenchyma. 2.2 Gelfoam torpedoes were injected after biopsy for immediate hemostasis. During biopsy and prior to insertion of Gelfoam torpedoes, a small amount of bleeding was seen adjacent to the needle entry site which stopped and did not grow any further. HISTORY: Corinna Saucedo is a Female of 64 years age. DIAGNOSIS:   random renal biopsy COMPARISON: None available. CT Dose-Length Product (estimate related to radiation exposure from this exam):  532.69  mGy*cm.  PROCEDURE: Following the discussion of the procedure, alternatives, risks versus benefits, informed consent was obtained from the patient. Specifically, risks of after-biopsy pain at the site, rare possibility of excessive hemorrhage, infection, injury to the adjacent organs were discussed and the patient verbalized understanding. Pre-procedure evaluation confirmed that the patient was an appropriate candidate for conscious sedation. Adequate sedation was maintained during the entire procedure. Vital signs, pulse oximetry, and response to verbal commands were monitored and recorded by the nurse throughout the procedure and the recovery period. Medical information was entered in the medical record including the medications and dosages used. The patient returned to baseline neurologic and physiologic status prior to leaving the department. No immediate sedation related complications were noted. Medication for conscious sedation was administered via IV route. 45 minutes of conscious sedation was provided. Following universal protocol, patient and site verification was performed with a \"timeout\" prior to the procedure. The patient was placed on the CT table in prone position and the right flank area was prepped and draped in usual sterile fashion. Using the usual sterile conditions, lidocaine and CT guidance, the inferior pole of the right kidney was accessed using an  18-guage coaxial biopsy needle system. After confirmation of appropriate localization of the needle, total of 3 samples were obtained and sent for pathological analysis. After biopsy, a small amount of blood was seen posterior to the right kidney. 2 Gelfoam torpedoes were injected through the coaxial needle for hemostasis. No further bleeding was seen. The coaxial needle system was removed and hemostasis was achieved by direct digital compression. The patient tolerated the procedure well. No immediate complications identified.   The patient left the CT suite in supine position to the recovery room in stable condition. Total local anesthetic used: lidocaine, approximately 8 mL. Estimated blood loss:  Approximately 20 mL. The patient was observed in the recovery room for two hours after the procedure and discharged in stable condition. XR CHEST PORTABLE    Result Date: 9/30/2022  EXAMINATION: ONE XRAY VIEW OF THE CHEST 9/30/2022 2:06 pm COMPARISON: None. HISTORY: ORDERING SYSTEM PROVIDED HISTORY: sob TECHNOLOGIST PROVIDED HISTORY: Reason for exam:->sob What reading provider will be dictating this exam?->CRC FINDINGS: The heart is within normals. There are findings of mild vascular congestion. There are trace bilateral pleural effusions. There are no findings of pneumonia     1. Trace bilateral pleural effusions 2. Mild vascular congestion     US DUP ABD PEL RETRO SCROT COMPLETE    Result Date: 10/4/2022  EXAMINATION: DOPPLER EVALUATION OF THE PELVIS 10/3/2022 6:50 pm COMPARISON: 07/16/2022 HISTORY: ORDERING SYSTEM PROVIDED HISTORY: CHAYO, CHF TECHNOLOGIST PROVIDED HISTORY: Complete study for renal artery stenosis and renal size. RADIOLOGY TO READ Reason for exam:->CHAYO, CHF What reading provider will be dictating this exam?->[facility]CY FINDINGS: The right kidney demonstrates unremarkable echogenicity and unremarkable vascularity. No evidence of right hydronephrosis is seen. No evidence of solid right renal mass is seen. No evidence of hyperechoic stones is seen. The right kidney measures 9.0 x 5.2 x 5.2 cm. The right renal cortex measures 1.4 cm. The left kidney demonstrates unremarkable echogenicity and unremarkable vascularity. No evidence of left hydronephrosis is seen. No evidence of solid left renal mass is seen. No evidence of hyperechoic stones is seen. . Simple cyst visualized in the lower pole of the left kidney measuring 1.4 x 0.8 x 0.8 cm. The left kidney measures 8.6 x 4.0 x 5.0 cm. The left renal cortex measures 1.4 cm. No evidence of free fluid is seen. Doppler: Abdominal aorta:  The abdominal aorta demonstrates atherosclerotic calcifications but no evidence of aneurysmal dilatation or arterial dissection is seen. Abdominal aorta peak systolic velocity at the level of the SMA measures 223 cm/s. Right Renal artery: Origin peak systolic velocity 80.9 cm/s, end-diastolic velocity of 47.1 cm/s and resistive index 0.73 Proximal peak systolic velocity 36.7 cm/s, end-diastolic velocity of 30.5 cm/s and resistive index 8.53 Mid peak systolic velocity 22.6 cm/s, end-diastolic velocity of 50.3 cm/s and resistive index 0.72 Distal peak systolic velocity 57.0 cm/s, end-diastolic velocity of 12.3 cm/s and resistive index 0.78 Right RAR: 0.4 Right kidney resistive index and acceleration times Superior pole 42 and 233 cm/s2 Mid pole 34 and 220 cm/s2 Inferior pole 28 and 199 cm/s2 Left Renal artery: Origin peak systolic velocity 28.8 cm/s, end-diastolic velocity of 15.7 cm/s and resistive index 0.79 Proximal peak systolic velocity 57.5 cm/s, end-diastolic velocity of 60.2 cm/s and resistive index 3.07 Mid peak systolic velocity 37.7 cm/s, end-diastolic velocity of 0.45 cm/s and resistive index 0.89 Distal peak systolic velocity 43.6 cm/s, end-diastolic velocity of 45.4 cm/s and resistive index 0.78 Left RAR:  0.3 Left kidney resistive index and acceleration times Superior pole 75 and 228 cm/s2 Mid pole 13 and 220 cm/s2 Inferior pole 28 and 123 cm/s2     No evidence of significant arterial stenosis is seen. Grayscale imaging demonstrates no acute pathology. Simple cyst visualized in the left kidney measuring 1.4 cm. CT BIOPSY RENAL    1. Successful core biopsy of right inferior pole renal parenchyma. 2.2 Gelfoam torpedoes were injected after biopsy for immediate hemostasis. During biopsy and prior to insertion of Gelfoam torpedoes, a small amount of bleeding was seen adjacent to the needle entry site which stopped and did not grow any further. HISTORY: Mirza Santa is a Female of 64 years age.  DIAGNOSIS: random renal biopsy COMPARISON: None available. CT Dose-Length Product (estimate related to radiation exposure from this exam):  532.69  mGy*cm. PROCEDURE: Following the discussion of the procedure, alternatives, risks versus benefits, informed consent was obtained from the patient. Specifically, risks of after-biopsy pain at the site, rare possibility of excessive hemorrhage, infection, injury to the adjacent organs were discussed and the patient verbalized understanding. Pre-procedure evaluation confirmed that the patient was an appropriate candidate for conscious sedation. Adequate sedation was maintained during the entire procedure. Vital signs, pulse oximetry, and response to verbal commands were monitored and recorded by the nurse throughout the procedure and the recovery period. Medical information was entered in the medical record including the medications and dosages used. The patient returned to baseline neurologic and physiologic status prior to leaving the department. No immediate sedation related complications were noted. Medication for conscious sedation was administered via IV route. 45 minutes of conscious sedation was provided. Following universal protocol, patient and site verification was performed with a \"timeout\" prior to the procedure. The patient was placed on the CT table in prone position and the right flank area was prepped and draped in usual sterile fashion. Using the usual sterile conditions, lidocaine and CT guidance, the inferior pole of the right kidney was accessed using an  18-guage coaxial biopsy needle system. After confirmation of appropriate localization of the needle, total of 3 samples were obtained and sent for pathological analysis. After biopsy, a small amount of blood was seen posterior to the right kidney. 2 Gelfoam torpedoes were injected through the coaxial needle for hemostasis. No further bleeding was seen.  The coaxial needle system was removed and hemostasis was achieved by direct digital compression. The patient tolerated the procedure well. No immediate complications identified. The patient left the CT suite in supine position to the recovery room in stable condition. Total local anesthetic used: lidocaine, approximately 8 mL. Estimated blood loss:  Approximately 20 mL. The patient was observed in the recovery room for two hours after the procedure and discharged in stable condition. IMPRESSION AND SUGGESTION:  Exertional shortness of breath, possible reactive airway disease  Diastolic heart failure  Abnormal chest x-ray    Recommend PFT as outpatient. Consider ICS/LABA after PFT. Overnight pulse oximetry. Home O2 evaluation prior to discharge. Incentive spirometer. Chest x-ray showing localized focal pulmonary parenchymal opacity in the right posterior costophrenic sulcus, recommend follow-up chest x-ray or CT chest for further evaluation. CT abdominal pelvis with base of the lungs showing no lung mass or nodule. Follow-up with Dr. Torito Sanchez as outpatient after discharge      NOTE: This report was transcribed using voice recognition software. Every effort was made to ensure accuracy; however, inadvertent computerized transcription errors may be present.       Electronically signed by August Kraus MD, [facility] on 10/14/2022 at 2:49 PM

## 2022-10-14 NOTE — PROGRESS NOTES
Progress Note  Patient: Porfirio Machado  Unit/Bed: C384/S858-47  YOB: 1945  MRN: [id number]  Acct: [de-identified]   Admitting Diagnosis: Cardiorenal disease [I13.10]  CHAYO (acute kidney injury) ([facility] [address] [N17.9]  Hypertensive emergency [I16.1]  Acute on chronic diastolic (congestive) heart failure ([facility] [I50.33]  Date:  9/30/2022  Hospital Day: 13    Chief Complaint:  Shortness of breath    Subjective    10/14/2022: Patient is resting comfortably. Sinus rhythm on telemetry. Discussed with nephrology. Awaiting final renal biopsy result. 10-13-20 22: Patient is doing well overall. Denies any chest pain or shortness of breath. Sinus rhythm on telemetry. Awaiting full biopsy result. 10/11/22: Resting comfortably in bed in no acute distress. Had mild headache this morning. Continues to have intermittent left-sided chest pressure but none currently. Still experiencing shortness of breath with exertion. Renal function is improving. Persistent anemia with hemoglobin low at 7.6. Still awaiting renal biopsy results from [facility] but patient with suspected acute glomerulonephritis/nephrotic/nephritic syndrome. BP better controlled on multiple antihypertensive medications. Currently on telemetry she is maintaining sinus rhythm with heart rate in the 70s.    10/10/22: Patient is resting,. Denies any chest pain or shortness of breath. Renal biopsy still pending. Discussed with nephrology. Holding on cardiac catheterization for now. 10/9/22: Feels better. No chest pain overnight. Renal biopsy results still pending. Creatinine slightly better today, 2.13.    10/8/22: Headache earlier now resolved. No further chest pain but contineus to have SILVA    10/7/22: Resting comfortably in bed in no acute distress. After taking morning medications, patient states she developed an episode of left-sided chest pain/pressure which lasted approximately 10 minutes before resolving.   She continues to experience shortness of breath with exertion. She is maintaining adequate SPO2 on room air. She underwent right kidney biopsy yesterday with IR and results are pending. She remains on Lasix 80 mg IV daily with approximately 2.2 L negative fluid balance since admission. Renal function remains relatively unchanged with creatinine of 2.56, BUN elevated 52 and GFR low at 18.1. Worsening in leukocytosis this morning with WBC of 14.7 compared to 11.9 yesterday. Currently on telemetry she is sinus rhythm with heart rate in the 80s. 10/6/22: Patient going for kidney biopsy today. Denies any chest pain. No significant shortness of breath. Sinus rhythm/sinus tachycardia on telemetry. Labs are pending today. Off of Lasix drip    10/5/22: Case discussed with nephrology. Patient remains on Lasix drip. Previous records reviewed from [facility]. Patient with negative cardiac CTA in 2015. Labs today are pending    10/4/22:  Sounds present at the bedside. All questions answered. Remains on Lasix drip in the lower extremity edema has significantly improved. She denies any chest pain or shortness of breath. Hemodynamically stable. Creatinine remains elevated with a GFR of 22. Hemoglobin is 7.5. GI input appreciated    10/3/22: discussed with son on phone. No CP or SOB today. On lasix gtt. Hd stable. BP elevated. + LE edema. 10/2/22: Patient is feeling somewhat better. Renal function slightly worsening. Sinus tach on telemetry heart rate of 106 bpm.  Continues to have significant bilateral extremity edema. Patient with significant EMEA hemoglobin is 7.3 from 12 2 months ago. 10/1/22:  Patient is a 64 y.o. female who presents with a chief complaint of short of breath.  Patient is followed on a regular basis by Dr. Augusto Heller MD. patient with past medical history of chronic diastolic congestive heart failure, hypertension, hyperlipidemia, chronic kidney disease who presents with worsening shortness of breath and increased lower extremity edema. Patient generally follows with [facility] she was requesting transfer but no beds available. She denies history of cardiac catheterization or any recent stress test.  Creatinine of 2.39 with a GFR of 19.6 on admission. BNP is elevated at 16,000 with mildly elevated troponin and a flat pattern. Hemoglobin is 8.1. He was noted to have extremely elevated blood pressure on arrival initiated on nitro drip. Currently she is in the 609E systolic. Echocardiogram at [facility] in 2017 ejection fraction of 60 to 65% no significant valve abnormalities. Negative nuclear stress test in 2017 at [facility]. Review of Systems:   Review of Systems   Constitutional:  Negative for fever. HENT:  Negative for congestion. Respiratory:  Positive for chest tightness (intermittent) and shortness of breath (with exertion). Cardiovascular:  Positive for chest pain (intermittent). Negative for palpitations and leg swelling. Gastrointestinal:  Negative for nausea and vomiting. Musculoskeletal:  Negative for arthralgias. Skin:  Negative for color change. Neurological:  Negative for dizziness, syncope and light-headedness. Psychiatric/Behavioral:  Negative for agitation. Physical Examination:    /60   Pulse 70   Temp 97.7 °F (36.5 °C)   Resp 18   Ht 5' 2\" (1.575 m)   Wt 128 lb (58.1 kg)   SpO2 98%   BMI 23.41 kg/m²    Physical Exam  Constitutional:       General: She is not in acute distress. HENT:      Head: Normocephalic and atraumatic. Cardiovascular:      Rate and Rhythm: Normal rate and regular rhythm. Pulmonary:      Effort: Pulmonary effort is normal. No respiratory distress. Breath sounds: No wheezing, rhonchi or rales. Abdominal:      Palpations: Abdomen is soft. Musculoskeletal:         General: Normal range of motion. Cervical back: Normal range of motion and neck supple. Right lower leg: Edema (trace) present. Left lower leg: No edema.    Skin: General: Skin is warm and dry. Neurological:      General: No focal deficit present. Mental Status: She is alert and oriented to person, place, and time. Cranial Nerves: No cranial nerve deficit.    Psychiatric:         Mood and Affect: Mood normal.         Behavior: Behavior normal.       LABS:  CBC:   Lab Results   Component Value Date/Time    WBC 9.9 10/12/2022 07:46 PM    RBC 2.55 10/12/2022 07:46 PM    HGB 7.5 10/12/2022 07:46 PM    HCT 21.9 10/12/2022 07:46 PM    MCV 86.0 10/12/2022 07:46 PM    MCH 29.4 10/12/2022 07:46 PM    MCHC 34.2 10/12/2022 07:46 PM    RDW 14.6 10/12/2022 07:46 PM     10/12/2022 07:46 PM    MPV 8.7 12/26/2013 08:34 AM     CBC with Differential:   Lab Results   Component Value Date/Time    WBC 9.9 10/12/2022 07:46 PM    RBC 2.55 10/12/2022 07:46 PM    HGB 7.5 10/12/2022 07:46 PM    HCT 21.9 10/12/2022 07:46 PM     10/12/2022 07:46 PM    MCV 86.0 10/12/2022 07:46 PM    MCH 29.4 10/12/2022 07:46 PM    MCHC 34.2 10/12/2022 07:46 PM    RDW 14.6 10/12/2022 07:46 PM    LYMPHOPCT 11.4 10/01/2022 07:59 AM    MONOPCT 6.3 10/01/2022 07:59 AM    BASOPCT 1.1 10/01/2022 07:59 AM    MONOSABS 0.5 10/01/2022 07:59 AM    LYMPHSABS 0.9 10/01/2022 07:59 AM    EOSABS 0.3 10/01/2022 07:59 AM    BASOSABS 0.1 10/01/2022 07:59 AM     CMP:    Lab Results   Component Value Date/Time     10/13/2022 04:57 AM    K 4.9 10/13/2022 04:57 AM    K 5.1 07/18/2022 10:25 AM     10/13/2022 04:57 AM    CO2 20 10/13/2022 04:57 AM    BUN 55 10/13/2022 04:57 AM    CREATININE 1.39 10/13/2022 04:57 AM    GFRAA 44.3 10/13/2022 04:57 AM    LABGLOM 36.6 10/13/2022 04:57 AM    GLUCOSE 139 10/13/2022 04:57 AM    PROT 4.7 10/01/2022 07:59 AM    LABALBU 2.7 10/01/2022 07:59 AM    CALCIUM 7.9 10/13/2022 04:57 AM    BILITOT 0.4 10/01/2022 07:59 AM    ALKPHOS 119 10/01/2022 07:59 AM    AST 18 10/01/2022 07:59 AM    ALT 14 10/01/2022 07:59 AM     BMP:    Lab Results   Component Value Date/Time     10/13/2022 04:57 AM    K 4.9 10/13/2022 04:57 AM    K 5.1 07/18/2022 10:25 AM     10/13/2022 04:57 AM    CO2 20 10/13/2022 04:57 AM    BUN 55 10/13/2022 04:57 AM    LABALBU 2.7 10/01/2022 07:59 AM    CREATININE 1.39 10/13/2022 04:57 AM    CALCIUM 7.9 10/13/2022 04:57 AM    GFRAA 44.3 10/13/2022 04:57 AM    LABGLOM 36.6 10/13/2022 04:57 AM    GLUCOSE 139 10/13/2022 04:57 AM     Magnesium:    Lab Results   Component Value Date/Time    MG 2.0 10/03/2022 05:55 AM     Troponin:    Lab Results   Component Value Date/Time    TROPONINI 0.022 10/01/2022 10:49 PM       Radiology:  CT ABDOMEN PELVIS WO CONTRAST Additional Contrast? None    Result Date: 10/6/2022  1. Again seen is a very small amount of postprocedural hemorrhage posterior to the right kidney. This is unchanged when compared to prior study one hour ago during the procedure. 2.Note is made of bilateral small pleural effusions, unchanged since prior study dating July 18, 2022. Of note is a right costophrenic angle medial density, which is again felt to represent likely compressive atelectasis, though appears more plump than prior. Attention on follow-up is recommended. 3.Calcified right liver lobe density, may represent an old granuloma. This is also unchanged 4. Calcific densities are noted within the colonic wall near the cecum. These may be dystrophic calcification such as sequelae of old inflammation or infection such as epiploic appendicitis versus infection, though alternatively premalignant and malignancies can also present in such a fashion. Please correlate with colonoscopy and other workup as clinically indicated. Discussed with Dr. Jocelin Alanis.  COMPARISON: Same day study, one hour prior during procedure, and CT from July 18, 2022 DIAGNOSIS: Small postprocedural hemorrhage posterior to the right kidney COMMENTS: I13.10 Cardiorenal disease ICD10 TECHNIQUE: Spiral scanning of the abdomen and pelvis was performed after the administration of oral contrast . Intravenous contrast was not administered as per the referring physician. CT Dose-Length Product (estimate related to radiation exposure from this exam):  130.68  mGy*cm. CT ABDOMEN: Note is made of bilateral small pleural effusions, unchanged since July. Note is made of bibasilar atelectasis. Some of the areas of atelectasis appear to be more plump such as in the right base at the costophrenic angle, attention on follow-up is recommended. Calcific density is again seen in the right liver lobe, this may represent an old granuloma. The spleen is of normal size and attenuation without focal lesions. Pancreas shows no signs of focal mass or peripancreatic fluid collection. Kidneys show no contour deforming renal masses or hydronephrosis. A very small amount of postprocedural biopsy bleed is seen posterior to the right kidney, this small and unchanged from 1 hour prior. Adrenal glands are unremarkable. Aorta is normal in caliber without  signs of aneurysmal dilatation. No significant retroperitoneal adenopathy or ascites is identified. Calcific densities are noted within the colonic wall near the cecum. These may be dystrophic calcification such as sequelae of old inflammation or infection such as epiploic appendicitis versus infection, though alternatively premalignant and malignancies can also present in such a fashion. Please correlate with colonoscopy and other workup as clinically indicated. CT PELVIS:  Scanning of the pelvis shows no signs of pelvic mass or pelvic fluid collection. Diverticulosis of the colon is noted. CT GUIDED NEEDLE PLACEMENT    Result Date: 10/6/2022  1. Successful core biopsy of right inferior pole renal parenchyma. 2.2 Gelfoam torpedoes were injected after biopsy for immediate hemostasis. During biopsy and prior to insertion of Gelfoam torpedoes, a small amount of bleeding was seen adjacent to the needle entry site which stopped and did not grow any further.  HISTORY: Mickiel Cousin is a Female of 64 years age. DIAGNOSIS:   random renal biopsy COMPARISON: None available. CT Dose-Length Product (estimate related to radiation exposure from this exam):  532.69  mGy*cm. PROCEDURE: Following the discussion of the procedure, alternatives, risks versus benefits, informed consent was obtained from the patient. Specifically, risks of after-biopsy pain at the site, rare possibility of excessive hemorrhage, infection, injury to the adjacent organs were discussed and the patient verbalized understanding. Pre-procedure evaluation confirmed that the patient was an appropriate candidate for conscious sedation. Adequate sedation was maintained during the entire procedure. Vital signs, pulse oximetry, and response to verbal commands were monitored and recorded by the nurse throughout the procedure and the recovery period. Medical information was entered in the medical record including the medications and dosages used. The patient returned to baseline neurologic and physiologic status prior to leaving the department. No immediate sedation related complications were noted. Medication for conscious sedation was administered via IV route. 45 minutes of conscious sedation was provided. Following universal protocol, patient and site verification was performed with a \"timeout\" prior to the procedure. The patient was placed on the CT table in prone position and the right flank area was prepped and draped in usual sterile fashion. Using the usual sterile conditions, lidocaine and CT guidance, the inferior pole of the right kidney was accessed using an  18-guage coaxial biopsy needle system. After confirmation of appropriate localization of the needle, total of 3 samples were obtained and sent for pathological analysis. After biopsy, a small amount of blood was seen posterior to the right kidney. 2 Gelfoam torpedoes were injected through the coaxial needle for hemostasis. No further bleeding was seen.  The coaxial needle system was removed and hemostasis was achieved by direct digital compression. The patient tolerated the procedure well. No immediate complications identified. The patient left the CT suite in supine position to the recovery room in stable condition. Total local anesthetic used: lidocaine, approximately 8 mL. Estimated blood loss:  Approximately 20 mL. The patient was observed in the recovery room for two hours after the procedure and discharged in stable condition. CT BIOPSY RENAL    Result Date: 10/6/2022  1. Successful core biopsy of right inferior pole renal parenchyma. 2.2 Gelfoam torpedoes were injected after biopsy for immediate hemostasis. During biopsy and prior to insertion of Gelfoam torpedoes, a small amount of bleeding was seen adjacent to the needle entry site which stopped and did not grow any further. HISTORY: Rehan Baldwin is a Female of 64 years age. DIAGNOSIS:   random renal biopsy COMPARISON: None available. CT Dose-Length Product (estimate related to radiation exposure from this exam):  532.69  mGy*cm. PROCEDURE: Following the discussion of the procedure, alternatives, risks versus benefits, informed consent was obtained from the patient. Specifically, risks of after-biopsy pain at the site, rare possibility of excessive hemorrhage, infection, injury to the adjacent organs were discussed and the patient verbalized understanding. Pre-procedure evaluation confirmed that the patient was an appropriate candidate for conscious sedation. Adequate sedation was maintained during the entire procedure. Vital signs, pulse oximetry, and response to verbal commands were monitored and recorded by the nurse throughout the procedure and the recovery period. Medical information was entered in the medical record including the medications and dosages used. The patient returned to baseline neurologic and physiologic status prior to leaving the department.  No immediate sedation related complications were noted. Medication for conscious sedation was administered via IV route. 45 minutes of conscious sedation was provided. Following universal protocol, patient and site verification was performed with a \"timeout\" prior to the procedure. The patient was placed on the CT table in prone position and the right flank area was prepped and draped in usual sterile fashion. Using the usual sterile conditions, lidocaine and CT guidance, the inferior pole of the right kidney was accessed using an  18-guage coaxial biopsy needle system. After confirmation of appropriate localization of the needle, total of 3 samples were obtained and sent for pathological analysis. After biopsy, a small amount of blood was seen posterior to the right kidney. 2 Gelfoam torpedoes were injected through the coaxial needle for hemostasis. No further bleeding was seen. The coaxial needle system was removed and hemostasis was achieved by direct digital compression. The patient tolerated the procedure well. No immediate complications identified. The patient left the CT suite in supine position to the recovery room in stable condition. Total local anesthetic used: lidocaine, approximately 8 mL. Estimated blood loss:  Approximately 20 mL. The patient was observed in the recovery room for two hours after the procedure and discharged in stable condition. Echocardiogram 7/19/22:  Conclusions   Summary   Normal left ventricle structure and function. Left ventricular ejection fraction is visually estimated at 75%. Moderate concentric LVH   Borderline diastolic dysfunction   There is DUST (discrete upper septal thickening) without evidence of   outflow tract obstruction. Normal right ventricle structure and function. Right ventricular systolic pressure of 37 mm Hg consistent with mild   pulmonary hypertension. Normal mitral valve structure and function. 1+ MR   MAC   Normal aortic valve structure and function.    Mild aortic sclerosis   Normal tricuspid valve structure and function. Mild tricuspid regurgitation. Mildly dilated left atrium. Signature   ----------------------------------------------------------------   Electronically signed by Blu Kim MD(Interpreting   physician) on 07/19/2022 12:11 PM   ----------------------------------------------------------------    EKG on 9/30/22: SR 88, no acute ischemic changes, QTc 467ms    Telemetry 10/7/22: SR 80s  Telemetry 10/11/22: SR 70s      Assessment:    Active Hospital Problems    Diagnosis Date Noted    Shortness of breath [R06.02] 10/13/2022     Priority: Medium    Abnormal chest x-ray [R93.89] 10/13/2022     Priority: Medium    Cardiorenal disease [I13.10] 10/11/2022     Priority: Medium    CHAYO (acute kidney injury) ([facility] [address] [N17.9] 10/06/2022     Priority: Medium    Anemia [D64.9] 10/03/2022     Priority: Medium    Hypertensive emergency [I16.1] 10/01/2022     Priority: Medium    Acute on chronic diastolic (congestive) heart failure ([facility] [address] [I50.33] 07/18/2022     Priority: Medium     Acute on chronic heart failure with preserved EF--appears euvolemic now  Normal LVF EF 75% per echo 7/19/22  Chest pain r/o cardiac ischemia  Dyspnea on exertion  HTN urgency--improving  Elevated troponin  Moderate concentric LVH  Dyslipidemia  CHAYO on CKD--s/p right kidney biopsy on 10/6/22.  Improving   Anemia--remains low but stable  Leukocytosis     Plan:  Continue current medications-Lopressor 100 mg p.o. twice daily, hydralazine 100 mg p.o. every 8 hours, Imdur 60 mg p.o. daily, Diovan 80 mg p.o. daily, Clonidine 0.2mg PO BID,Lipitor 40 mg p.o. nightly, magnesium oxide 400 mg p.o. daily, levothyroxine 25 mcg p.o. daily, potassium chloride 20 mEq p.o. daily, Protonix 40 mg p.o. daily, CellCept 1000 mg p.o. twice daily, prednisone 60mg PO daily, Retacrit 1800 units 3 x weekly  Cardiac/less than 2 g sodium diet recommended  Check daily weight and strict intake and output  Recommend 2000 mL daily fluid restriction  Monitor on telemetry for any tachycardia or bradycardia arrhythmias  Maintain potassium greater than 4, magnesium greater than 2  GI/DVT prophylaxis  Gastroenterology recommendations regarding anemia--status post recent EGD and colonoscopy less than 1 month ago which were normal.  No overt bleeding noted and therefore no immediate clinical indication for endoscopic investigation. Recommend follow-up outpatient and consideration for small bowel pill capsule endoscopy  Nephrology recommendations--awaiting renal biopsy results. Suspected acute glomerulonephritis/nephrotic//nephritic syndrome  Coronary evaluation/cardiac catheterization in future when feasible/renal function allows as patient with multiple risk factors for CAD, acute decompensated heart failure and mildly elevated cardiac enzymes. Check chest x-ray in AM given ongoing dyspnea on exertion complaints  Consider pulmonology consult for evaluation of persistent shortness of breath   Will need follow-up with CHF clinic upon discharge  Further recommendations to follow    Electronically signed by Christos Sandy DO on 10/14/2022 at 3:58 PM      Attending Supervising [de-identified] Attestation Statement  The patient is a 64 y.o. female. I have performed a history and physical examination of the patient. I discussed the case with the physician assistant. I reviewed the patient's Past Medical History, Past Surgical History, Medications, and Allergies.      Physical Exam:  Vitals:    10/14/22 0620 10/14/22 0845 10/14/22 1511 10/14/22 1545   BP: (!) 150/70 (!) 146/61 128/64 139/60   Pulse:  83 70    Resp:       Temp:   97.7 °F (36.5 °C)    TempSrc:       SpO2:   98%    Weight:       Height:           Review of Systems - Respiratory ROS: no cough, shortness of breath, or wheezing  Cardiovascular ROS: no chest pain or dyspnea on exertion  Gastrointestinal ROS: no abdominal pain, change in bowel habits, or black or bloody stools    Pulmonary/Chest: clear to auscultation bilaterally- no wheezes, rales or rhonchi, normal air movement, no respiratory distress  Cardiovascular: normal rate, normal S1 and S2, no gallops, intact distal pulses, and no carotid bruits  Abdomen: soft, non-tender, non-distended, normal bowel sounds, no masses or organomegaly    Active Hospital Problems    Diagnosis Date Noted    Shortness of breath [R06.02] 10/13/2022     Priority: Medium    Abnormal chest x-ray [R93.89] 10/13/2022     Priority: Medium    Cardiorenal disease [I13.10] 10/11/2022     Priority: Medium    CHAYO (acute kidney injury) ([facility] [address] [N17.9] 10/06/2022     Priority: Medium    Anemia [D64.9] 10/03/2022     Priority: Medium    Hypertensive emergency [I16.1] 10/01/2022     Priority: Medium    Acute on chronic diastolic (congestive) heart failure ([facility] [address] [I50.33] 07/18/2022     Priority: Medium        I reviewed and agree with the findings and plan documented in her note . Assessment:        Acute on chronic decompensated diastolic congestive heart failure  Hypertensive urgency-improved  Elevated cardiac enzyme. Questionable demand ischemia. Rule out underlying coronary ischemia  Acute on chronic kidney disease-improving  Hyperlipidemia  Anemia  Pauci-immune glomerulonephritis         PLAN:   As always, aggressive risk factor modification is strongly recommended. We should adhere to the JNC VIII guidelines for HTN management and the NCEPATP III guidelines for LDL-C management. Nephrology recommendations/await renal biopsty result  Diuretic management per nephrology team  Coronary evaluation in the form of cardiac catheterization when clinically stable/renal function stable. Patient with multiple risk factors for CAD and acute decompensated heart failure. Also has abnormal EKG and mildly elevated cardiac enzymes. higher risk for CAD. Will await clearance from nephrology. Can be performed as outpatient.   NO Norvasc secondary to lower extremity edema as well as acute decompensated heart failure  Avoid nephrotoxic agents  Monitor on telemetry  GI/DVT prophylaxis  CHF teaching  Follow-up in CHF clinic post discharge  Maintain hemoglobin greater than 7. Retacrit 3 times weekly. Consider hematology evaluation. Pulmonary recommendation  Continue Imdur to 60mg daily for BP control.         Electronically signed by Gildardo Troy DO on 10/14/2022 at 3:58 PM

## 2022-10-15 VITALS
HEART RATE: 85 BPM | OXYGEN SATURATION: 99 % | HEIGHT: 62 IN | TEMPERATURE: 97.7 F | WEIGHT: 128 LBS | DIASTOLIC BLOOD PRESSURE: 63 MMHG | RESPIRATION RATE: 18 BRPM | BODY MASS INDEX: 23.55 KG/M2 | SYSTOLIC BLOOD PRESSURE: 143 MMHG

## 2022-10-15 PROCEDURE — 6370000000 HC RX 637 (ALT 250 FOR IP): Performed by: INTERNAL MEDICINE

## 2022-10-15 PROCEDURE — 2580000003 HC RX 258: Performed by: INTERNAL MEDICINE

## 2022-10-15 PROCEDURE — 99238 HOSP IP/OBS DSCHRG MGMT 30/<: CPT | Performed by: INTERNAL MEDICINE

## 2022-10-15 PROCEDURE — 99232 SBSQ HOSP IP/OBS MODERATE 35: CPT | Performed by: INTERNAL MEDICINE

## 2022-10-15 PROCEDURE — 6370000000 HC RX 637 (ALT 250 FOR IP): Performed by: PHYSICIAN ASSISTANT

## 2022-10-15 PROCEDURE — 6360000002 HC RX W HCPCS: Performed by: INTERNAL MEDICINE

## 2022-10-15 RX ORDER — VALSARTAN 80 MG/1
80 TABLET ORAL 2 TIMES DAILY
Status: DISCONTINUED | OUTPATIENT
Start: 2022-10-15 | End: 2022-10-15 | Stop reason: HOSPADM

## 2022-10-15 RX ORDER — CLONIDINE HYDROCHLORIDE 0.2 MG/1
0.2 TABLET ORAL 2 TIMES DAILY
Qty: 60 TABLET | Refills: 3 | Status: SHIPPED | OUTPATIENT
Start: 2022-10-15

## 2022-10-15 RX ORDER — POTASSIUM CHLORIDE 20 MEQ/1
20 TABLET, EXTENDED RELEASE ORAL
Qty: 60 TABLET | Refills: 3 | Status: SHIPPED | OUTPATIENT
Start: 2022-10-16

## 2022-10-15 RX ORDER — SULFAMETHOXAZOLE AND TRIMETHOPRIM 800; 160 MG/1; MG/1
1 TABLET ORAL
Qty: 12 TABLET | Refills: 0 | Status: SHIPPED | OUTPATIENT
Start: 2022-10-17 | End: 2022-11-03

## 2022-10-15 RX ORDER — PANTOPRAZOLE SODIUM 40 MG/1
40 TABLET, DELAYED RELEASE ORAL DAILY
Qty: 30 TABLET | Refills: 3 | Status: SHIPPED | OUTPATIENT
Start: 2022-10-16

## 2022-10-15 RX ORDER — ISOSORBIDE MONONITRATE 60 MG/1
60 TABLET, EXTENDED RELEASE ORAL DAILY
Qty: 30 TABLET | Refills: 3 | Status: SHIPPED | OUTPATIENT
Start: 2022-10-16

## 2022-10-15 RX ORDER — HYDRALAZINE HYDROCHLORIDE 100 MG/1
100 TABLET, FILM COATED ORAL EVERY 8 HOURS SCHEDULED
Qty: 60 TABLET | Refills: 3 | Status: SHIPPED | OUTPATIENT
Start: 2022-10-15 | End: 2022-11-03

## 2022-10-15 RX ORDER — MYCOPHENOLATE MOFETIL 250 MG/1
1000 CAPSULE ORAL 2 TIMES DAILY
Qty: 60 CAPSULE | Refills: 3 | Status: SHIPPED | OUTPATIENT
Start: 2022-10-15 | End: 2022-11-03

## 2022-10-15 RX ORDER — PREDNISONE 20 MG/1
60 TABLET ORAL DAILY
Qty: 90 TABLET | Refills: 3 | Status: SHIPPED | OUTPATIENT
Start: 2022-10-16 | End: 2022-11-15

## 2022-10-15 RX ORDER — VALSARTAN 80 MG/1
80 TABLET ORAL 2 TIMES DAILY
Qty: 30 TABLET | Refills: 3 | Status: SHIPPED | OUTPATIENT
Start: 2022-10-15

## 2022-10-15 RX ADMIN — ISOSORBIDE MONONITRATE 60 MG: 60 TABLET, EXTENDED RELEASE ORAL at 08:59

## 2022-10-15 RX ADMIN — Medication 10 ML: at 09:03

## 2022-10-15 RX ADMIN — HYDRALAZINE HYDROCHLORIDE 100 MG: 100 TABLET, FILM COATED ORAL at 06:23

## 2022-10-15 RX ADMIN — LEVOTHYROXINE SODIUM 25 MCG: 0.03 TABLET ORAL at 06:23

## 2022-10-15 RX ADMIN — MYCOPHENOLATE MOFETIL 1000 MG: 250 CAPSULE ORAL at 09:09

## 2022-10-15 RX ADMIN — PANTOPRAZOLE SODIUM 40 MG: 40 TABLET, DELAYED RELEASE ORAL at 09:00

## 2022-10-15 RX ADMIN — HYDRALAZINE HYDROCHLORIDE 100 MG: 100 TABLET, FILM COATED ORAL at 14:21

## 2022-10-15 RX ADMIN — DOXAZOSIN 2 MG: 1 TABLET ORAL at 09:00

## 2022-10-15 RX ADMIN — PREDNISONE 60 MG: 50 TABLET ORAL at 08:59

## 2022-10-15 RX ADMIN — Medication 400 MG: at 08:59

## 2022-10-15 RX ADMIN — METOPROLOL TARTRATE 100 MG: 50 TABLET, FILM COATED ORAL at 08:59

## 2022-10-15 RX ADMIN — VALSARTAN 80 MG: 80 TABLET, FILM COATED ORAL at 08:59

## 2022-10-15 RX ADMIN — CLONIDINE HYDROCHLORIDE 0.2 MG: 0.1 TABLET ORAL at 09:00

## 2022-10-15 RX ADMIN — POTASSIUM CHLORIDE 20 MEQ: 1500 TABLET, EXTENDED RELEASE ORAL at 08:59

## 2022-10-15 ASSESSMENT — PAIN SCALES - GENERAL: PAINLEVEL_OUTOF10: 0

## 2022-10-15 ASSESSMENT — PAIN - FUNCTIONAL ASSESSMENT: PAIN_FUNCTIONAL_ASSESSMENT: ACTIVITIES ARE NOT PREVENTED

## 2022-10-15 NOTE — FLOWSHEET NOTE
Patient sitting up in bed. NSR 85 on telemetry. Pt denies pain. BP is 143/63. Call light in pts reach.

## 2022-10-15 NOTE — DISCHARGE SUMMARY
Cardiology Discharge Summary      Patient Identification:  Tom Swanson  : 1945  MRN: [id number]   Account: [de-identified]     Admit date: 2022  Discharge date: 10/15/2022   Attending provider: Gisela Hernandes DO        Primary care provider: Jaida Stewart MD     Admission Diagnoses:  Hypertensive emergency   SOB  CHD- D  CHAYO      Discharge Diagnoses: Active Hospital Problems    Diagnosis Date Noted    Shortness of breath [R06.02] 10/13/2022     Priority: Medium    Abnormal chest x-ray [R93.89] 10/13/2022     Priority: Medium    Cardiorenal disease [I13.10] 10/11/2022     Priority: Medium    CHAYO (acute kidney injury) ([facility] [address] [N17.9] 10/06/2022     Priority: Medium    Anemia [D64.9] 10/03/2022     Priority: Medium    Hypertensive emergency [I16.1] 10/01/2022     Priority: Medium    Acute on chronic diastolic (congestive) heart failure ([facility] [address] [I50.33] 2022     Priority: Medium          Hospital Course:   Tom Swanson is a 64 y.o. female admitted to [facility] on 2022 for . Med Rx with diuretics. Responded well  Seen by Nephrology and diagnosed with GGN - received ABX. Procedures:        Consults:   IP CONSULT TO NEPHROLOGY  IP CONSULT TO GI  IP CONSULT TO INTERVENTIONAL RADIOLOGY  IP CONSULT TO PULMONOLOGY    Examination:  BP (!) 143/63   Pulse 85   Temp 97.8 °F (36.6 °C) (Oral)   Resp 18   Ht 5' 2\" (1.575 m)   Wt 128 lb (58.1 kg)   SpO2 99%   BMI 23.41 kg/m²    Physical Exam   Constitutional: She appears healthy. No distress. HENT:   Normal cephalic and Atraumatic   Eyes: Pupils are equal, round, and reactive to light. Neck: Thyroid normal. No JVD present. No neck adenopathy. No thyromegaly present. Cardiovascular: Normal rate, regular rhythm, normal heart sounds, intact distal pulses and normal pulses. Pulmonary/Chest: Effort normal and breath sounds normal. She has no wheezes. She has no rales. She exhibits no tenderness. Abdominal: Soft. Bowel sounds are normal. There is no abdominal tenderness. Musculoskeletal:         General: No tenderness or edema. Normal range of motion. Cervical back: Normal range of motion and neck supple. Neurological: She is alert and oriented to person, place, and time. Skin: Skin is warm. No cyanosis. Nails show no clubbing. Medications:  Current Discharge Medication List        START taking these medications    Details   mycophenolate (CELLCEPT) 250 MG capsule Take 4 capsules by mouth 2 times daily  Qty: 60 capsule, Refills: 3      predniSONE (DELTASONE) 20 MG tablet Take 3 tablets by mouth daily  Qty: 90 tablet, Refills: 3      sulfamethoxazole-trimethoprim (BACTRIM DS;SEPTRA DS) 800-160 MG per tablet Take 1 tablet by mouth three times a week Every Monday, Wednesday and Friday  Qty: 12 tablet, Refills: 0      !! isosorbide mononitrate (IMDUR) 60 MG extended release tablet Take 1 tablet by mouth daily  Qty: 30 tablet, Refills: 3      potassium chloride (KLOR-CON M) 20 MEQ extended release tablet Take 1 tablet by mouth daily (with breakfast)  Qty: 60 tablet, Refills: 3      valsartan (DIOVAN) 80 MG tablet Take 1 tablet by mouth in the morning and at bedtime  Qty: 30 tablet, Refills: 3      cloNIDine (CATAPRES) 0.2 MG tablet Take 1 tablet by mouth 2 times daily  Qty: 60 tablet, Refills: 3       !! - Potential duplicate medications found. Please discuss with provider.         CONTINUE these medications which have CHANGED    Details   pantoprazole (PROTONIX) 40 MG tablet Take 1 tablet by mouth daily  Qty: 30 tablet, Refills: 3      hydrALAZINE (APRESOLINE) 100 MG tablet Take 1 tablet by mouth every 8 hours  Qty: 60 tablet, Refills: 3           CONTINUE these medications which have NOT CHANGED    Details   !! isosorbide mononitrate (IMDUR) 60 MG extended release tablet Take 120 mg by mouth daily      Magnesium 400 MG TABS TAKE 1 TABLET BY MOUTH ONCE DAILY levothyroxine (SYNTHROID) 25 MCG tablet 1/2 po daily  Qty: 30 tablet, Refills: 5      atorvastatin (LIPITOR) 20 MG tablet Take by mouth      metoprolol (LOPRESSOR) 100 MG tablet Take 100 mg by mouth in the morning and 100 mg before bedtime. !! - Potential duplicate medications found. Please discuss with provider. STOP taking these medications       torsemide (DEMADEX) 20 MG tablet Comments:   Reason for Stopping:         doxazosin (CARDURA) 2 MG tablet Comments:   Reason for Stopping:         amLODIPine (NORVASC) 10 MG tablet Comments:   Reason for Stopping:         losartan (COZAAR) 50 MG tablet Comments:   Reason for Stopping:         furosemide (LASIX) 20 MG tablet Comments:   Reason for Stopping:               Significant Diagnostics:   Radiology: CT ABDOMEN PELVIS WO CONTRAST Additional Contrast? None    Result Date: 10/6/2022  1. Again seen is a very small amount of postprocedural hemorrhage posterior to the right kidney. This is unchanged when compared to prior study one hour ago during the procedure. 2.Note is made of bilateral small pleural effusions, unchanged since prior study dating July 18, 2022. Of note is a right costophrenic angle medial density, which is again felt to represent likely compressive atelectasis, though appears more plump than prior. Attention on follow-up is recommended. 3.Calcified right liver lobe density, may represent an old granuloma. This is also unchanged 4. Calcific densities are noted within the colonic wall near the cecum. These may be dystrophic calcification such as sequelae of old inflammation or infection such as epiploic appendicitis versus infection, though alternatively premalignant and malignancies can also present in such a fashion. Please correlate with colonoscopy and other workup as clinically indicated. Discussed with Dr. Griselda Combs.  COMPARISON: Same day study, one hour prior during procedure, and CT from July 18, 2022 DIAGNOSIS: Small postprocedural hemorrhage posterior to the right kidney COMMENTS: I13.10 Cardiorenal disease ICD10 TECHNIQUE: Spiral scanning of the abdomen and pelvis was performed after the administration of oral contrast .   Intravenous contrast was not administered as per the referring physician. CT Dose-Length Product (estimate related to radiation exposure from this exam):  130.68  mGy*cm. CT ABDOMEN: Note is made of bilateral small pleural effusions, unchanged since July. Note is made of bibasilar atelectasis. Some of the areas of atelectasis appear to be more plump such as in the right base at the costophrenic angle, attention on follow-up is recommended. Calcific density is again seen in the right liver lobe, this may represent an old granuloma. The spleen is of normal size and attenuation without focal lesions. Pancreas shows no signs of focal mass or peripancreatic fluid collection. Kidneys show no contour deforming renal masses or hydronephrosis. A very small amount of postprocedural biopsy bleed is seen posterior to the right kidney, this small and unchanged from 1 hour prior. Adrenal glands are unremarkable. Aorta is normal in caliber without  signs of aneurysmal dilatation. No significant retroperitoneal adenopathy or ascites is identified. Calcific densities are noted within the colonic wall near the cecum. These may be dystrophic calcification such as sequelae of old inflammation or infection such as epiploic appendicitis versus infection, though alternatively premalignant and malignancies can also present in such a fashion. Please correlate with colonoscopy and other workup as clinically indicated. CT PELVIS:  Scanning of the pelvis shows no signs of pelvic mass or pelvic fluid collection. Diverticulosis of the colon is noted. CT GUIDED NEEDLE PLACEMENT    Result Date: 10/6/2022  1. Successful core biopsy of right inferior pole renal parenchyma. 2.2 Gelfoam torpedoes were injected after biopsy for immediate hemostasis. During biopsy and prior to insertion of Gelfoam torpedoes, a small amount of bleeding was seen adjacent to the needle entry site which stopped and did not grow any further. HISTORY: Bairon Grissom is a Female of 64 years age. DIAGNOSIS:   random renal biopsy COMPARISON: None available. CT Dose-Length Product (estimate related to radiation exposure from this exam):  532.69  mGy*cm. PROCEDURE: Following the discussion of the procedure, alternatives, risks versus benefits, informed consent was obtained from the patient. Specifically, risks of after-biopsy pain at the site, rare possibility of excessive hemorrhage, infection, injury to the adjacent organs were discussed and the patient verbalized understanding. Pre-procedure evaluation confirmed that the patient was an appropriate candidate for conscious sedation. Adequate sedation was maintained during the entire procedure. Vital signs, pulse oximetry, and response to verbal commands were monitored and recorded by the nurse throughout the procedure and the recovery period. Medical information was entered in the medical record including the medications and dosages used. The patient returned to baseline neurologic and physiologic status prior to leaving the department. No immediate sedation related complications were noted. Medication for conscious sedation was administered via IV route. 45 minutes of conscious sedation was provided. Following universal protocol, patient and site verification was performed with a \"timeout\" prior to the procedure. The patient was placed on the CT table in prone position and the right flank area was prepped and draped in usual sterile fashion. Using the usual sterile conditions, lidocaine and CT guidance, the inferior pole of the right kidney was accessed using an  18-guage coaxial biopsy needle system. After confirmation of appropriate localization of the needle, total of 3 samples were obtained and sent for pathological analysis. After biopsy, a small amount of blood was seen posterior to the right kidney. 2 Gelfoam torpedoes were injected through the coaxial needle for hemostasis. No further bleeding was seen. The coaxial needle system was removed and hemostasis was achieved by direct digital compression. The patient tolerated the procedure well. No immediate complications identified. The patient left the CT suite in supine position to the recovery room in stable condition. Total local anesthetic used: lidocaine, approximately 8 mL. Estimated blood loss:  Approximately 20 mL. The patient was observed in the recovery room for two hours after the procedure and discharged in stable condition. CT BIOPSY RENAL    Result Date: 10/6/2022  1. Successful core biopsy of right inferior pole renal parenchyma. 2.2 Gelfoam torpedoes were injected after biopsy for immediate hemostasis. During biopsy and prior to insertion of Gelfoam torpedoes, a small amount of bleeding was seen adjacent to the needle entry site which stopped and did not grow any further. HISTORY: Bairon Grissom is a Female of 64 years age. DIAGNOSIS:   random renal biopsy COMPARISON: None available. CT Dose-Length Product (estimate related to radiation exposure from this exam):  532.69  mGy*cm. PROCEDURE: Following the discussion of the procedure, alternatives, risks versus benefits, informed consent was obtained from the patient. Specifically, risks of after-biopsy pain at the site, rare possibility of excessive hemorrhage, infection, injury to the adjacent organs were discussed and the patient verbalized understanding. Pre-procedure evaluation confirmed that the patient was an appropriate candidate for conscious sedation. Adequate sedation was maintained during the entire procedure. Vital signs, pulse oximetry, and response to verbal commands were monitored and recorded by the nurse throughout the procedure and the recovery period.  Medical information was entered in the medical record including the medications and dosages used. The patient returned to baseline neurologic and physiologic status prior to leaving the department. No immediate sedation related complications were noted. Medication for conscious sedation was administered via IV route. 45 minutes of conscious sedation was provided. Following universal protocol, patient and site verification was performed with a \"timeout\" prior to the procedure. The patient was placed on the CT table in prone position and the right flank area was prepped and draped in usual sterile fashion. Using the usual sterile conditions, lidocaine and CT guidance, the inferior pole of the right kidney was accessed using an  18-guage coaxial biopsy needle system. After confirmation of appropriate localization of the needle, total of 3 samples were obtained and sent for pathological analysis. After biopsy, a small amount of blood was seen posterior to the right kidney. 2 Gelfoam torpedoes were injected through the coaxial needle for hemostasis. No further bleeding was seen. The coaxial needle system was removed and hemostasis was achieved by direct digital compression. The patient tolerated the procedure well. No immediate complications identified. The patient left the CT suite in supine position to the recovery room in stable condition. Total local anesthetic used: lidocaine, approximately 8 mL. Estimated blood loss:  Approximately 20 mL. The patient was observed in the recovery room for two hours after the procedure and discharged in stable condition.        Labs:   Recent Results (from the past 72 hour(s))   Basic Metabolic Panel    Collection Time: 10/12/22  7:46 PM   Result Value Ref Range    Sodium 133 (L) 135 - 144 mEq/L    Potassium 5.3 (H) 3.4 - 4.9 mEq/L    Chloride 102 95 - 107 mEq/L    CO2 20 20 - 31 mEq/L    Anion Gap 11 9 - 15 mEq/L    Glucose 299 (H) 70 - 99 mg/dL    BUN 55 (H) 8 - 23 mg/dL    Creatinine 1.50 (H) 0.50 - 0.90 mg/dL GFR Non- 33.5 (L) >60    GFR  40.5 (L) >60    Calcium 7.8 (L) 8.5 - 9.9 mg/dL   CBC    Collection Time: 10/12/22  7:46 PM   Result Value Ref Range    WBC 9.9 4.8 - 10.8 K/uL    RBC 2.55 (L) 4.20 - 5.40 M/uL    Hemoglobin 7.5 (L) 12.0 - 16.0 g/dL    Hematocrit 21.9 (L) 37.0 - 47.0 %    MCV 86.0 82.0 - 100.0 fL    MCH 29.4 27.0 - 31.3 pg    MCHC 34.2 33.0 - 37.0 %    RDW 14.6 (H) 11.5 - 14.5 %    Platelets 842 159 - 411 K/uL   Basic Metabolic Panel    Collection Time: 10/13/22  4:57 AM   Result Value Ref Range    Sodium 135 135 - 144 mEq/L    Potassium 4.9 3.4 - 4.9 mEq/L    Chloride 105 95 - 107 mEq/L    CO2 20 20 - 31 mEq/L    Anion Gap 10 9 - 15 mEq/L    Glucose 139 (H) 70 - 99 mg/dL    BUN 55 (H) 8 - 23 mg/dL    Creatinine 1.39 (H) 0.50 - 0.90 mg/dL    GFR Non-African American 36.6 (L) >60    GFR  44.3 (L) >60    Calcium 7.9 (L) 8.5 - 9.9 mg/dL           Follow-up visits: Shanna Schlatter, DO  [address] [place] [address]  [phone]    Schedule an appointment as soon as possible for a visit in 3 week(s)         [facility] Problems    Diagnosis Date Noted    Shortness of breath [R06.02] 10/13/2022     Priority: Medium    Abnormal chest x-ray [R93.89] 10/13/2022     Priority: Medium    Cardiorenal disease [I13.10] 10/11/2022     Priority: Medium    CHAYO (acute kidney injury) ([facility] [address] [N17.9] 10/06/2022     Priority: Medium    Anemia [D64.9] 10/03/2022     Priority: Medium    Hypertensive emergency [I16.1] 10/01/2022     Priority: Medium    Acute on chronic diastolic (congestive) heart failure ([facility] [address] [I50.33] 07/18/2022     Priority: Medium      Acute on chronic decompensated diastolic congestive heart failure - now stable with no volume overload  Hypertensive urgency-improved. Continue meds. Advanced Diovan to 80 bid   Elevated cardiac enzyme. Questionable demand ischemia. no CP.   Will need out pt stress test.   Acute on chronic kidney disease-improving  Hyperlipidemia - statin Rx low fat diet   Anemia  Pauci-immune glomerulonephritis - f/u Nephrology.                   Electronically signed by Caesar Eng MD on 10/15/2022 at 12:20 PM

## 2022-10-15 NOTE — PROGRESS NOTES
INPATIENT PROGRESS NOTES    PATIENT NAME: Marcia Daniels  MRN: [id number]  SERVICE DATE:  October 15, 2022   SERVICE TIME:  1:35 PM      PRIMARY SERVICE: Pulmonary Disease    CHIEF COMPLAIN: Shortness of breath      INTERVAL HPI: Patient seen and examined at bedside, Interval Notes, orders reviewed. Nursing notes noted    She is having short of breath with exertion and minimal cough no chest pain. .    No fever or chills. No nausea vomiting diarrhea or abdominal pain. O2 saturation 99% on room air    OBJECTIVE    Body mass index is 23.41 kg/m². PHYSICAL EXAM:  Vitals:  BP (!) 143/63   Pulse 85   Temp 97.7 °F (36.5 °C) (Oral)   Resp 18   Ht 5' 2\" (1.575 m)   Wt 128 lb (58.1 kg)   SpO2 99%   BMI 23.41 kg/m²   General: Alert, awake . comfortable in bed, No distress. Head: Atraumatic , Normocephalic   Eyes: PERRL. No sclera icterus. No conjunctival injection. No discharge   ENT: No nasal  discharge. Pharynx clear. Neck:  Trachea midline. No thyromegaly, no JVD, No cervical adenopathy. Chest : Bilaterally symmetrical ,Normal effort,  No accessory muscle use  Lung : . Fair BS bilateral, decreased BS at bases. No Rales. No wheezing. No rhonchi. Heart[de-identified] Normal  rate. Regular rhythm. No mumur ,  Rub or gallop  ABD: Non-tender. Non-distended. No masses. No organmegaly. Normal bowel sounds. No hernia. Ext : No Pitting both leg , No Cyanosis No clubbing  Neuro: no focal weakness          DATA:   Recent Labs     10/12/22  1946   WBC 9.9   HGB 7.5*   HCT 21.9*   MCV 86.0        Recent Labs     10/12/22  1948 10/13/22  0457   * 135   K 5.3* 4.9    105   CO2 20 20   BUN 55* 55*   CREATININE 1.50* 1.39*   GLUCOSE 299* 139*   CALCIUM 7.8* 7.9*   LABGLOM 33.5* 36.6*   GFRAA 40.5* 44.3*       MV Settings:          No results for input(s): PHART, VIK4WCO, PO2ART, UPU2DTR, BEART, [id number] in the last 72 hours. O2 Device: None (Room air)    ADULT DIET;  Regular; Low Fat/Low Chol/High Fiber/DAMIAN MEDICATIONS during current hospitalization:    Continuous Infusions:   sodium chloride 100 mL/hr at 10/10/22 0855    sodium chloride         Scheduled Meds:   valsartan  80 mg Oral BID    cloNIDine  0.2 mg Oral BID    predniSONE  60 mg Oral Daily    isosorbide mononitrate  60 mg Oral Daily    mycophenolate  1,000 mg Oral BID    hydrALAZINE  100 mg Oral 3 times per day    potassium chloride  20 mEq Oral Daily with breakfast    epoetin rian-epbx  30 Units/kg SubCUTAneous Once per day on Mon Wed Fri    sodium chloride flush  5-40 mL IntraVENous 2 times per day    atorvastatin  40 mg Oral Nightly    doxazosin  2 mg Oral Daily    levothyroxine  25 mcg Oral Daily    metoprolol  100 mg Oral BID    pantoprazole  40 mg Oral Daily    magnesium oxide  400 mg Oral Daily       PRN Meds:levalbuterol, sodium chloride, fentanNYL, lidocaine, midazolam, sodium chloride flush, sodium chloride, ondansetron **OR** ondansetron, acetaminophen, labetalol, hydrALAZINE    Radiology  CT ABDOMEN PELVIS WO CONTRAST Additional Contrast? None    Result Date: 10/6/2022  1. Again seen is a very small amount of postprocedural hemorrhage posterior to the right kidney. This is unchanged when compared to prior study one hour ago during the procedure. 2.Note is made of bilateral small pleural effusions, unchanged since prior study dating July 18, 2022. Of note is a right costophrenic angle medial density, which is again felt to represent likely compressive atelectasis, though appears more plump than prior. Attention on follow-up is recommended. 3.Calcified right liver lobe density, may represent an old granuloma. This is also unchanged 4. Calcific densities are noted within the colonic wall near the cecum. These may be dystrophic calcification such as sequelae of old inflammation or infection such as epiploic appendicitis versus infection, though alternatively premalignant and malignancies can also present in such a fashion.  Please correlate with colonoscopy and other workup as clinically indicated. Discussed with Dr. Fady Pascal. COMPARISON: Same day study, one hour prior during procedure, and CT from July 18, 2022 DIAGNOSIS: Small postprocedural hemorrhage posterior to the right kidney COMMENTS: I13.10 Cardiorenal disease ICD10 TECHNIQUE: Spiral scanning of the abdomen and pelvis was performed after the administration of oral contrast .   Intravenous contrast was not administered as per the referring physician. CT Dose-Length Product (estimate related to radiation exposure from this exam):  130.68  mGy*cm. CT ABDOMEN: Note is made of bilateral small pleural effusions, unchanged since July. Note is made of bibasilar atelectasis. Some of the areas of atelectasis appear to be more plump such as in the right base at the costophrenic angle, attention on follow-up is recommended. Calcific density is again seen in the right liver lobe, this may represent an old granuloma. The spleen is of normal size and attenuation without focal lesions. Pancreas shows no signs of focal mass or peripancreatic fluid collection. Kidneys show no contour deforming renal masses or hydronephrosis. A very small amount of postprocedural biopsy bleed is seen posterior to the right kidney, this small and unchanged from 1 hour prior. Adrenal glands are unremarkable. Aorta is normal in caliber without  signs of aneurysmal dilatation. No significant retroperitoneal adenopathy or ascites is identified. Calcific densities are noted within the colonic wall near the cecum. These may be dystrophic calcification such as sequelae of old inflammation or infection such as epiploic appendicitis versus infection, though alternatively premalignant and malignancies can also present in such a fashion. Please correlate with colonoscopy and other workup as clinically indicated. CT PELVIS:  Scanning of the pelvis shows no signs of pelvic mass or pelvic fluid collection. Diverticulosis of the colon is noted. XR CHEST (2 VW)    Result Date: 10/12/2022  EXAMINATION: TWO XRAY VIEWS OF THE CHEST 10/12/2022 8:28 am COMPARISON: None. HISTORY: ORDERING SYSTEM PROVIDED HISTORY: dyspnea TECHNOLOGIST PROVIDED HISTORY: Reason for exam:->dyspnea What reading provider will be dictating this exam?->CRC FINDINGS: In the lateral view there is focal localized increased density in the posterior costophrenic sulcus more likely in the right lower lobe, of about 3 cm size area. It represents a peripheral subpleural opacity. These can be manifestation of focal pneumonia, organized pneumonia, even a localized pulmonary infarct. However, cannot exclude a small underlying mass lesion. Correlation with clinical data is needed. If patient has clinical symptomatology for a lower respiratory tract infection follow-up study following a short clinical treatment trial in 10-14 days is recommended, otherwise correlation with CTA scan of the chest could be considered. The cardiac area has borderline size. CTR: 14.4/25.8 cm. There is no perihilar vascular congestion. There is no pleural effusions. The left lung appears to be clear. No pneumothorax seen. Mediastinum appears unremarkable. Localized focal pulmonary parenchymal opacity in the right posterior costophrenic sulcus as above discussed. See above comments and recommendations. CT GUIDED NEEDLE PLACEMENT    1. Successful core biopsy of right inferior pole renal parenchyma. 2.2 Gelfoam torpedoes were injected after biopsy for immediate hemostasis. During biopsy and prior to insertion of Gelfoam torpedoes, a small amount of bleeding was seen adjacent to the needle entry site which stopped and did not grow any further. HISTORY: Lalo Panchal is a Female of 64 years age. DIAGNOSIS:   random renal biopsy COMPARISON: None available. CT Dose-Length Product (estimate related to radiation exposure from this exam):  532.69  mGy*cm.  PROCEDURE: Following the discussion of the procedure, alternatives, risks versus benefits, informed consent was obtained from the patient. Specifically, risks of after-biopsy pain at the site, rare possibility of excessive hemorrhage, infection, injury to the adjacent organs were discussed and the patient verbalized understanding. Pre-procedure evaluation confirmed that the patient was an appropriate candidate for conscious sedation. Adequate sedation was maintained during the entire procedure. Vital signs, pulse oximetry, and response to verbal commands were monitored and recorded by the nurse throughout the procedure and the recovery period. Medical information was entered in the medical record including the medications and dosages used. The patient returned to baseline neurologic and physiologic status prior to leaving the department. No immediate sedation related complications were noted. Medication for conscious sedation was administered via IV route. 45 minutes of conscious sedation was provided. Following universal protocol, patient and site verification was performed with a \"timeout\" prior to the procedure. The patient was placed on the CT table in prone position and the right flank area was prepped and draped in usual sterile fashion. Using the usual sterile conditions, lidocaine and CT guidance, the inferior pole of the right kidney was accessed using an  18-guage coaxial biopsy needle system. After confirmation of appropriate localization of the needle, total of 3 samples were obtained and sent for pathological analysis. After biopsy, a small amount of blood was seen posterior to the right kidney. 2 Gelfoam torpedoes were injected through the coaxial needle for hemostasis. No further bleeding was seen. The coaxial needle system was removed and hemostasis was achieved by direct digital compression. The patient tolerated the procedure well. No immediate complications identified.   The patient left the CT suite in supine position to the recovery room in stable condition. Total local anesthetic used: lidocaine, approximately 8 mL. Estimated blood loss:  Approximately 20 mL. The patient was observed in the recovery room for two hours after the procedure and discharged in stable condition. XR CHEST PORTABLE    Result Date: 9/30/2022  EXAMINATION: ONE XRAY VIEW OF THE CHEST 9/30/2022 2:06 pm COMPARISON: None. HISTORY: ORDERING SYSTEM PROVIDED HISTORY: sob TECHNOLOGIST PROVIDED HISTORY: Reason for exam:->sob What reading provider will be dictating this exam?->CRC FINDINGS: The heart is within normals. There are findings of mild vascular congestion. There are trace bilateral pleural effusions. There are no findings of pneumonia     1. Trace bilateral pleural effusions 2. Mild vascular congestion     US DUP ABD PEL RETRO SCROT COMPLETE    Result Date: 10/4/2022  EXAMINATION: DOPPLER EVALUATION OF THE PELVIS 10/3/2022 6:50 pm COMPARISON: 07/16/2022 HISTORY: ORDERING SYSTEM PROVIDED HISTORY: CHAYO, CHF TECHNOLOGIST PROVIDED HISTORY: Complete study for renal artery stenosis and renal size. RADIOLOGY TO READ Reason for exam:->CHAYO, CHF What reading provider will be dictating this exam?->MERCY FINDINGS: The right kidney demonstrates unremarkable echogenicity and unremarkable vascularity. No evidence of right hydronephrosis is seen. No evidence of solid right renal mass is seen. No evidence of hyperechoic stones is seen. The right kidney measures 9.0 x 5.2 x 5.2 cm. The right renal cortex measures 1.4 cm. The left kidney demonstrates unremarkable echogenicity and unremarkable vascularity. No evidence of left hydronephrosis is seen. No evidence of solid left renal mass is seen. No evidence of hyperechoic stones is seen. . Simple cyst visualized in the lower pole of the left kidney measuring 1.4 x 0.8 x 0.8 cm. The left kidney measures 8.6 x 4.0 x 5.0 cm. The left renal cortex measures 1.4 cm. No evidence of free fluid is seen. Doppler: Abdominal aorta:  The abdominal aorta demonstrates atherosclerotic calcifications but no evidence of aneurysmal dilatation or arterial dissection is seen. Abdominal aorta peak systolic velocity at the level of the SMA measures 223 cm/s. Right Renal artery: Origin peak systolic velocity 71.6 cm/s, end-diastolic velocity of 32.9 cm/s and resistive index 0.73 Proximal peak systolic velocity 32.6 cm/s, end-diastolic velocity of 08.2 cm/s and resistive index 7.64 Mid peak systolic velocity 89.8 cm/s, end-diastolic velocity of 05.6 cm/s and resistive index 0.72 Distal peak systolic velocity 08.5 cm/s, end-diastolic velocity of 53.1 cm/s and resistive index 0.78 Right RAR: 0.4 Right kidney resistive index and acceleration times Superior pole 42 and 233 cm/s2 Mid pole 34 and 220 cm/s2 Inferior pole 28 and 199 cm/s2 Left Renal artery: Origin peak systolic velocity 81.5 cm/s, end-diastolic velocity of 61.8 cm/s and resistive index 0.79 Proximal peak systolic velocity 87.7 cm/s, end-diastolic velocity of 15.6 cm/s and resistive index 3.76 Mid peak systolic velocity 54.9 cm/s, end-diastolic velocity of 5.90 cm/s and resistive index 0.89 Distal peak systolic velocity 94.1 cm/s, end-diastolic velocity of 62.0 cm/s and resistive index 0.78 Left RAR:  0.3 Left kidney resistive index and acceleration times Superior pole 75 and 228 cm/s2 Mid pole 13 and 220 cm/s2 Inferior pole 28 and 123 cm/s2     No evidence of significant arterial stenosis is seen. Grayscale imaging demonstrates no acute pathology. Simple cyst visualized in the left kidney measuring 1.4 cm. CT BIOPSY RENAL    1. Successful core biopsy of right inferior pole renal parenchyma. 2.2 Gelfoam torpedoes were injected after biopsy for immediate hemostasis. During biopsy and prior to insertion of Gelfoam torpedoes, a small amount of bleeding was seen adjacent to the needle entry site which stopped and did not grow any further. HISTORY: Monisha Escalante is a Female of 64 years age.  DIAGNOSIS:   random renal biopsy COMPARISON: None available. CT Dose-Length Product (estimate related to radiation exposure from this exam):  532.69  mGy*cm. PROCEDURE: Following the discussion of the procedure, alternatives, risks versus benefits, informed consent was obtained from the patient. Specifically, risks of after-biopsy pain at the site, rare possibility of excessive hemorrhage, infection, injury to the adjacent organs were discussed and the patient verbalized understanding. Pre-procedure evaluation confirmed that the patient was an appropriate candidate for conscious sedation. Adequate sedation was maintained during the entire procedure. Vital signs, pulse oximetry, and response to verbal commands were monitored and recorded by the nurse throughout the procedure and the recovery period. Medical information was entered in the medical record including the medications and dosages used. The patient returned to baseline neurologic and physiologic status prior to leaving the department. No immediate sedation related complications were noted. Medication for conscious sedation was administered via IV route. 45 minutes of conscious sedation was provided. Following universal protocol, patient and site verification was performed with a \"timeout\" prior to the procedure. The patient was placed on the CT table in prone position and the right flank area was prepped and draped in usual sterile fashion. Using the usual sterile conditions, lidocaine and CT guidance, the inferior pole of the right kidney was accessed using an  18-guage coaxial biopsy needle system. After confirmation of appropriate localization of the needle, total of 3 samples were obtained and sent for pathological analysis. After biopsy, a small amount of blood was seen posterior to the right kidney. 2 Gelfoam torpedoes were injected through the coaxial needle for hemostasis. No further bleeding was seen.  The coaxial needle system was removed and hemostasis was achieved by direct digital compression. The patient tolerated the procedure well. No immediate complications identified. The patient left the CT suite in supine position to the recovery room in stable condition. Total local anesthetic used: lidocaine, approximately 8 mL. Estimated blood loss:  Approximately 20 mL. The patient was observed in the recovery room for two hours after the procedure and discharged in stable condition. IMPRESSION AND SUGGESTION:  Exertional shortness of breath, possible reactive airway disease  Diastolic heart failure  Abnormal chest x-ray    Recommend PFT as outpatient. Consider ICS/LABA after PFT. Overnight pulse oximetry. Home O2 evaluation prior to discharge. Incentive spirometer. Chest x-ray showing localized focal pulmonary parenchymal opacity in the right posterior costophrenic sulcus, recommend follow-up chest x-ray or CT chest for further evaluation. CT abdominal pelvis with base of the lungs showing no lung mass or nodule. Okay to discharge pulmonary wise. Follow-up with Dr. Savannah Lucia as outpatient after discharge      NOTE: This report was transcribed using voice recognition software. Every effort was made to ensure accuracy; however, inadvertent computerized transcription errors may be present.       Electronically signed by Maxine Rubio MD, FCCP on 10/15/2022 at 1:35 PM

## 2022-10-15 NOTE — DISCHARGE INSTR - DIET
Good nutrition is important when healing from an illness, injury, or surgery. Follow any nutrition recommendations given to you during your hospital stay. If you were given an oral nutrition supplement while in the hospital, continue to take this supplement at home. You can take it with meals, in-between meals, and/or before bedtime. These supplements can be purchased at most local grocery stores, pharmacies, and chain [employer]stores. If you have any questions about your diet or nutrition, call the hospital and ask for the dietitian.   LOW SALT DIET

## 2022-10-15 NOTE — PROGRESS NOTES
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, baseline Scr ~1.1-1.2 w/ eGFR low 50s back in 7/22, function started worsening thereafter, now improving    OHD Diastolic HF  Hypertension, improving     Plan:  - continue prednisone 60 mg QD on discharge  - will discharge on bactrim 1 DS tab MWF as well  - should be on PPI on discharge  - discussed low salt diet beverly as she is on prednisone  - will f/u biopsy results as outpatient  - ok to discharge from renal standpoint, f/u 1 week      Patient Active Problem List:     CHF (congestive heart failure), NYHA class I, acute on chronic, combined ([facility]     Wheezing     Acute congestive heart failure ([facility]     Microhematuria     Diarrhea of presumed infectious origin     Hypertensive emergency     Anemia     CHAYO (acute kidney injury) ([facility]      Subjective:  Admit Date: 9/30/2022    Interval History: function stable, no biopsy results yet    Medications:  Scheduled Meds:   cloNIDine  0.2 mg Oral BID    predniSONE  60 mg Oral Daily    isosorbide mononitrate  60 mg Oral Daily    valsartan  80 mg Oral Daily    mycophenolate  1,000 mg Oral BID    hydrALAZINE  100 mg Oral 3 times per day    potassium chloride  20 mEq Oral Daily with breakfast    epoetin rian-epbx  30 Units/kg SubCUTAneous Once per day on Mon Wed Fri    sodium chloride flush  5-40 mL IntraVENous 2 times per day    atorvastatin  40 mg Oral Nightly    doxazosin  2 mg Oral Daily    levothyroxine  25 mcg Oral Daily    metoprolol  100 mg Oral BID    pantoprazole  40 mg Oral Daily    magnesium oxide  400 mg Oral Daily     Continuous Infusions:   sodium chloride 100 mL/hr at 10/10/22 0855    sodium chloride         CBC:   Recent Labs     10/12/22  1946   WBC 9.9   HGB 7.5*          CMP:    Recent Labs     10/12/22  1948 10/13/22  0457   * 135   K 5.3* 4.9    105   CO2 20 20   BUN 55* 55*   CREATININE 1.50* 1.39*   GLUCOSE 299* 139*   CALCIUM 7.8* 7.9*   LABGLOM 33.5* 36.6*       Troponin: No results for input(s): TROPONINI in the last 72 hours. BNP: No results for input(s): BNP in the last 72 hours. INR: No results for input(s): INR in the last 72 hours. Lipids: No results for input(s): CHOL, LDLDIRECT, TRIG, HDL, AMYLASE, LIPASE in the last 72 hours. Liver: No results for input(s): AST, ALT, ALKPHOS, PROT, LABALBU, BILITOT in the last 72 hours. Invalid input(s): BILDIR  Iron:    No results for input(s): IRONS, FERRITIN in the last 72 hours. Invalid input(s): LABIRONS    Urinalysis: No results for input(s): UA in the last 72 hours.     Objective:  Vitals: BP (!) 143/63   Pulse 85   Temp 97.8 °F (36.6 °C) (Oral)   Resp 18   Ht 5' 2\" (1.575 m)   Wt 128 lb (58.1 kg)   SpO2 99%   BMI 23.41 kg/m²    Wt Readings from Last 3 Encounters:   10/12/22 128 lb (58.1 kg)   09/01/22 130 lb (59 kg)   08/10/22 134 lb (60.8 kg)      24HR INTAKE/OUTPUT:  No intake or output data in the 24 hours ending 10/15/22 1050      General: alert, in no apparent distress  HEENT: normocephalic, atraumatic, anicteric  Lungs: non-labored respirations, clear to auscultation bilaterally  Heart: regular rate and rhythm, no murmurs or rubs  Abdomen: soft, non-tender, non-distended  Ext: no cyanosis, no peripheral edema  Neuro: alert and oriented, no gross abnormalities      Electronically signed by Antonio Zheng MD, MD

## 2022-10-17 LAB — SURGICAL PATHOLOGY REPORT: NORMAL

## 2022-10-20 ENCOUNTER — TELEPHONE (OUTPATIENT)
Dept: CARDIOLOGY CLINIC | Age: 77
End: 2022-10-20

## 2022-10-20 PROCEDURE — 94729 DIFFUSING CAPACITY: CPT | Performed by: INTERNAL MEDICINE

## 2022-10-20 PROCEDURE — 94726 PLETHYSMOGRAPHY LUNG VOLUMES: CPT | Performed by: INTERNAL MEDICINE

## 2022-10-20 PROCEDURE — 94060 EVALUATION OF WHEEZING: CPT | Performed by: INTERNAL MEDICINE

## 2022-10-20 NOTE — PROCEDURES
[address]                      [facility], [address] [facility]                    PULMONARY FUNCTION  Malu Nunez Sin   64 y.o.   female  Height 62 in  Weight 128 lb      Referring provider   No ref. provider found    Reading provider   Jesse Wheatley MD    Test meets ATS criteria for acceptability and reproducibility Yes    Diagnosis: SILVA: Yes  Cough   Yes, wheezing Yes  Smoking   no    Spirometry   FVC            1.64 L   67%  Post bronchodilator 1.45 L  59% Change -1 %  FEV1          1.56 L  83%   Post bronchodilator  1.42 L  76%   Change -8%  FEV1/FVC  94  %             Post bronchodilator  98 %  [id number]% 2.55 L  179%  Post bronchodilator  1.80 L  126%   Change -29%    Lung volume      RV              2.05 L 91%   TLC            3.63  L 76%   RV/TLC      56 %    DLCO           42 %     Test interpretation     Spirometry suggest restriction, with no significant response to bronchodilator  Lung volume confirms mild to moderate restrictive lung disease  DLCO is moderately reduced and normalized when corrected for alveolar volume suggesting restriction is probably due to extrapulmonary etiology. Patient has increased RV/TLC ratio, suggesting possible air trapping.        Clinical correlation is recommended     Jesse Wheatley MD [facility], 10/20/2022 1:53 PM

## 2022-10-24 NOTE — TELEPHONE ENCOUNTER
Patient appt moved up to review testing, from 11/07/2022 with  to 11/03/2022 with [facility].

## 2022-11-03 ENCOUNTER — OFFICE VISIT (OUTPATIENT)
Dept: CARDIOLOGY CLINIC | Age: 77
End: 2022-11-03
Payer: MEDICARE

## 2022-11-03 VITALS
SYSTOLIC BLOOD PRESSURE: 118 MMHG | HEIGHT: 62 IN | DIASTOLIC BLOOD PRESSURE: 70 MMHG | OXYGEN SATURATION: 100 % | HEART RATE: 77 BPM | BODY MASS INDEX: 22.34 KG/M2 | WEIGHT: 121.4 LBS

## 2022-11-03 DIAGNOSIS — E78.2 MIXED HYPERLIPIDEMIA: ICD-10-CM

## 2022-11-03 DIAGNOSIS — I50.32 CHRONIC DIASTOLIC HEART FAILURE (HCC): ICD-10-CM

## 2022-11-03 DIAGNOSIS — I10 PRIMARY HYPERTENSION: ICD-10-CM

## 2022-11-03 DIAGNOSIS — R06.02 SOB (SHORTNESS OF BREATH): Primary | ICD-10-CM

## 2022-11-03 PROBLEM — R06.2 WHEEZING: Status: RESOLVED | Noted: 2022-07-18 | Resolved: 2022-11-03

## 2022-11-03 PROBLEM — I50.33 ACUTE ON CHRONIC DIASTOLIC (CONGESTIVE) HEART FAILURE (HCC): Status: RESOLVED | Noted: 2022-07-18 | Resolved: 2022-11-03

## 2022-11-03 PROBLEM — I50.9 ACUTE CONGESTIVE HEART FAILURE (HCC): Status: RESOLVED | Noted: 2022-07-19 | Resolved: 2022-11-03

## 2022-11-03 PROBLEM — I16.1 HYPERTENSIVE EMERGENCY: Status: RESOLVED | Noted: 2022-10-01 | Resolved: 2022-11-03

## 2022-11-03 PROCEDURE — 3074F SYST BP LT 130 MM HG: CPT | Performed by: INTERNAL MEDICINE

## 2022-11-03 PROCEDURE — 1123F ACP DISCUSS/DSCN MKR DOCD: CPT | Performed by: INTERNAL MEDICINE

## 2022-11-03 PROCEDURE — 99214 OFFICE O/P EST MOD 30 MIN: CPT | Performed by: INTERNAL MEDICINE

## 2022-11-03 PROCEDURE — 3078F DIAST BP <80 MM HG: CPT | Performed by: INTERNAL MEDICINE

## 2022-11-03 RX ORDER — ISOSORBIDE MONONITRATE 120 MG/1
TABLET, EXTENDED RELEASE ORAL
COMMUNITY
Start: 2022-09-29

## 2022-11-03 RX ORDER — HYDRALAZINE HYDROCHLORIDE 10 MG/1
TABLET, FILM COATED ORAL
COMMUNITY
Start: 2022-10-26

## 2022-11-03 NOTE — PROGRESS NOTES
Chief Complaint   Patient presents with    Results     Heart Cath? At the time in the hospital Dr Lola Christina advised to hold off due to kidneys. Less of contrast     Follow-Up from [facility]     9/30    Hypertension     BP has been been high in the morning is there any ER medicaitons to help with BP         10/1/22:  Patient is a 64 y.o. female who presents with a chief complaint of short of breath. Patient is followed on a regular basis by Dr. Eldon Solis MD. patient with past medical history of chronic diastolic congestive heart failure, hypertension, hyperlipidemia, chronic kidney disease who presents with worsening shortness of breath and increased lower extremity edema. Patient generally follows with [facility] she was requesting transfer but no beds available. She denies history of cardiac catheterization or any recent stress test.  Creatinine of 2.39 with a GFR of 19.6 on admission. BNP is elevated at 16,000 with mildly elevated troponin and a flat pattern. Hemoglobin is 8.1. He was noted to have extremely elevated blood pressure on arrival initiated on nitro drip. Currently she is in the 637E systolic. Echocardiogram at [facility] in 2017 ejection fraction of 60 to 65% no significant valve abnormalities. Negative nuclear stress test in 2017 at [facility]. Patient with negative cardiac CTA in 2015      11-3-22: Patient presents for initial medical evaluation. Patient is followed on a regular basis by Dr. Eldon Solis MD. patient is status post long hospitalization for hypertensive urgency, acute kidney injury. She was also noted to be in acute decompensated diastolic congestive heart failure. 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 As well as antibiotics which she developed diarrhea. She is follow-up with nephrology.   She did develop chest discomfort as well as mildly elevated cardiac enzymes in the hospital and she was treated medically. Cardiac catheterization was not able to be performed due to her kidney function. Patient with negative cardiac CTA in 2015        Patient Active Problem List   Diagnosis    Microhematuria    Diarrhea of presumed infectious origin    Anemia    CHAYO (acute kidney injury) ([facility] [address]    Cardiorenal disease    SOB (shortness of breath)    Abnormal chest x-ray    Chronic diastolic heart failure ([facility] [address]    Primary hypertension    Mixed hyperlipidemia       Past Surgical History:   Procedure Laterality Date    CATARACT REMOVAL Bilateral     CT BIOPSY RENAL  10/6/2022    CT BIOPSY RENAL 10/6/2022 [facility] CT SCAN    HERNIA REPAIR      HYSTERECTOMY, TOTAL ABDOMINAL (CERVIX REMOVED)      OTHER SURGICAL HISTORY Right 10/06/2022    right renal random biopsy performed by Dr. Clark Offer History     Socioeconomic History    Marital status:      Spouse name: None    Number of children: None    Years of education: None    Highest education level: None   Tobacco Use    Smoking status: Never    Smokeless tobacco: Never   Vaping Use    Vaping Use: Never used   Substance and Sexual Activity    Alcohol use: Not Currently    Drug use: Never    Sexual activity: Never       No family history on file.     Current Outpatient Medications   Medication Sig Dispense Refill    hydrALAZINE (APRESOLINE) 10 MG tablet       isosorbide mononitrate (IMDUR) 120 MG extended release tablet       pantoprazole (PROTONIX) 40 MG tablet Take 1 tablet by mouth daily 30 tablet 3    predniSONE (DELTASONE) 20 MG tablet Take 3 tablets by mouth daily 90 tablet 3    isosorbide mononitrate (IMDUR) 60 MG extended release tablet Take 1 tablet by mouth daily 30 tablet 3    potassium chloride (KLOR-CON M) 20 MEQ extended release tablet Take 1 tablet by mouth daily (with breakfast) 60 tablet 3    valsartan (DIOVAN) 80 MG tablet Take 1 tablet by mouth in the morning and at bedtime 30 tablet 3    cloNIDine (CATAPRES) 0.2 MG tablet Take 1 tablet by mouth 2 times daily 60 tablet 3    Magnesium 400 MG TABS TAKE 1 TABLET BY MOUTH ONCE DAILY      levothyroxine (SYNTHROID) 25 MCG tablet 1/2 po daily 30 tablet 5    atorvastatin (LIPITOR) 20 MG tablet Take by mouth      metoprolol (LOPRESSOR) 100 MG tablet Take 100 mg by mouth in the morning and 100 mg before bedtime. No current facility-administered medications for this visit. Norco [hydrocodone-acetaminophen]    Review of Systems:  General ROS: negative  Psychological ROS: negative  Hematological and Lymphatic ROS: No history of blood clots or bleeding disorder. Respiratory ROS: no cough, shortness of breath, or wheezing  Cardiovascular ROS: no chest pain or dyspnea on exertion  Gastrointestinal ROS: no abdominal pain, change in bowel habits, or black or bloody stools  Genito-Urinary ROS: no dysuria, trouble voiding, or hematuria  Musculoskeletal ROS: negative  Neurological ROS: no TIA or stroke symptoms  Dermatological ROS: negative    VITALS:  Blood pressure 118/70, pulse 77, height 5' 2\" (1.575 m), weight 121 lb 6.4 oz (55.1 kg), SpO2 100 %. Body mass index is 22.2 kg/m². Physical Examination:  General appearance - alert, well appearing, and in no distress  Mental status - alert, oriented to person, place, and time  Neck - Neck is supple, no JVD or carotid bruits. No thyromegaly or adenopathy.    Chest - clear to auscultation, no wheezes, rales or rhonchi, symmetric air entry  Heart - normal rate, regular rhythm, normal S1, S2, no murmurs, rubs, clicks or gallops  Abdomen - soft, nontender, nondistended, no masses or organomegaly  Neurological - alert, oriented, normal speech, no focal findings or movement disorder noted  Extremities - peripheral pulses normal, no pedal edema, no clubbing or cyanosis  Skin - normal coloration and turgor, no rashes, no suspicious skin lesions noted        Orders Placed This Encounter   Procedures    AMB REFERRAL TO HEART FAILURE CLINIC         ASSESSMENT: Diagnosis Orders   1. SOB (shortness of breath)        2. Chronic diastolic heart failure (HCC)  AMB REFERRAL TO HEART FAILURE CLINIC      3. Primary hypertension        4. Mixed hyperlipidemia          chronic diastolic congestive heart failure  Elevated cardiac enzyme. Questionable demand ischemia. Rule out underlying coronary ischemia  Acute on chronic kidney disease-improving  Hyperlipidemia  Anemia  Pauci-immune glomerulonephritis    PLAN:         As always, aggressive risk factor modification is strongly recommended. We should adhere to the JNC VIII guidelines for HTN management and the NCEP ATP III guidelines for LDL-C management. Cardiac diet is always recommended with low fat, cholesterol, calories and sodium. Continue medications at current doses. Consider LHC in future given CP and elevated trops/risk factors if has reoccurrence of symptoms. Doing ok now. Follow up with nephro    Refer to CHF clinic for optimization of meds. The importance of daily weights, dietary sodium restriction, and contact with cardiology if weight is increased more than 3 lbs in any 48 hour period was stressed. The patient has been advised to contact us if they experience progressive SOB, orthopnea, PND or progressive edema. Patient was advised and encouraged to check blood pressure at home or at a pharmacy, maintain a logbook, and also call us back if blood pressure are above the target ranges or if it is low. Patient clearly understands and agrees to the instructions. We will need to continue to monitor muscle and liver enzymes, BUN, CR, and electrolytes.     Check EKG next OV

## 2022-11-04 ENCOUNTER — OFFICE VISIT (OUTPATIENT)
Dept: ENDOCRINOLOGY | Age: 77
End: 2022-11-04
Payer: MEDICARE

## 2022-11-04 VITALS
OXYGEN SATURATION: 97 % | HEIGHT: 62 IN | SYSTOLIC BLOOD PRESSURE: 135 MMHG | HEART RATE: 75 BPM | DIASTOLIC BLOOD PRESSURE: 67 MMHG | WEIGHT: 122 LBS | BODY MASS INDEX: 22.45 KG/M2

## 2022-11-04 DIAGNOSIS — R68.2 DRY MOUTH: ICD-10-CM

## 2022-11-04 DIAGNOSIS — E03.9 ACQUIRED HYPOTHYROIDISM: Primary | ICD-10-CM

## 2022-11-04 PROCEDURE — 1123F ACP DISCUSS/DSCN MKR DOCD: CPT | Performed by: INTERNAL MEDICINE

## 2022-11-04 PROCEDURE — 3074F SYST BP LT 130 MM HG: CPT | Performed by: INTERNAL MEDICINE

## 2022-11-04 PROCEDURE — 3078F DIAST BP <80 MM HG: CPT | Performed by: INTERNAL MEDICINE

## 2022-11-04 PROCEDURE — 99213 OFFICE O/P EST LOW 20 MIN: CPT | Performed by: INTERNAL MEDICINE

## 2022-11-04 RX ORDER — LEVOTHYROXINE SODIUM 0.03 MG/1
TABLET ORAL
Qty: 30 TABLET | Refills: 5 | Status: SHIPPED | OUTPATIENT
Start: 2022-11-04

## 2022-11-04 RX ORDER — CEVIMELINE HYDROCHLORIDE 30 MG/1
30 CAPSULE ORAL 3 TIMES DAILY
Qty: 90 CAPSULE | Refills: 3 | Status: SHIPPED | OUTPATIENT
Start: 2022-11-04

## 2022-11-04 NOTE — PROGRESS NOTES
2022    Assessment:       Diagnosis Orders   1. Acquired hypothyroidism  TSH with Reflex    T4, Free    levothyroxine (SYNTHROID) 25 MCG tablet    Thyroid Peroxidase Antibody      2. Dry mouth              PLAN:     Orders Placed This Encounter   Procedures    TSH with Reflex     Standing Status:   Future     Standing Expiration Date:   2023    T4, Free     Standing Status:   Future     Standing Expiration Date:   2023    Thyroid Peroxidase Antibody     Standing Status:   Future     Standing Expiration Date:   2023     Continue Synthroid 12.5 mcg daily start on Alex sac follow-up in 3 to 6 months  Orders Placed This Encounter   Medications    levothyroxine (SYNTHROID) 25 MCG tablet     Si/2 po daily     Dispense:  30 tablet     Refill:  5    cevimeline (EVOXAC) 30 MG capsule     Sig: Take 1 capsule by mouth 3 times daily     Dispense:  90 capsule     Refill:  3       Subjective:     Chief Complaint   Patient presents with    Hypothyroidism     Vitals:    22 1357   BP: 135/67   Site: Left Upper Arm   Position: Sitting   Cuff Size: Medium Adult   Pulse: 75   SpO2: 97%   Weight: 122 lb (55.3 kg)   Height: 5' 2\" (1.575 m)     Wt Readings from Last 3 Encounters:   22 122 lb (55.3 kg)   22 121 lb 6.4 oz (55.1 kg)   10/12/22 128 lb (58.1 kg)     BP Readings from Last 3 Encounters:   22 135/67   22 118/70   10/15/22 (!) 143/63     Follow-up on hypothyroidism 2-month thyroid function test is improved TSH improved from 4.8-2.2 also complains of dry mouth history of congestive heart failure    Thyroid Problem  Presents for follow-up visit. Symptoms include fatigue. Patient reports no cold intolerance or heat intolerance. The symptoms have been improving.    Past Medical History:   Diagnosis Date    CHF (congestive heart failure) (HCC)     Chronic diastolic heart failure ([address] 11/3/2022    Hypertension     Mixed hyperlipidemia 11/3/2022    Primary hypertension 11/3/2022 Past Surgical History:   Procedure Laterality Date    CATARACT REMOVAL Bilateral     CT BIOPSY RENAL  10/6/2022    CT BIOPSY RENAL 10/6/2022 [facility] CT SCAN    HERNIA REPAIR      HYSTERECTOMY, TOTAL ABDOMINAL (CERVIX REMOVED)      OTHER SURGICAL HISTORY Right 10/06/2022    right renal random biopsy performed by Dr. Matt Holland History    Marital status:      Spouse name: Not on file    Number of children: Not on file    Years of education: Not on file    Highest education level: Not on file   Occupational History    Not on file   Tobacco Use    Smoking status: Never    Smokeless tobacco: Never   Vaping Use    Vaping Use: Never used   Substance and Sexual Activity    Alcohol use: Not Currently    Drug use: Never    Sexual activity: Never   Other Topics Concern    Not on file   Social History Narrative    Not on file     Social Determinants of Health     Financial Resource Strain: Not on file   Food Insecurity: Not on file   Transportation Needs: Not on file   Physical Activity: Not on file   Stress: Not on file   Social Connections: Not on file   Intimate Partner Violence: Not on file   Housing Stability: Not on file     No family history on file.   Allergies   Allergen Reactions    Norco [Hydrocodone-Acetaminophen]        Current Outpatient Medications:     hydrALAZINE (APRESOLINE) 10 MG tablet, , Disp: , Rfl:     isosorbide mononitrate (IMDUR) 120 MG extended release tablet, , Disp: , Rfl:     pantoprazole (PROTONIX) 40 MG tablet, Take 1 tablet by mouth daily, Disp: 30 tablet, Rfl: 3    predniSONE (DELTASONE) 20 MG tablet, Take 3 tablets by mouth daily, Disp: 90 tablet, Rfl: 3    isosorbide mononitrate (IMDUR) 60 MG extended release tablet, Take 1 tablet by mouth daily, Disp: 30 tablet, Rfl: 3    potassium chloride (KLOR-CON M) 20 MEQ extended release tablet, Take 1 tablet by mouth daily (with breakfast), Disp: 60 tablet, Rfl: 3    valsartan (DIOVAN) 80 MG tablet, Take 1 tablet by mouth in the morning and at bedtime, Disp: 30 tablet, Rfl: 3    cloNIDine (CATAPRES) 0.2 MG tablet, Take 1 tablet by mouth 2 times daily, Disp: 60 tablet, Rfl: 3    Magnesium 400 MG TABS, TAKE 1 TABLET BY MOUTH ONCE DAILY, Disp: , Rfl:     levothyroxine (SYNTHROID) 25 MCG tablet, 1/2 po daily, Disp: 30 tablet, Rfl: 5    atorvastatin (LIPITOR) 20 MG tablet, Take by mouth, Disp: , Rfl:     metoprolol (LOPRESSOR) 100 MG tablet, Take 100 mg by mouth in the morning and 100 mg before bedtime. , Disp: , Rfl:   Lab Results   Component Value Date     11/04/2022    K 4.5 11/04/2022     11/04/2022    CO2 26 11/04/2022    BUN 36 (H) 11/04/2022    CREATININE 1.33 (H) 11/04/2022    GLUCOSE 106 (H) 11/04/2022    CALCIUM 8.9 11/04/2022    PROT 5.2 (L) 11/04/2022    LABALBU 3.4 (L) 11/04/2022    BILITOT 0.3 11/04/2022    ALKPHOS 92 11/04/2022    AST 15 11/04/2022    ALT 26 11/04/2022    LABGLOM 40.7 (L) 11/04/2022    GFRAA 44.3 (L) 10/13/2022    GLOB 1.8 (L) 11/04/2022     Lab Results   Component Value Date    WBC 13.8 (H) 11/04/2022    HGB 9.4 (L) 11/04/2022    HCT 28.9 (L) 11/04/2022    MCV 83.8 11/04/2022     (H) 11/04/2022     Lab Results   Component Value Date    LABA1C 6.3 (H) 08/12/2021    LABA1C 5.9 12/15/2012     Lab Results   Component Value Date    HDL 47 08/04/2022    HDL 36 (L) 07/19/2022    HDL 59 08/12/2021    LDLCALC 56 08/04/2022    LDLCALC 29 07/19/2022    LDLCALC 51 08/12/2021    CHOL 136 08/04/2022    CHOL 96 07/19/2022    CHOL 134 08/12/2021    TRIG 167 (H) 08/04/2022    TRIG 156 (H) 07/19/2022    TRIG 119 08/12/2021     No results found for: TESTM  Lab Results   Component Value Date    TSH 4.800 (H) 09/30/2022    TSH 6.590 (H) 08/04/2022    TSH 3.760 08/12/2021    TSHREFLEX 2.200 10/08/2022     No results found for: TPOABS    Review of Systems   Constitutional:  Positive for fatigue. HENT:          Dry mouth   Cardiovascular: Negative.     Endocrine: Negative for cold intolerance and heat intolerance. All other systems reviewed and are negative. Objective:   Physical Exam  Vitals reviewed. Constitutional:       General: She is not in acute distress. Appearance: Normal appearance. HENT:      Head: Normocephalic and atraumatic. Right Ear: External ear normal.      Left Ear: External ear normal.      Nose: Nose normal.   Eyes:      General: No scleral icterus. Right eye: No discharge. Left eye: No discharge. Extraocular Movements: Extraocular movements intact. Conjunctiva/sclera: Conjunctivae normal.   Cardiovascular:      Rate and Rhythm: Normal rate. Pulmonary:      Effort: Pulmonary effort is normal.   Musculoskeletal:         General: Normal range of motion. Cervical back: Normal range of motion and neck supple. Neurological:      General: No focal deficit present. Mental Status: She is alert and oriented to person, place, and time.    Psychiatric:         Mood and Affect: Mood normal.         Behavior: Behavior normal.

## 2022-11-15 DIAGNOSIS — N18.4 CHRONIC KIDNEY DISEASE, STAGE IV (SEVERE) (HCC): ICD-10-CM

## 2022-11-15 DIAGNOSIS — D64.9 ANEMIA, UNSPECIFIED TYPE: ICD-10-CM

## 2022-12-02 ENCOUNTER — HOSPITAL ENCOUNTER (OUTPATIENT)
Dept: INFUSION THERAPY | Age: 77
Setting detail: INFUSION SERIES
Discharge: HOME | End: 2022-12-02
Payer: MEDICARE

## 2022-12-02 VITALS
TEMPERATURE: 98.2 F | DIASTOLIC BLOOD PRESSURE: 78 MMHG | HEART RATE: 87 BPM | RESPIRATION RATE: 18 BRPM | SYSTOLIC BLOOD PRESSURE: 154 MMHG

## 2022-12-02 DIAGNOSIS — D64.9 ANEMIA, UNSPECIFIED TYPE: Primary | ICD-10-CM

## 2022-12-02 DIAGNOSIS — N18.4 CHRONIC KIDNEY DISEASE, STAGE IV (SEVERE) (HCC): ICD-10-CM

## 2022-12-02 PROCEDURE — 96361 HYDRATE IV INFUSION ADD-ON: CPT

## 2022-12-02 PROCEDURE — 2580000003 HC RX 258: Performed by: INTERNAL MEDICINE

## 2022-12-02 PROCEDURE — 6360000002 HC RX W HCPCS: Performed by: INTERNAL MEDICINE

## 2022-12-02 PROCEDURE — 96360 HYDRATION IV INFUSION INIT: CPT

## 2022-12-02 RX ADMIN — IRON SUCROSE 300 MG: 20 INJECTION, SOLUTION INTRAVENOUS at 11:27

## 2022-12-02 NOTE — FLOWSHEET NOTE
Observation complete, patient tolerated well. Left unit by ambulation. All equipment used in the care for this patient has been cleaned.

## 2022-12-08 DIAGNOSIS — E03.9 ACQUIRED HYPOTHYROIDISM: ICD-10-CM

## 2022-12-08 LAB
T4 FREE: 1.26 NG/DL (ref 0.84–1.68)
TSH REFLEX: 3.62 UIU/ML (ref 0.44–3.86)

## 2022-12-13 LAB
REASON FOR REJECTION: NORMAL
REJECTED TEST: NORMAL

## 2023-01-24 ENCOUNTER — HOSPITAL ENCOUNTER (OUTPATIENT)
Dept: INFUSION THERAPY | Age: 78
Setting detail: INFUSION SERIES
Discharge: HOME | End: 2023-01-24
Payer: MEDICARE

## 2023-01-24 VITALS
RESPIRATION RATE: 16 BRPM | HEART RATE: 77 BPM | SYSTOLIC BLOOD PRESSURE: 132 MMHG | TEMPERATURE: 98.3 F | DIASTOLIC BLOOD PRESSURE: 60 MMHG

## 2023-01-24 DIAGNOSIS — D64.9 ANEMIA, UNSPECIFIED TYPE: Primary | ICD-10-CM

## 2023-01-24 DIAGNOSIS — N18.4 CHRONIC KIDNEY DISEASE, STAGE IV (SEVERE) (HCC): ICD-10-CM

## 2023-01-24 LAB
HCT VFR BLD CALC: 28.2 % (ref 37–47)
HEMOGLOBIN: 9.1 G/DL (ref 12–16)
MCH RBC QN AUTO: 27.9 PG (ref 27–31.3)
MCHC RBC AUTO-ENTMCNC: 32.4 % (ref 33–37)
MCV RBC AUTO: 86.2 FL (ref 79.4–94.8)
PDW BLD-RTO: 16.1 % (ref 11.5–14.5)
PLATELET # BLD: 226 K/UL (ref 130–400)
RBC # BLD: 3.27 M/UL (ref 4.2–5.4)
WBC # BLD: 14.3 K/UL (ref 4.8–10.8)

## 2023-01-24 PROCEDURE — 85027 COMPLETE CBC AUTOMATED: CPT

## 2023-01-24 PROCEDURE — 6360000002 HC RX W HCPCS: Performed by: INTERNAL MEDICINE

## 2023-01-24 PROCEDURE — 36415 COLL VENOUS BLD VENIPUNCTURE: CPT

## 2023-01-24 PROCEDURE — 96372 THER/PROPH/DIAG INJ SC/IM: CPT

## 2023-01-24 RX ADMIN — EPOETIN ALFA-EPBX 5000 UNITS: 10000 INJECTION, SOLUTION INTRAVENOUS; SUBCUTANEOUS at 11:45

## 2023-01-24 NOTE — PROGRESS NOTES
Patient arrived on unit by ambulation, alert, oriented, resting in chair. Vital signs obtained. Peripheral CBC obtained and sent to lab.

## 2023-01-24 NOTE — PROGRESS NOTES
Observation complete, patient tolerated well with no symptoms. Left unit by ambulation. All equipment used in the care for this patient has been cleaned.

## 2023-02-07 ENCOUNTER — HOSPITAL ENCOUNTER (OUTPATIENT)
Dept: INFUSION THERAPY | Age: 78
Setting detail: INFUSION SERIES
Discharge: HOME | End: 2023-02-07
Payer: MEDICARE

## 2023-02-07 VITALS
HEART RATE: 74 BPM | RESPIRATION RATE: 16 BRPM | TEMPERATURE: 98.2 F | SYSTOLIC BLOOD PRESSURE: 127 MMHG | DIASTOLIC BLOOD PRESSURE: 63 MMHG

## 2023-02-07 DIAGNOSIS — N18.4 CHRONIC KIDNEY DISEASE, STAGE IV (SEVERE) (HCC): ICD-10-CM

## 2023-02-07 DIAGNOSIS — D64.9 ANEMIA, UNSPECIFIED TYPE: Primary | ICD-10-CM

## 2023-02-07 PROCEDURE — 96372 THER/PROPH/DIAG INJ SC/IM: CPT

## 2023-02-07 PROCEDURE — 6360000002 HC RX W HCPCS: Performed by: INTERNAL MEDICINE

## 2023-02-07 RX ADMIN — EPOETIN ALFA-EPBX 5000 UNITS: 10000 INJECTION, SOLUTION INTRAVENOUS; SUBCUTANEOUS at 12:27

## 2023-02-07 NOTE — PROGRESS NOTES
Injection provided, patient tolerated well. Provided reminder card for next dose due. Left unit by ambulation. All equipment used in the care for this patient has been cleaned.

## 2023-02-21 ENCOUNTER — HOSPITAL ENCOUNTER (OUTPATIENT)
Dept: INFUSION THERAPY | Age: 78
Setting detail: INFUSION SERIES
Discharge: HOME | End: 2023-02-21
Payer: MEDICARE

## 2023-02-21 VITALS
RESPIRATION RATE: 18 BRPM | DIASTOLIC BLOOD PRESSURE: 69 MMHG | HEART RATE: 73 BPM | SYSTOLIC BLOOD PRESSURE: 135 MMHG | TEMPERATURE: 97.8 F

## 2023-02-21 DIAGNOSIS — D64.9 ANEMIA, UNSPECIFIED TYPE: Primary | ICD-10-CM

## 2023-02-21 DIAGNOSIS — N18.4 CHRONIC KIDNEY DISEASE, STAGE IV (SEVERE) (HCC): ICD-10-CM

## 2023-02-21 LAB
HCT VFR BLD CALC: 29.3 % (ref 37–47)
HEMOGLOBIN: 9.5 G/DL (ref 12–16)
MCH RBC QN AUTO: 28 PG (ref 27–31.3)
MCHC RBC AUTO-ENTMCNC: 32.4 % (ref 33–37)
MCV RBC AUTO: 86.3 FL (ref 79.4–94.8)
PDW BLD-RTO: 15.3 % (ref 11.5–14.5)
PLATELET # BLD: 230 K/UL (ref 130–400)
RBC # BLD: 3.4 M/UL (ref 4.2–5.4)
WBC # BLD: 9.7 K/UL (ref 4.8–10.8)

## 2023-02-21 PROCEDURE — 96372 THER/PROPH/DIAG INJ SC/IM: CPT

## 2023-02-21 PROCEDURE — 85027 COMPLETE CBC AUTOMATED: CPT

## 2023-02-21 PROCEDURE — 36415 COLL VENOUS BLD VENIPUNCTURE: CPT

## 2023-02-21 PROCEDURE — 6360000002 HC RX W HCPCS: Performed by: INTERNAL MEDICINE

## 2023-02-21 RX ADMIN — EPOETIN ALFA-EPBX 5000 UNITS: 10000 INJECTION, SOLUTION INTRAVENOUS; SUBCUTANEOUS at 13:07

## 2023-02-21 NOTE — FLOWSHEET NOTE
Hgb is 9.5. injection given in her left arm. Tolerated well. Left the unit ambulatory. All equipment used in the care for this patient has been cleaned.

## 2023-02-21 NOTE — FLOWSHEET NOTE
Patient to the floor ambulatory for her labs and injection. Vital signs taken. Denies any discomfort. Call light within reach.

## 2023-03-07 ENCOUNTER — HOSPITAL ENCOUNTER (OUTPATIENT)
Dept: INFUSION THERAPY | Age: 78
Setting detail: INFUSION SERIES
Discharge: HOME | End: 2023-03-07
Payer: MEDICARE

## 2023-03-07 VITALS
HEART RATE: 86 BPM | SYSTOLIC BLOOD PRESSURE: 113 MMHG | TEMPERATURE: 97.4 F | DIASTOLIC BLOOD PRESSURE: 63 MMHG | RESPIRATION RATE: 18 BRPM

## 2023-03-07 DIAGNOSIS — D64.9 ANEMIA, UNSPECIFIED TYPE: Primary | ICD-10-CM

## 2023-03-07 DIAGNOSIS — N18.4 CHRONIC KIDNEY DISEASE, STAGE IV (SEVERE) (HCC): ICD-10-CM

## 2023-03-07 PROCEDURE — 96372 THER/PROPH/DIAG INJ SC/IM: CPT

## 2023-03-07 PROCEDURE — 6360000002 HC RX W HCPCS: Performed by: INTERNAL MEDICINE

## 2023-03-07 RX ADMIN — EPOETIN ALFA-EPBX 5000 UNITS: 10000 INJECTION, SOLUTION INTRAVENOUS; SUBCUTANEOUS at 11:31

## 2023-03-07 NOTE — FLOWSHEET NOTE
Patient to the floor ambulatory for her injection. Vital signs taken. Denies any discomfort. Call light within reach.

## 2023-03-07 NOTE — FLOWSHEET NOTE
Injection given in her left arm. Tolerated well. Left the unit ambulatory. All equipment used in the care for this patient has been cleaned.

## 2023-03-21 ENCOUNTER — HOSPITAL ENCOUNTER (OUTPATIENT)
Dept: INFUSION THERAPY | Age: 78
Setting detail: INFUSION SERIES
Discharge: HOME | End: 2023-03-21
Payer: MEDICARE

## 2023-03-21 VITALS
RESPIRATION RATE: 18 BRPM | DIASTOLIC BLOOD PRESSURE: 60 MMHG | HEART RATE: 85 BPM | SYSTOLIC BLOOD PRESSURE: 120 MMHG | TEMPERATURE: 97.9 F

## 2023-03-21 DIAGNOSIS — N18.4 CHRONIC KIDNEY DISEASE, STAGE IV (SEVERE) (HCC): ICD-10-CM

## 2023-03-21 DIAGNOSIS — D64.9 ANEMIA, UNSPECIFIED TYPE: Primary | ICD-10-CM

## 2023-03-21 LAB
ERYTHROCYTE [DISTWIDTH] IN BLOOD BY AUTOMATED COUNT: 15 % (ref 11.5–14.5)
HCT VFR BLD AUTO: 26.8 % (ref 37–47)
HGB BLD-MCNC: 8.6 G/DL (ref 12–16)
MCH RBC QN AUTO: 27.6 PG (ref 27–31.3)
MCHC RBC AUTO-ENTMCNC: 32 % (ref 33–37)
MCV RBC AUTO: 86.1 FL (ref 79.4–94.8)
PLATELET # BLD AUTO: 308 K/UL (ref 130–400)
RBC # BLD AUTO: 3.12 M/UL (ref 4.2–5.4)
WBC # BLD AUTO: 8.9 K/UL (ref 4.8–10.8)

## 2023-03-21 PROCEDURE — 6360000002 HC RX W HCPCS: Performed by: INTERNAL MEDICINE

## 2023-03-21 PROCEDURE — 85027 COMPLETE CBC AUTOMATED: CPT

## 2023-03-21 PROCEDURE — 36415 COLL VENOUS BLD VENIPUNCTURE: CPT

## 2023-03-21 PROCEDURE — 96372 THER/PROPH/DIAG INJ SC/IM: CPT

## 2023-03-21 RX ORDER — TORSEMIDE 20 MG/1
20 TABLET ORAL DAILY PRN
COMMUNITY

## 2023-03-21 RX ORDER — PREDNISONE 20 MG/1
20 TABLET ORAL DAILY
COMMUNITY

## 2023-03-21 RX ORDER — MYCOPHENOLATE MOFETIL 250 MG/1
250 CAPSULE ORAL 2 TIMES DAILY
COMMUNITY

## 2023-03-21 RX ADMIN — EPOETIN ALFA-EPBX 5000 UNITS: 10000 INJECTION, SOLUTION INTRAVENOUS; SUBCUTANEOUS at 13:02

## 2023-03-21 NOTE — FLOWSHEET NOTE
Injection given in her left arm. Tolerated well. Left the unit ambulatory. Call to Dr. Alanna Mariscal office to make aware of HGB. All equipment used in the care for this patient has been cleaned.

## 2023-04-04 ENCOUNTER — HOSPITAL ENCOUNTER (OUTPATIENT)
Dept: INFUSION THERAPY | Age: 78
Setting detail: INFUSION SERIES
Discharge: HOME | End: 2023-04-04
Payer: MEDICARE

## 2023-04-04 VITALS
TEMPERATURE: 97.4 F | DIASTOLIC BLOOD PRESSURE: 90 MMHG | SYSTOLIC BLOOD PRESSURE: 111 MMHG | HEART RATE: 89 BPM | RESPIRATION RATE: 18 BRPM

## 2023-04-04 DIAGNOSIS — N18.4 CHRONIC KIDNEY DISEASE, STAGE IV (SEVERE) (HCC): ICD-10-CM

## 2023-04-04 DIAGNOSIS — D64.9 ANEMIA, UNSPECIFIED TYPE: Primary | ICD-10-CM

## 2023-04-04 PROCEDURE — 96372 THER/PROPH/DIAG INJ SC/IM: CPT

## 2023-04-04 PROCEDURE — 6360000002 HC RX W HCPCS: Performed by: INTERNAL MEDICINE

## 2023-04-04 RX ADMIN — EPOETIN ALFA-EPBX 5000 UNITS: 10000 INJECTION, SOLUTION INTRAVENOUS; SUBCUTANEOUS at 12:19

## 2023-04-04 NOTE — FLOWSHEET NOTE
Patient to the floor ambulatory for her Retacrit injection. Vital signs taken. Denies any discomfort. Call light within reach.

## 2023-04-18 ENCOUNTER — HOSPITAL ENCOUNTER (OUTPATIENT)
Dept: INFUSION THERAPY | Age: 78
Setting detail: INFUSION SERIES
Discharge: HOME | End: 2023-04-18
Payer: MEDICARE

## 2023-04-18 VITALS
HEART RATE: 77 BPM | DIASTOLIC BLOOD PRESSURE: 62 MMHG | TEMPERATURE: 97.5 F | RESPIRATION RATE: 18 BRPM | SYSTOLIC BLOOD PRESSURE: 111 MMHG

## 2023-04-18 DIAGNOSIS — N18.4 CHRONIC KIDNEY DISEASE, STAGE IV (SEVERE) (HCC): ICD-10-CM

## 2023-04-18 DIAGNOSIS — D64.9 ANEMIA, UNSPECIFIED TYPE: Primary | ICD-10-CM

## 2023-04-18 PROCEDURE — 6360000002 HC RX W HCPCS: Performed by: INTERNAL MEDICINE

## 2023-04-18 PROCEDURE — 96372 THER/PROPH/DIAG INJ SC/IM: CPT

## 2023-04-18 RX ADMIN — EPOETIN ALFA-EPBX 5000 UNITS: 10000 INJECTION, SOLUTION INTRAVENOUS; SUBCUTANEOUS at 11:05

## 2023-04-18 NOTE — FLOWSHEET NOTE
Injection given in her right arm. Tolerated well. Left the unit ambulatory. All equipment used in the care for this patient has been cleaned.

## 2023-05-02 ENCOUNTER — HOSPITAL ENCOUNTER (OUTPATIENT)
Dept: INFUSION THERAPY | Age: 78
Setting detail: INFUSION SERIES
Discharge: HOME | End: 2023-05-02
Payer: MEDICARE

## 2023-05-02 VITALS
HEART RATE: 86 BPM | RESPIRATION RATE: 18 BRPM | DIASTOLIC BLOOD PRESSURE: 63 MMHG | SYSTOLIC BLOOD PRESSURE: 117 MMHG | TEMPERATURE: 97.7 F

## 2023-05-02 DIAGNOSIS — N18.4 CHRONIC KIDNEY DISEASE, STAGE IV (SEVERE) (HCC): ICD-10-CM

## 2023-05-02 DIAGNOSIS — D64.9 ANEMIA, UNSPECIFIED TYPE: Primary | ICD-10-CM

## 2023-05-02 PROCEDURE — 6360000002 HC RX W HCPCS: Performed by: INTERNAL MEDICINE

## 2023-05-02 PROCEDURE — 96372 THER/PROPH/DIAG INJ SC/IM: CPT

## 2023-05-02 RX ORDER — HYDRALAZINE HYDROCHLORIDE 100 MG/1
100 TABLET, FILM COATED ORAL 2 TIMES DAILY
COMMUNITY

## 2023-05-02 RX ORDER — SULFAMETHOXAZOLE AND TRIMETHOPRIM 800; 160 MG/1; MG/1
1 TABLET ORAL
COMMUNITY

## 2023-05-02 RX ADMIN — EPOETIN ALFA-EPBX 5000 UNITS: 10000 INJECTION, SOLUTION INTRAVENOUS; SUBCUTANEOUS at 12:22

## 2023-05-02 NOTE — FLOWSHEET NOTE
Patient to the floor ambulatory for her Retacrit injection. Vital sign taken. Denies any discomfort. Call light within reach.

## 2023-05-16 ENCOUNTER — HOSPITAL ENCOUNTER (OUTPATIENT)
Dept: INFUSION THERAPY | Age: 78
Setting detail: INFUSION SERIES
Discharge: HOME | End: 2023-05-16
Payer: MEDICARE

## 2023-05-16 VITALS
SYSTOLIC BLOOD PRESSURE: 135 MMHG | HEART RATE: 80 BPM | RESPIRATION RATE: 18 BRPM | TEMPERATURE: 98.3 F | DIASTOLIC BLOOD PRESSURE: 94 MMHG

## 2023-05-16 DIAGNOSIS — N18.4 CHRONIC KIDNEY DISEASE, STAGE IV (SEVERE) (HCC): ICD-10-CM

## 2023-05-16 DIAGNOSIS — D64.9 ANEMIA, UNSPECIFIED TYPE: Primary | ICD-10-CM

## 2023-05-16 LAB
ERYTHROCYTE [DISTWIDTH] IN BLOOD BY AUTOMATED COUNT: 15 % (ref 11.5–14.5)
HCT VFR BLD AUTO: 30.3 % (ref 37–47)
HGB BLD-MCNC: 10 G/DL (ref 12–16)
MCH RBC QN AUTO: 27.8 PG (ref 27–31.3)
MCHC RBC AUTO-ENTMCNC: 33.2 % (ref 33–37)
MCV RBC AUTO: 83.9 FL (ref 79.4–94.8)
PLATELET # BLD AUTO: 300 K/UL (ref 130–400)
RBC # BLD AUTO: 3.61 M/UL (ref 4.2–5.4)
WBC # BLD AUTO: 8.2 K/UL (ref 4.8–10.8)

## 2023-05-16 PROCEDURE — 85027 COMPLETE CBC AUTOMATED: CPT

## 2023-05-16 PROCEDURE — 36415 COLL VENOUS BLD VENIPUNCTURE: CPT

## 2023-05-16 NOTE — PROGRESS NOTES
Hgb 10.0 patient does not require injection today. Provided reminder card for next dose due. Left unit by ambulation. All equipment used in the care for this patient has been cleaned.

## 2023-06-23 NOTE — FLOWSHEET NOTE
Occupational Therapy   Treatment    Name: Jean Armenta  MRN: [id number]  Admitting Diagnosis:  Developmental regression  3 Days Post-Op    Recommendations:     Discharge Recommendations: home  Discharge Equipment Recommendations:  bedside commode, wheelchair  Barriers to discharge:  None    Assessment:     Jean Armenta is a 5 y.o. male with a medical diagnosis of Developmental regression.  He presents with the following performance deficits affecting function:  weakness, impaired endurance, impaired self care skills, impaired functional mobility, gait instability, impaired balance, decreased upper extremity function, decreased lower extremity function, pain.     Pt tolerated the session better this date. Pt found sitting up in the chair and playing his video game. Set a 2 minute timer where the game was then turned off. Pt transitioned into standing with BUE propped onto raised bed. Attempted to engage in play with legos, but Pt not interested. Agreeable to work on standing while Pt was able to play his video game. Pt able to bear 100% of weight through BLE in standing for ~10 minutes with BUE support onto bed and CGA-SBA. Physical and verbal cues given to facilitate upright posture. Pt occassionally complaining of generalized pain, but would demonstrate the ability to stand on one leg at a time and intermittently switch legs. Pt more interactive this date and engaged in talk about Pokemon cards. Pt encouraged to reach up and point at various Pokemon cards while in standing and able to maintain standing with one arm support. Pt then given pediatric walker where he was able to ambulate 4 ft with Max A and a forward flexed posture. Physical and verbal cues given to bear weight through BUE (Pt with elbows flexed), but Pt unable to maintain upright posture. Overall, he is making progress towards his goals, but is very limited by pain and behaviors.     Rehab Prognosis:  Good; patient would benefit from acute skilled  Patient arrived on unit by ambulation for Venofer infusion. Alert, oriented, resting on chair. Vital signs obtained. "OT services to address these deficits and reach maximum level of function.       Plan:     Patient to be seen 3 x/week to address the above listed problems via self-care/home management, therapeutic activities, therapeutic exercises, neuromuscular re-education  Plan of Care Expires: 07/22/23  Plan of Care Reviewed with: patient, mother    Subjective     "I really like Joe"     Chief Complaint: Pain   Patient/Family Comments/goals: To gain independence   Pain/Comfort:  Pain Rating 1: other (see comments) (not rated)  Location - Side 1: Left  Location - Orientation 1: generalized  Location 1: leg  Pain Addressed 1: Reposition, Distraction  Pain Rating Post-Intervention 1: other (see comments) (not rated)    Objective:     Communicated with: RN prior to session.  Patient found up in chair with Other (comments) (no active lines) upon OT entry to room.    General Precautions: Standard, fall    Orthopedic Precautions:N/A  Braces: N/A  Respiratory Status: Room air     Occupational Performance:     Bed Mobility:    Not assessed: Patient found up in chair and returned to chair at end of session.     Functional Mobility/Transfers:  Patient completed Sit <> Stand Transfer with contact guard assistance  with  no assistive device   Functional Mobility: Pt engaging in functional mobility to simulate household/community distances approx 4 ft with Max A and utilizing RW in order to maximize functional activity tolerance and standing balance required for engagement in occupations of choice.  Unable to come to an upright posture - remained in a forward flexed position     Activities of Daily Living:  Not performed this session     Therapeutic Activity:   Agreeable to work on standing while Pt was able to play his video game. Pt able to bear 100% of weight through BLE in standing for ~10 minutes with BUE support onto bed and CGA-SBA. Physical and verbal cues given to facilitate upright posture. Pt occassionally complaining of " generalized leg pain, but would demonstrate the ability to stand on one leg at a time and intermittently switch legs. Pt more interactive this date and engaged in talk about Pokemon cards. Pt encouraged to reach up and point at various Pokemon cards while in standing and able to maintain standing with one arm support    Treatment & Education:  Therapist provided facilitation and instruction of proper body mechanics and fall prevention strategies during tasks listed above.  Instructed patient to sit in bedside chair daily to increase OOB/activity tolerance.  Instructed patient to use call light to have nursing staff assist with needs/transfers.  Discussed OT POC and answered all questions within OT scope of practice.  Whiteboard updated       Patient left up in chair with all lines intact, call button in reach, and mother present    GOALS:   Multidisciplinary Problems       Occupational Therapy Goals          Problem: Occupational Therapy    Goal Priority Disciplines Outcome Interventions   Occupational Therapy Goal     OT, PT/OT Ongoing, Progressing    Description: Goals to be met by: 7/6/23     Patient will increase functional independence with ADLs by performing:    Feeding with [place].  UE Dressing with [place].  LE Dressing with Supervision.  Grooming while seated with [place].  Toileting from bedside commode with Moderate Assistance for hygiene and clothing management.   Toilet transfer to bedside commode with Moderate Assistance.                         Time Tracking:     OT Date of Treatment: 06/23/23  OT Start Time: 1135  OT Stop Time: 1209  OT Total Time (min): 34 min    Billable Minutes:Therapeutic Activity 19  Therapeutic Exercise 15    OT/ABIGAIL: OT          6/23/2023

## 2023-07-03 ENCOUNTER — HOSPITAL ENCOUNTER (OUTPATIENT)
Dept: INFUSION THERAPY | Age: 78
Setting detail: INFUSION SERIES
Discharge: HOME | End: 2023-07-03
Payer: MEDICARE

## 2023-07-03 VITALS
HEART RATE: 66 BPM | TEMPERATURE: 97.7 F | DIASTOLIC BLOOD PRESSURE: 72 MMHG | RESPIRATION RATE: 18 BRPM | SYSTOLIC BLOOD PRESSURE: 164 MMHG

## 2023-07-03 DIAGNOSIS — N18.4 CHRONIC KIDNEY DISEASE, STAGE IV (SEVERE) (HCC): ICD-10-CM

## 2023-07-03 DIAGNOSIS — D64.9 ANEMIA, UNSPECIFIED TYPE: Primary | ICD-10-CM

## 2023-07-03 LAB
ERYTHROCYTE [DISTWIDTH] IN BLOOD BY AUTOMATED COUNT: 14.7 % (ref 11.5–14.5)
HCT VFR BLD AUTO: 27 % (ref 37–47)
HGB BLD-MCNC: 8.9 G/DL (ref 12–16)
MCH RBC QN AUTO: 27 PG (ref 27–31.3)
MCHC RBC AUTO-ENTMCNC: 33 % (ref 33–37)
MCV RBC AUTO: 81.9 FL (ref 79.4–94.8)
PLATELET # BLD AUTO: 260 K/UL (ref 130–400)
RBC # BLD AUTO: 3.3 M/UL (ref 4.2–5.4)
WBC # BLD AUTO: 10 K/UL (ref 4.8–10.8)

## 2023-07-03 PROCEDURE — 6360000002 HC RX W HCPCS: Performed by: INTERNAL MEDICINE

## 2023-07-03 PROCEDURE — 96375 TX/PRO/DX INJ NEW DRUG ADDON: CPT

## 2023-07-03 PROCEDURE — 96376 TX/PRO/DX INJ SAME DRUG ADON: CPT

## 2023-07-03 PROCEDURE — 36415 COLL VENOUS BLD VENIPUNCTURE: CPT

## 2023-07-03 PROCEDURE — 96365 THER/PROPH/DIAG IV INF INIT: CPT

## 2023-07-03 PROCEDURE — 99285 EMERGENCY DEPT VISIT HI MDM: CPT

## 2023-07-03 PROCEDURE — 31500 INSERT EMERGENCY AIRWAY: CPT

## 2023-07-03 PROCEDURE — 96367 TX/PROPH/DG ADDL SEQ IV INF: CPT

## 2023-07-03 PROCEDURE — 96372 THER/PROPH/DIAG INJ SC/IM: CPT

## 2023-07-03 PROCEDURE — 85027 COMPLETE CBC AUTOMATED: CPT

## 2023-07-03 RX ADMIN — EPOETIN ALFA-EPBX 5000 UNITS: 10000 INJECTION, SOLUTION INTRAVENOUS; SUBCUTANEOUS at 13:50

## 2023-07-03 NOTE — PROGRESS NOTES
Injection provided, patient tolerated well. Provided reminder card for next injection due. Left unit by ambulation. All equipment used in the care for this patient has been cleaned.

## 2023-07-04 ENCOUNTER — APPOINTMENT (OUTPATIENT)
Dept: GENERAL RADIOLOGY | Age: 78
DRG: 208 | End: 2023-07-04
Payer: MEDICARE

## 2023-07-04 ENCOUNTER — APPOINTMENT (OUTPATIENT)
Dept: ULTRASOUND IMAGING | Age: 78
DRG: 208 | End: 2023-07-04
Payer: MEDICARE

## 2023-07-04 ENCOUNTER — HOSPITAL ENCOUNTER (INPATIENT)
Age: 78
LOS: 11 days | Discharge: HOME HEALTH | DRG: 208 | End: 2023-07-15
Attending: EMERGENCY MEDICINE | Admitting: INTERNAL MEDICINE
Payer: MEDICARE

## 2023-07-04 DIAGNOSIS — N18.9 CHRONIC KIDNEY DISEASE, UNSPECIFIED CKD STAGE: ICD-10-CM

## 2023-07-04 DIAGNOSIS — D64.9 CHRONIC ANEMIA: Primary | ICD-10-CM

## 2023-07-04 DIAGNOSIS — R73.03 PREDIABETES: ICD-10-CM

## 2023-07-04 DIAGNOSIS — I16.1 HYPERTENSIVE EMERGENCY: ICD-10-CM

## 2023-07-04 DIAGNOSIS — I50.9 ACUTE ON CHRONIC CONGESTIVE HEART FAILURE, UNSPECIFIED HEART FAILURE TYPE (HCC): ICD-10-CM

## 2023-07-04 DIAGNOSIS — J96.90 RESPIRATORY FAILURE, UNSPECIFIED CHRONICITY, UNSPECIFIED WHETHER WITH HYPOXIA OR HYPERCAPNIA (HCC): ICD-10-CM

## 2023-07-04 LAB
ALBUMIN SERPL-MCNC: 3.2 G/DL (ref 3.5–4.6)
ALBUMIN SERPL-MCNC: 3.8 G/DL (ref 3.5–4.6)
ALP SERPL-CCNC: 139 U/L (ref 40–130)
ALP SERPL-CCNC: 152 U/L (ref 40–130)
ALT SERPL-CCNC: 159 U/L (ref 0–33)
ALT SERPL-CCNC: 37 U/L (ref 0–33)
ANION GAP SERPL CALCULATED.3IONS-SCNC: 13 MEQ/L (ref 9–15)
ANION GAP SERPL CALCULATED.3IONS-SCNC: 16 MEQ/L (ref 9–15)
ANION GAP SERPL CALCULATED.3IONS-SCNC: 19 MEQ/L (ref 9–15)
ANISOCYTOSIS BLD QL SMEAR: ABNORMAL
ANTI-XA UNFRAC HEPARIN: 0.77 IU/ML
ANTI-XA UNFRAC HEPARIN: 1.15 IU/ML
ANTI-XA UNFRAC HEPARIN: <0.1 IU/ML
APTT PPP: 20 SEC (ref 24.4–36.8)
APTT PPP: 20.6 SEC (ref 24.4–36.8)
AST SERPL-CCNC: 137 U/L (ref 0–35)
AST SERPL-CCNC: 39 U/L (ref 0–35)
BASE EXCESS ARTERIAL: -1 (ref -3–3)
BASE EXCESS ARTERIAL: -17 (ref -3–3)
BASE EXCESS ARTERIAL: -3 (ref -3–3)
BASE EXCESS ARTERIAL: -3 (ref -3–3)
BASOPHILS # BLD: 0 K/UL (ref 0–0.2)
BASOPHILS # BLD: 0 K/UL (ref 0–0.2)
BASOPHILS NFR BLD: 0 %
BASOPHILS NFR BLD: 0.1 %
BILIRUB SERPL-MCNC: 0.4 MG/DL (ref 0.2–0.7)
BILIRUB SERPL-MCNC: 0.5 MG/DL (ref 0.2–0.7)
BNP BLD-MCNC: NORMAL PG/ML
BUN SERPL-MCNC: 56 MG/DL (ref 8–23)
BUN SERPL-MCNC: 65 MG/DL (ref 8–23)
BUN SERPL-MCNC: 67 MG/DL (ref 8–23)
BURR CELLS: ABNORMAL
CALCIUM IONIZED: 1.16 MMOL/L (ref 1.12–1.32)
CALCIUM IONIZED: 1.25 MMOL/L (ref 1.12–1.32)
CALCIUM IONIZED: 1.43 MMOL/L (ref 1.12–1.32)
CALCIUM IONIZED: 1.83 MMOL/L (ref 1.12–1.32)
CALCIUM SERPL-MCNC: 9 MG/DL (ref 8.5–9.9)
CALCIUM SERPL-MCNC: 9.1 MG/DL (ref 8.5–9.9)
CALCIUM SERPL-MCNC: 9.2 MG/DL (ref 8.5–9.9)
CHLORIDE SERPL-SCNC: 94 MEQ/L (ref 95–107)
CHLORIDE SERPL-SCNC: 97 MEQ/L (ref 95–107)
CHLORIDE SERPL-SCNC: 98 MEQ/L (ref 95–107)
CK SERPL-CCNC: 65 U/L (ref 0–170)
CO2 SERPL-SCNC: 21 MEQ/L (ref 20–31)
CO2 SERPL-SCNC: 23 MEQ/L (ref 20–31)
CO2 SERPL-SCNC: 25 MEQ/L (ref 20–31)
CREAT SERPL-MCNC: 2.2 MG/DL (ref 0.5–0.9)
CREAT SERPL-MCNC: 2.8 MG/DL (ref 0.5–0.9)
CREAT SERPL-MCNC: 2.81 MG/DL (ref 0.5–0.9)
EOSINOPHIL # BLD: 0 K/UL (ref 0–0.7)
EOSINOPHIL # BLD: 0 K/UL (ref 0–0.7)
EOSINOPHIL NFR BLD: 0 %
EOSINOPHIL NFR BLD: 0 %
ERYTHROCYTE [DISTWIDTH] IN BLOOD BY AUTOMATED COUNT: 14.7 % (ref 11.5–14.5)
ERYTHROCYTE [DISTWIDTH] IN BLOOD BY AUTOMATED COUNT: 15.1 % (ref 11.5–14.5)
ERYTHROCYTE [DISTWIDTH] IN BLOOD BY AUTOMATED COUNT: 15.8 % (ref 11.5–14.5)
ETHANOL PERCENT: NORMAL G/DL
ETHANOLAMINE SERPL-MCNC: <10 MG/DL (ref 0–0.08)
GLOBULIN SER CALC-MCNC: 1.8 G/DL (ref 2.3–3.5)
GLOBULIN SER CALC-MCNC: 2.3 G/DL (ref 2.3–3.5)
GLUCOSE BLD-MCNC: 244 MG/DL (ref 70–99)
GLUCOSE BLD-MCNC: 262 MG/DL (ref 70–99)
GLUCOSE BLD-MCNC: 266 MG/DL (ref 70–99)
GLUCOSE BLD-MCNC: 267 MG/DL (ref 70–99)
GLUCOSE BLD-MCNC: 282 MG/DL (ref 70–99)
GLUCOSE BLD-MCNC: 305 MG/DL (ref 70–99)
GLUCOSE BLD-MCNC: 337 MG/DL (ref 70–99)
GLUCOSE SERPL-MCNC: 236 MG/DL (ref 70–99)
GLUCOSE SERPL-MCNC: 272 MG/DL (ref 70–99)
GLUCOSE SERPL-MCNC: 319 MG/DL (ref 70–99)
HCO3 ARTERIAL: 13.6 MMOL/L (ref 21–29)
HCO3 ARTERIAL: 23.2 MMOL/L (ref 21–29)
HCO3 ARTERIAL: 23.3 MMOL/L (ref 21–29)
HCO3 ARTERIAL: 24.7 MMOL/L (ref 21–29)
HCT VFR BLD AUTO: 25 % (ref 36–48)
HCT VFR BLD AUTO: 28 % (ref 36–48)
HCT VFR BLD AUTO: 29 % (ref 36–48)
HCT VFR BLD AUTO: 29 % (ref 36–48)
HCT VFR BLD AUTO: 29.5 % (ref 37–47)
HCT VFR BLD AUTO: 30.9 % (ref 37–47)
HCT VFR BLD AUTO: 34.6 % (ref 37–47)
HGB BLD CALC-MCNC: 10 GM/DL (ref 12–16)
HGB BLD CALC-MCNC: 8.6 GM/DL (ref 12–16)
HGB BLD CALC-MCNC: 9.4 GM/DL (ref 12–16)
HGB BLD CALC-MCNC: 9.9 GM/DL (ref 12–16)
HGB BLD-MCNC: 10.7 G/DL (ref 12–16)
HGB BLD-MCNC: 9.5 G/DL (ref 12–16)
HGB BLD-MCNC: 9.8 G/DL (ref 12–16)
HYPOCHROMIA BLD QL SMEAR: ABNORMAL
INR PPP: 0.9
INR PPP: 1.1
LACTATE BLDV-SCNC: 2 MMOL/L (ref 0.5–2.2)
LACTATE: 1.51 MMOL/L (ref 0.4–2)
LACTATE: 2.79 MMOL/L (ref 0.4–2)
LACTATE: 3.66 MMOL/L (ref 0.4–2)
LACTATE: 8.25 MMOL/L (ref 0.4–2)
LACTIC ACID, SEPSIS: 2.2 MMOL/L (ref 0.5–1.9)
LACTIC ACID, SEPSIS: 5.7 MMOL/L (ref 0.5–1.9)
LV EF: 23 %
LVEF MODALITY: NORMAL
LYMPHOCYTES # BLD: 0.6 K/UL (ref 1–4.8)
LYMPHOCYTES # BLD: 1.3 K/UL (ref 1–4.8)
LYMPHOCYTES NFR BLD: 3 %
LYMPHOCYTES NFR BLD: 7 %
MAGNESIUM SERPL-MCNC: 2.8 MG/DL (ref 1.7–2.4)
MCH RBC QN AUTO: 26.1 PG (ref 27–31.3)
MCH RBC QN AUTO: 26.5 PG (ref 27–31.3)
MCH RBC QN AUTO: 26.5 PG (ref 27–31.3)
MCHC RBC AUTO-ENTMCNC: 31 % (ref 33–37)
MCHC RBC AUTO-ENTMCNC: 31.8 % (ref 33–37)
MCHC RBC AUTO-ENTMCNC: 32 % (ref 33–37)
MCV RBC AUTO: 82 FL (ref 79.4–94.8)
MCV RBC AUTO: 82.6 FL (ref 79.4–94.8)
MCV RBC AUTO: 85.5 FL (ref 79.4–94.8)
MONOCYTES # BLD: 0.4 K/UL (ref 0.2–0.8)
MONOCYTES # BLD: 0.6 K/UL (ref 0.2–0.8)
MONOCYTES NFR BLD: 2 %
MONOCYTES NFR BLD: 3.3 %
NEUTROPHILS # BLD: 17.4 K/UL (ref 1.4–6.5)
NEUTROPHILS # BLD: 17.8 K/UL (ref 1.4–6.5)
NEUTS BAND NFR BLD MANUAL: 10 % (ref 5–11)
NEUTS SEG NFR BLD: 81 %
NEUTS SEG NFR BLD: 93.6 %
O2 SAT, ARTERIAL: 100 % (ref 93–100)
O2 SAT, ARTERIAL: 95 % (ref 93–100)
O2 SAT, ARTERIAL: 98 % (ref 93–100)
O2 SAT, ARTERIAL: 98 % (ref 93–100)
OVALOCYTES BLD QL SMEAR: ABNORMAL
PCO2 ARTERIAL: 36 MM HG (ref 35–45)
PCO2 ARTERIAL: 43 MM HG (ref 35–45)
PCO2 ARTERIAL: 49 MM HG (ref 35–45)
PCO2 ARTERIAL: 59 MM HG (ref 35–45)
PERFORMED ON: ABNORMAL
PH ARTERIAL: 7.05 (ref 7.35–7.45)
PH ARTERIAL: 7.23 (ref 7.35–7.45)
PH ARTERIAL: 7.34 (ref 7.35–7.45)
PH ARTERIAL: 7.42 (ref 7.35–7.45)
PLATELET # BLD AUTO: 201 K/UL (ref 130–400)
PLATELET # BLD AUTO: 304 K/UL (ref 130–400)
PLATELET # BLD AUTO: 394 K/UL (ref 130–400)
PO2 ARTERIAL: 103 MM HG (ref 75–108)
PO2 ARTERIAL: 141 MM HG (ref 75–108)
PO2 ARTERIAL: 390 MM HG (ref 75–108)
PO2 ARTERIAL: 89 MM HG (ref 75–108)
POC CHLORIDE: 102 MEQ/L (ref 99–110)
POC CHLORIDE: 104 MEQ/L (ref 99–110)
POC CHLORIDE: 108 MEQ/L (ref 99–110)
POC CHLORIDE: 99 MEQ/L (ref 99–110)
POC CREATININE: 2 MG/DL (ref 0.6–1.2)
POC CREATININE: 2.5 MG/DL (ref 0.6–1.2)
POC CREATININE: 2.6 MG/DL (ref 0.6–1.2)
POC CREATININE: 2.6 MG/DL (ref 0.6–1.2)
POC FIO2: 100
POC FIO2: 70
POC SAMPLE TYPE: ABNORMAL
POIKILOCYTOSIS BLD QL SMEAR: ABNORMAL
POTASSIUM SERPL-SCNC: 4.6 MEQ/L (ref 3.4–4.9)
POTASSIUM SERPL-SCNC: 4.6 MEQ/L (ref 3.5–5.1)
POTASSIUM SERPL-SCNC: 5 MEQ/L (ref 3.5–5.1)
POTASSIUM SERPL-SCNC: 5.4 MEQ/L (ref 3.5–5.1)
POTASSIUM SERPL-SCNC: 5.7 MEQ/L (ref 3.4–4.9)
POTASSIUM SERPL-SCNC: 6 MEQ/L (ref 3.4–4.9)
POTASSIUM SERPL-SCNC: 6.4 MEQ/L (ref 3.5–5.1)
POTASSIUM SERPL-SCNC: 6.6 MEQ/L (ref 3.4–4.9)
PROCALCITONIN SERPL IA-MCNC: 0.16 NG/ML (ref 0–0.15)
PROT SERPL-MCNC: 5 G/DL (ref 6.3–8)
PROT SERPL-MCNC: 6.1 G/DL (ref 6.3–8)
PROTHROMBIN TIME: 12.1 SEC (ref 12.3–14.9)
PROTHROMBIN TIME: 14.4 SEC (ref 12.3–14.9)
RBC # BLD AUTO: 3.58 M/UL (ref 4.2–5.4)
RBC # BLD AUTO: 3.77 M/UL (ref 4.2–5.4)
RBC # BLD AUTO: 4.05 M/UL (ref 4.2–5.4)
REASON FOR REJECTION: NORMAL
REJECTED TEST: NORMAL
SODIUM BLD-SCNC: 132 MEQ/L (ref 136–145)
SODIUM BLD-SCNC: 132 MEQ/L (ref 136–145)
SODIUM BLD-SCNC: 133 MEQ/L (ref 136–145)
SODIUM BLD-SCNC: 133 MEQ/L (ref 136–145)
SODIUM SERPL-SCNC: 134 MEQ/L (ref 135–144)
SODIUM SERPL-SCNC: 134 MEQ/L (ref 135–144)
SODIUM SERPL-SCNC: 138 MEQ/L (ref 135–144)
TCO2 ARTERIAL: 15 MMOL/L (ref 21–32)
TCO2 ARTERIAL: 24 MMOL/L (ref 21–32)
TCO2 ARTERIAL: 25 MMOL/L (ref 21–32)
TCO2 ARTERIAL: 27 MMOL/L (ref 21–32)
TROPONIN T SERPL-MCNC: 0.53 NG/ML (ref 0–0.01)
TROPONIN T SERPL-MCNC: 0.63 NG/ML (ref 0–0.01)
TROPONIN T SERPL-MCNC: <0.01 NG/ML (ref 0–0.01)
TSH REFLEX: 2.94 UIU/ML (ref 0.44–3.86)
WBC # BLD AUTO: 19 K/UL (ref 4.8–10.8)
WBC # BLD AUTO: 19.1 K/UL (ref 4.8–10.8)
WBC # BLD AUTO: 24 K/UL (ref 4.8–10.8)

## 2023-07-04 PROCEDURE — 84443 ASSAY THYROID STIM HORMONE: CPT

## 2023-07-04 PROCEDURE — 2580000003 HC RX 258: Performed by: INTERNAL MEDICINE

## 2023-07-04 PROCEDURE — 2500000003 HC RX 250 WO HCPCS: Performed by: INTERNAL MEDICINE

## 2023-07-04 PROCEDURE — 93005 ELECTROCARDIOGRAM TRACING: CPT | Performed by: INTERNAL MEDICINE

## 2023-07-04 PROCEDURE — 71045 X-RAY EXAM CHEST 1 VIEW: CPT

## 2023-07-04 PROCEDURE — 99222 1ST HOSP IP/OBS MODERATE 55: CPT | Performed by: PSYCHIATRY & NEUROLOGY

## 2023-07-04 PROCEDURE — 0BH17EZ INSERTION OF ENDOTRACHEAL AIRWAY INTO TRACHEA, VIA NATURAL OR ARTIFICIAL OPENING: ICD-10-PCS | Performed by: INTERNAL MEDICINE

## 2023-07-04 PROCEDURE — A4216 STERILE WATER/SALINE, 10 ML: HCPCS | Performed by: NURSE PRACTITIONER

## 2023-07-04 PROCEDURE — 36556 INSERT NON-TUNNEL CV CATH: CPT

## 2023-07-04 PROCEDURE — 83735 ASSAY OF MAGNESIUM: CPT

## 2023-07-04 PROCEDURE — 6360000002 HC RX W HCPCS: Performed by: INTERNAL MEDICINE

## 2023-07-04 PROCEDURE — 82077 ASSAY SPEC XCP UR&BREATH IA: CPT

## 2023-07-04 PROCEDURE — 94660 CPAP INITIATION&MGMT: CPT

## 2023-07-04 PROCEDURE — 6360000002 HC RX W HCPCS: Performed by: NURSE PRACTITIONER

## 2023-07-04 PROCEDURE — 84145 PROCALCITONIN (PCT): CPT

## 2023-07-04 PROCEDURE — 93970 EXTREMITY STUDY: CPT

## 2023-07-04 PROCEDURE — 82550 ASSAY OF CK (CPK): CPT

## 2023-07-04 PROCEDURE — 93005 ELECTROCARDIOGRAM TRACING: CPT | Performed by: EMERGENCY MEDICINE

## 2023-07-04 PROCEDURE — 84295 ASSAY OF SERUM SODIUM: CPT

## 2023-07-04 PROCEDURE — 2500000003 HC RX 250 WO HCPCS

## 2023-07-04 PROCEDURE — 84132 ASSAY OF SERUM POTASSIUM: CPT

## 2023-07-04 PROCEDURE — 83880 ASSAY OF NATRIURETIC PEPTIDE: CPT

## 2023-07-04 PROCEDURE — 89220 SPUTUM SPECIMEN COLLECTION: CPT

## 2023-07-04 PROCEDURE — 6370000000 HC RX 637 (ALT 250 FOR IP): Performed by: INTERNAL MEDICINE

## 2023-07-04 PROCEDURE — 82565 ASSAY OF CREATININE: CPT

## 2023-07-04 PROCEDURE — 82330 ASSAY OF CALCIUM: CPT

## 2023-07-04 PROCEDURE — 2700000000 HC OXYGEN THERAPY PER DAY

## 2023-07-04 PROCEDURE — 85025 COMPLETE CBC W/AUTO DIFF WBC: CPT

## 2023-07-04 PROCEDURE — 85014 HEMATOCRIT: CPT

## 2023-07-04 PROCEDURE — 2000000000 HC ICU R&B

## 2023-07-04 PROCEDURE — 6360000002 HC RX W HCPCS: Performed by: EMERGENCY MEDICINE

## 2023-07-04 PROCEDURE — 6370000000 HC RX 637 (ALT 250 FOR IP): Performed by: NURSE PRACTITIONER

## 2023-07-04 PROCEDURE — 2580000003 HC RX 258: Performed by: NURSE PRACTITIONER

## 2023-07-04 PROCEDURE — 85730 THROMBOPLASTIN TIME PARTIAL: CPT

## 2023-07-04 PROCEDURE — 2580000003 HC RX 258: Performed by: EMERGENCY MEDICINE

## 2023-07-04 PROCEDURE — 84484 ASSAY OF TROPONIN QUANT: CPT

## 2023-07-04 PROCEDURE — 87040 BLOOD CULTURE FOR BACTERIA: CPT

## 2023-07-04 PROCEDURE — 80053 COMPREHEN METABOLIC PANEL: CPT

## 2023-07-04 PROCEDURE — 6360000004 HC RX CONTRAST MEDICATION: Performed by: PSYCHIATRY & NEUROLOGY

## 2023-07-04 PROCEDURE — 83605 ASSAY OF LACTIC ACID: CPT

## 2023-07-04 PROCEDURE — 02HV33Z INSERTION OF INFUSION DEVICE INTO SUPERIOR VENA CAVA, PERCUTANEOUS APPROACH: ICD-10-PCS | Performed by: INTERNAL MEDICINE

## 2023-07-04 PROCEDURE — 5A1945Z RESPIRATORY VENTILATION, 24-96 CONSECUTIVE HOURS: ICD-10-PCS | Performed by: INTERNAL MEDICINE

## 2023-07-04 PROCEDURE — 36600 WITHDRAWAL OF ARTERIAL BLOOD: CPT

## 2023-07-04 PROCEDURE — 82803 BLOOD GASES ANY COMBINATION: CPT

## 2023-07-04 PROCEDURE — 82948 REAGENT STRIP/BLOOD GLUCOSE: CPT

## 2023-07-04 PROCEDURE — C8929 TTE W OR WO FOL WCON,DOPPLER: HCPCS

## 2023-07-04 PROCEDURE — 92950 HEART/LUNG RESUSCITATION CPR: CPT

## 2023-07-04 PROCEDURE — 94002 VENT MGMT INPAT INIT DAY: CPT

## 2023-07-04 PROCEDURE — 82435 ASSAY OF BLOOD CHLORIDE: CPT

## 2023-07-04 PROCEDURE — 36415 COLL VENOUS BLD VENIPUNCTURE: CPT

## 2023-07-04 PROCEDURE — 51702 INSERT TEMP BLADDER CATH: CPT

## 2023-07-04 PROCEDURE — 2500000003 HC RX 250 WO HCPCS: Performed by: EMERGENCY MEDICINE

## 2023-07-04 PROCEDURE — 31500 INSERT EMERGENCY AIRWAY: CPT

## 2023-07-04 PROCEDURE — 85027 COMPLETE CBC AUTOMATED: CPT

## 2023-07-04 PROCEDURE — 99291 CRITICAL CARE FIRST HOUR: CPT | Performed by: INTERNAL MEDICINE

## 2023-07-04 PROCEDURE — 5A09357 ASSISTANCE WITH RESPIRATORY VENTILATION, LESS THAN 24 CONSECUTIVE HOURS, CONTINUOUS POSITIVE AIRWAY PRESSURE: ICD-10-PCS | Performed by: EMERGENCY MEDICINE

## 2023-07-04 PROCEDURE — C9113 INJ PANTOPRAZOLE SODIUM, VIA: HCPCS | Performed by: NURSE PRACTITIONER

## 2023-07-04 PROCEDURE — 85520 HEPARIN ASSAY: CPT

## 2023-07-04 PROCEDURE — 06H03DZ INSERTION OF INTRALUMINAL DEVICE INTO INFERIOR VENA CAVA, PERCUTANEOUS APPROACH: ICD-10-PCS | Performed by: INTERNAL MEDICINE

## 2023-07-04 PROCEDURE — 85610 PROTHROMBIN TIME: CPT

## 2023-07-04 RX ORDER — METOPROLOL TARTRATE 5 MG/5ML
2.5 INJECTION INTRAVENOUS EVERY 6 HOURS
Status: DISCONTINUED | OUTPATIENT
Start: 2023-07-04 | End: 2023-07-05

## 2023-07-04 RX ORDER — HEPARIN SODIUM 10000 [USP'U]/100ML
5-30 INJECTION, SOLUTION INTRAVENOUS CONTINUOUS
Status: DISCONTINUED | OUTPATIENT
Start: 2023-07-04 | End: 2023-07-06

## 2023-07-04 RX ORDER — CHLORHEXIDINE GLUCONATE 0.12 MG/ML
15 RINSE ORAL 2 TIMES DAILY
Status: DISCONTINUED | OUTPATIENT
Start: 2023-07-04 | End: 2023-07-05

## 2023-07-04 RX ORDER — NOREPINEPHRINE BITARTRATE 0.06 MG/ML
1-100 INJECTION, SOLUTION INTRAVENOUS CONTINUOUS
Status: DISCONTINUED | OUTPATIENT
Start: 2023-07-04 | End: 2023-07-05

## 2023-07-04 RX ORDER — ENALAPRILAT 2.5 MG/2ML
1.25 INJECTION INTRAVENOUS EVERY 8 HOURS
Status: DISCONTINUED | OUTPATIENT
Start: 2023-07-04 | End: 2023-07-04

## 2023-07-04 RX ORDER — FUROSEMIDE 10 MG/ML
20 INJECTION INTRAMUSCULAR; INTRAVENOUS 2 TIMES DAILY
Status: DISCONTINUED | OUTPATIENT
Start: 2023-07-04 | End: 2023-07-04

## 2023-07-04 RX ORDER — MAGNESIUM SULFATE IN WATER 40 MG/ML
2000 INJECTION, SOLUTION INTRAVENOUS ONCE
Status: COMPLETED | OUTPATIENT
Start: 2023-07-04 | End: 2023-07-04

## 2023-07-04 RX ORDER — MAGNESIUM SULFATE IN WATER 40 MG/ML
INJECTION, SOLUTION INTRAVENOUS CONTINUOUS PRN
Status: COMPLETED | OUTPATIENT
Start: 2023-07-04 | End: 2023-07-04

## 2023-07-04 RX ORDER — FUROSEMIDE 10 MG/ML
40 INJECTION INTRAMUSCULAR; INTRAVENOUS ONCE
Status: COMPLETED | OUTPATIENT
Start: 2023-07-04 | End: 2023-07-04

## 2023-07-04 RX ORDER — SODIUM CHLORIDE 0.9 % (FLUSH) 0.9 %
5-40 SYRINGE (ML) INJECTION EVERY 12 HOURS SCHEDULED
Status: DISCONTINUED | OUTPATIENT
Start: 2023-07-04 | End: 2023-07-15 | Stop reason: HOSPADM

## 2023-07-04 RX ORDER — CALCIUM GLUCONATE 94 MG/ML
1000 INJECTION, SOLUTION INTRAVENOUS ONCE
Status: COMPLETED | OUTPATIENT
Start: 2023-07-04 | End: 2023-07-04

## 2023-07-04 RX ORDER — INSULIN LISPRO 100 [IU]/ML
0-4 INJECTION, SOLUTION INTRAVENOUS; SUBCUTANEOUS NIGHTLY
Status: DISCONTINUED | OUTPATIENT
Start: 2023-07-04 | End: 2023-07-04

## 2023-07-04 RX ORDER — SODIUM CHLORIDE 0.9 % (FLUSH) 0.9 %
5-40 SYRINGE (ML) INJECTION PRN
Status: DISCONTINUED | OUTPATIENT
Start: 2023-07-04 | End: 2023-07-15 | Stop reason: HOSPADM

## 2023-07-04 RX ORDER — NITROGLYCERIN 0.4 MG/1
0.4 TABLET SUBLINGUAL EVERY 5 MIN PRN
Status: DISCONTINUED | OUTPATIENT
Start: 2023-07-04 | End: 2023-07-15 | Stop reason: HOSPADM

## 2023-07-04 RX ORDER — ONDANSETRON 4 MG/1
4 TABLET, ORALLY DISINTEGRATING ORAL EVERY 8 HOURS PRN
Status: DISCONTINUED | OUTPATIENT
Start: 2023-07-04 | End: 2023-07-15 | Stop reason: HOSPADM

## 2023-07-04 RX ORDER — FUROSEMIDE 10 MG/ML
80 INJECTION INTRAMUSCULAR; INTRAVENOUS ONCE
Status: COMPLETED | OUTPATIENT
Start: 2023-07-04 | End: 2023-07-04

## 2023-07-04 RX ORDER — FUROSEMIDE 10 MG/ML
20 INJECTION INTRAMUSCULAR; INTRAVENOUS ONCE
Status: COMPLETED | OUTPATIENT
Start: 2023-07-04 | End: 2023-07-04

## 2023-07-04 RX ORDER — DEXAMETHASONE SODIUM PHOSPHATE 10 MG/ML
10 INJECTION INTRAMUSCULAR; INTRAVENOUS ONCE
Status: COMPLETED | OUTPATIENT
Start: 2023-07-04 | End: 2023-07-04

## 2023-07-04 RX ORDER — HYDRALAZINE HYDROCHLORIDE 20 MG/ML
10 INJECTION INTRAMUSCULAR; INTRAVENOUS EVERY 4 HOURS PRN
Status: DISCONTINUED | OUTPATIENT
Start: 2023-07-04 | End: 2023-07-15 | Stop reason: HOSPADM

## 2023-07-04 RX ORDER — ROCURONIUM BROMIDE 10 MG/ML
0.6 INJECTION, SOLUTION INTRAVENOUS ONCE
Status: COMPLETED | OUTPATIENT
Start: 2023-07-04 | End: 2023-07-04

## 2023-07-04 RX ORDER — HEPARIN SODIUM 1000 [USP'U]/ML
80 INJECTION, SOLUTION INTRAVENOUS; SUBCUTANEOUS ONCE
Status: COMPLETED | OUTPATIENT
Start: 2023-07-04 | End: 2023-07-04

## 2023-07-04 RX ORDER — DEXTROSE MONOHYDRATE 25 G/50ML
25 INJECTION, SOLUTION INTRAVENOUS ONCE
Status: DISCONTINUED | OUTPATIENT
Start: 2023-07-04 | End: 2023-07-04 | Stop reason: SDUPTHER

## 2023-07-04 RX ORDER — EPINEPHRINE IN SOD CHLOR,ISO 1 MG/10 ML
SYRINGE (ML) INTRAVENOUS DAILY PRN
Status: COMPLETED | OUTPATIENT
Start: 2023-07-04 | End: 2023-07-04

## 2023-07-04 RX ORDER — ROCURONIUM BROMIDE 10 MG/ML
INJECTION, SOLUTION INTRAVENOUS
Status: COMPLETED
Start: 2023-07-04 | End: 2023-07-04

## 2023-07-04 RX ORDER — SODIUM CHLORIDE 9 MG/ML
INJECTION, SOLUTION INTRAVENOUS PRN
Status: DISCONTINUED | OUTPATIENT
Start: 2023-07-04 | End: 2023-07-15 | Stop reason: HOSPADM

## 2023-07-04 RX ORDER — POLYETHYLENE GLYCOL 3350 17 G/17G
17 POWDER, FOR SOLUTION ORAL DAILY PRN
Status: DISCONTINUED | OUTPATIENT
Start: 2023-07-04 | End: 2023-07-15 | Stop reason: HOSPADM

## 2023-07-04 RX ORDER — HEPARIN SODIUM 1000 [USP'U]/ML
80 INJECTION, SOLUTION INTRAVENOUS; SUBCUTANEOUS PRN
Status: DISCONTINUED | OUTPATIENT
Start: 2023-07-04 | End: 2023-07-06

## 2023-07-04 RX ORDER — NITROGLYCERIN 20 MG/100ML
5-200 INJECTION INTRAVENOUS CONTINUOUS
Status: DISCONTINUED | OUTPATIENT
Start: 2023-07-04 | End: 2023-07-04

## 2023-07-04 RX ORDER — ONDANSETRON 2 MG/ML
4 INJECTION INTRAMUSCULAR; INTRAVENOUS EVERY 6 HOURS PRN
Status: DISCONTINUED | OUTPATIENT
Start: 2023-07-04 | End: 2023-07-15 | Stop reason: HOSPADM

## 2023-07-04 RX ORDER — ETOMIDATE 2 MG/ML
INJECTION INTRAVENOUS
Status: COMPLETED
Start: 2023-07-04 | End: 2023-07-04

## 2023-07-04 RX ORDER — FENTANYL CITRATE-0.9 % NACL/PF 20 MCG/2ML
50 SYRINGE (ML) INTRAVENOUS EVERY 30 MIN PRN
Status: DISCONTINUED | OUTPATIENT
Start: 2023-07-04 | End: 2023-07-05

## 2023-07-04 RX ORDER — LABETALOL HYDROCHLORIDE 5 MG/ML
10 INJECTION, SOLUTION INTRAVENOUS EVERY 4 HOURS PRN
Status: DISCONTINUED | OUTPATIENT
Start: 2023-07-04 | End: 2023-07-06

## 2023-07-04 RX ORDER — NOREPINEPHRINE BITARTRATE 0.06 MG/ML
INJECTION, SOLUTION INTRAVENOUS
Status: COMPLETED
Start: 2023-07-04 | End: 2023-07-04

## 2023-07-04 RX ORDER — SODIUM POLYSTYRENE SULFONATE 15 G/60ML
30 SUSPENSION ORAL; RECTAL ONCE
Status: DISCONTINUED | OUTPATIENT
Start: 2023-07-04 | End: 2023-07-04

## 2023-07-04 RX ORDER — DEXTROSE MONOHYDRATE 100 MG/ML
INJECTION, SOLUTION INTRAVENOUS CONTINUOUS PRN
Status: DISCONTINUED | OUTPATIENT
Start: 2023-07-04 | End: 2023-07-15 | Stop reason: HOSPADM

## 2023-07-04 RX ORDER — DEXTROSE MONOHYDRATE 100 MG/ML
INJECTION, SOLUTION INTRAVENOUS CONTINUOUS PRN
Status: DISCONTINUED | OUTPATIENT
Start: 2023-07-04 | End: 2023-07-08

## 2023-07-04 RX ORDER — MIDAZOLAM HYDROCHLORIDE 1 MG/ML
INJECTION INTRAMUSCULAR; INTRAVENOUS DAILY PRN
Status: COMPLETED | OUTPATIENT
Start: 2023-07-04 | End: 2023-07-04

## 2023-07-04 RX ORDER — HEPARIN SODIUM 1000 [USP'U]/ML
40 INJECTION, SOLUTION INTRAVENOUS; SUBCUTANEOUS PRN
Status: DISCONTINUED | OUTPATIENT
Start: 2023-07-04 | End: 2023-07-06

## 2023-07-04 RX ORDER — CALCIUM CHLORIDE 100 MG/ML
INJECTION INTRAVENOUS; INTRAVENTRICULAR DAILY PRN
Status: COMPLETED | OUTPATIENT
Start: 2023-07-04 | End: 2023-07-04

## 2023-07-04 RX ORDER — ETOMIDATE 2 MG/ML
0.3 INJECTION INTRAVENOUS ONCE
Status: COMPLETED | OUTPATIENT
Start: 2023-07-04 | End: 2023-07-04

## 2023-07-04 RX ORDER — INSULIN LISPRO 100 [IU]/ML
0-8 INJECTION, SOLUTION INTRAVENOUS; SUBCUTANEOUS
Status: DISCONTINUED | OUTPATIENT
Start: 2023-07-04 | End: 2023-07-04

## 2023-07-04 RX ORDER — MIDAZOLAM HYDROCHLORIDE 1 MG/ML
INJECTION INTRAMUSCULAR; INTRAVENOUS
Status: DISPENSED
Start: 2023-07-04 | End: 2023-07-04

## 2023-07-04 RX ORDER — INSULIN LISPRO 100 [IU]/ML
0-8 INJECTION, SOLUTION INTRAVENOUS; SUBCUTANEOUS EVERY 4 HOURS
Status: DISCONTINUED | OUTPATIENT
Start: 2023-07-04 | End: 2023-07-08

## 2023-07-04 RX ORDER — ENOXAPARIN SODIUM 100 MG/ML
30 INJECTION SUBCUTANEOUS DAILY
Status: DISCONTINUED | OUTPATIENT
Start: 2023-07-04 | End: 2023-07-04 | Stop reason: ALTCHOICE

## 2023-07-04 RX ORDER — PREDNISONE 10 MG/1
40 TABLET ORAL DAILY
Status: DISCONTINUED | OUTPATIENT
Start: 2023-07-04 | End: 2023-07-10

## 2023-07-04 RX ADMIN — SODIUM BICARBONATE 50 MEQ: 84 INJECTION, SOLUTION INTRAVENOUS at 03:56

## 2023-07-04 RX ADMIN — ROCURONIUM BROMIDE 100 MG: 10 INJECTION, SOLUTION INTRAVENOUS at 03:39

## 2023-07-04 RX ADMIN — CEFTRIAXONE SODIUM 1000 MG: 1 INJECTION, POWDER, FOR SOLUTION INTRAMUSCULAR; INTRAVENOUS at 01:58

## 2023-07-04 RX ADMIN — HEPARIN SODIUM 18 UNITS/KG/HR: 10000 INJECTION, SOLUTION INTRAVENOUS at 06:41

## 2023-07-04 RX ADMIN — INSULIN LISPRO 2 UNITS: 100 INJECTION, SOLUTION INTRAVENOUS; SUBCUTANEOUS at 13:13

## 2023-07-04 RX ADMIN — INSULIN HUMAN 10 UNITS: 100 INJECTION, SOLUTION PARENTERAL at 16:26

## 2023-07-04 RX ADMIN — EPINEPHRINE 1 MG: 0.1 INJECTION INTRACARDIAC; INTRAVENOUS at 03:35

## 2023-07-04 RX ADMIN — FUROSEMIDE 5 MG/HR: 10 INJECTION, SOLUTION INTRAMUSCULAR; INTRAVENOUS at 11:58

## 2023-07-04 RX ADMIN — INSULIN LISPRO 6 UNITS: 100 INJECTION, SOLUTION INTRAVENOUS; SUBCUTANEOUS at 18:34

## 2023-07-04 RX ADMIN — SODIUM ZIRCONIUM CYCLOSILICATE 10 G: 10 POWDER, FOR SUSPENSION ORAL at 21:40

## 2023-07-04 RX ADMIN — NITROGLYCERIN 0.5 INCH: 20 OINTMENT TOPICAL at 18:23

## 2023-07-04 RX ADMIN — FUROSEMIDE 20 MG: 10 INJECTION, SOLUTION INTRAMUSCULAR; INTRAVENOUS at 09:27

## 2023-07-04 RX ADMIN — INSULIN LISPRO 4 UNITS: 100 INJECTION, SOLUTION INTRAVENOUS; SUBCUTANEOUS at 21:05

## 2023-07-04 RX ADMIN — AZITHROMYCIN MONOHYDRATE 500 MG: 500 INJECTION, POWDER, LYOPHILIZED, FOR SOLUTION INTRAVENOUS at 02:37

## 2023-07-04 RX ADMIN — FENTANYL CITRATE 125 MCG/HR: 50 INJECTION INTRAVENOUS at 23:12

## 2023-07-04 RX ADMIN — EPINEPHRINE 1 MG: 0.1 INJECTION INTRACARDIAC; INTRAVENOUS at 03:42

## 2023-07-04 RX ADMIN — NITROGLYCERIN 50 MCG/MIN: 20 INJECTION INTRAVENOUS at 00:44

## 2023-07-04 RX ADMIN — FUROSEMIDE 60 MG: 10 INJECTION, SOLUTION INTRAMUSCULAR; INTRAVENOUS at 11:50

## 2023-07-04 RX ADMIN — FENTANYL CITRATE 50 MCG/HR: 50 INJECTION INTRAVENOUS at 12:00

## 2023-07-04 RX ADMIN — PIPERACILLIN AND TAZOBACTAM 3375 MG: 3; .375 INJECTION, POWDER, LYOPHILIZED, FOR SOLUTION INTRAVENOUS at 18:37

## 2023-07-04 RX ADMIN — ONDANSETRON 4 MG: 2 INJECTION INTRAMUSCULAR; INTRAVENOUS at 17:55

## 2023-07-04 RX ADMIN — EPINEPHRINE 1 MG: 0.1 INJECTION INTRACARDIAC; INTRAVENOUS at 03:38

## 2023-07-04 RX ADMIN — SODIUM CHLORIDE 40 MG: 9 INJECTION INTRAMUSCULAR; INTRAVENOUS; SUBCUTANEOUS at 09:27

## 2023-07-04 RX ADMIN — PERFLUTREN 1.5 ML: 6.52 INJECTION, SUSPENSION INTRAVENOUS at 09:54

## 2023-07-04 RX ADMIN — SODIUM BICARBONATE 50 MEQ: 84 INJECTION, SOLUTION INTRAVENOUS at 16:36

## 2023-07-04 RX ADMIN — METOPROLOL TARTRATE 2.5 MG: 5 INJECTION INTRAVENOUS at 21:03

## 2023-07-04 RX ADMIN — Medication 10 ML: at 09:28

## 2023-07-04 RX ADMIN — NITROGLYCERIN 0.5 INCH: 20 OINTMENT TOPICAL at 14:12

## 2023-07-04 RX ADMIN — SODIUM ZIRCONIUM CYCLOSILICATE 10 G: 10 POWDER, FOR SUSPENSION ORAL at 16:36

## 2023-07-04 RX ADMIN — Medication 5 MCG/MIN: at 03:52

## 2023-07-04 RX ADMIN — ETOMIDATE 20 MG: 2 INJECTION INTRAVENOUS at 03:53

## 2023-07-04 RX ADMIN — CALCIUM GLUCONATE 1000 MG: 98 INJECTION, SOLUTION INTRAVENOUS at 16:24

## 2023-07-04 RX ADMIN — 0.12% CHLORHEXIDINE GLUCONATE 15 ML: 1.2 RINSE ORAL at 21:11

## 2023-07-04 RX ADMIN — SODIUM BICARBONATE 50 MEQ: 84 INJECTION, SOLUTION INTRAVENOUS at 03:35

## 2023-07-04 RX ADMIN — MIDAZOLAM HYDROCHLORIDE 5 MG: 1 INJECTION, SOLUTION INTRAMUSCULAR; INTRAVENOUS at 03:38

## 2023-07-04 RX ADMIN — ETOMIDATE 20 MG: 2 INJECTION, SOLUTION INTRAVENOUS at 03:53

## 2023-07-04 RX ADMIN — PIPERACILLIN AND TAZOBACTAM 3375 MG: 3; .375 INJECTION, POWDER, LYOPHILIZED, FOR SOLUTION INTRAVENOUS at 06:45

## 2023-07-04 RX ADMIN — DEXTROSE MONOHYDRATE 250 ML: 100 INJECTION, SOLUTION INTRAVENOUS at 16:34

## 2023-07-04 RX ADMIN — DEXAMETHASONE SODIUM PHOSPHATE 10 MG: 10 INJECTION INTRAMUSCULAR; INTRAVENOUS at 00:56

## 2023-07-04 RX ADMIN — 0.12% CHLORHEXIDINE GLUCONATE 15 ML: 1.2 RINSE ORAL at 09:27

## 2023-07-04 RX ADMIN — MAGNESIUM SULFATE IN WATER 4000 MG: 40 INJECTION, SOLUTION INTRAVENOUS at 03:38

## 2023-07-04 RX ADMIN — MAGNESIUM SULFATE HEPTAHYDRATE 2000 MG: 40 INJECTION, SOLUTION INTRAVENOUS at 00:45

## 2023-07-04 RX ADMIN — HEPARIN SODIUM 4540 UNITS: 1000 INJECTION INTRAVENOUS; SUBCUTANEOUS at 06:39

## 2023-07-04 RX ADMIN — CALCIUM CHLORIDE 1000 MG: 100 INJECTION INTRAVENOUS; INTRAVENTRICULAR at 03:37

## 2023-07-04 RX ADMIN — METOPROLOL TARTRATE 2.5 MG: 5 INJECTION INTRAVENOUS at 14:14

## 2023-07-04 RX ADMIN — Medication 10 ML: at 21:03

## 2023-07-04 RX ADMIN — NOREPINEPHRINE BITARTRATE 5 MCG/MIN: 0.06 INJECTION, SOLUTION INTRAVENOUS at 03:52

## 2023-07-04 RX ADMIN — FUROSEMIDE 20 MG: 10 INJECTION, SOLUTION INTRAMUSCULAR; INTRAVENOUS at 01:55

## 2023-07-04 RX ADMIN — Medication 50 MEQ: at 03:56

## 2023-07-04 RX ADMIN — FUROSEMIDE 40 MG: 10 INJECTION, SOLUTION INTRAMUSCULAR; INTRAVENOUS at 00:58

## 2023-07-04 RX ADMIN — PREDNISONE 40 MG: 10 TABLET ORAL at 13:14

## 2023-07-04 ASSESSMENT — PULMONARY FUNCTION TESTS
PIF_VALUE: 20
PIF_VALUE: 21
PIF_VALUE: 20
PIF_VALUE: 20
PIF_VALUE: 31
PIF_VALUE: 24
PIF_VALUE: 31
PIF_VALUE: 24
PIF_VALUE: 32
PIF_VALUE: 24
PIF_VALUE: 34
PIF_VALUE: 31
PIF_VALUE: 21
PIF_VALUE: 17
PIF_VALUE: 19
PIF_VALUE: 24
PIF_VALUE: 21
PIF_VALUE: 28
PIF_VALUE: 22
PIF_VALUE: 19
PIF_VALUE: 21
PIF_VALUE: 32
PIF_VALUE: 20
PIF_VALUE: 24
PIF_VALUE: 32
PIF_VALUE: 21
PIF_VALUE: 21
PIF_VALUE: 20
PIF_VALUE: 26
PIF_VALUE: 20
PIF_VALUE: 34
PIF_VALUE: 24
PIF_VALUE: 32
PIF_VALUE: 24
PIF_VALUE: 21
PIF_VALUE: 24
PIF_VALUE: 27
PIF_VALUE: 20
PIF_VALUE: 24
PIF_VALUE: 24
PIF_VALUE: 17
PIF_VALUE: 20
PIF_VALUE: 21
PIF_VALUE: 24
PIF_VALUE: 20
PIF_VALUE: 21
PIF_VALUE: 20
PIF_VALUE: 21
PIF_VALUE: 28
PIF_VALUE: 18
PIF_VALUE: 18
PIF_VALUE: 20
PIF_VALUE: 21
PIF_VALUE: 21
PIF_VALUE: 18
PIF_VALUE: 26
PIF_VALUE: 26
PIF_VALUE: 20
PIF_VALUE: 20
PIF_VALUE: 21
PIF_VALUE: 23
PIF_VALUE: 21
PIF_VALUE: 20
PIF_VALUE: 21
PIF_VALUE: 20
PIF_VALUE: 24
PIF_VALUE: 21
PIF_VALUE: 20
PIF_VALUE: 33
PIF_VALUE: 20
PIF_VALUE: 20
PIF_VALUE: 21
PIF_VALUE: 20
PIF_VALUE: 24
PIF_VALUE: 22
PIF_VALUE: 24

## 2023-07-04 ASSESSMENT — LIFESTYLE VARIABLES
HOW MANY STANDARD DRINKS CONTAINING ALCOHOL DO YOU HAVE ON A TYPICAL DAY: PATIENT DOES NOT DRINK
HOW OFTEN DO YOU HAVE A DRINK CONTAINING ALCOHOL: NEVER

## 2023-07-04 ASSESSMENT — PAIN DESCRIPTION - PAIN TYPE: TYPE: ACUTE PAIN

## 2023-07-04 ASSESSMENT — PAIN SCALES - GENERAL
PAINLEVEL_OUTOF10: 0
PAINLEVEL_OUTOF10: 0
PAINLEVEL_OUTOF10: 8

## 2023-07-04 ASSESSMENT — PAIN DESCRIPTION - ONSET: ONSET: ON-GOING

## 2023-07-04 ASSESSMENT — ENCOUNTER SYMPTOMS
SHORTNESS OF BREATH: 1
ABDOMINAL PAIN: 0
COUGH: 1
CHEST TIGHTNESS: 1

## 2023-07-04 ASSESSMENT — PAIN DESCRIPTION - LOCATION: LOCATION: CHEST

## 2023-07-04 ASSESSMENT — PAIN DESCRIPTION - FREQUENCY: FREQUENCY: CONTINUOUS

## 2023-07-04 ASSESSMENT — PAIN DESCRIPTION - DESCRIPTORS: DESCRIPTORS: ACHING

## 2023-07-04 ASSESSMENT — PAIN DESCRIPTION - ORIENTATION: ORIENTATION: RIGHT;LEFT

## 2023-07-04 ASSESSMENT — PAIN - FUNCTIONAL ASSESSMENT: PAIN_FUNCTIONAL_ASSESSMENT: 0-10

## 2023-07-05 LAB
ALBUMIN SERPL-MCNC: 3.1 G/DL (ref 3.5–4.6)
ALP SERPL-CCNC: 115 U/L (ref 40–130)
ALT SERPL-CCNC: 111 U/L (ref 0–33)
ANION GAP SERPL CALCULATED.3IONS-SCNC: 13 MEQ/L (ref 9–15)
ANION GAP SERPL CALCULATED.3IONS-SCNC: 17 MEQ/L (ref 9–15)
ANTI-XA UNFRAC HEPARIN: 0.33 IU/ML
ANTI-XA UNFRAC HEPARIN: 0.5 IU/ML
ANTI-XA UNFRAC HEPARIN: 0.6 IU/ML
ANTI-XA UNFRAC HEPARIN: 0.73 IU/ML
AST SERPL-CCNC: 61 U/L (ref 0–35)
BASE EXCESS ARTERIAL: 2 (ref -3–3)
BASOPHILS # BLD: 0 K/UL (ref 0–0.2)
BASOPHILS NFR BLD: 0.3 %
BILIRUB SERPL-MCNC: 0.4 MG/DL (ref 0.2–0.7)
BUN SERPL-MCNC: 72 MG/DL (ref 8–23)
BUN SERPL-MCNC: 74 MG/DL (ref 8–23)
CALCIUM IONIZED: 1.23 MMOL/L (ref 1.12–1.32)
CALCIUM SERPL-MCNC: 8.9 MG/DL (ref 8.5–9.9)
CALCIUM SERPL-MCNC: 9.4 MG/DL (ref 8.5–9.9)
CHLORIDE SERPL-SCNC: 96 MEQ/L (ref 95–107)
CHLORIDE SERPL-SCNC: 99 MEQ/L (ref 95–107)
CO2 SERPL-SCNC: 25 MEQ/L (ref 20–31)
CO2 SERPL-SCNC: 26 MEQ/L (ref 20–31)
CREAT SERPL-MCNC: 3.13 MG/DL (ref 0.5–0.9)
CREAT SERPL-MCNC: 3.19 MG/DL (ref 0.5–0.9)
EKG ATRIAL RATE: 109 BPM
EKG ATRIAL RATE: 153 BPM
EKG P AXIS: 60 DEGREES
EKG P AXIS: 72 DEGREES
EKG P-R INTERVAL: 138 MS
EKG P-R INTERVAL: 150 MS
EKG Q-T INTERVAL: 246 MS
EKG Q-T INTERVAL: 342 MS
EKG QRS DURATION: 72 MS
EKG QRS DURATION: 88 MS
EKG QTC CALCULATION (BAZETT): 392 MS
EKG QTC CALCULATION (BAZETT): 460 MS
EKG R AXIS: 26 DEGREES
EKG R AXIS: 55 DEGREES
EKG T AXIS: 114 DEGREES
EKG T AXIS: 78 DEGREES
EKG VENTRICULAR RATE: 109 BPM
EKG VENTRICULAR RATE: 153 BPM
EOSINOPHIL # BLD: 0 K/UL (ref 0–0.7)
EOSINOPHIL NFR BLD: 0 %
ERYTHROCYTE [DISTWIDTH] IN BLOOD BY AUTOMATED COUNT: 14.8 % (ref 11.5–14.5)
GLOBULIN SER CALC-MCNC: 1.8 G/DL (ref 2.3–3.5)
GLUCOSE BLD-MCNC: 126 MG/DL (ref 70–99)
GLUCOSE BLD-MCNC: 130 MG/DL (ref 70–99)
GLUCOSE BLD-MCNC: 154 MG/DL (ref 70–99)
GLUCOSE BLD-MCNC: 156 MG/DL (ref 70–99)
GLUCOSE BLD-MCNC: 187 MG/DL (ref 70–99)
GLUCOSE BLD-MCNC: 198 MG/DL (ref 70–99)
GLUCOSE SERPL-MCNC: 135 MG/DL (ref 70–99)
GLUCOSE SERPL-MCNC: 156 MG/DL (ref 70–99)
HCO3 ARTERIAL: 26.7 MMOL/L (ref 21–29)
HCT VFR BLD AUTO: 24 % (ref 36–48)
HCT VFR BLD AUTO: 25.1 % (ref 37–47)
HGB BLD CALC-MCNC: 8.2 GM/DL (ref 12–16)
HGB BLD-MCNC: 8 G/DL (ref 12–16)
LACTATE: 1.63 MMOL/L (ref 0.4–2)
LYMPHOCYTES # BLD: 0.8 K/UL (ref 1–4.8)
LYMPHOCYTES NFR BLD: 5.2 %
MAGNESIUM SERPL-MCNC: 3.9 MG/DL (ref 1.7–2.4)
MCH RBC QN AUTO: 26.1 PG (ref 27–31.3)
MCHC RBC AUTO-ENTMCNC: 32.1 % (ref 33–37)
MCV RBC AUTO: 81.5 FL (ref 79.4–94.8)
MONOCYTES # BLD: 0.5 K/UL (ref 0.2–0.8)
MONOCYTES NFR BLD: 3.1 %
NEUTROPHILS # BLD: 13.7 K/UL (ref 1.4–6.5)
NEUTS SEG NFR BLD: 91.4 %
O2 SAT, ARTERIAL: 96 % (ref 93–100)
PCO2 ARTERIAL: 43 MM HG (ref 35–45)
PERFORMED ON: ABNORMAL
PH ARTERIAL: 7.4 (ref 7.35–7.45)
PLATELET # BLD AUTO: 194 K/UL (ref 130–400)
PO2 ARTERIAL: 82 MM HG (ref 75–108)
POC CHLORIDE: 100 MEQ/L (ref 99–110)
POC CREATININE: 3.3 MG/DL (ref 0.6–1.2)
POC FIO2: 40
POC SAMPLE TYPE: ABNORMAL
POTASSIUM SERPL-SCNC: 4.4 MEQ/L (ref 3.5–5.1)
POTASSIUM SERPL-SCNC: 4.8 MEQ/L (ref 3.4–4.9)
POTASSIUM SERPL-SCNC: 5.4 MEQ/L (ref 3.4–4.9)
PROT SERPL-MCNC: 4.9 G/DL (ref 6.3–8)
RBC # BLD AUTO: 3.08 M/UL (ref 4.2–5.4)
SODIUM BLD-SCNC: 134 MEQ/L (ref 136–145)
SODIUM SERPL-SCNC: 138 MEQ/L (ref 135–144)
SODIUM SERPL-SCNC: 138 MEQ/L (ref 135–144)
TCO2 ARTERIAL: 28 MMOL/L (ref 21–32)
WBC # BLD AUTO: 15 K/UL (ref 4.8–10.8)

## 2023-07-05 PROCEDURE — 82803 BLOOD GASES ANY COMBINATION: CPT

## 2023-07-05 PROCEDURE — 94660 CPAP INITIATION&MGMT: CPT

## 2023-07-05 PROCEDURE — 36600 WITHDRAWAL OF ARTERIAL BLOOD: CPT

## 2023-07-05 PROCEDURE — 82330 ASSAY OF CALCIUM: CPT

## 2023-07-05 PROCEDURE — 2580000003 HC RX 258: Performed by: INTERNAL MEDICINE

## 2023-07-05 PROCEDURE — 6370000000 HC RX 637 (ALT 250 FOR IP): Performed by: INTERNAL MEDICINE

## 2023-07-05 PROCEDURE — 6370000000 HC RX 637 (ALT 250 FOR IP): Performed by: NURSE PRACTITIONER

## 2023-07-05 PROCEDURE — C9113 INJ PANTOPRAZOLE SODIUM, VIA: HCPCS | Performed by: NURSE PRACTITIONER

## 2023-07-05 PROCEDURE — 82435 ASSAY OF BLOOD CHLORIDE: CPT

## 2023-07-05 PROCEDURE — 93010 ELECTROCARDIOGRAM REPORT: CPT | Performed by: INTERNAL MEDICINE

## 2023-07-05 PROCEDURE — 83605 ASSAY OF LACTIC ACID: CPT

## 2023-07-05 PROCEDURE — 6360000002 HC RX W HCPCS: Performed by: INTERNAL MEDICINE

## 2023-07-05 PROCEDURE — 2500000003 HC RX 250 WO HCPCS: Performed by: INTERNAL MEDICINE

## 2023-07-05 PROCEDURE — 84132 ASSAY OF SERUM POTASSIUM: CPT

## 2023-07-05 PROCEDURE — 94761 N-INVAS EAR/PLS OXIMETRY MLT: CPT

## 2023-07-05 PROCEDURE — 2580000003 HC RX 258: Performed by: NURSE PRACTITIONER

## 2023-07-05 PROCEDURE — 99291 CRITICAL CARE FIRST HOUR: CPT | Performed by: INTERNAL MEDICINE

## 2023-07-05 PROCEDURE — 82565 ASSAY OF CREATININE: CPT

## 2023-07-05 PROCEDURE — 85025 COMPLETE CBC W/AUTO DIFF WBC: CPT

## 2023-07-05 PROCEDURE — 99232 SBSQ HOSP IP/OBS MODERATE 35: CPT | Performed by: PSYCHIATRY & NEUROLOGY

## 2023-07-05 PROCEDURE — A4216 STERILE WATER/SALINE, 10 ML: HCPCS | Performed by: NURSE PRACTITIONER

## 2023-07-05 PROCEDURE — 83735 ASSAY OF MAGNESIUM: CPT

## 2023-07-05 PROCEDURE — 80053 COMPREHEN METABOLIC PANEL: CPT

## 2023-07-05 PROCEDURE — 82948 REAGENT STRIP/BLOOD GLUCOSE: CPT

## 2023-07-05 PROCEDURE — 2000000000 HC ICU R&B

## 2023-07-05 PROCEDURE — 93005 ELECTROCARDIOGRAM TRACING: CPT | Performed by: INTERNAL MEDICINE

## 2023-07-05 PROCEDURE — 85014 HEMATOCRIT: CPT

## 2023-07-05 PROCEDURE — 94003 VENT MGMT INPAT SUBQ DAY: CPT

## 2023-07-05 PROCEDURE — 6360000002 HC RX W HCPCS: Performed by: NURSE PRACTITIONER

## 2023-07-05 PROCEDURE — 85520 HEPARIN ASSAY: CPT

## 2023-07-05 PROCEDURE — 84295 ASSAY OF SERUM SODIUM: CPT

## 2023-07-05 PROCEDURE — 2700000000 HC OXYGEN THERAPY PER DAY

## 2023-07-05 RX ORDER — METOPROLOL TARTRATE 5 MG/5ML
2.5 INJECTION INTRAVENOUS EVERY 6 HOURS
Status: DISCONTINUED | OUTPATIENT
Start: 2023-07-05 | End: 2023-07-06

## 2023-07-05 RX ORDER — ONDANSETRON 4 MG/1
4 TABLET, FILM COATED ORAL ONCE
Status: DISCONTINUED | OUTPATIENT
Start: 2023-07-05 | End: 2023-07-15 | Stop reason: HOSPADM

## 2023-07-05 RX ORDER — METOPROLOL TARTRATE 50 MG/1
100 TABLET, FILM COATED ORAL 2 TIMES DAILY
Status: DISCONTINUED | OUTPATIENT
Start: 2023-07-05 | End: 2023-07-06

## 2023-07-05 RX ORDER — CLONIDINE 0.2 MG/24H
1 PATCH, EXTENDED RELEASE TRANSDERMAL WEEKLY
Status: DISCONTINUED | OUTPATIENT
Start: 2023-07-05 | End: 2023-07-15 | Stop reason: HOSPADM

## 2023-07-05 RX ADMIN — NITROGLYCERIN 0.5 INCH: 20 OINTMENT TOPICAL at 05:56

## 2023-07-05 RX ADMIN — METOPROLOL TARTRATE 2.5 MG: 5 INJECTION INTRAVENOUS at 01:55

## 2023-07-05 RX ADMIN — METOPROLOL TARTRATE 2.5 MG: 5 INJECTION INTRAVENOUS at 21:50

## 2023-07-05 RX ADMIN — PIPERACILLIN AND TAZOBACTAM 3375 MG: 3; .375 INJECTION, POWDER, LYOPHILIZED, FOR SOLUTION INTRAVENOUS at 05:59

## 2023-07-05 RX ADMIN — HYDRALAZINE HYDROCHLORIDE 10 MG: 20 INJECTION INTRAMUSCULAR; INTRAVENOUS at 20:24

## 2023-07-05 RX ADMIN — NITROGLYCERIN 0.5 INCH: 20 OINTMENT TOPICAL at 01:53

## 2023-07-05 RX ADMIN — ONDANSETRON 4 MG: 2 INJECTION INTRAMUSCULAR; INTRAVENOUS at 10:44

## 2023-07-05 RX ADMIN — NITROGLYCERIN 0.5 INCH: 20 OINTMENT TOPICAL at 17:28

## 2023-07-05 RX ADMIN — PREDNISONE 40 MG: 10 TABLET ORAL at 08:35

## 2023-07-05 RX ADMIN — HEPARIN SODIUM 15 UNITS/KG/HR: 10000 INJECTION, SOLUTION INTRAVENOUS at 08:34

## 2023-07-05 RX ADMIN — METOPROLOL TARTRATE 100 MG: 50 TABLET ORAL at 09:31

## 2023-07-05 RX ADMIN — FUROSEMIDE 5 MG/HR: 10 INJECTION, SOLUTION INTRAMUSCULAR; INTRAVENOUS at 05:48

## 2023-07-05 RX ADMIN — METOPROLOL TARTRATE 2.5 MG: 5 INJECTION INTRAVENOUS at 08:34

## 2023-07-05 RX ADMIN — 0.12% CHLORHEXIDINE GLUCONATE 15 ML: 1.2 RINSE ORAL at 08:35

## 2023-07-05 RX ADMIN — SODIUM CHLORIDE 40 MG: 9 INJECTION INTRAMUSCULAR; INTRAVENOUS; SUBCUTANEOUS at 08:34

## 2023-07-05 RX ADMIN — Medication 10 ML: at 08:36

## 2023-07-05 RX ADMIN — HYDRALAZINE HYDROCHLORIDE 10 MG: 20 INJECTION INTRAMUSCULAR; INTRAVENOUS at 05:52

## 2023-07-05 RX ADMIN — PIPERACILLIN AND TAZOBACTAM 3375 MG: 3; .375 INJECTION, POWDER, LYOPHILIZED, FOR SOLUTION INTRAVENOUS at 17:34

## 2023-07-05 RX ADMIN — NITROGLYCERIN 0.5 INCH: 20 OINTMENT TOPICAL at 11:25

## 2023-07-05 RX ADMIN — FENTANYL CITRATE 150 MCG/HR: 50 INJECTION INTRAVENOUS at 07:28

## 2023-07-05 RX ADMIN — LABETALOL HYDROCHLORIDE 10 MG: 5 INJECTION, SOLUTION INTRAVENOUS at 06:46

## 2023-07-05 RX ADMIN — ONDANSETRON 4 MG: 2 INJECTION INTRAMUSCULAR; INTRAVENOUS at 06:51

## 2023-07-05 RX ADMIN — LABETALOL HYDROCHLORIDE 10 MG: 5 INJECTION, SOLUTION INTRAVENOUS at 15:05

## 2023-07-05 RX ADMIN — LABETALOL HYDROCHLORIDE 10 MG: 5 INJECTION, SOLUTION INTRAVENOUS at 23:27

## 2023-07-05 RX ADMIN — Medication 10 ML: at 21:54

## 2023-07-05 ASSESSMENT — PULMONARY FUNCTION TESTS
PIF_VALUE: 21
PIF_VALUE: 22
PIF_VALUE: 21
PIF_VALUE: 19
PIF_VALUE: 17
PIF_VALUE: 22
PIF_VALUE: 19
PIF_VALUE: 25
PIF_VALUE: 21
PIF_VALUE: 2
PIF_VALUE: 21
PIF_VALUE: 22
PIF_VALUE: 26
PIF_VALUE: 21
PIF_VALUE: 21
PIF_VALUE: 15
PIF_VALUE: 27
PIF_VALUE: 21
PIF_VALUE: 23
PIF_VALUE: 21
PIF_VALUE: 23
PIF_VALUE: 22
PIF_VALUE: 21
PIF_VALUE: 22
PIF_VALUE: 20
PIF_VALUE: 22
PIF_VALUE: 22
PIF_VALUE: 16
PIF_VALUE: 21
PIF_VALUE: 24
PIF_VALUE: 19
PIF_VALUE: 23
PIF_VALUE: 22

## 2023-07-05 ASSESSMENT — PAIN SCALES - GENERAL
PAINLEVEL_OUTOF10: 0

## 2023-07-05 ASSESSMENT — ENCOUNTER SYMPTOMS
PHOTOPHOBIA: 0
BACK PAIN: 0
TROUBLE SWALLOWING: 0
VOMITING: 0
NAUSEA: 0
SHORTNESS OF BREATH: 0
CHOKING: 0
COLOR CHANGE: 0

## 2023-07-06 PROBLEM — I46.9 PEA (PULSELESS ELECTRICAL ACTIVITY) (HCC): Status: ACTIVE | Noted: 2023-07-06

## 2023-07-06 LAB
ACANTHOCYTES BLD QL SMEAR: ABNORMAL
ALBUMIN SERPL-MCNC: 3.2 G/DL (ref 3.5–4.6)
ALP SERPL-CCNC: 101 U/L (ref 40–130)
ALT SERPL-CCNC: 77 U/L (ref 0–33)
ANION GAP SERPL CALCULATED.3IONS-SCNC: 15 MEQ/L (ref 9–15)
ANISOCYTOSIS BLD QL SMEAR: ABNORMAL
ANTI-XA UNFRAC HEPARIN: 0.23 IU/ML
ANTI-XA UNFRAC HEPARIN: 0.34 IU/ML
ANTI-XA UNFRAC HEPARIN: 0.43 IU/ML
AST SERPL-CCNC: 26 U/L (ref 0–35)
BASOPHILS # BLD: 0 K/UL (ref 0–0.2)
BASOPHILS NFR BLD: 0 %
BILIRUB SERPL-MCNC: 0.5 MG/DL (ref 0.2–0.7)
BUN SERPL-MCNC: 73 MG/DL (ref 8–23)
CALCIUM SERPL-MCNC: 8.8 MG/DL (ref 8.5–9.9)
CHLORIDE SERPL-SCNC: 97 MEQ/L (ref 95–107)
CO2 SERPL-SCNC: 27 MEQ/L (ref 20–31)
CREAT SERPL-MCNC: 3 MG/DL (ref 0.5–0.9)
EKG ATRIAL RATE: 106 BPM
EKG P AXIS: 58 DEGREES
EKG P-R INTERVAL: 146 MS
EKG Q-T INTERVAL: 400 MS
EKG QRS DURATION: 74 MS
EKG QTC CALCULATION (BAZETT): 531 MS
EKG R AXIS: 18 DEGREES
EKG T AXIS: 161 DEGREES
EKG VENTRICULAR RATE: 106 BPM
EOSINOPHIL # BLD: 0 K/UL (ref 0–0.7)
EOSINOPHIL NFR BLD: 0 %
ERYTHROCYTE [DISTWIDTH] IN BLOOD BY AUTOMATED COUNT: 15.1 % (ref 11.5–14.5)
GLOBULIN SER CALC-MCNC: 2.2 G/DL (ref 2.3–3.5)
GLUCOSE BLD-MCNC: 159 MG/DL (ref 70–99)
GLUCOSE BLD-MCNC: 167 MG/DL (ref 70–99)
GLUCOSE BLD-MCNC: 180 MG/DL (ref 70–99)
GLUCOSE BLD-MCNC: 252 MG/DL (ref 70–99)
GLUCOSE BLD-MCNC: 301 MG/DL (ref 70–99)
GLUCOSE SERPL-MCNC: 159 MG/DL (ref 70–99)
HCT VFR BLD AUTO: 26.6 % (ref 37–47)
HGB BLD-MCNC: 8.7 G/DL (ref 12–16)
LYMPHOCYTES # BLD: 1.3 K/UL (ref 1–4.8)
LYMPHOCYTES NFR BLD: 13 %
MAGNESIUM SERPL-MCNC: 3.2 MG/DL (ref 1.7–2.4)
MCH RBC QN AUTO: 26.8 PG (ref 27–31.3)
MCHC RBC AUTO-ENTMCNC: 32.8 % (ref 33–37)
MCV RBC AUTO: 81.8 FL (ref 79.4–94.8)
METAMYELOCYTES NFR BLD MANUAL: 1 %
MONOCYTES # BLD: 0.7 K/UL (ref 0.2–0.8)
MONOCYTES NFR BLD: 7 %
NEUTROPHILS # BLD: 8.2 K/UL (ref 1.4–6.5)
NEUTS SEG NFR BLD: 79 %
NRBC BLD-RTO: 3 /100 WBC
OVALOCYTES BLD QL SMEAR: ABNORMAL
PERFORMED ON: ABNORMAL
PLATELET # BLD AUTO: 254 K/UL (ref 130–400)
PLATELET BLD QL SMEAR: ADEQUATE
POC CREATININE: 2.5 MG/DL (ref 0.6–1.2)
POC SAMPLE TYPE: ABNORMAL
POIKILOCYTOSIS BLD QL SMEAR: ABNORMAL
POLYCHROMASIA BLD QL SMEAR: ABNORMAL
POTASSIUM SERPL-SCNC: 4.5 MEQ/L (ref 3.4–4.9)
PROT SERPL-MCNC: 5.4 G/DL (ref 6.3–8)
RBC # BLD AUTO: 3.24 M/UL (ref 4.2–5.4)
SODIUM SERPL-SCNC: 139 MEQ/L (ref 135–144)
WBC # BLD AUTO: 10.3 K/UL (ref 4.8–10.8)

## 2023-07-06 PROCEDURE — 36592 COLLECT BLOOD FROM PICC: CPT

## 2023-07-06 PROCEDURE — 6370000000 HC RX 637 (ALT 250 FOR IP): Performed by: INTERNAL MEDICINE

## 2023-07-06 PROCEDURE — 2580000003 HC RX 258: Performed by: INTERNAL MEDICINE

## 2023-07-06 PROCEDURE — 85025 COMPLETE CBC W/AUTO DIFF WBC: CPT

## 2023-07-06 PROCEDURE — 99232 SBSQ HOSP IP/OBS MODERATE 35: CPT | Performed by: PSYCHIATRY & NEUROLOGY

## 2023-07-06 PROCEDURE — 99233 SBSQ HOSP IP/OBS HIGH 50: CPT | Performed by: INTERNAL MEDICINE

## 2023-07-06 PROCEDURE — A4216 STERILE WATER/SALINE, 10 ML: HCPCS | Performed by: NURSE PRACTITIONER

## 2023-07-06 PROCEDURE — 6360000002 HC RX W HCPCS: Performed by: INTERNAL MEDICINE

## 2023-07-06 PROCEDURE — 92610 EVALUATE SWALLOWING FUNCTION: CPT

## 2023-07-06 PROCEDURE — 2580000003 HC RX 258: Performed by: NURSE PRACTITIONER

## 2023-07-06 PROCEDURE — 99291 CRITICAL CARE FIRST HOUR: CPT | Performed by: INTERNAL MEDICINE

## 2023-07-06 PROCEDURE — C9113 INJ PANTOPRAZOLE SODIUM, VIA: HCPCS | Performed by: NURSE PRACTITIONER

## 2023-07-06 PROCEDURE — 2500000003 HC RX 250 WO HCPCS: Performed by: INTERNAL MEDICINE

## 2023-07-06 PROCEDURE — 85520 HEPARIN ASSAY: CPT

## 2023-07-06 PROCEDURE — 6370000000 HC RX 637 (ALT 250 FOR IP): Performed by: NURSE PRACTITIONER

## 2023-07-06 PROCEDURE — 80053 COMPREHEN METABOLIC PANEL: CPT

## 2023-07-06 PROCEDURE — 2700000000 HC OXYGEN THERAPY PER DAY

## 2023-07-06 PROCEDURE — 2000000000 HC ICU R&B

## 2023-07-06 PROCEDURE — 83735 ASSAY OF MAGNESIUM: CPT

## 2023-07-06 PROCEDURE — 6360000002 HC RX W HCPCS: Performed by: NURSE PRACTITIONER

## 2023-07-06 RX ORDER — LABETALOL HYDROCHLORIDE 5 MG/ML
10 INJECTION, SOLUTION INTRAVENOUS
Status: DISCONTINUED | OUTPATIENT
Start: 2023-07-06 | End: 2023-07-07

## 2023-07-06 RX ORDER — LIDOCAINE 4 G/G
1 PATCH TOPICAL DAILY
Status: DISCONTINUED | OUTPATIENT
Start: 2023-07-06 | End: 2023-07-15 | Stop reason: HOSPADM

## 2023-07-06 RX ORDER — LEVOTHYROXINE SODIUM 0.05 MG/1
50 TABLET ORAL DAILY
Status: DISCONTINUED | OUTPATIENT
Start: 2023-07-06 | End: 2023-07-15 | Stop reason: HOSPADM

## 2023-07-06 RX ORDER — DOCUSATE SODIUM 100 MG/1
100 CAPSULE, LIQUID FILLED ORAL 2 TIMES DAILY
Status: DISCONTINUED | OUTPATIENT
Start: 2023-07-06 | End: 2023-07-15 | Stop reason: HOSPADM

## 2023-07-06 RX ORDER — HYDRALAZINE HYDROCHLORIDE 100 MG/1
100 TABLET, FILM COATED ORAL 2 TIMES DAILY
Status: DISCONTINUED | OUTPATIENT
Start: 2023-07-06 | End: 2023-07-09

## 2023-07-06 RX ORDER — HEPARIN SODIUM 1000 [USP'U]/ML
40 INJECTION, SOLUTION INTRAVENOUS; SUBCUTANEOUS PRN
Status: DISCONTINUED | OUTPATIENT
Start: 2023-07-06 | End: 2023-07-08 | Stop reason: ALTCHOICE

## 2023-07-06 RX ORDER — HEPARIN SODIUM 10000 [USP'U]/100ML
5-30 INJECTION, SOLUTION INTRAVENOUS CONTINUOUS
Status: DISCONTINUED | OUTPATIENT
Start: 2023-07-06 | End: 2023-07-07

## 2023-07-06 RX ORDER — ISOSORBIDE MONONITRATE 60 MG/1
60 TABLET, EXTENDED RELEASE ORAL DAILY
Status: DISCONTINUED | OUTPATIENT
Start: 2023-07-06 | End: 2023-07-13

## 2023-07-06 RX ORDER — VALSARTAN 80 MG/1
80 TABLET ORAL 2 TIMES DAILY
Status: DISCONTINUED | OUTPATIENT
Start: 2023-07-06 | End: 2023-07-07

## 2023-07-06 RX ORDER — ENALAPRILAT 2.5 MG/2ML
1.25 INJECTION INTRAVENOUS EVERY 6 HOURS SCHEDULED
Status: DISCONTINUED | OUTPATIENT
Start: 2023-07-06 | End: 2023-07-06

## 2023-07-06 RX ORDER — CARVEDILOL 12.5 MG/1
12.5 TABLET ORAL 2 TIMES DAILY WITH MEALS
Status: DISCONTINUED | OUTPATIENT
Start: 2023-07-06 | End: 2023-07-07

## 2023-07-06 RX ORDER — HEPARIN SODIUM 1000 [USP'U]/ML
80 INJECTION, SOLUTION INTRAVENOUS; SUBCUTANEOUS PRN
Status: DISCONTINUED | OUTPATIENT
Start: 2023-07-06 | End: 2023-07-08 | Stop reason: ALTCHOICE

## 2023-07-06 RX ADMIN — HYDRALAZINE HYDROCHLORIDE 100 MG: 100 TABLET, FILM COATED ORAL at 20:12

## 2023-07-06 RX ADMIN — HYDRALAZINE HYDROCHLORIDE 100 MG: 100 TABLET, FILM COATED ORAL at 10:39

## 2023-07-06 RX ADMIN — INSULIN LISPRO 4 UNITS: 100 INJECTION, SOLUTION INTRAVENOUS; SUBCUTANEOUS at 20:14

## 2023-07-06 RX ADMIN — FUROSEMIDE 10 MG/HR: 10 INJECTION, SOLUTION INTRAMUSCULAR; INTRAVENOUS at 16:04

## 2023-07-06 RX ADMIN — HYDRALAZINE HYDROCHLORIDE 10 MG: 20 INJECTION INTRAMUSCULAR; INTRAVENOUS at 11:29

## 2023-07-06 RX ADMIN — LABETALOL HYDROCHLORIDE 10 MG: 5 INJECTION, SOLUTION INTRAVENOUS at 03:05

## 2023-07-06 RX ADMIN — NITROGLYCERIN 0.5 INCH: 20 OINTMENT TOPICAL at 00:24

## 2023-07-06 RX ADMIN — LABETALOL HYDROCHLORIDE 10 MG: 5 INJECTION, SOLUTION INTRAVENOUS at 12:29

## 2023-07-06 RX ADMIN — PIPERACILLIN AND TAZOBACTAM 3375 MG: 3; .375 INJECTION, POWDER, LYOPHILIZED, FOR SOLUTION INTRAVENOUS at 05:42

## 2023-07-06 RX ADMIN — LABETALOL HYDROCHLORIDE 10 MG: 5 INJECTION, SOLUTION INTRAVENOUS at 15:05

## 2023-07-06 RX ADMIN — LEVOTHYROXINE SODIUM 50 MCG: 0.1 TABLET ORAL at 10:39

## 2023-07-06 RX ADMIN — VALSARTAN 80 MG: 80 TABLET, FILM COATED ORAL at 20:12

## 2023-07-06 RX ADMIN — PREDNISONE 40 MG: 10 TABLET ORAL at 08:00

## 2023-07-06 RX ADMIN — ENALAPRILAT 1.25 MG: 1.25 INJECTION INTRAVENOUS at 08:04

## 2023-07-06 RX ADMIN — SODIUM CHLORIDE 40 MG: 9 INJECTION INTRAMUSCULAR; INTRAVENOUS; SUBCUTANEOUS at 08:00

## 2023-07-06 RX ADMIN — VALSARTAN 80 MG: 80 TABLET, FILM COATED ORAL at 11:46

## 2023-07-06 RX ADMIN — LABETALOL HYDROCHLORIDE 10 MG: 5 INJECTION, SOLUTION INTRAVENOUS at 06:05

## 2023-07-06 RX ADMIN — PIPERACILLIN AND TAZOBACTAM 3375 MG: 3; .375 INJECTION, POWDER, LYOPHILIZED, FOR SOLUTION INTRAVENOUS at 17:06

## 2023-07-06 RX ADMIN — HEPARIN SODIUM 16 UNITS/KG/HR: 10000 INJECTION, SOLUTION INTRAVENOUS at 15:02

## 2023-07-06 RX ADMIN — INSULIN LISPRO 6 UNITS: 100 INJECTION, SOLUTION INTRAVENOUS; SUBCUTANEOUS at 16:46

## 2023-07-06 RX ADMIN — Medication 10 ML: at 20:12

## 2023-07-06 RX ADMIN — ISOSORBIDE MONONITRATE 60 MG: 120 TABLET ORAL at 10:39

## 2023-07-06 RX ADMIN — METOPROLOL TARTRATE 2.5 MG: 5 INJECTION INTRAVENOUS at 08:00

## 2023-07-06 RX ADMIN — FUROSEMIDE 5 MG/HR: 10 INJECTION, SOLUTION INTRAMUSCULAR; INTRAVENOUS at 04:01

## 2023-07-06 RX ADMIN — DOCUSATE SODIUM 100 MG: 100 CAPSULE, LIQUID FILLED ORAL at 10:39

## 2023-07-06 RX ADMIN — HEPARIN SODIUM 2600 UNITS: 1000 INJECTION INTRAVENOUS; SUBCUTANEOUS at 04:27

## 2023-07-06 RX ADMIN — NITROGLYCERIN 0.5 INCH: 20 OINTMENT TOPICAL at 05:42

## 2023-07-06 RX ADMIN — METOPROLOL TARTRATE 2.5 MG: 5 INJECTION INTRAVENOUS at 03:50

## 2023-07-06 RX ADMIN — HEPARIN SODIUM 16 UNITS/KG/HR: 10000 INJECTION, SOLUTION INTRAVENOUS at 04:40

## 2023-07-06 RX ADMIN — DOCUSATE SODIUM 100 MG: 100 CAPSULE, LIQUID FILLED ORAL at 20:12

## 2023-07-06 RX ADMIN — Medication 10 ML: at 08:10

## 2023-07-06 RX ADMIN — CARVEDILOL 12.5 MG: 12.5 TABLET, FILM COATED ORAL at 18:11

## 2023-07-06 ASSESSMENT — ENCOUNTER SYMPTOMS
TROUBLE SWALLOWING: 0
SHORTNESS OF BREATH: 0
CHOKING: 0
VOMITING: 0
NAUSEA: 0
COLOR CHANGE: 0
PHOTOPHOBIA: 0
BACK PAIN: 0

## 2023-07-06 ASSESSMENT — PAIN SCALES - GENERAL
PAINLEVEL_OUTOF10: 0

## 2023-07-07 LAB
ALBUMIN SERPL-MCNC: 3 G/DL (ref 3.5–4.6)
ANION GAP SERPL CALCULATED.3IONS-SCNC: 13 MEQ/L (ref 9–15)
ANTI-XA UNFRAC HEPARIN: 0.42 IU/ML
BASOPHILS # BLD: 0 K/UL (ref 0–0.2)
BASOPHILS NFR BLD: 0.2 %
BUN SERPL-MCNC: 69 MG/DL (ref 8–23)
CALCIUM SERPL-MCNC: 8.4 MG/DL (ref 8.5–9.9)
CHLORIDE SERPL-SCNC: 94 MEQ/L (ref 95–107)
CO2 SERPL-SCNC: 29 MEQ/L (ref 20–31)
CREAT SERPL-MCNC: 2.58 MG/DL (ref 0.5–0.9)
EKG ATRIAL RATE: 34 BPM
EKG Q-T INTERVAL: 366 MS
EKG QRS DURATION: 80 MS
EKG QTC CALCULATION (BAZETT): 556 MS
EKG R AXIS: 57 DEGREES
EKG T AXIS: 84 DEGREES
EKG VENTRICULAR RATE: 139 BPM
EOSINOPHIL # BLD: 0.2 K/UL (ref 0–0.7)
EOSINOPHIL NFR BLD: 1.4 %
ERYTHROCYTE [DISTWIDTH] IN BLOOD BY AUTOMATED COUNT: 15.5 % (ref 11.5–14.5)
GLUCOSE BLD-MCNC: 134 MG/DL (ref 70–99)
GLUCOSE BLD-MCNC: 160 MG/DL (ref 70–99)
GLUCOSE BLD-MCNC: 178 MG/DL (ref 70–99)
GLUCOSE BLD-MCNC: 182 MG/DL (ref 70–99)
GLUCOSE BLD-MCNC: 263 MG/DL (ref 70–99)
GLUCOSE SERPL-MCNC: 148 MG/DL (ref 70–99)
HCT VFR BLD AUTO: 21.3 % (ref 37–47)
HCT VFR BLD AUTO: 23.8 % (ref 37–47)
HGB BLD-MCNC: 6.9 G/DL (ref 12–16)
HGB BLD-MCNC: 7.8 G/DL (ref 12–16)
LYMPHOCYTES # BLD: 1 K/UL (ref 1–4.8)
LYMPHOCYTES NFR BLD: 7.4 %
MAGNESIUM SERPL-MCNC: 2.8 MG/DL (ref 1.7–2.4)
MCH RBC QN AUTO: 26.4 PG (ref 27–31.3)
MCHC RBC AUTO-ENTMCNC: 32.3 % (ref 33–37)
MCV RBC AUTO: 81.8 FL (ref 79.4–94.8)
MONOCYTES # BLD: 0.7 K/UL (ref 0.2–0.8)
MONOCYTES NFR BLD: 5.1 %
NEUTROPHILS # BLD: 11 K/UL (ref 1.4–6.5)
NEUTS SEG NFR BLD: 85.9 %
PERFORMED ON: ABNORMAL
PHOSPHATE SERPL-MCNC: 4.5 MG/DL (ref 2.3–4.8)
PLATELET # BLD AUTO: 295 K/UL (ref 130–400)
POTASSIUM SERPL-SCNC: 3.5 MEQ/L (ref 3.4–4.9)
PROCALCITONIN SERPL IA-MCNC: 6.99 NG/ML (ref 0–0.15)
RBC # BLD AUTO: 2.6 M/UL (ref 4.2–5.4)
SODIUM SERPL-SCNC: 136 MEQ/L (ref 135–144)
WBC # BLD AUTO: 12.8 K/UL (ref 4.8–10.8)

## 2023-07-07 PROCEDURE — 85025 COMPLETE CBC W/AUTO DIFF WBC: CPT

## 2023-07-07 PROCEDURE — 85014 HEMATOCRIT: CPT

## 2023-07-07 PROCEDURE — 6370000000 HC RX 637 (ALT 250 FOR IP): Performed by: INTERNAL MEDICINE

## 2023-07-07 PROCEDURE — 85520 HEPARIN ASSAY: CPT

## 2023-07-07 PROCEDURE — 36592 COLLECT BLOOD FROM PICC: CPT

## 2023-07-07 PROCEDURE — 97166 OT EVAL MOD COMPLEX 45 MIN: CPT

## 2023-07-07 PROCEDURE — 36415 COLL VENOUS BLD VENIPUNCTURE: CPT

## 2023-07-07 PROCEDURE — 2580000003 HC RX 258: Performed by: NURSE PRACTITIONER

## 2023-07-07 PROCEDURE — 6360000002 HC RX W HCPCS: Performed by: NURSE PRACTITIONER

## 2023-07-07 PROCEDURE — 84145 PROCALCITONIN (PCT): CPT

## 2023-07-07 PROCEDURE — 6360000002 HC RX W HCPCS: Performed by: INTERNAL MEDICINE

## 2023-07-07 PROCEDURE — 2580000003 HC RX 258: Performed by: INTERNAL MEDICINE

## 2023-07-07 PROCEDURE — 97162 PT EVAL MOD COMPLEX 30 MIN: CPT

## 2023-07-07 PROCEDURE — 6370000000 HC RX 637 (ALT 250 FOR IP): Performed by: NURSE PRACTITIONER

## 2023-07-07 PROCEDURE — 2000000000 HC ICU R&B

## 2023-07-07 PROCEDURE — 93005 ELECTROCARDIOGRAM TRACING: CPT | Performed by: INTERNAL MEDICINE

## 2023-07-07 PROCEDURE — 85018 HEMOGLOBIN: CPT

## 2023-07-07 PROCEDURE — 99232 SBSQ HOSP IP/OBS MODERATE 35: CPT | Performed by: PSYCHIATRY & NEUROLOGY

## 2023-07-07 PROCEDURE — 2700000000 HC OXYGEN THERAPY PER DAY

## 2023-07-07 PROCEDURE — 80069 RENAL FUNCTION PANEL: CPT

## 2023-07-07 PROCEDURE — C9113 INJ PANTOPRAZOLE SODIUM, VIA: HCPCS | Performed by: NURSE PRACTITIONER

## 2023-07-07 PROCEDURE — A4216 STERILE WATER/SALINE, 10 ML: HCPCS | Performed by: NURSE PRACTITIONER

## 2023-07-07 PROCEDURE — 93010 ELECTROCARDIOGRAM REPORT: CPT | Performed by: INTERNAL MEDICINE

## 2023-07-07 PROCEDURE — 99232 SBSQ HOSP IP/OBS MODERATE 35: CPT | Performed by: INTERNAL MEDICINE

## 2023-07-07 PROCEDURE — 83735 ASSAY OF MAGNESIUM: CPT

## 2023-07-07 RX ORDER — ROSUVASTATIN CALCIUM 20 MG/1
20 TABLET, COATED ORAL NIGHTLY
Status: DISCONTINUED | OUTPATIENT
Start: 2023-07-07 | End: 2023-07-10

## 2023-07-07 RX ORDER — POLYETHYLENE GLYCOL 3350 17 G/17G
17 POWDER, FOR SOLUTION ORAL DAILY
Status: DISCONTINUED | OUTPATIENT
Start: 2023-07-07 | End: 2023-07-15 | Stop reason: HOSPADM

## 2023-07-07 RX ORDER — LIDOCAINE 4 G/G
1 PATCH TOPICAL DAILY
Status: DISCONTINUED | OUTPATIENT
Start: 2023-07-07 | End: 2023-07-07 | Stop reason: SDUPTHER

## 2023-07-07 RX ORDER — METOPROLOL TARTRATE 5 MG/5ML
5 INJECTION INTRAVENOUS
Status: DISCONTINUED | OUTPATIENT
Start: 2023-07-07 | End: 2023-07-15 | Stop reason: HOSPADM

## 2023-07-07 RX ORDER — CARVEDILOL 25 MG/1
25 TABLET ORAL 2 TIMES DAILY WITH MEALS
Status: DISCONTINUED | OUTPATIENT
Start: 2023-07-07 | End: 2023-07-15 | Stop reason: HOSPADM

## 2023-07-07 RX ADMIN — APIXABAN 10 MG: 5 TABLET, FILM COATED ORAL at 21:30

## 2023-07-07 RX ADMIN — PIPERACILLIN AND TAZOBACTAM 3375 MG: 3; .375 INJECTION, POWDER, LYOPHILIZED, FOR SOLUTION INTRAVENOUS at 05:13

## 2023-07-07 RX ADMIN — PIPERACILLIN AND TAZOBACTAM 3375 MG: 3; .375 INJECTION, POWDER, LYOPHILIZED, FOR SOLUTION INTRAVENOUS at 17:01

## 2023-07-07 RX ADMIN — LABETALOL HYDROCHLORIDE 10 MG: 5 INJECTION, SOLUTION INTRAVENOUS at 05:12

## 2023-07-07 RX ADMIN — FUROSEMIDE 10 MG/HR: 10 INJECTION, SOLUTION INTRAMUSCULAR; INTRAVENOUS at 00:14

## 2023-07-07 RX ADMIN — FUROSEMIDE 10 MG/HR: 10 INJECTION, SOLUTION INTRAMUSCULAR; INTRAVENOUS at 10:16

## 2023-07-07 RX ADMIN — CARVEDILOL 25 MG: 25 TABLET, FILM COATED ORAL at 16:50

## 2023-07-07 RX ADMIN — PREDNISONE 40 MG: 10 TABLET ORAL at 08:30

## 2023-07-07 RX ADMIN — Medication 10 ML: at 08:30

## 2023-07-07 RX ADMIN — DOCUSATE SODIUM 100 MG: 100 CAPSULE, LIQUID FILLED ORAL at 21:30

## 2023-07-07 RX ADMIN — LABETALOL HYDROCHLORIDE 10 MG: 5 INJECTION, SOLUTION INTRAVENOUS at 01:46

## 2023-07-07 RX ADMIN — LEVOTHYROXINE SODIUM 50 MCG: 0.1 TABLET ORAL at 05:11

## 2023-07-07 RX ADMIN — DOCUSATE SODIUM 100 MG: 100 CAPSULE, LIQUID FILLED ORAL at 08:30

## 2023-07-07 RX ADMIN — VALSARTAN 80 MG: 80 TABLET, FILM COATED ORAL at 08:30

## 2023-07-07 RX ADMIN — ROSUVASTATIN CALCIUM 20 MG: 20 TABLET, FILM COATED ORAL at 21:29

## 2023-07-07 RX ADMIN — Medication 10 ML: at 21:35

## 2023-07-07 RX ADMIN — HYDRALAZINE HYDROCHLORIDE 100 MG: 100 TABLET, FILM COATED ORAL at 08:30

## 2023-07-07 RX ADMIN — APIXABAN 10 MG: 5 TABLET, FILM COATED ORAL at 11:55

## 2023-07-07 RX ADMIN — SODIUM CHLORIDE 40 MG: 9 INJECTION INTRAMUSCULAR; INTRAVENOUS; SUBCUTANEOUS at 08:30

## 2023-07-07 RX ADMIN — CARVEDILOL 25 MG: 25 TABLET, FILM COATED ORAL at 08:30

## 2023-07-07 RX ADMIN — ISOSORBIDE MONONITRATE 60 MG: 120 TABLET ORAL at 08:30

## 2023-07-07 RX ADMIN — ONDANSETRON 4 MG: 2 INJECTION INTRAMUSCULAR; INTRAVENOUS at 10:24

## 2023-07-07 RX ADMIN — FUROSEMIDE 10 MG/HR: 10 INJECTION, SOLUTION INTRAMUSCULAR; INTRAVENOUS at 21:38

## 2023-07-07 RX ADMIN — HYDRALAZINE HYDROCHLORIDE 10 MG: 20 INJECTION INTRAMUSCULAR; INTRAVENOUS at 06:38

## 2023-07-07 RX ADMIN — INSULIN LISPRO 4 UNITS: 100 INJECTION, SOLUTION INTRAVENOUS; SUBCUTANEOUS at 21:44

## 2023-07-07 RX ADMIN — HYDRALAZINE HYDROCHLORIDE 100 MG: 100 TABLET, FILM COATED ORAL at 21:29

## 2023-07-07 ASSESSMENT — PAIN SCALES - GENERAL
PAINLEVEL_OUTOF10: 0

## 2023-07-07 ASSESSMENT — ENCOUNTER SYMPTOMS
PHOTOPHOBIA: 0
NAUSEA: 0
TROUBLE SWALLOWING: 0
VOMITING: 0
BACK PAIN: 0
COLOR CHANGE: 0
CHOKING: 0
SHORTNESS OF BREATH: 0

## 2023-07-08 ENCOUNTER — APPOINTMENT (OUTPATIENT)
Dept: GENERAL RADIOLOGY | Age: 78
DRG: 208 | End: 2023-07-08
Payer: MEDICARE

## 2023-07-08 LAB
ALBUMIN SERPL-MCNC: 3 G/DL (ref 3.5–4.6)
ALP SERPL-CCNC: 79 U/L (ref 40–130)
ALT SERPL-CCNC: 35 U/L (ref 0–33)
ANION GAP SERPL CALCULATED.3IONS-SCNC: 11 MEQ/L (ref 9–15)
ANTI-XA UNFRAC HEPARIN: >=2 IU/ML
AST SERPL-CCNC: 17 U/L (ref 0–35)
BACTERIA URNS QL MICRO: NEGATIVE /HPF
BASOPHILS # BLD: 0 K/UL (ref 0–0.2)
BASOPHILS NFR BLD: 0.3 %
BILIRUB SERPL-MCNC: 0.6 MG/DL (ref 0.2–0.7)
BILIRUB UR QL STRIP: ABNORMAL
BUN SERPL-MCNC: 63 MG/DL (ref 8–23)
CALCIUM SERPL-MCNC: 8.3 MG/DL (ref 8.5–9.9)
CHLORIDE SERPL-SCNC: 97 MEQ/L (ref 95–107)
CLARITY UR: ABNORMAL
CO2 SERPL-SCNC: 33 MEQ/L (ref 20–31)
COLOR UR: ABNORMAL
CREAT SERPL-MCNC: 2.33 MG/DL (ref 0.5–0.9)
EOSINOPHIL # BLD: 0.3 K/UL (ref 0–0.7)
EOSINOPHIL NFR BLD: 2.6 %
EPI CELLS #/AREA URNS AUTO: ABNORMAL /HPF (ref 0–5)
ERYTHROCYTE [DISTWIDTH] IN BLOOD BY AUTOMATED COUNT: 15.4 % (ref 11.5–14.5)
GLOBULIN SER CALC-MCNC: 1.9 G/DL (ref 2.3–3.5)
GLUCOSE BLD-MCNC: 121 MG/DL (ref 70–99)
GLUCOSE BLD-MCNC: 126 MG/DL (ref 70–99)
GLUCOSE BLD-MCNC: 229 MG/DL (ref 70–99)
GLUCOSE BLD-MCNC: 308 MG/DL (ref 70–99)
GLUCOSE BLD-MCNC: 335 MG/DL (ref 70–99)
GLUCOSE SERPL-MCNC: 114 MG/DL (ref 70–99)
GLUCOSE UR STRIP-MCNC: 100 MG/DL
HCT VFR BLD AUTO: 23.1 % (ref 37–47)
HCT VFR BLD AUTO: 24.3 % (ref 37–47)
HGB BLD-MCNC: 7.4 G/DL (ref 12–16)
HGB BLD-MCNC: 7.9 G/DL (ref 12–16)
HGB UR QL STRIP: ABNORMAL
KETONES UR STRIP-MCNC: NEGATIVE MG/DL
LEUKOCYTE ESTERASE UR QL STRIP: ABNORMAL
LYMPHOCYTES # BLD: 1.9 K/UL (ref 1–4.8)
LYMPHOCYTES NFR BLD: 15.3 %
MAGNESIUM SERPL-MCNC: 2.4 MG/DL (ref 1.7–2.4)
MCH RBC QN AUTO: 26.1 PG (ref 27–31.3)
MCHC RBC AUTO-ENTMCNC: 32 % (ref 33–37)
MCV RBC AUTO: 81.7 FL (ref 79.4–94.8)
MONOCYTES # BLD: 0.7 K/UL (ref 0.2–0.8)
MONOCYTES NFR BLD: 5.5 %
NEUTROPHILS # BLD: 9.3 K/UL (ref 1.4–6.5)
NEUTS SEG NFR BLD: 76.3 %
NITRITE UR QL STRIP: NEGATIVE
PERFORMED ON: ABNORMAL
PH UR STRIP: 5 [PH] (ref 5–9)
PHOSPHATE SERPL-MCNC: 3.9 MG/DL (ref 2.3–4.8)
PLATELET # BLD AUTO: 338 K/UL (ref 130–400)
POTASSIUM SERPL-SCNC: 3.3 MEQ/L (ref 3.4–4.9)
PROT SERPL-MCNC: 4.9 G/DL (ref 6.3–8)
PROT UR STRIP-MCNC: >=300 MG/DL
RBC # BLD AUTO: 2.82 M/UL (ref 4.2–5.4)
RBC #/AREA URNS HPF: >100 /HPF (ref 0–2)
SODIUM SERPL-SCNC: 141 MEQ/L (ref 135–144)
SP GR UR STRIP: 1.01 (ref 1–1.03)
UROBILINOGEN UR STRIP-ACNC: 0.2 E.U./DL
WBC # BLD AUTO: 12.2 K/UL (ref 4.8–10.8)
WBC #/AREA URNS AUTO: ABNORMAL /HPF (ref 0–5)

## 2023-07-08 PROCEDURE — 99233 SBSQ HOSP IP/OBS HIGH 50: CPT | Performed by: INTERNAL MEDICINE

## 2023-07-08 PROCEDURE — 6370000000 HC RX 637 (ALT 250 FOR IP): Performed by: INTERNAL MEDICINE

## 2023-07-08 PROCEDURE — 2700000000 HC OXYGEN THERAPY PER DAY

## 2023-07-08 PROCEDURE — 6370000000 HC RX 637 (ALT 250 FOR IP)

## 2023-07-08 PROCEDURE — 6360000002 HC RX W HCPCS: Performed by: INTERNAL MEDICINE

## 2023-07-08 PROCEDURE — A4216 STERILE WATER/SALINE, 10 ML: HCPCS | Performed by: NURSE PRACTITIONER

## 2023-07-08 PROCEDURE — 2580000003 HC RX 258: Performed by: INTERNAL MEDICINE

## 2023-07-08 PROCEDURE — 85520 HEPARIN ASSAY: CPT

## 2023-07-08 PROCEDURE — 2580000003 HC RX 258: Performed by: NURSE PRACTITIONER

## 2023-07-08 PROCEDURE — 1210000000 HC MED SURG R&B

## 2023-07-08 PROCEDURE — 85014 HEMATOCRIT: CPT

## 2023-07-08 PROCEDURE — 2500000003 HC RX 250 WO HCPCS: Performed by: INTERNAL MEDICINE

## 2023-07-08 PROCEDURE — 85025 COMPLETE CBC W/AUTO DIFF WBC: CPT

## 2023-07-08 PROCEDURE — 6370000000 HC RX 637 (ALT 250 FOR IP): Performed by: NURSE PRACTITIONER

## 2023-07-08 PROCEDURE — 6360000002 HC RX W HCPCS: Performed by: NURSE PRACTITIONER

## 2023-07-08 PROCEDURE — 84100 ASSAY OF PHOSPHORUS: CPT

## 2023-07-08 PROCEDURE — A4216 STERILE WATER/SALINE, 10 ML: HCPCS | Performed by: INTERNAL MEDICINE

## 2023-07-08 PROCEDURE — 83735 ASSAY OF MAGNESIUM: CPT

## 2023-07-08 PROCEDURE — 71045 X-RAY EXAM CHEST 1 VIEW: CPT

## 2023-07-08 PROCEDURE — 81001 URINALYSIS AUTO W/SCOPE: CPT

## 2023-07-08 PROCEDURE — 80053 COMPREHEN METABOLIC PANEL: CPT

## 2023-07-08 PROCEDURE — 85018 HEMOGLOBIN: CPT

## 2023-07-08 PROCEDURE — C9113 INJ PANTOPRAZOLE SODIUM, VIA: HCPCS | Performed by: NURSE PRACTITIONER

## 2023-07-08 PROCEDURE — 97535 SELF CARE MNGMENT TRAINING: CPT

## 2023-07-08 PROCEDURE — 36415 COLL VENOUS BLD VENIPUNCTURE: CPT

## 2023-07-08 RX ORDER — INSULIN LISPRO 100 [IU]/ML
0-8 INJECTION, SOLUTION INTRAVENOUS; SUBCUTANEOUS
Status: DISCONTINUED | OUTPATIENT
Start: 2023-07-08 | End: 2023-07-15 | Stop reason: HOSPADM

## 2023-07-08 RX ORDER — POTASSIUM CHLORIDE 20 MEQ/1
40 TABLET, EXTENDED RELEASE ORAL ONCE
Status: COMPLETED | OUTPATIENT
Start: 2023-07-08 | End: 2023-07-08

## 2023-07-08 RX ORDER — POTASSIUM CHLORIDE 7.45 MG/ML
10 INJECTION INTRAVENOUS ONCE
Status: COMPLETED | OUTPATIENT
Start: 2023-07-08 | End: 2023-07-08

## 2023-07-08 RX ADMIN — INSULIN LISPRO 6 UNITS: 100 INJECTION, SOLUTION INTRAVENOUS; SUBCUTANEOUS at 17:46

## 2023-07-08 RX ADMIN — PREDNISONE 40 MG: 10 TABLET ORAL at 08:02

## 2023-07-08 RX ADMIN — APIXABAN 10 MG: 5 TABLET, FILM COATED ORAL at 08:02

## 2023-07-08 RX ADMIN — SODIUM CHLORIDE 40 MG: 9 INJECTION INTRAMUSCULAR; INTRAVENOUS; SUBCUTANEOUS at 08:01

## 2023-07-08 RX ADMIN — APIXABAN 10 MG: 5 TABLET, FILM COATED ORAL at 20:45

## 2023-07-08 RX ADMIN — Medication 10 ML: at 20:51

## 2023-07-08 RX ADMIN — DOCUSATE SODIUM 100 MG: 100 CAPSULE, LIQUID FILLED ORAL at 08:02

## 2023-07-08 RX ADMIN — DOCUSATE SODIUM 100 MG: 100 CAPSULE, LIQUID FILLED ORAL at 20:46

## 2023-07-08 RX ADMIN — PIPERACILLIN AND TAZOBACTAM 3375 MG: 3; .375 INJECTION, POWDER, LYOPHILIZED, FOR SOLUTION INTRAVENOUS at 06:03

## 2023-07-08 RX ADMIN — SACUBITRIL AND VALSARTAN 1 TABLET: 24; 26 TABLET, FILM COATED ORAL at 20:46

## 2023-07-08 RX ADMIN — Medication 10 ML: at 08:04

## 2023-07-08 RX ADMIN — METOPROLOL TARTRATE 5 MG: 5 INJECTION INTRAVENOUS at 06:13

## 2023-07-08 RX ADMIN — POLYETHYLENE GLYCOL 3350 17 G: 17 POWDER, FOR SOLUTION ORAL at 08:02

## 2023-07-08 RX ADMIN — FUROSEMIDE 10 MG/HR: 10 INJECTION, SOLUTION INTRAMUSCULAR; INTRAVENOUS at 06:41

## 2023-07-08 RX ADMIN — HYDRALAZINE HYDROCHLORIDE 100 MG: 100 TABLET, FILM COATED ORAL at 08:02

## 2023-07-08 RX ADMIN — HYDRALAZINE HYDROCHLORIDE 100 MG: 100 TABLET, FILM COATED ORAL at 20:45

## 2023-07-08 RX ADMIN — FUROSEMIDE 10 MG/HR: 10 INJECTION, SOLUTION INTRAMUSCULAR; INTRAVENOUS at 17:51

## 2023-07-08 RX ADMIN — ISOSORBIDE MONONITRATE 60 MG: 120 TABLET ORAL at 08:02

## 2023-07-08 RX ADMIN — ROSUVASTATIN CALCIUM 20 MG: 20 TABLET, FILM COATED ORAL at 20:46

## 2023-07-08 RX ADMIN — POTASSIUM CHLORIDE 40 MEQ: 1500 TABLET, EXTENDED RELEASE ORAL at 08:26

## 2023-07-08 RX ADMIN — INSULIN LISPRO 6 UNITS: 100 INJECTION, SOLUTION INTRAVENOUS; SUBCUTANEOUS at 20:45

## 2023-07-08 RX ADMIN — CARVEDILOL 25 MG: 25 TABLET, FILM COATED ORAL at 08:08

## 2023-07-08 RX ADMIN — SACUBITRIL AND VALSARTAN 1 TABLET: 24; 26 TABLET, FILM COATED ORAL at 08:02

## 2023-07-08 RX ADMIN — CARVEDILOL 25 MG: 25 TABLET, FILM COATED ORAL at 17:46

## 2023-07-08 RX ADMIN — POTASSIUM CHLORIDE 10 MEQ: 7.46 INJECTION, SOLUTION INTRAVENOUS at 08:28

## 2023-07-08 RX ADMIN — PIPERACILLIN AND TAZOBACTAM 3375 MG: 3; .375 INJECTION, POWDER, LYOPHILIZED, FOR SOLUTION INTRAVENOUS at 17:50

## 2023-07-08 RX ADMIN — LEVOTHYROXINE SODIUM 50 MCG: 0.1 TABLET ORAL at 05:49

## 2023-07-08 RX ADMIN — INSULIN LISPRO 2 UNITS: 100 INJECTION, SOLUTION INTRAVENOUS; SUBCUTANEOUS at 11:30

## 2023-07-08 ASSESSMENT — PAIN SCALES - GENERAL
PAINLEVEL_OUTOF10: 0

## 2023-07-09 LAB
ALBUMIN SERPL-MCNC: 3 G/DL (ref 3.5–4.6)
ANION GAP SERPL CALCULATED.3IONS-SCNC: 13 MEQ/L (ref 9–15)
BACTERIA BLD CULT ORG #2: NORMAL
BACTERIA BLD CULT: NORMAL
BUN SERPL-MCNC: 63 MG/DL (ref 8–23)
CALCIUM SERPL-MCNC: 8.2 MG/DL (ref 8.5–9.9)
CHLORIDE SERPL-SCNC: 97 MEQ/L (ref 95–107)
CO2 SERPL-SCNC: 28 MEQ/L (ref 20–31)
CREAT SERPL-MCNC: 2.53 MG/DL (ref 0.5–0.9)
ERYTHROCYTE [DISTWIDTH] IN BLOOD BY AUTOMATED COUNT: 15.3 % (ref 11.5–14.5)
GLUCOSE BLD-MCNC: 157 MG/DL (ref 70–99)
GLUCOSE BLD-MCNC: 168 MG/DL (ref 70–99)
GLUCOSE BLD-MCNC: 172 MG/DL (ref 70–99)
GLUCOSE BLD-MCNC: 187 MG/DL (ref 70–99)
GLUCOSE BLD-MCNC: 330 MG/DL (ref 70–99)
GLUCOSE BLD-MCNC: 379 MG/DL (ref 70–99)
GLUCOSE SERPL-MCNC: 141 MG/DL (ref 70–99)
HCT VFR BLD AUTO: 21.3 % (ref 37–47)
HCT VFR BLD AUTO: 23.4 % (ref 37–47)
HCT VFR BLD AUTO: 23.6 % (ref 37–47)
HGB BLD-MCNC: 7.1 G/DL (ref 12–16)
HGB BLD-MCNC: 7.6 G/DL (ref 12–16)
HGB BLD-MCNC: 7.7 G/DL (ref 12–16)
MCH RBC QN AUTO: 26.9 PG (ref 27–31.3)
MCHC RBC AUTO-ENTMCNC: 33 % (ref 33–37)
MCV RBC AUTO: 81.5 FL (ref 79.4–94.8)
PERFORMED ON: ABNORMAL
PHOSPHATE SERPL-MCNC: 3.6 MG/DL (ref 2.3–4.8)
PLATELET # BLD AUTO: 398 K/UL (ref 130–400)
POTASSIUM SERPL-SCNC: 4 MEQ/L (ref 3.4–4.9)
RBC # BLD AUTO: 2.88 M/UL (ref 4.2–5.4)
SODIUM SERPL-SCNC: 138 MEQ/L (ref 135–144)
WBC # BLD AUTO: 17.2 K/UL (ref 4.8–10.8)

## 2023-07-09 PROCEDURE — 2580000003 HC RX 258: Performed by: INTERNAL MEDICINE

## 2023-07-09 PROCEDURE — 6370000000 HC RX 637 (ALT 250 FOR IP): Performed by: NURSE PRACTITIONER

## 2023-07-09 PROCEDURE — 6370000000 HC RX 637 (ALT 250 FOR IP)

## 2023-07-09 PROCEDURE — C9113 INJ PANTOPRAZOLE SODIUM, VIA: HCPCS | Performed by: NURSE PRACTITIONER

## 2023-07-09 PROCEDURE — 6370000000 HC RX 637 (ALT 250 FOR IP): Performed by: INTERNAL MEDICINE

## 2023-07-09 PROCEDURE — 80069 RENAL FUNCTION PANEL: CPT

## 2023-07-09 PROCEDURE — 85014 HEMATOCRIT: CPT

## 2023-07-09 PROCEDURE — 2700000000 HC OXYGEN THERAPY PER DAY

## 2023-07-09 PROCEDURE — 85018 HEMOGLOBIN: CPT

## 2023-07-09 PROCEDURE — 85027 COMPLETE CBC AUTOMATED: CPT

## 2023-07-09 PROCEDURE — 1210000000 HC MED SURG R&B

## 2023-07-09 PROCEDURE — 6360000002 HC RX W HCPCS: Performed by: INTERNAL MEDICINE

## 2023-07-09 PROCEDURE — 99233 SBSQ HOSP IP/OBS HIGH 50: CPT | Performed by: INTERNAL MEDICINE

## 2023-07-09 PROCEDURE — A4216 STERILE WATER/SALINE, 10 ML: HCPCS | Performed by: NURSE PRACTITIONER

## 2023-07-09 PROCEDURE — 2580000003 HC RX 258: Performed by: NURSE PRACTITIONER

## 2023-07-09 PROCEDURE — 99232 SBSQ HOSP IP/OBS MODERATE 35: CPT | Performed by: PSYCHIATRY & NEUROLOGY

## 2023-07-09 PROCEDURE — 36415 COLL VENOUS BLD VENIPUNCTURE: CPT

## 2023-07-09 PROCEDURE — 6360000002 HC RX W HCPCS: Performed by: NURSE PRACTITIONER

## 2023-07-09 RX ORDER — LANOLIN ALCOHOL/MO/W.PET/CERES
3 CREAM (GRAM) TOPICAL NIGHTLY PRN
Status: DISCONTINUED | OUTPATIENT
Start: 2023-07-09 | End: 2023-07-15 | Stop reason: HOSPADM

## 2023-07-09 RX ORDER — HYDRALAZINE HYDROCHLORIDE 100 MG/1
100 TABLET, FILM COATED ORAL EVERY 8 HOURS SCHEDULED
Status: DISCONTINUED | OUTPATIENT
Start: 2023-07-09 | End: 2023-07-13

## 2023-07-09 RX ORDER — HEPARIN SODIUM 5000 [USP'U]/ML
5000 INJECTION, SOLUTION INTRAVENOUS; SUBCUTANEOUS EVERY 8 HOURS SCHEDULED
Status: DISCONTINUED | OUTPATIENT
Start: 2023-07-09 | End: 2023-07-11

## 2023-07-09 RX ADMIN — FUROSEMIDE 10 MG/HR: 10 INJECTION, SOLUTION INTRAMUSCULAR; INTRAVENOUS at 04:21

## 2023-07-09 RX ADMIN — Medication 10 ML: at 10:35

## 2023-07-09 RX ADMIN — EPOETIN ALFA-EPBX 10000 UNITS: 40000 INJECTION, SOLUTION INTRAVENOUS; SUBCUTANEOUS at 12:37

## 2023-07-09 RX ADMIN — FUROSEMIDE 60 MG: 40 TABLET ORAL at 17:07

## 2023-07-09 RX ADMIN — HEPARIN SODIUM 5000 UNITS: 5000 INJECTION INTRAVENOUS; SUBCUTANEOUS at 21:04

## 2023-07-09 RX ADMIN — ISOSORBIDE MONONITRATE 60 MG: 120 TABLET ORAL at 10:30

## 2023-07-09 RX ADMIN — CARVEDILOL 25 MG: 25 TABLET, FILM COATED ORAL at 10:30

## 2023-07-09 RX ADMIN — PIPERACILLIN AND TAZOBACTAM 3375 MG: 3; .375 INJECTION, POWDER, LYOPHILIZED, FOR SOLUTION INTRAVENOUS at 09:23

## 2023-07-09 RX ADMIN — SACUBITRIL AND VALSARTAN 1 TABLET: 24; 26 TABLET, FILM COATED ORAL at 10:30

## 2023-07-09 RX ADMIN — CARVEDILOL 25 MG: 25 TABLET, FILM COATED ORAL at 17:07

## 2023-07-09 RX ADMIN — FUROSEMIDE 60 MG: 40 TABLET ORAL at 10:30

## 2023-07-09 RX ADMIN — HYDRALAZINE HYDROCHLORIDE 100 MG: 100 TABLET, FILM COATED ORAL at 21:04

## 2023-07-09 RX ADMIN — INSULIN LISPRO 8 UNITS: 100 INJECTION, SOLUTION INTRAVENOUS; SUBCUTANEOUS at 21:14

## 2023-07-09 RX ADMIN — HEPARIN SODIUM 5000 UNITS: 5000 INJECTION INTRAVENOUS; SUBCUTANEOUS at 14:55

## 2023-07-09 RX ADMIN — DOCUSATE SODIUM 100 MG: 100 CAPSULE, LIQUID FILLED ORAL at 21:04

## 2023-07-09 RX ADMIN — SODIUM CHLORIDE 40 MG: 9 INJECTION INTRAMUSCULAR; INTRAVENOUS; SUBCUTANEOUS at 10:38

## 2023-07-09 RX ADMIN — ROSUVASTATIN CALCIUM 20 MG: 20 TABLET, FILM COATED ORAL at 21:04

## 2023-07-09 RX ADMIN — PREDNISONE 40 MG: 10 TABLET ORAL at 10:30

## 2023-07-09 RX ADMIN — Medication 10 ML: at 21:05

## 2023-07-09 RX ADMIN — INSULIN LISPRO 6 UNITS: 100 INJECTION, SOLUTION INTRAVENOUS; SUBCUTANEOUS at 17:08

## 2023-07-09 RX ADMIN — Medication 3 MG: at 02:00

## 2023-07-09 RX ADMIN — LEVOTHYROXINE SODIUM 50 MCG: 0.1 TABLET ORAL at 06:16

## 2023-07-09 RX ADMIN — SACUBITRIL AND VALSARTAN 1 TABLET: 24; 26 TABLET, FILM COATED ORAL at 21:04

## 2023-07-09 ASSESSMENT — ENCOUNTER SYMPTOMS
PHOTOPHOBIA: 0
BACK PAIN: 0
SHORTNESS OF BREATH: 0
CHOKING: 0
VOMITING: 0
NAUSEA: 0
COLOR CHANGE: 0
TROUBLE SWALLOWING: 0

## 2023-07-10 LAB
ALBUMIN SERPL-MCNC: 3 G/DL (ref 3.5–4.6)
ANION GAP SERPL CALCULATED.3IONS-SCNC: 9 MEQ/L (ref 9–15)
BUN SERPL-MCNC: 73 MG/DL (ref 8–23)
CALCIUM SERPL-MCNC: 8.1 MG/DL (ref 8.5–9.9)
CHLORIDE SERPL-SCNC: 97 MEQ/L (ref 95–107)
CO2 SERPL-SCNC: 30 MEQ/L (ref 20–31)
CREAT SERPL-MCNC: 3.24 MG/DL (ref 0.5–0.9)
GLUCOSE BLD-MCNC: 114 MG/DL (ref 70–99)
GLUCOSE BLD-MCNC: 172 MG/DL (ref 70–99)
GLUCOSE BLD-MCNC: 396 MG/DL (ref 70–99)
GLUCOSE BLD-MCNC: 419 MG/DL (ref 70–99)
GLUCOSE SERPL-MCNC: 112 MG/DL (ref 70–99)
HCT VFR BLD AUTO: 21.4 % (ref 37–47)
HCT VFR BLD AUTO: 22.7 % (ref 37–47)
HCT VFR BLD AUTO: 24.2 % (ref 37–47)
HGB BLD-MCNC: 7.1 G/DL (ref 12–16)
HGB BLD-MCNC: 7.6 G/DL (ref 12–16)
HGB BLD-MCNC: 7.9 G/DL (ref 12–16)
LV EF: 58 %
LVEF MODALITY: NORMAL
PERFORMED ON: ABNORMAL
PHOSPHATE SERPL-MCNC: 4.6 MG/DL (ref 2.3–4.8)
POTASSIUM SERPL-SCNC: 4 MEQ/L (ref 3.4–4.9)
SODIUM SERPL-SCNC: 136 MEQ/L (ref 135–144)

## 2023-07-10 PROCEDURE — 93308 TTE F-UP OR LMTD: CPT

## 2023-07-10 PROCEDURE — 80069 RENAL FUNCTION PANEL: CPT

## 2023-07-10 PROCEDURE — 6370000000 HC RX 637 (ALT 250 FOR IP): Performed by: INTERNAL MEDICINE

## 2023-07-10 PROCEDURE — 6370000000 HC RX 637 (ALT 250 FOR IP): Performed by: NURSE PRACTITIONER

## 2023-07-10 PROCEDURE — 6370000000 HC RX 637 (ALT 250 FOR IP)

## 2023-07-10 PROCEDURE — 97116 GAIT TRAINING THERAPY: CPT

## 2023-07-10 PROCEDURE — 85014 HEMATOCRIT: CPT

## 2023-07-10 PROCEDURE — 99231 SBSQ HOSP IP/OBS SF/LOW 25: CPT | Performed by: PSYCHIATRY & NEUROLOGY

## 2023-07-10 PROCEDURE — 1210000000 HC MED SURG R&B

## 2023-07-10 PROCEDURE — APPSS15 APP SPLIT SHARED TIME 0-15 MINUTES: Performed by: NURSE PRACTITIONER

## 2023-07-10 PROCEDURE — 2580000003 HC RX 258: Performed by: INTERNAL MEDICINE

## 2023-07-10 PROCEDURE — 6360000004 HC RX CONTRAST MEDICATION: Performed by: INTERNAL MEDICINE

## 2023-07-10 PROCEDURE — 36415 COLL VENOUS BLD VENIPUNCTURE: CPT

## 2023-07-10 PROCEDURE — 6360000002 HC RX W HCPCS: Performed by: INTERNAL MEDICINE

## 2023-07-10 PROCEDURE — 2700000000 HC OXYGEN THERAPY PER DAY

## 2023-07-10 PROCEDURE — 85018 HEMOGLOBIN: CPT

## 2023-07-10 PROCEDURE — 92526 ORAL FUNCTION THERAPY: CPT

## 2023-07-10 PROCEDURE — 99232 SBSQ HOSP IP/OBS MODERATE 35: CPT | Performed by: INTERNAL MEDICINE

## 2023-07-10 RX ORDER — ROSUVASTATIN CALCIUM 10 MG/1
10 TABLET, COATED ORAL NIGHTLY
Status: DISCONTINUED | OUTPATIENT
Start: 2023-07-10 | End: 2023-07-15 | Stop reason: HOSPADM

## 2023-07-10 RX ORDER — PREDNISONE 10 MG/1
10 TABLET ORAL 2 TIMES DAILY
Status: DISCONTINUED | OUTPATIENT
Start: 2023-07-10 | End: 2023-07-15 | Stop reason: HOSPADM

## 2023-07-10 RX ADMIN — Medication 10 ML: at 08:52

## 2023-07-10 RX ADMIN — DOCUSATE SODIUM 100 MG: 100 CAPSULE, LIQUID FILLED ORAL at 20:09

## 2023-07-10 RX ADMIN — ROSUVASTATIN CALCIUM 10 MG: 10 TABLET, FILM COATED ORAL at 20:09

## 2023-07-10 RX ADMIN — HYDRALAZINE HYDROCHLORIDE 100 MG: 100 TABLET, FILM COATED ORAL at 06:01

## 2023-07-10 RX ADMIN — HYDRALAZINE HYDROCHLORIDE 100 MG: 100 TABLET, FILM COATED ORAL at 20:09

## 2023-07-10 RX ADMIN — HEPARIN SODIUM 5000 UNITS: 5000 INJECTION INTRAVENOUS; SUBCUTANEOUS at 14:09

## 2023-07-10 RX ADMIN — POLYETHYLENE GLYCOL 3350 17 G: 17 POWDER, FOR SOLUTION ORAL at 08:52

## 2023-07-10 RX ADMIN — Medication 10 ML: at 20:10

## 2023-07-10 RX ADMIN — CARVEDILOL 25 MG: 25 TABLET, FILM COATED ORAL at 16:35

## 2023-07-10 RX ADMIN — LEVOTHYROXINE SODIUM 50 MCG: 0.1 TABLET ORAL at 06:01

## 2023-07-10 RX ADMIN — INSULIN LISPRO 8 UNITS: 100 INJECTION, SOLUTION INTRAVENOUS; SUBCUTANEOUS at 16:36

## 2023-07-10 RX ADMIN — DOCUSATE SODIUM 100 MG: 100 CAPSULE, LIQUID FILLED ORAL at 08:52

## 2023-07-10 RX ADMIN — INSULIN LISPRO 8 UNITS: 100 INJECTION, SOLUTION INTRAVENOUS; SUBCUTANEOUS at 22:10

## 2023-07-10 RX ADMIN — PERFLUTREN 1.5 ML: 6.52 INJECTION, SUSPENSION INTRAVENOUS at 13:30

## 2023-07-10 RX ADMIN — ISOSORBIDE MONONITRATE 60 MG: 120 TABLET ORAL at 08:52

## 2023-07-10 RX ADMIN — PREDNISONE 40 MG: 10 TABLET ORAL at 08:52

## 2023-07-10 RX ADMIN — PREDNISONE 10 MG: 10 TABLET ORAL at 20:09

## 2023-07-10 RX ADMIN — HEPARIN SODIUM 5000 UNITS: 5000 INJECTION INTRAVENOUS; SUBCUTANEOUS at 06:01

## 2023-07-10 RX ADMIN — HYDRALAZINE HYDROCHLORIDE 100 MG: 100 TABLET, FILM COATED ORAL at 14:09

## 2023-07-10 RX ADMIN — CARVEDILOL 25 MG: 25 TABLET, FILM COATED ORAL at 08:52

## 2023-07-10 RX ADMIN — HEPARIN SODIUM 5000 UNITS: 5000 INJECTION INTRAVENOUS; SUBCUTANEOUS at 22:10

## 2023-07-10 ASSESSMENT — ENCOUNTER SYMPTOMS
BACK PAIN: 0
CHOKING: 0
TROUBLE SWALLOWING: 0
NAUSEA: 0
VOMITING: 0
SHORTNESS OF BREATH: 0
COLOR CHANGE: 0

## 2023-07-10 ASSESSMENT — PAIN SCALES - GENERAL
PAINLEVEL_OUTOF10: 0

## 2023-07-10 NOTE — CARE COORDINATION
LSW met with patient to discuss her discharge plan. Patient denied need for SNF but was agreeable to [facility]. [place] of choice was offered and she chose [facility].

## 2023-07-10 NOTE — DISCHARGE INSTR - COC
Continuity of Care Form    Patient Name: Abigail Brady   :  1945  MRN:  [id number]    Admit date:  2023  Discharge date:  ***    Code Status Order: Full Code   Advance Directives:     Admitting Physician: Huma Rosales MD  PCP: Veronica Romero MD    Discharging Nurse: [facility]/Room#: Z117/A167-25  Discharging Unit Phone Number: ***    Emergency Contact:   Extended Emergency Contact Information  Primary Emergency Contact: [address] [place] [address] Phone: [phone]  Mobile Phone: [phone]  Relation: Child  Preferred language: English   needed? No  Secondary Emergency Contact: Renard Avendano  Address: [address] [place] [address],[address] [facility], [address] [place] [address] of [address] [address] [place] Phone: [phone]  Mobile Phone: [phone]  Relation: Boyfriend    Past Surgical History:  Past Surgical History:   Procedure Laterality Date    CATARACT REMOVAL Bilateral     CT BIOPSY RENAL  10/6/2022    CT BIOPSY RENAL 10/6/2022 [facility] CT SCAN    HERNIA REPAIR      HYSTERECTOMY, TOTAL ABDOMINAL (CERVIX REMOVED)      OTHER SURGICAL HISTORY Right 10/06/2022    right renal random biopsy performed by Dr. Ana Currie       Immunization History: There is no immunization history on file for this patient.     Active Problems:  Patient Active Problem List   Diagnosis Code    Microhematuria R31.29    Diarrhea of presumed infectious origin R19.7    Anemia D64.9    CHAYO (acute kidney injury) ([address]) N17.9    Cardiorenal disease I13.10    SOB (shortness of breath) R06.02    Abnormal chest x-ray R93.89    Chronic diastolic heart failure (HCC) I50.32    Primary hypertension I10    Mixed hyperlipidemia E78.2    Chronic anemia D64.9    Chronic kidney disease, stage IV (severe) (HCC) N18.4    Hypertensive emergency I16.1    PEA (Pulseless electrical activity) ([facility]) I46.9       Isolation/Infection:   Isolation            No Isolation          Patient Infection Status       Infection Onset Added Last Indicated Last

## 2023-07-10 NOTE — FLOWSHEET NOTE
0845 Assessment complete, see flow sheet. Patient A/O X4, following all commands. Patients son at bedside, update given, questions answered. Patient denies pain and or discomfort at present time. 1745 Patient was up in chair with therapy from 1615, till 1745, returned to bed and repositioned for comfort. Pinedo DCed, emptied for 150cc zoe colored with red flecks urine. Had moderate loose green BM on bed pan. Patient cleansed. Resting at present time without complaints. No changes in assessment noted.

## 2023-07-10 NOTE — CARE COORDINATION
MESSAGE LEFT FOR [facility] FOR REFERRAL    1  SPOKE TO EVERT GEORGE [facility], WILL ACCEPT PT.  AWAITING DRS FOR DC.

## 2023-07-11 LAB
ALBUMIN SERPL-MCNC: 3 G/DL (ref 3.5–4.6)
ANION GAP SERPL CALCULATED.3IONS-SCNC: 14 MEQ/L (ref 9–15)
BUN SERPL-MCNC: 80 MG/DL (ref 8–23)
CALCIUM SERPL-MCNC: 8.4 MG/DL (ref 8.5–9.9)
CHLORIDE SERPL-SCNC: 98 MEQ/L (ref 95–107)
CO2 SERPL-SCNC: 26 MEQ/L (ref 20–31)
CREAT SERPL-MCNC: 3.42 MG/DL (ref 0.5–0.9)
ERYTHROCYTE [DISTWIDTH] IN BLOOD BY AUTOMATED COUNT: 15.4 % (ref 11.5–14.5)
GLUCOSE BLD-MCNC: 179 MG/DL (ref 70–99)
GLUCOSE BLD-MCNC: 206 MG/DL (ref 70–99)
GLUCOSE BLD-MCNC: 271 MG/DL (ref 70–99)
GLUCOSE BLD-MCNC: 276 MG/DL (ref 70–99)
GLUCOSE SERPL-MCNC: 164 MG/DL (ref 70–99)
HCT VFR BLD AUTO: 21.3 % (ref 37–47)
HCT VFR BLD AUTO: 23.1 % (ref 37–47)
HGB BLD-MCNC: 7 G/DL (ref 12–16)
HGB BLD-MCNC: 7.7 G/DL (ref 12–16)
MCH RBC QN AUTO: 28.1 PG (ref 27–31.3)
MCHC RBC AUTO-ENTMCNC: 33.4 % (ref 33–37)
MCV RBC AUTO: 84.1 FL (ref 79.4–94.8)
PERFORMED ON: ABNORMAL
PHOSPHATE SERPL-MCNC: 4.6 MG/DL (ref 2.3–4.8)
PLATELET # BLD AUTO: 315 K/UL (ref 130–400)
POTASSIUM SERPL-SCNC: 4.6 MEQ/L (ref 3.4–4.9)
RBC # BLD AUTO: 2.75 M/UL (ref 4.2–5.4)
SODIUM SERPL-SCNC: 138 MEQ/L (ref 135–144)
WBC # BLD AUTO: 15.6 K/UL (ref 4.8–10.8)

## 2023-07-11 PROCEDURE — 36415 COLL VENOUS BLD VENIPUNCTURE: CPT

## 2023-07-11 PROCEDURE — 85018 HEMOGLOBIN: CPT

## 2023-07-11 PROCEDURE — APPSS30 APP SPLIT SHARED TIME 16-30 MINUTES: Performed by: PHYSICIAN ASSISTANT

## 2023-07-11 PROCEDURE — 97535 SELF CARE MNGMENT TRAINING: CPT

## 2023-07-11 PROCEDURE — 1210000000 HC MED SURG R&B

## 2023-07-11 PROCEDURE — 6370000000 HC RX 637 (ALT 250 FOR IP): Performed by: INTERNAL MEDICINE

## 2023-07-11 PROCEDURE — 85014 HEMATOCRIT: CPT

## 2023-07-11 PROCEDURE — 2580000003 HC RX 258: Performed by: INTERNAL MEDICINE

## 2023-07-11 PROCEDURE — 85027 COMPLETE CBC AUTOMATED: CPT

## 2023-07-11 PROCEDURE — 80069 RENAL FUNCTION PANEL: CPT

## 2023-07-11 PROCEDURE — 6360000002 HC RX W HCPCS: Performed by: INTERNAL MEDICINE

## 2023-07-11 PROCEDURE — 6370000000 HC RX 637 (ALT 250 FOR IP)

## 2023-07-11 PROCEDURE — 6370000000 HC RX 637 (ALT 250 FOR IP): Performed by: NURSE PRACTITIONER

## 2023-07-11 PROCEDURE — 2700000000 HC OXYGEN THERAPY PER DAY

## 2023-07-11 RX ORDER — SODIUM CHLORIDE 9 MG/ML
INJECTION, SOLUTION INTRAVENOUS CONTINUOUS
Status: DISPENSED | OUTPATIENT
Start: 2023-07-11 | End: 2023-07-11

## 2023-07-11 RX ADMIN — LEVOTHYROXINE SODIUM 50 MCG: 0.1 TABLET ORAL at 06:25

## 2023-07-11 RX ADMIN — INSULIN LISPRO 4 UNITS: 100 INJECTION, SOLUTION INTRAVENOUS; SUBCUTANEOUS at 21:02

## 2023-07-11 RX ADMIN — ROSUVASTATIN CALCIUM 10 MG: 10 TABLET, FILM COATED ORAL at 21:03

## 2023-07-11 RX ADMIN — APIXABAN 5 MG: 5 TABLET, FILM COATED ORAL at 21:02

## 2023-07-11 RX ADMIN — CARVEDILOL 25 MG: 25 TABLET, FILM COATED ORAL at 08:29

## 2023-07-11 RX ADMIN — Medication 10 ML: at 08:30

## 2023-07-11 RX ADMIN — PREDNISONE 10 MG: 10 TABLET ORAL at 08:29

## 2023-07-11 RX ADMIN — CARVEDILOL 25 MG: 25 TABLET, FILM COATED ORAL at 17:56

## 2023-07-11 RX ADMIN — SODIUM CHLORIDE: 9 INJECTION, SOLUTION INTRAVENOUS at 08:26

## 2023-07-11 RX ADMIN — DOCUSATE SODIUM 100 MG: 100 CAPSULE, LIQUID FILLED ORAL at 08:29

## 2023-07-11 RX ADMIN — HYDRALAZINE HYDROCHLORIDE 100 MG: 100 TABLET, FILM COATED ORAL at 13:36

## 2023-07-11 RX ADMIN — INSULIN LISPRO 2 UNITS: 100 INJECTION, SOLUTION INTRAVENOUS; SUBCUTANEOUS at 13:30

## 2023-07-11 RX ADMIN — DOCUSATE SODIUM 100 MG: 100 CAPSULE, LIQUID FILLED ORAL at 21:03

## 2023-07-11 RX ADMIN — HYDRALAZINE HYDROCHLORIDE 100 MG: 100 TABLET, FILM COATED ORAL at 06:25

## 2023-07-11 RX ADMIN — HYDRALAZINE HYDROCHLORIDE 100 MG: 100 TABLET, FILM COATED ORAL at 21:02

## 2023-07-11 RX ADMIN — INSULIN LISPRO 4 UNITS: 100 INJECTION, SOLUTION INTRAVENOUS; SUBCUTANEOUS at 17:55

## 2023-07-11 RX ADMIN — POLYETHYLENE GLYCOL 3350 17 G: 17 POWDER, FOR SOLUTION ORAL at 08:30

## 2023-07-11 RX ADMIN — HEPARIN SODIUM 5000 UNITS: 5000 INJECTION INTRAVENOUS; SUBCUTANEOUS at 13:37

## 2023-07-11 RX ADMIN — HEPARIN SODIUM 5000 UNITS: 5000 INJECTION INTRAVENOUS; SUBCUTANEOUS at 06:25

## 2023-07-11 RX ADMIN — PREDNISONE 10 MG: 10 TABLET ORAL at 21:03

## 2023-07-11 RX ADMIN — ISOSORBIDE MONONITRATE 60 MG: 120 TABLET ORAL at 08:29

## 2023-07-11 ASSESSMENT — ENCOUNTER SYMPTOMS
VOMITING: 0
ABDOMINAL PAIN: 0
SHORTNESS OF BREATH: 1

## 2023-07-11 NOTE — CARE COORDINATION
[address] H[facility].  SPOKE WITH MICHAEL AT [facility], UPDATE GIVEN

## 2023-07-12 ENCOUNTER — APPOINTMENT (OUTPATIENT)
Dept: CARDIAC CATH/INVASIVE PROCEDURES | Age: 78
DRG: 208 | End: 2023-07-12
Payer: MEDICARE

## 2023-07-12 LAB
ABO + RH BLD: NORMAL
ALBUMIN SERPL-MCNC: 3 G/DL (ref 3.5–4.6)
ANION GAP SERPL CALCULATED.3IONS-SCNC: 12 MEQ/L (ref 9–15)
BLD GP AB SCN SERPL QL: NORMAL
BLOOD BANK DISPENSE STATUS: NORMAL
BLOOD BANK PRODUCT CODE: NORMAL
BPU ID: NORMAL
BUN SERPL-MCNC: 86 MG/DL (ref 8–23)
CALCIUM SERPL-MCNC: 8.3 MG/DL (ref 8.5–9.9)
CHLORIDE SERPL-SCNC: 99 MEQ/L (ref 95–107)
CO2 SERPL-SCNC: 25 MEQ/L (ref 20–31)
CREAT SERPL-MCNC: 3.07 MG/DL (ref 0.5–0.9)
DESCRIPTION BLOOD BANK: NORMAL
EKG ATRIAL RATE: 109 BPM
EKG P AXIS: 49 DEGREES
EKG P-R INTERVAL: 144 MS
EKG Q-T INTERVAL: 374 MS
EKG QRS DURATION: 82 MS
EKG QTC CALCULATION (BAZETT): 503 MS
EKG R AXIS: 2 DEGREES
EKG T AXIS: 164 DEGREES
EKG VENTRICULAR RATE: 109 BPM
ERYTHROCYTE [DISTWIDTH] IN BLOOD BY AUTOMATED COUNT: 16.1 % (ref 11.5–14.5)
GLUCOSE BLD-MCNC: 177 MG/DL (ref 70–99)
GLUCOSE BLD-MCNC: 195 MG/DL (ref 70–99)
GLUCOSE BLD-MCNC: 207 MG/DL (ref 70–99)
GLUCOSE BLD-MCNC: 211 MG/DL (ref 70–99)
GLUCOSE SERPL-MCNC: 179 MG/DL (ref 70–99)
HCT VFR BLD AUTO: 20.8 % (ref 37–47)
HCT VFR BLD AUTO: 27.8 % (ref 37–47)
HGB BLD-MCNC: 6.9 G/DL (ref 12–16)
HGB BLD-MCNC: 9.3 G/DL (ref 12–16)
MCH RBC QN AUTO: 27.7 PG (ref 27–31.3)
MCHC RBC AUTO-ENTMCNC: 33 % (ref 33–37)
MCV RBC AUTO: 83.9 FL (ref 79.4–94.8)
PERFORMED ON: ABNORMAL
PHOSPHATE SERPL-MCNC: 5.3 MG/DL (ref 2.3–4.8)
PLATELET # BLD AUTO: 329 K/UL (ref 130–400)
POTASSIUM SERPL-SCNC: 4.4 MEQ/L (ref 3.4–4.9)
RBC # BLD AUTO: 2.48 M/UL (ref 4.2–5.4)
SODIUM SERPL-SCNC: 136 MEQ/L (ref 135–144)
WBC # BLD AUTO: 13.2 K/UL (ref 4.8–10.8)

## 2023-07-12 PROCEDURE — 6370000000 HC RX 637 (ALT 250 FOR IP): Performed by: INTERNAL MEDICINE

## 2023-07-12 PROCEDURE — C1880 VENA CAVA FILTER: HCPCS

## 2023-07-12 PROCEDURE — 6360000002 HC RX W HCPCS

## 2023-07-12 PROCEDURE — 86850 RBC ANTIBODY SCREEN: CPT

## 2023-07-12 PROCEDURE — 99232 SBSQ HOSP IP/OBS MODERATE 35: CPT | Performed by: INTERNAL MEDICINE

## 2023-07-12 PROCEDURE — 2709999900 HC NON-CHARGEABLE SUPPLY

## 2023-07-12 PROCEDURE — 2700000000 HC OXYGEN THERAPY PER DAY

## 2023-07-12 PROCEDURE — 6370000000 HC RX 637 (ALT 250 FOR IP): Performed by: NURSE PRACTITIONER

## 2023-07-12 PROCEDURE — P9016 RBC LEUKOCYTES REDUCED: HCPCS

## 2023-07-12 PROCEDURE — 93010 ELECTROCARDIOGRAM REPORT: CPT | Performed by: INTERNAL MEDICINE

## 2023-07-12 PROCEDURE — 36430 TRANSFUSION BLD/BLD COMPNT: CPT

## 2023-07-12 PROCEDURE — 36415 COLL VENOUS BLD VENIPUNCTURE: CPT

## 2023-07-12 PROCEDURE — C1894 INTRO/SHEATH, NON-LASER: HCPCS

## 2023-07-12 PROCEDURE — 2580000003 HC RX 258: Performed by: INTERNAL MEDICINE

## 2023-07-12 PROCEDURE — C1769 GUIDE WIRE: HCPCS

## 2023-07-12 PROCEDURE — 2500000003 HC RX 250 WO HCPCS

## 2023-07-12 PROCEDURE — 37191 INS ENDOVAS VENA CAVA FILTR: CPT

## 2023-07-12 PROCEDURE — 86923 COMPATIBILITY TEST ELECTRIC: CPT

## 2023-07-12 PROCEDURE — 85027 COMPLETE CBC AUTOMATED: CPT

## 2023-07-12 PROCEDURE — 85018 HEMOGLOBIN: CPT

## 2023-07-12 PROCEDURE — 80069 RENAL FUNCTION PANEL: CPT

## 2023-07-12 PROCEDURE — 06H03DZ INSERTION OF INTRALUMINAL DEVICE INTO INFERIOR VENA CAVA, PERCUTANEOUS APPROACH: ICD-10-PCS | Performed by: INTERNAL MEDICINE

## 2023-07-12 PROCEDURE — 1210000000 HC MED SURG R&B

## 2023-07-12 PROCEDURE — 86901 BLOOD TYPING SEROLOGIC RH(D): CPT

## 2023-07-12 PROCEDURE — 6360000004 HC RX CONTRAST MEDICATION: Performed by: INTERNAL MEDICINE

## 2023-07-12 PROCEDURE — 6370000000 HC RX 637 (ALT 250 FOR IP)

## 2023-07-12 PROCEDURE — 86900 BLOOD TYPING SEROLOGIC ABO: CPT

## 2023-07-12 PROCEDURE — 85014 HEMATOCRIT: CPT

## 2023-07-12 RX ORDER — SODIUM CHLORIDE 0.9 % (FLUSH) 0.9 %
5-40 SYRINGE (ML) INJECTION PRN
Status: DISCONTINUED | OUTPATIENT
Start: 2023-07-12 | End: 2023-07-15 | Stop reason: HOSPADM

## 2023-07-12 RX ORDER — SODIUM CHLORIDE 9 MG/ML
INJECTION, SOLUTION INTRAVENOUS PRN
Status: DISCONTINUED | OUTPATIENT
Start: 2023-07-12 | End: 2023-07-15 | Stop reason: HOSPADM

## 2023-07-12 RX ORDER — FENTANYL CITRATE 0.05 MG/ML
25 INJECTION, SOLUTION INTRAMUSCULAR; INTRAVENOUS
Status: DISCONTINUED | OUTPATIENT
Start: 2023-07-12 | End: 2023-07-12

## 2023-07-12 RX ORDER — LABETALOL HYDROCHLORIDE 5 MG/ML
10 INJECTION, SOLUTION INTRAVENOUS EVERY 30 MIN PRN
Status: DISCONTINUED | OUTPATIENT
Start: 2023-07-12 | End: 2023-07-15 | Stop reason: HOSPADM

## 2023-07-12 RX ORDER — MIDAZOLAM HYDROCHLORIDE 1 MG/ML
2 INJECTION INTRAMUSCULAR; INTRAVENOUS
Status: ACTIVE | OUTPATIENT
Start: 2023-07-12 | End: 2023-07-13

## 2023-07-12 RX ORDER — ONDANSETRON 2 MG/ML
4 INJECTION INTRAMUSCULAR; INTRAVENOUS EVERY 6 HOURS PRN
Status: DISCONTINUED | OUTPATIENT
Start: 2023-07-12 | End: 2023-07-12

## 2023-07-12 RX ORDER — SODIUM CHLORIDE 0.9 % (FLUSH) 0.9 %
5-40 SYRINGE (ML) INJECTION EVERY 12 HOURS SCHEDULED
Status: DISCONTINUED | OUTPATIENT
Start: 2023-07-12 | End: 2023-07-15 | Stop reason: HOSPADM

## 2023-07-12 RX ORDER — ACETAMINOPHEN 325 MG/1
650 TABLET ORAL EVERY 4 HOURS PRN
Status: DISCONTINUED | OUTPATIENT
Start: 2023-07-12 | End: 2023-07-15 | Stop reason: HOSPADM

## 2023-07-12 RX ORDER — SODIUM CHLORIDE 9 MG/ML
INJECTION, SOLUTION INTRAVENOUS CONTINUOUS
Status: DISCONTINUED | OUTPATIENT
Start: 2023-07-12 | End: 2023-07-13

## 2023-07-12 RX ORDER — MORPHINE SULFATE 2 MG/ML
2 INJECTION, SOLUTION INTRAMUSCULAR; INTRAVENOUS
Status: ACTIVE | OUTPATIENT
Start: 2023-07-12 | End: 2023-07-13

## 2023-07-12 RX ORDER — HYDRALAZINE HYDROCHLORIDE 20 MG/ML
10 INJECTION INTRAMUSCULAR; INTRAVENOUS EVERY 10 MIN PRN
Status: DISCONTINUED | OUTPATIENT
Start: 2023-07-12 | End: 2023-07-15 | Stop reason: HOSPADM

## 2023-07-12 RX ADMIN — Medication 5 ML: at 20:43

## 2023-07-12 RX ADMIN — DOCUSATE SODIUM 100 MG: 100 CAPSULE, LIQUID FILLED ORAL at 20:41

## 2023-07-12 RX ADMIN — Medication 10 ML: at 09:03

## 2023-07-12 RX ADMIN — PREDNISONE 10 MG: 10 TABLET ORAL at 08:58

## 2023-07-12 RX ADMIN — SODIUM CHLORIDE: 9 INJECTION, SOLUTION INTRAVENOUS at 18:07

## 2023-07-12 RX ADMIN — CARVEDILOL 25 MG: 25 TABLET, FILM COATED ORAL at 18:05

## 2023-07-12 RX ADMIN — DOCUSATE SODIUM 100 MG: 100 CAPSULE, LIQUID FILLED ORAL at 08:57

## 2023-07-12 RX ADMIN — ISOSORBIDE MONONITRATE 60 MG: 120 TABLET ORAL at 08:58

## 2023-07-12 RX ADMIN — IOPAMIDOL 25 ML: 612 INJECTION, SOLUTION INTRAVENOUS at 15:43

## 2023-07-12 RX ADMIN — INSULIN LISPRO 2 UNITS: 100 INJECTION, SOLUTION INTRAVENOUS; SUBCUTANEOUS at 18:04

## 2023-07-12 RX ADMIN — Medication 10 ML: at 20:42

## 2023-07-12 RX ADMIN — PREDNISONE 10 MG: 10 TABLET ORAL at 20:41

## 2023-07-12 RX ADMIN — CARVEDILOL 25 MG: 25 TABLET, FILM COATED ORAL at 08:58

## 2023-07-12 RX ADMIN — HYDRALAZINE HYDROCHLORIDE 100 MG: 100 TABLET, FILM COATED ORAL at 20:41

## 2023-07-12 RX ADMIN — HYDRALAZINE HYDROCHLORIDE 100 MG: 100 TABLET, FILM COATED ORAL at 06:35

## 2023-07-12 RX ADMIN — ROSUVASTATIN CALCIUM 10 MG: 10 TABLET, FILM COATED ORAL at 20:41

## 2023-07-12 RX ADMIN — LEVOTHYROXINE SODIUM 50 MCG: 0.1 TABLET ORAL at 06:35

## 2023-07-12 NOTE — BRIEF OP NOTE
Section of Cardiology  Adult Brief Procedure Note        Procedure(s):    IVC filter insertion    Pre-operative Diagnosis: Acute DVT unable to tolerate anticoagulation due to anemia    H&P Status: Completed and reviewed.      Post-operative Diagnosis: Acute DVT    Findings:   Successful IVC filter placement below the renal veins    Complications:  none    Primary Proceduralist: Deya Quigley MD      Full procedure note to follow

## 2023-07-13 ENCOUNTER — APPOINTMENT (OUTPATIENT)
Dept: GENERAL RADIOLOGY | Age: 78
DRG: 208 | End: 2023-07-13
Payer: MEDICARE

## 2023-07-13 LAB
ALBUMIN SERPL-MCNC: 2.9 G/DL (ref 3.5–4.6)
ANION GAP SERPL CALCULATED.3IONS-SCNC: 16 MEQ/L (ref 9–15)
BUN SERPL-MCNC: 85 MG/DL (ref 8–23)
CALCIUM SERPL-MCNC: 8.1 MG/DL (ref 8.5–9.9)
CHLORIDE SERPL-SCNC: 103 MEQ/L (ref 95–107)
CO2 SERPL-SCNC: 22 MEQ/L (ref 20–31)
CREAT SERPL-MCNC: 2.87 MG/DL (ref 0.5–0.9)
ERYTHROCYTE [DISTWIDTH] IN BLOOD BY AUTOMATED COUNT: 15.4 % (ref 11.5–14.5)
GLUCOSE BLD-MCNC: 196 MG/DL (ref 70–99)
GLUCOSE BLD-MCNC: 213 MG/DL (ref 70–99)
GLUCOSE BLD-MCNC: 222 MG/DL (ref 70–99)
GLUCOSE BLD-MCNC: 231 MG/DL (ref 70–99)
GLUCOSE BLD-MCNC: 255 MG/DL (ref 70–99)
GLUCOSE SERPL-MCNC: 209 MG/DL (ref 70–99)
HCT VFR BLD AUTO: 26.9 % (ref 37–47)
HGB BLD-MCNC: 8.8 G/DL (ref 12–16)
MCH RBC QN AUTO: 27.5 PG (ref 27–31.3)
MCHC RBC AUTO-ENTMCNC: 32.8 % (ref 33–37)
MCV RBC AUTO: 84 FL (ref 79.4–94.8)
PERFORMED ON: ABNORMAL
PHOSPHATE SERPL-MCNC: 6 MG/DL (ref 2.3–4.8)
PLATELET # BLD AUTO: 305 K/UL (ref 130–400)
PLATELET BLD QL SMEAR: ADEQUATE
POTASSIUM SERPL-SCNC: 4.7 MEQ/L (ref 3.4–4.9)
RBC # BLD AUTO: 3.2 M/UL (ref 4.2–5.4)
SLIDE REVIEW: ABNORMAL
SODIUM SERPL-SCNC: 141 MEQ/L (ref 135–144)
TROPONIN T SERPL-MCNC: 0.08 NG/ML (ref 0–0.01)
WBC # BLD AUTO: 11.7 K/UL (ref 4.8–10.8)

## 2023-07-13 PROCEDURE — 6370000000 HC RX 637 (ALT 250 FOR IP): Performed by: INTERNAL MEDICINE

## 2023-07-13 PROCEDURE — 93005 ELECTROCARDIOGRAM TRACING: CPT | Performed by: INTERNAL MEDICINE

## 2023-07-13 PROCEDURE — 97116 GAIT TRAINING THERAPY: CPT

## 2023-07-13 PROCEDURE — 2580000003 HC RX 258: Performed by: INTERNAL MEDICINE

## 2023-07-13 PROCEDURE — 1210000000 HC MED SURG R&B

## 2023-07-13 PROCEDURE — 6360000002 HC RX W HCPCS: Performed by: INTERNAL MEDICINE

## 2023-07-13 PROCEDURE — 36415 COLL VENOUS BLD VENIPUNCTURE: CPT

## 2023-07-13 PROCEDURE — 84484 ASSAY OF TROPONIN QUANT: CPT

## 2023-07-13 PROCEDURE — 99232 SBSQ HOSP IP/OBS MODERATE 35: CPT | Performed by: INTERNAL MEDICINE

## 2023-07-13 PROCEDURE — 80069 RENAL FUNCTION PANEL: CPT

## 2023-07-13 PROCEDURE — 6370000000 HC RX 637 (ALT 250 FOR IP)

## 2023-07-13 PROCEDURE — 71045 X-RAY EXAM CHEST 1 VIEW: CPT

## 2023-07-13 PROCEDURE — 6370000000 HC RX 637 (ALT 250 FOR IP): Performed by: NURSE PRACTITIONER

## 2023-07-13 PROCEDURE — 2500000003 HC RX 250 WO HCPCS: Performed by: INTERNAL MEDICINE

## 2023-07-13 PROCEDURE — 85027 COMPLETE CBC AUTOMATED: CPT

## 2023-07-13 PROCEDURE — 2700000000 HC OXYGEN THERAPY PER DAY

## 2023-07-13 RX ORDER — HYDRALAZINE HYDROCHLORIDE 100 MG/1
100 TABLET, FILM COATED ORAL EVERY 8 HOURS SCHEDULED
Status: DISCONTINUED | OUTPATIENT
Start: 2023-07-13 | End: 2023-07-15 | Stop reason: HOSPADM

## 2023-07-13 RX ORDER — HYDRALAZINE HYDROCHLORIDE 100 MG/1
100 TABLET, FILM COATED ORAL EVERY 12 HOURS SCHEDULED
Status: DISCONTINUED | OUTPATIENT
Start: 2023-07-13 | End: 2023-07-13

## 2023-07-13 RX ORDER — HYDROMORPHONE HYDROCHLORIDE 1 MG/ML
0.5 INJECTION, SOLUTION INTRAMUSCULAR; INTRAVENOUS; SUBCUTANEOUS ONCE
Status: COMPLETED | OUTPATIENT
Start: 2023-07-13 | End: 2023-07-13

## 2023-07-13 RX ORDER — AMLODIPINE BESYLATE 5 MG/1
5 TABLET ORAL DAILY
Status: DISCONTINUED | OUTPATIENT
Start: 2023-07-13 | End: 2023-07-14

## 2023-07-13 RX ORDER — ISOSORBIDE MONONITRATE 60 MG/1
60 TABLET, EXTENDED RELEASE ORAL DAILY
Status: DISCONTINUED | OUTPATIENT
Start: 2023-07-13 | End: 2023-07-15 | Stop reason: HOSPADM

## 2023-07-13 RX ORDER — HEPARIN SODIUM 5000 [USP'U]/ML
5000 INJECTION, SOLUTION INTRAVENOUS; SUBCUTANEOUS EVERY 8 HOURS SCHEDULED
Status: DISCONTINUED | OUTPATIENT
Start: 2023-07-13 | End: 2023-07-15 | Stop reason: HOSPADM

## 2023-07-13 RX ADMIN — LEVOTHYROXINE SODIUM 50 MCG: 0.1 TABLET ORAL at 05:47

## 2023-07-13 RX ADMIN — LABETALOL HYDROCHLORIDE 10 MG: 5 INJECTION, SOLUTION INTRAVENOUS at 05:02

## 2023-07-13 RX ADMIN — INSULIN LISPRO 2 UNITS: 100 INJECTION, SOLUTION INTRAVENOUS; SUBCUTANEOUS at 20:52

## 2023-07-13 RX ADMIN — AMLODIPINE BESYLATE 5 MG: 5 TABLET ORAL at 08:35

## 2023-07-13 RX ADMIN — INSULIN LISPRO 2 UNITS: 100 INJECTION, SOLUTION INTRAVENOUS; SUBCUTANEOUS at 07:58

## 2023-07-13 RX ADMIN — HYDROMORPHONE HYDROCHLORIDE 0.5 MG: 1 INJECTION, SOLUTION INTRAMUSCULAR; INTRAVENOUS; SUBCUTANEOUS at 08:35

## 2023-07-13 RX ADMIN — PREDNISONE 10 MG: 10 TABLET ORAL at 07:55

## 2023-07-13 RX ADMIN — Medication 5 ML: at 23:04

## 2023-07-13 RX ADMIN — ISOSORBIDE MONONITRATE 60 MG: 60 TABLET, EXTENDED RELEASE ORAL at 07:56

## 2023-07-13 RX ADMIN — HEPARIN SODIUM 5000 UNITS: 5000 INJECTION INTRAVENOUS; SUBCUTANEOUS at 08:35

## 2023-07-13 RX ADMIN — INSULIN LISPRO 4 UNITS: 100 INJECTION, SOLUTION INTRAVENOUS; SUBCUTANEOUS at 17:40

## 2023-07-13 RX ADMIN — ROSUVASTATIN CALCIUM 10 MG: 10 TABLET, FILM COATED ORAL at 20:52

## 2023-07-13 RX ADMIN — HEPARIN SODIUM 5000 UNITS: 5000 INJECTION INTRAVENOUS; SUBCUTANEOUS at 14:13

## 2023-07-13 RX ADMIN — METOPROLOL TARTRATE 5 MG: 5 INJECTION INTRAVENOUS at 03:52

## 2023-07-13 RX ADMIN — CARVEDILOL 25 MG: 25 TABLET, FILM COATED ORAL at 07:56

## 2023-07-13 RX ADMIN — Medication 10 ML: at 20:52

## 2023-07-13 RX ADMIN — HYDRALAZINE HYDROCHLORIDE 10 MG: 20 INJECTION INTRAMUSCULAR; INTRAVENOUS at 00:23

## 2023-07-13 RX ADMIN — CARVEDILOL 25 MG: 25 TABLET, FILM COATED ORAL at 17:41

## 2023-07-13 RX ADMIN — HYDRALAZINE HYDROCHLORIDE 100 MG: 100 TABLET, FILM COATED ORAL at 05:47

## 2023-07-13 RX ADMIN — Medication 5 ML: at 21:42

## 2023-07-13 RX ADMIN — HYDRALAZINE HYDROCHLORIDE 100 MG: 100 TABLET, FILM COATED ORAL at 14:13

## 2023-07-13 RX ADMIN — HYDRALAZINE HYDROCHLORIDE 100 MG: 100 TABLET, FILM COATED ORAL at 20:52

## 2023-07-13 RX ADMIN — HEPARIN SODIUM 5000 UNITS: 5000 INJECTION INTRAVENOUS; SUBCUTANEOUS at 20:52

## 2023-07-13 RX ADMIN — PREDNISONE 10 MG: 10 TABLET ORAL at 20:52

## 2023-07-13 ASSESSMENT — PAIN SCALES - GENERAL
PAINLEVEL_OUTOF10: 9
PAINLEVEL_OUTOF10: 0
PAINLEVEL_OUTOF10: 4

## 2023-07-13 ASSESSMENT — PAIN DESCRIPTION - DESCRIPTORS: DESCRIPTORS: ACHING;SORE

## 2023-07-13 ASSESSMENT — PAIN DESCRIPTION - ORIENTATION: ORIENTATION: MID

## 2023-07-13 ASSESSMENT — PAIN DESCRIPTION - LOCATION: LOCATION: CHEST

## 2023-07-13 NOTE — PLAN OF CARE
Problem: Discharge Planning  Goal: Discharge to home or other facility with appropriate resources  Outcome: Progressing     Problem: Pain  Goal: Verbalizes/displays adequate comfort level or baseline comfort level  Outcome: Progressing     Problem: Safety - Medical Restraint  Goal: Remains free of injury from restraints (Restraint for Interference with Medical Device)  Description: INTERVENTIONS:  1. Determine that other, less restrictive measures have been tried or would not be effective before applying the restraint  2. Evaluate the patient's condition at the time of restraint application  3. Inform patient/family regarding the reason for restraint  4. Q2H: Monitor safety, psychosocial status, comfort, nutrition and hydration  Outcome: Progressing     Problem: Safety - Adult  Goal: Free from fall injury  Outcome: Progressing     Problem: Skin/Tissue Integrity  Goal: Absence of new skin breakdown  Description: 1. Monitor for areas of redness and/or skin breakdown  2. Assess vascular access sites hourly  3. Every 4-6 hours minimum:  Change oxygen saturation probe site  4. Every 4-6 hours:  If on nasal continuous positive airway pressure, respiratory therapy assess nares and determine need for appliance change or resting period.   Outcome: Progressing     Problem: Chronic Conditions and Co-morbidities  Goal: Patient's chronic conditions and co-morbidity symptoms are monitored and maintained or improved  Outcome: Progressing

## 2023-07-13 NOTE — PLAN OF CARE
Nutrition Problem #1: Inadequate oral intake  Intervention: Food and/or Nutrient Delivery: Modify Current Diet (Continue Cardaic;DAMIAN diet  Add Carb Control 4  Continue diabetic supplement BID)

## 2023-07-13 NOTE — CARE COORDINATION
Dc plan remains home with [facility]. UPDATE GIVEN TO MICHAEL AT [facility]. Will need to follow for 02 eval as well.

## 2023-07-13 NOTE — CARE COORDINATION
JONHW met with patient to discuss her discharge plan. She stated she still plans to return home with [facility].

## 2023-07-14 LAB
ALBUMIN SERPL-MCNC: 3.1 G/DL (ref 3.5–4.6)
ANION GAP SERPL CALCULATED.3IONS-SCNC: 11 MEQ/L (ref 9–15)
ANION GAP SERPL CALCULATED.3IONS-SCNC: 14 MEQ/L (ref 9–15)
BILIRUB UR QL STRIP: NEGATIVE
BUN SERPL-MCNC: 89 MG/DL (ref 8–23)
BUN SERPL-MCNC: 89 MG/DL (ref 8–23)
CALCIUM SERPL-MCNC: 8.8 MG/DL (ref 8.5–9.9)
CALCIUM SERPL-MCNC: 8.9 MG/DL (ref 8.5–9.9)
CHLORIDE SERPL-SCNC: 100 MEQ/L (ref 95–107)
CHLORIDE SERPL-SCNC: 101 MEQ/L (ref 95–107)
CLARITY UR: ABNORMAL
CO2 SERPL-SCNC: 23 MEQ/L (ref 20–31)
CO2 SERPL-SCNC: 23 MEQ/L (ref 20–31)
COLOR UR: ABNORMAL
CREAT SERPL-MCNC: 2.86 MG/DL (ref 0.5–0.9)
CREAT SERPL-MCNC: 2.88 MG/DL (ref 0.5–0.9)
EKG ATRIAL RATE: 120 BPM
EKG P AXIS: 58 DEGREES
EKG P-R INTERVAL: 140 MS
EKG Q-T INTERVAL: 326 MS
EKG QRS DURATION: 80 MS
EKG QTC CALCULATION (BAZETT): 460 MS
EKG R AXIS: 9 DEGREES
EKG T AXIS: 47 DEGREES
EKG VENTRICULAR RATE: 120 BPM
ERYTHROCYTE [DISTWIDTH] IN BLOOD BY AUTOMATED COUNT: 15.8 % (ref 11.5–14.5)
ERYTHROCYTE [DISTWIDTH] IN BLOOD BY AUTOMATED COUNT: 16.2 % (ref 11.5–14.5)
GLUCOSE BLD-MCNC: 195 MG/DL (ref 70–99)
GLUCOSE BLD-MCNC: 298 MG/DL (ref 70–99)
GLUCOSE BLD-MCNC: 299 MG/DL (ref 70–99)
GLUCOSE BLD-MCNC: 306 MG/DL (ref 70–99)
GLUCOSE SERPL-MCNC: 180 MG/DL (ref 70–99)
GLUCOSE SERPL-MCNC: 215 MG/DL (ref 70–99)
GLUCOSE UR STRIP-MCNC: NEGATIVE MG/DL
HCT VFR BLD AUTO: 26.7 % (ref 37–47)
HCT VFR BLD AUTO: 27.6 % (ref 37–47)
HGB BLD-MCNC: 8.8 G/DL (ref 12–16)
HGB BLD-MCNC: 9 G/DL (ref 12–16)
HGB UR QL STRIP: ABNORMAL
KETONES UR STRIP-MCNC: NEGATIVE MG/DL
LEUKOCYTE ESTERASE UR QL STRIP: ABNORMAL
MCH RBC QN AUTO: 27.4 PG (ref 27–31.3)
MCH RBC QN AUTO: 27.7 PG (ref 27–31.3)
MCHC RBC AUTO-ENTMCNC: 32.6 % (ref 33–37)
MCHC RBC AUTO-ENTMCNC: 33.1 % (ref 33–37)
MCV RBC AUTO: 83.8 FL (ref 79.4–94.8)
MCV RBC AUTO: 83.9 FL (ref 79.4–94.8)
NITRITE UR QL STRIP: NEGATIVE
PERFORMED ON: ABNORMAL
PH UR STRIP: 5.5 [PH] (ref 5–9)
PHOSPHATE SERPL-MCNC: 6.6 MG/DL (ref 2.3–4.8)
PLATELET # BLD AUTO: 230 K/UL (ref 130–400)
PLATELET # BLD AUTO: 250 K/UL (ref 130–400)
PLATELET BLD QL SMEAR: ADEQUATE
POTASSIUM SERPL-SCNC: 4.5 MEQ/L (ref 3.4–4.9)
POTASSIUM SERPL-SCNC: 4.9 MEQ/L (ref 3.4–4.9)
PROT UR STRIP-MCNC: >=300 MG/DL
RBC # BLD AUTO: 3.19 M/UL (ref 4.2–5.4)
RBC # BLD AUTO: 3.29 M/UL (ref 4.2–5.4)
SLIDE REVIEW: ABNORMAL
SODIUM SERPL-SCNC: 135 MEQ/L (ref 135–144)
SODIUM SERPL-SCNC: 137 MEQ/L (ref 135–144)
SP GR UR STRIP: 1.02 (ref 1–1.03)
UROBILINOGEN UR STRIP-ACNC: 0.2 E.U./DL
WBC # BLD AUTO: 11.1 K/UL (ref 4.8–10.8)
WBC # BLD AUTO: 12.2 K/UL (ref 4.8–10.8)

## 2023-07-14 PROCEDURE — 85027 COMPLETE CBC AUTOMATED: CPT

## 2023-07-14 PROCEDURE — 6370000000 HC RX 637 (ALT 250 FOR IP): Performed by: NURSE PRACTITIONER

## 2023-07-14 PROCEDURE — 6360000002 HC RX W HCPCS: Performed by: INTERNAL MEDICINE

## 2023-07-14 PROCEDURE — 97535 SELF CARE MNGMENT TRAINING: CPT

## 2023-07-14 PROCEDURE — 81001 URINALYSIS AUTO W/SCOPE: CPT

## 2023-07-14 PROCEDURE — 6370000000 HC RX 637 (ALT 250 FOR IP): Performed by: INTERNAL MEDICINE

## 2023-07-14 PROCEDURE — 2580000003 HC RX 258: Performed by: INTERNAL MEDICINE

## 2023-07-14 PROCEDURE — 6370000000 HC RX 637 (ALT 250 FOR IP)

## 2023-07-14 PROCEDURE — 36415 COLL VENOUS BLD VENIPUNCTURE: CPT

## 2023-07-14 PROCEDURE — 99232 SBSQ HOSP IP/OBS MODERATE 35: CPT | Performed by: INTERNAL MEDICINE

## 2023-07-14 PROCEDURE — 94761 N-INVAS EAR/PLS OXIMETRY MLT: CPT

## 2023-07-14 PROCEDURE — 1210000000 HC MED SURG R&B

## 2023-07-14 PROCEDURE — 80069 RENAL FUNCTION PANEL: CPT

## 2023-07-14 PROCEDURE — 97116 GAIT TRAINING THERAPY: CPT

## 2023-07-14 PROCEDURE — 2700000000 HC OXYGEN THERAPY PER DAY

## 2023-07-14 RX ORDER — AMLODIPINE BESYLATE 10 MG/1
10 TABLET ORAL DAILY
Status: DISCONTINUED | OUTPATIENT
Start: 2023-07-15 | End: 2023-07-15 | Stop reason: HOSPADM

## 2023-07-14 RX ADMIN — HYDRALAZINE HYDROCHLORIDE 100 MG: 100 TABLET, FILM COATED ORAL at 21:21

## 2023-07-14 RX ADMIN — HEPARIN SODIUM 5000 UNITS: 5000 INJECTION INTRAVENOUS; SUBCUTANEOUS at 14:13

## 2023-07-14 RX ADMIN — ISOSORBIDE MONONITRATE 60 MG: 60 TABLET, EXTENDED RELEASE ORAL at 08:19

## 2023-07-14 RX ADMIN — Medication 10 ML: at 08:21

## 2023-07-14 RX ADMIN — HEPARIN SODIUM 5000 UNITS: 5000 INJECTION INTRAVENOUS; SUBCUTANEOUS at 21:21

## 2023-07-14 RX ADMIN — CARVEDILOL 25 MG: 25 TABLET, FILM COATED ORAL at 08:19

## 2023-07-14 RX ADMIN — CARVEDILOL 25 MG: 25 TABLET, FILM COATED ORAL at 17:05

## 2023-07-14 RX ADMIN — ROSUVASTATIN CALCIUM 10 MG: 10 TABLET, FILM COATED ORAL at 21:21

## 2023-07-14 RX ADMIN — INSULIN LISPRO 6 UNITS: 100 INJECTION, SOLUTION INTRAVENOUS; SUBCUTANEOUS at 11:23

## 2023-07-14 RX ADMIN — HEPARIN SODIUM 5000 UNITS: 5000 INJECTION INTRAVENOUS; SUBCUTANEOUS at 06:16

## 2023-07-14 RX ADMIN — PREDNISONE 10 MG: 10 TABLET ORAL at 21:21

## 2023-07-14 RX ADMIN — PREDNISONE 10 MG: 10 TABLET ORAL at 08:19

## 2023-07-14 RX ADMIN — AMLODIPINE BESYLATE 5 MG: 5 TABLET ORAL at 08:19

## 2023-07-14 RX ADMIN — LEVOTHYROXINE SODIUM 50 MCG: 0.1 TABLET ORAL at 06:16

## 2023-07-14 RX ADMIN — Medication 5 ML: at 21:23

## 2023-07-14 RX ADMIN — INSULIN LISPRO 4 UNITS: 100 INJECTION, SOLUTION INTRAVENOUS; SUBCUTANEOUS at 17:05

## 2023-07-14 RX ADMIN — HYDRALAZINE HYDROCHLORIDE 100 MG: 100 TABLET, FILM COATED ORAL at 14:13

## 2023-07-14 RX ADMIN — HYDRALAZINE HYDROCHLORIDE 100 MG: 100 TABLET, FILM COATED ORAL at 06:16

## 2023-07-14 RX ADMIN — INSULIN LISPRO 4 UNITS: 100 INJECTION, SOLUTION INTRAVENOUS; SUBCUTANEOUS at 22:03

## 2023-07-14 RX ADMIN — DOCUSATE SODIUM 100 MG: 100 CAPSULE, LIQUID FILLED ORAL at 21:21

## 2023-07-14 NOTE — CARE COORDINATION
DC PLAN REMAINS HOME WITH [facility], PLAN FOR DC POSSIBLE SAT PER MD ROUNDS.

## 2023-07-14 NOTE — PLAN OF CARE
Problem: Discharge Planning  Goal: Discharge to home or other facility with appropriate resources  Outcome: Progressing     Problem: Pain  Goal: Verbalizes/displays adequate comfort level or baseline comfort level  Outcome: Progressing     Problem: Safety - Medical Restraint  Goal: Remains free of injury from restraints (Restraint for Interference with Medical Device)  Description: INTERVENTIONS:  1. Determine that other, less restrictive measures have been tried or would not be effective before applying the restraint  2. Evaluate the patient's condition at the time of restraint application  3. Inform patient/family regarding the reason for restraint  4. Q2H: Monitor safety, psychosocial status, comfort, nutrition and hydration  Outcome: Progressing     Problem: Safety - Adult  Goal: Free from fall injury  Outcome: Progressing     Problem: Skin/Tissue Integrity  Goal: Absence of new skin breakdown  Description: 1. Monitor for areas of redness and/or skin breakdown  2. Assess vascular access sites hourly  3. Every 4-6 hours minimum:  Change oxygen saturation probe site  4. Every 4-6 hours:  If on nasal continuous positive airway pressure, respiratory therapy assess nares and determine need for appliance change or resting period.   Outcome: Progressing     Problem: Chronic Conditions and Co-morbidities  Goal: Patient's chronic conditions and co-morbidity symptoms are monitored and maintained or improved  Outcome: Progressing     Problem: Nutrition Deficit:  Goal: Optimize nutritional status  7/14/2023 0130 by Maddy Molina RN  Outcome: Progressing  7/13/2023 1132 by Tapan Obrien RD, LD  Flowsheets (Taken 7/13/2023 1132)  Nutrient intake appropriate for improving, restoring, or maintaining nutritional needs:   Assess nutritional status and recommend course of action   Monitor oral intake, labs, and treatment plans   Recommend appropriate diets, oral nutritional supplements, and vitamin/mineral supplements

## 2023-07-15 VITALS
HEIGHT: 65 IN | RESPIRATION RATE: 18 BRPM | SYSTOLIC BLOOD PRESSURE: 133 MMHG | BODY MASS INDEX: 22.74 KG/M2 | DIASTOLIC BLOOD PRESSURE: 58 MMHG | HEART RATE: 95 BPM | TEMPERATURE: 98.1 F | WEIGHT: 136.47 LBS | OXYGEN SATURATION: 97 %

## 2023-07-15 LAB
ALBUMIN SERPL-MCNC: 3 G/DL (ref 3.5–4.6)
ALP SERPL-CCNC: 59 U/L (ref 40–130)
ALT SERPL-CCNC: 12 U/L (ref 0–33)
ANION GAP SERPL CALCULATED.3IONS-SCNC: 10 MEQ/L (ref 9–15)
ANISOCYTOSIS BLD QL SMEAR: ABNORMAL
AST SERPL-CCNC: 14 U/L (ref 0–35)
BACTERIA URNS QL MICRO: NEGATIVE /HPF
BASOPHILS # BLD: 0 K/UL (ref 0–0.2)
BASOPHILS NFR BLD: 0.1 %
BILIRUB SERPL-MCNC: 0.4 MG/DL (ref 0.2–0.7)
BUN SERPL-MCNC: 97 MG/DL (ref 8–23)
C3 SERPL-MCNC: 87 MG/DL (ref 90–180)
C4 SERPL-MCNC: <2 MG/DL (ref 10–40)
CALCIUM SERPL-MCNC: 8.3 MG/DL (ref 8.5–9.9)
CHLORIDE SERPL-SCNC: 97 MEQ/L (ref 95–107)
CO2 SERPL-SCNC: 24 MEQ/L (ref 20–31)
CREAT SERPL-MCNC: 2.79 MG/DL (ref 0.5–0.9)
EOSINOPHIL # BLD: 0 K/UL (ref 0–0.7)
EOSINOPHIL NFR BLD: 0 %
EPI CELLS #/AREA URNS AUTO: ABNORMAL /HPF (ref 0–5)
ERYTHROCYTE [DISTWIDTH] IN BLOOD BY AUTOMATED COUNT: 15.6 % (ref 11.5–14.5)
GLOBULIN SER CALC-MCNC: 1.6 G/DL (ref 2.3–3.5)
GLUCOSE BLD-MCNC: 201 MG/DL (ref 70–99)
GLUCOSE BLD-MCNC: 275 MG/DL (ref 70–99)
GLUCOSE SERPL-MCNC: 182 MG/DL (ref 70–99)
HBA1C MFR BLD: 6.7 % (ref 4.8–5.9)
HCT VFR BLD AUTO: 25.5 % (ref 37–47)
HGB BLD-MCNC: 8.4 G/DL (ref 12–16)
HYALINE CASTS #/AREA URNS AUTO: ABNORMAL /HPF (ref 0–5)
LYMPHOCYTES # BLD: 0.5 K/UL (ref 1–4.8)
LYMPHOCYTES NFR BLD: 3.5 %
MCH RBC QN AUTO: 27.9 PG (ref 27–31.3)
MCHC RBC AUTO-ENTMCNC: 32.9 % (ref 33–37)
MCV RBC AUTO: 84.6 FL (ref 79.4–94.8)
MONOCYTES # BLD: 0.1 K/UL (ref 0.2–0.8)
MONOCYTES NFR BLD: 0.4 %
NEUTROPHILS # BLD: 14.1 K/UL (ref 1.4–6.5)
NEUTS SEG NFR BLD: 96 %
OVALOCYTES BLD QL SMEAR: ABNORMAL
PERFORMED ON: ABNORMAL
PERFORMED ON: ABNORMAL
PHOSPHATE SERPL-MCNC: 5 MG/DL (ref 2.3–4.8)
PLATELET # BLD AUTO: 267 K/UL (ref 130–400)
PLATELET BLD QL SMEAR: ADEQUATE
POIKILOCYTOSIS BLD QL SMEAR: ABNORMAL
POTASSIUM SERPL-SCNC: 4.7 MEQ/L (ref 3.4–4.9)
PROT SERPL-MCNC: 4.6 G/DL (ref 6.3–8)
RBC # BLD AUTO: 3.02 M/UL (ref 4.2–5.4)
RBC #/AREA URNS HPF: ABNORMAL /HPF (ref 0–2)
SODIUM SERPL-SCNC: 131 MEQ/L (ref 135–144)
WBC # BLD AUTO: 14.7 K/UL (ref 4.8–10.8)
WBC #/AREA URNS AUTO: ABNORMAL /HPF (ref 0–5)
YEAST URNS QL MICRO: PRESENT /HPF

## 2023-07-15 PROCEDURE — 6370000000 HC RX 637 (ALT 250 FOR IP): Performed by: INTERNAL MEDICINE

## 2023-07-15 PROCEDURE — 6360000002 HC RX W HCPCS: Performed by: INTERNAL MEDICINE

## 2023-07-15 PROCEDURE — 83036 HEMOGLOBIN GLYCOSYLATED A1C: CPT

## 2023-07-15 PROCEDURE — 83516 IMMUNOASSAY NONANTIBODY: CPT

## 2023-07-15 PROCEDURE — 36415 COLL VENOUS BLD VENIPUNCTURE: CPT

## 2023-07-15 PROCEDURE — 85025 COMPLETE CBC W/AUTO DIFF WBC: CPT

## 2023-07-15 PROCEDURE — 86160 COMPLEMENT ANTIGEN: CPT

## 2023-07-15 PROCEDURE — 6370000000 HC RX 637 (ALT 250 FOR IP): Performed by: NURSE PRACTITIONER

## 2023-07-15 PROCEDURE — 6370000000 HC RX 637 (ALT 250 FOR IP)

## 2023-07-15 PROCEDURE — 2580000003 HC RX 258: Performed by: INTERNAL MEDICINE

## 2023-07-15 PROCEDURE — 84100 ASSAY OF PHOSPHORUS: CPT

## 2023-07-15 PROCEDURE — 80053 COMPREHEN METABOLIC PANEL: CPT

## 2023-07-15 PROCEDURE — 5A12012 PERFORMANCE OF CARDIAC OUTPUT, SINGLE, MANUAL: ICD-10-PCS | Performed by: INTERNAL MEDICINE

## 2023-07-15 PROCEDURE — 97116 GAIT TRAINING THERAPY: CPT

## 2023-07-15 RX ORDER — GLIPIZIDE 5 MG/1
2.5 TABLET ORAL
Status: DISCONTINUED | OUTPATIENT
Start: 2023-07-16 | End: 2023-07-15 | Stop reason: HOSPADM

## 2023-07-15 RX ORDER — CARVEDILOL 25 MG/1
25 TABLET ORAL 2 TIMES DAILY WITH MEALS
Qty: 60 TABLET | Refills: 3 | Status: ON HOLD | OUTPATIENT
Start: 2023-07-15

## 2023-07-15 RX ORDER — GLIPIZIDE 5 MG/1
2.5 TABLET ORAL
Qty: 60 TABLET | Refills: 3 | Status: ON HOLD | OUTPATIENT
Start: 2023-07-16

## 2023-07-15 RX ORDER — AMLODIPINE BESYLATE 10 MG/1
10 TABLET ORAL DAILY
Qty: 30 TABLET | Refills: 3 | Status: ON HOLD | OUTPATIENT
Start: 2023-07-15

## 2023-07-15 RX ADMIN — HEPARIN SODIUM 5000 UNITS: 5000 INJECTION INTRAVENOUS; SUBCUTANEOUS at 06:18

## 2023-07-15 RX ADMIN — PREDNISONE 10 MG: 10 TABLET ORAL at 08:31

## 2023-07-15 RX ADMIN — LEVOTHYROXINE SODIUM 50 MCG: 0.1 TABLET ORAL at 06:18

## 2023-07-15 RX ADMIN — HYDRALAZINE HYDROCHLORIDE 100 MG: 100 TABLET, FILM COATED ORAL at 13:36

## 2023-07-15 RX ADMIN — INSULIN LISPRO 2 UNITS: 100 INJECTION, SOLUTION INTRAVENOUS; SUBCUTANEOUS at 08:30

## 2023-07-15 RX ADMIN — AMLODIPINE BESYLATE 10 MG: 10 TABLET ORAL at 08:31

## 2023-07-15 RX ADMIN — Medication 5 ML: at 09:00

## 2023-07-15 RX ADMIN — HYDRALAZINE HYDROCHLORIDE 100 MG: 100 TABLET, FILM COATED ORAL at 06:18

## 2023-07-15 RX ADMIN — ONDANSETRON 4 MG: 4 TABLET, ORALLY DISINTEGRATING ORAL at 11:26

## 2023-07-15 RX ADMIN — HEPARIN SODIUM 5000 UNITS: 5000 INJECTION INTRAVENOUS; SUBCUTANEOUS at 13:36

## 2023-07-15 RX ADMIN — CARVEDILOL 25 MG: 25 TABLET, FILM COATED ORAL at 08:31

## 2023-07-15 RX ADMIN — ISOSORBIDE MONONITRATE 60 MG: 60 TABLET, EXTENDED RELEASE ORAL at 08:30

## 2023-07-15 RX ADMIN — INSULIN LISPRO 4 UNITS: 100 INJECTION, SOLUTION INTRAVENOUS; SUBCUTANEOUS at 13:37

## 2023-07-15 NOTE — DISCHARGE INSTRUCTIONS
Follow up with primary care physician in the next 7 days or sooner if needed. If you do not have a Primary care physician, please schedule an appointment with one. Please ask prior to discharge about a list of local providers. Please return to ER or call 911 if you develop any significant signs or symptoms. I may not have addressed all of your medical illnesses or the abnormal blood work or imaging therefore please ask your PCP to obtain [facility] record to follow up on all of the abnormal labs, imaging and findings that I have and have not addressed during your hospitalization. Discharging you from the hospital does not mean that your medical care ends here and now. You may still need additional work up, investigation, monitoring, and treatment to be handled from this point on by outside providers including your PCP, Specialists and other healthcare providers. For medication questions, contact your retail pharmacy and your PCP. Your medical team at [facility] appreciates the opportunity to work with you to get well!     Kurtis Lynn, DO  7:15 AM

## 2023-07-15 NOTE — PLAN OF CARE
Problem: Discharge Planning  Goal: Discharge to home or other facility with appropriate resources  Outcome: Progressing     Problem: Pain  Goal: Verbalizes/displays adequate comfort level or baseline comfort level  Outcome: Progressing     Problem: Safety - Medical Restraint  Goal: Remains free of injury from restraints (Restraint for Interference with Medical Device)  Description: INTERVENTIONS:  1. Determine that other, less restrictive measures have been tried or would not be effective before applying the restraint  2. Evaluate the patient's condition at the time of restraint application  3. Inform patient/family regarding the reason for restraint  4. Q2H: Monitor safety, psychosocial status, comfort, nutrition and hydration  Outcome: Progressing     Problem: Safety - Adult  Goal: Free from fall injury  Outcome: Progressing     Problem: Skin/Tissue Integrity  Goal: Absence of new skin breakdown  Description: 1. Monitor for areas of redness and/or skin breakdown  2. Assess vascular access sites hourly  3. Every 4-6 hours minimum:  Change oxygen saturation probe site  4. Every 4-6 hours:  If on nasal continuous positive airway pressure, respiratory therapy assess nares and determine need for appliance change or resting period.   Outcome: Progressing     Problem: Chronic Conditions and Co-morbidities  Goal: Patient's chronic conditions and co-morbidity symptoms are monitored and maintained or improved  Outcome: Progressing     Problem: Nutrition Deficit:  Goal: Optimize nutritional status  Outcome: Progressing

## 2023-07-15 NOTE — PLAN OF CARE
Problem: Discharge Planning  Goal: Discharge to home or other facility with appropriate resources  Outcome: Adequate for Discharge     Problem: Pain  Goal: Verbalizes/displays adequate comfort level or baseline comfort level  Outcome: Adequate for Discharge     Problem: Safety - Medical Restraint  Goal: Remains free of injury from restraints (Restraint for Interference with Medical Device)  Description: INTERVENTIONS:  1. Determine that other, less restrictive measures have been tried or would not be effective before applying the restraint  2. Evaluate the patient's condition at the time of restraint application  3. Inform patient/family regarding the reason for restraint  4. Q2H: Monitor safety, psychosocial status, comfort, nutrition and hydration  Outcome: Adequate for Discharge     Problem: Safety - Adult  Goal: Free from fall injury  Outcome: Adequate for Discharge     Problem: Skin/Tissue Integrity  Goal: Absence of new skin breakdown  Description: 1. Monitor for areas of redness and/or skin breakdown  2. Assess vascular access sites hourly  3. Every 4-6 hours minimum:  Change oxygen saturation probe site  4. Every 4-6 hours:  If on nasal continuous positive airway pressure, respiratory therapy assess nares and determine need for appliance change or resting period.   Outcome: Adequate for Discharge     Problem: Chronic Conditions and Co-morbidities  Goal: Patient's chronic conditions and co-morbidity symptoms are monitored and maintained or improved  Outcome: Adequate for Discharge     Problem: Nutrition Deficit:  Goal: Optimize nutritional status  Outcome: Adequate for Discharge

## 2023-07-18 LAB
MYELOPEROXIDASE AB SER-ACNC: 0 AU/ML (ref 0–19)
PROTEINASE3 AB SER-ACNC: 0 AU/ML (ref 0–19)

## 2023-07-18 NOTE — PROCEDURES
Operative report      Patient:  Yue Umana    Medical record number:  [id number]    Date of procedure:  7/12/2023    Procedure:   IVC filter insertion    Primary Proceduralist: Rosalind Quinteros MD    Indication:   Acute DVT unable to tolerate anticoagulation due to anemia    Preoperative diagnosis:   DVT    Postoperative diagnosis:   DVT    Findings:  Successful IVC filter placement below the renal veins    Complication: None    LIH:0FL    Procedure in detail:  Risk benefits and alternatives of the procedure were discussed in detail with the patient, all questions were answered, after signing the consent patient was brought to the cardiac catheterization laboratory in the fasting state. She was prepped and draped in the usual sterile fashion  A 2% Xylocaine solution was used to locally anesthetize the right groin through a micropuncture needle and using fluoroscopy access was obtained into the right common femoral vein successfully.     Next using a 0.035 J-wire a Cook sheath with dilator system was advanced to the level of the renal veins  Dilator was removed  Inferior vena cava angiogram was then performed identifying the bilateral renal veins  Once location of the bilateral renal veins was performed by the use of a ruler  The Cook IVC filter was then applied into the sheath  Successful deployment of IVC filter below the veins was performed    IVC filter delivery system was then removed  IVC filter delivery sheath was then removed and successful hemostasis achieved via manual compression    Recommendations:  Bedrest for 4 hours  Continue management of comorbidities as per primary  Follow-up with me in clinic in 1 to 2 weeks after discharge

## 2023-07-19 ENCOUNTER — TELEPHONE (OUTPATIENT)
Dept: OTHER | Facility: CLINIC | Age: 78
End: 2023-07-19

## 2023-07-19 ENCOUNTER — APPOINTMENT (OUTPATIENT)
Dept: GENERAL RADIOLOGY | Age: 78
DRG: 286 | End: 2023-07-19
Payer: MEDICARE

## 2023-07-19 ENCOUNTER — HOSPITAL ENCOUNTER (INPATIENT)
Age: 78
LOS: 20 days | Discharge: HOME | DRG: 286 | End: 2023-08-08
Attending: EMERGENCY MEDICINE | Admitting: INTERNAL MEDICINE
Payer: MEDICARE

## 2023-07-19 DIAGNOSIS — R94.31 ACUTE ELECTROCARDIOGRAM CHANGES: Primary | ICD-10-CM

## 2023-07-19 DIAGNOSIS — D64.9 CHRONIC ANEMIA: ICD-10-CM

## 2023-07-19 DIAGNOSIS — R06.00 DYSPNEA, UNSPECIFIED TYPE: ICD-10-CM

## 2023-07-19 DIAGNOSIS — N18.2 CHRONIC RENAL IMPAIRMENT, STAGE 2 (MILD): ICD-10-CM

## 2023-07-19 DIAGNOSIS — R60.0 EDEMA OF BOTH UPPER EXTREMITIES: ICD-10-CM

## 2023-07-19 PROBLEM — J96.01 ACUTE RESPIRATORY FAILURE WITH HYPOXIA (HCC): Status: ACTIVE | Noted: 2023-07-19

## 2023-07-19 LAB
ALBUMIN SERPL-MCNC: 3.3 G/DL (ref 3.5–4.6)
ALP SERPL-CCNC: 77 U/L (ref 40–130)
ALT SERPL-CCNC: 18 U/L (ref 0–33)
ANION GAP SERPL CALCULATED.3IONS-SCNC: 10 MEQ/L (ref 9–15)
ANISOCYTOSIS BLD QL SMEAR: ABNORMAL
ANTI-XA UNFRAC HEPARIN: <0.1 IU/ML
APTT PPP: 24.8 SEC (ref 24.4–36.8)
AST SERPL-CCNC: 18 U/L (ref 0–35)
BASOPHILS # BLD: 0 K/UL (ref 0–0.2)
BASOPHILS NFR BLD: 0 %
BILIRUB SERPL-MCNC: 0.8 MG/DL (ref 0.2–0.7)
BNP BLD-MCNC: NORMAL PG/ML
BUN SERPL-MCNC: 79 MG/DL (ref 8–23)
CALCIUM SERPL-MCNC: 8.4 MG/DL (ref 8.5–9.9)
CHLORIDE SERPL-SCNC: 101 MEQ/L (ref 95–107)
CO2 SERPL-SCNC: 24 MEQ/L (ref 20–31)
CREAT SERPL-MCNC: 2.78 MG/DL (ref 0.5–0.9)
EOSINOPHIL # BLD: 0 K/UL (ref 0–0.7)
EOSINOPHIL NFR BLD: 0 %
ERYTHROCYTE [DISTWIDTH] IN BLOOD BY AUTOMATED COUNT: 16.1 % (ref 11.5–14.5)
GLOBULIN SER CALC-MCNC: 1.9 G/DL (ref 2.3–3.5)
GLUCOSE BLD-MCNC: 128 MG/DL (ref 70–99)
GLUCOSE SERPL-MCNC: 127 MG/DL (ref 70–99)
HCT VFR BLD AUTO: 23.3 % (ref 37–47)
HGB BLD-MCNC: 7.6 G/DL (ref 12–16)
HYPOCHROMIA BLD QL SMEAR: ABNORMAL
INR PPP: 1.1
LYMPHOCYTES # BLD: 0.2 K/UL (ref 1–4.8)
LYMPHOCYTES NFR BLD: 6 %
MAGNESIUM SERPL-MCNC: 2.7 MG/DL (ref 1.7–2.4)
MCH RBC QN AUTO: 27.6 PG (ref 27–31.3)
MCHC RBC AUTO-ENTMCNC: 32.6 % (ref 33–37)
MCV RBC AUTO: 84.7 FL (ref 79.4–94.8)
MICROCYTES BLD QL SMEAR: ABNORMAL
MONOCYTES # BLD: 0.2 K/UL (ref 0.2–0.8)
MONOCYTES NFR BLD: 6 %
NEUTROPHILS # BLD: 2.7 K/UL (ref 1.4–6.5)
NEUTS SEG NFR BLD: 88 %
PERFORMED ON: ABNORMAL
PLATELET # BLD AUTO: 255 K/UL (ref 130–400)
PLATELET BLD QL SMEAR: ADEQUATE
POLYCHROMASIA BLD QL SMEAR: ABNORMAL
POTASSIUM SERPL-SCNC: 5.4 MEQ/L (ref 3.4–4.9)
PROT SERPL-MCNC: 5.2 G/DL (ref 6.3–8)
PROTHROMBIN TIME: 14.3 SEC (ref 12.3–14.9)
RBC # BLD AUTO: 2.75 M/UL (ref 4.2–5.4)
SODIUM SERPL-SCNC: 135 MEQ/L (ref 135–144)
TROPONIN T SERPL-MCNC: 0.02 NG/ML (ref 0–0.01)
TROPONIN T SERPL-MCNC: 0.02 NG/ML (ref 0–0.01)
WBC # BLD AUTO: 3.1 K/UL (ref 4.8–10.8)

## 2023-07-19 PROCEDURE — 85025 COMPLETE CBC W/AUTO DIFF WBC: CPT

## 2023-07-19 PROCEDURE — 93005 ELECTROCARDIOGRAM TRACING: CPT | Performed by: EMERGENCY MEDICINE

## 2023-07-19 PROCEDURE — 83735 ASSAY OF MAGNESIUM: CPT

## 2023-07-19 PROCEDURE — 84484 ASSAY OF TROPONIN QUANT: CPT

## 2023-07-19 PROCEDURE — 85610 PROTHROMBIN TIME: CPT

## 2023-07-19 PROCEDURE — 6370000000 HC RX 637 (ALT 250 FOR IP): Performed by: INTERNAL MEDICINE

## 2023-07-19 PROCEDURE — 6360000002 HC RX W HCPCS: Performed by: EMERGENCY MEDICINE

## 2023-07-19 PROCEDURE — 1210000000 HC MED SURG R&B

## 2023-07-19 PROCEDURE — 71045 X-RAY EXAM CHEST 1 VIEW: CPT

## 2023-07-19 PROCEDURE — 6360000002 HC RX W HCPCS: Performed by: INTERNAL MEDICINE

## 2023-07-19 PROCEDURE — 80053 COMPREHEN METABOLIC PANEL: CPT

## 2023-07-19 PROCEDURE — 85520 HEPARIN ASSAY: CPT

## 2023-07-19 PROCEDURE — 99285 EMERGENCY DEPT VISIT HI MDM: CPT

## 2023-07-19 PROCEDURE — 85730 THROMBOPLASTIN TIME PARTIAL: CPT

## 2023-07-19 PROCEDURE — 83880 ASSAY OF NATRIURETIC PEPTIDE: CPT

## 2023-07-19 PROCEDURE — 36415 COLL VENOUS BLD VENIPUNCTURE: CPT

## 2023-07-19 RX ORDER — ONDANSETRON 4 MG/1
4 TABLET, ORALLY DISINTEGRATING ORAL EVERY 8 HOURS PRN
Status: DISCONTINUED | OUTPATIENT
Start: 2023-07-19 | End: 2023-08-08 | Stop reason: HOSPADM

## 2023-07-19 RX ORDER — DEXTROSE MONOHYDRATE 100 MG/ML
INJECTION, SOLUTION INTRAVENOUS CONTINUOUS PRN
Status: DISCONTINUED | OUTPATIENT
Start: 2023-07-19 | End: 2023-08-08 | Stop reason: HOSPADM

## 2023-07-19 RX ORDER — FUROSEMIDE 10 MG/ML
20 INJECTION INTRAMUSCULAR; INTRAVENOUS ONCE
Status: COMPLETED | OUTPATIENT
Start: 2023-07-19 | End: 2023-07-19

## 2023-07-19 RX ORDER — INSULIN LISPRO 100 [IU]/ML
0-8 INJECTION, SOLUTION INTRAVENOUS; SUBCUTANEOUS
Status: DISCONTINUED | OUTPATIENT
Start: 2023-07-20 | End: 2023-08-08 | Stop reason: HOSPADM

## 2023-07-19 RX ORDER — SODIUM CHLORIDE 0.9 % (FLUSH) 0.9 %
5-40 SYRINGE (ML) INJECTION PRN
Status: DISCONTINUED | OUTPATIENT
Start: 2023-07-19 | End: 2023-08-08 | Stop reason: HOSPADM

## 2023-07-19 RX ORDER — CEVIMELINE HYDROCHLORIDE 30 MG/1
30 CAPSULE ORAL 2 TIMES DAILY
Status: DISCONTINUED | OUTPATIENT
Start: 2023-07-19 | End: 2023-08-08 | Stop reason: HOSPADM

## 2023-07-19 RX ORDER — PANTOPRAZOLE SODIUM 40 MG/1
40 TABLET, DELAYED RELEASE ORAL DAILY
Status: DISCONTINUED | OUTPATIENT
Start: 2023-07-20 | End: 2023-08-08 | Stop reason: HOSPADM

## 2023-07-19 RX ORDER — INSULIN LISPRO 100 [IU]/ML
0-4 INJECTION, SOLUTION INTRAVENOUS; SUBCUTANEOUS NIGHTLY
Status: DISCONTINUED | OUTPATIENT
Start: 2023-07-19 | End: 2023-08-08 | Stop reason: HOSPADM

## 2023-07-19 RX ORDER — HEPARIN SODIUM 10000 [USP'U]/100ML
5-30 INJECTION, SOLUTION INTRAVENOUS CONTINUOUS
Status: DISCONTINUED | OUTPATIENT
Start: 2023-07-19 | End: 2023-07-20

## 2023-07-19 RX ORDER — GLUCAGON 1 MG/ML
1 KIT INJECTION PRN
Status: DISCONTINUED | OUTPATIENT
Start: 2023-07-19 | End: 2023-08-08 | Stop reason: HOSPADM

## 2023-07-19 RX ORDER — SODIUM CHLORIDE 0.9 % (FLUSH) 0.9 %
5-40 SYRINGE (ML) INJECTION EVERY 12 HOURS SCHEDULED
Status: DISCONTINUED | OUTPATIENT
Start: 2023-07-19 | End: 2023-08-08 | Stop reason: HOSPADM

## 2023-07-19 RX ORDER — CLONIDINE HYDROCHLORIDE 0.1 MG/1
0.2 TABLET ORAL 2 TIMES DAILY
Status: DISCONTINUED | OUTPATIENT
Start: 2023-07-19 | End: 2023-08-08 | Stop reason: HOSPADM

## 2023-07-19 RX ORDER — PREDNISONE 5 MG/1
5 TABLET ORAL EVERY OTHER DAY
Status: DISCONTINUED | OUTPATIENT
Start: 2023-07-20 | End: 2023-08-08 | Stop reason: HOSPADM

## 2023-07-19 RX ORDER — ONDANSETRON 2 MG/ML
4 INJECTION INTRAMUSCULAR; INTRAVENOUS EVERY 6 HOURS PRN
Status: DISCONTINUED | OUTPATIENT
Start: 2023-07-19 | End: 2023-07-25

## 2023-07-19 RX ORDER — ASPIRIN 81 MG/1
81 TABLET, CHEWABLE ORAL DAILY
Status: DISCONTINUED | OUTPATIENT
Start: 2023-07-20 | End: 2023-08-08 | Stop reason: HOSPADM

## 2023-07-19 RX ORDER — MYCOPHENOLATE MOFETIL 250 MG/1
250 CAPSULE ORAL 2 TIMES DAILY
Status: DISCONTINUED | OUTPATIENT
Start: 2023-07-19 | End: 2023-08-05

## 2023-07-19 RX ORDER — ATORVASTATIN CALCIUM 80 MG/1
80 TABLET, FILM COATED ORAL NIGHTLY
Status: DISCONTINUED | OUTPATIENT
Start: 2023-07-19 | End: 2023-07-19

## 2023-07-19 RX ORDER — AMLODIPINE BESYLATE 10 MG/1
10 TABLET ORAL DAILY
Status: DISCONTINUED | OUTPATIENT
Start: 2023-07-20 | End: 2023-07-29

## 2023-07-19 RX ORDER — HYDRALAZINE HYDROCHLORIDE 100 MG/1
100 TABLET, FILM COATED ORAL 2 TIMES DAILY
Status: DISCONTINUED | OUTPATIENT
Start: 2023-07-19 | End: 2023-08-08 | Stop reason: HOSPADM

## 2023-07-19 RX ORDER — SODIUM CHLORIDE 9 MG/ML
INJECTION, SOLUTION INTRAVENOUS PRN
Status: DISCONTINUED | OUTPATIENT
Start: 2023-07-19 | End: 2023-08-08 | Stop reason: HOSPADM

## 2023-07-19 RX ORDER — ATORVASTATIN CALCIUM 20 MG/1
20 TABLET, FILM COATED ORAL DAILY
Status: DISCONTINUED | OUTPATIENT
Start: 2023-07-20 | End: 2023-08-08 | Stop reason: HOSPADM

## 2023-07-19 RX ORDER — POLYETHYLENE GLYCOL 3350 17 G/17G
17 POWDER, FOR SOLUTION ORAL DAILY PRN
Status: DISCONTINUED | OUTPATIENT
Start: 2023-07-19 | End: 2023-08-08 | Stop reason: HOSPADM

## 2023-07-19 RX ORDER — LANOLIN ALCOHOL/MO/W.PET/CERES
400 CREAM (GRAM) TOPICAL DAILY
Status: DISCONTINUED | OUTPATIENT
Start: 2023-07-20 | End: 2023-08-08 | Stop reason: HOSPADM

## 2023-07-19 RX ORDER — ISOSORBIDE MONONITRATE 60 MG/1
60 TABLET, EXTENDED RELEASE ORAL DAILY
Status: DISCONTINUED | OUTPATIENT
Start: 2023-07-20 | End: 2023-08-08 | Stop reason: HOSPADM

## 2023-07-19 RX ADMIN — HEPARIN SODIUM 12 UNITS/KG/HR: 10000 INJECTION, SOLUTION INTRAVENOUS at 21:14

## 2023-07-19 RX ADMIN — FUROSEMIDE 20 MG: 10 INJECTION, SOLUTION INTRAMUSCULAR; INTRAVENOUS at 21:07

## 2023-07-19 RX ADMIN — ATORVASTATIN CALCIUM 80 MG: 80 TABLET, FILM COATED ORAL at 22:11

## 2023-07-19 RX ADMIN — SODIUM ZIRCONIUM CYCLOSILICATE 10 G: 5 POWDER, FOR SUSPENSION ORAL at 22:11

## 2023-07-19 ASSESSMENT — ENCOUNTER SYMPTOMS
CHEST TIGHTNESS: 0
BACK PAIN: 0
SHORTNESS OF BREATH: 1
RHINORRHEA: 0

## 2023-07-19 ASSESSMENT — PAIN SCALES - GENERAL: PAINLEVEL_OUTOF10: 0

## 2023-07-19 ASSESSMENT — PAIN - FUNCTIONAL ASSESSMENT: PAIN_FUNCTIONAL_ASSESSMENT: NONE - DENIES PAIN

## 2023-07-19 NOTE — ED PROVIDER NOTES
[facility]  EMERGENCY DEPARTMENT ENCOUNTER      Pt Name: Alejandra Bacon  MRN: [id number]  [address] [place] 1945  Date of evaluation: 7/19/2023  Provider: Lukas Coleman MD    CHIEF COMPLAINT       Chief Complaint   Patient presents with    Shortness of Breath     CHF post Cardiac arrest on 7/4         HISTORY OF PRESENT ILLNESS   (Location/Symptom, Timing/Onset, Context/Setting, Quality, Duration, Modifying Factors, Severity)  Note limiting factors. Alejandra Bacon is a 76 y.o. female who presents to the emergency department      Patient notes that she has shortness of breath that developed over the past day. She notes that she was discharged from the hospital recently. EMS notes that she was hypoxic at the scene and they started her on CPAP. Upon arrival she noted that she had some improvement. She notes that she does have some chest pain and is unsure if that is new or related to the CPR that she had. She denies any known aggravating factors other than ambulation and notes are no alleviating factors. Denies any other associate symptoms such as nausea vomiting abdominal pain. Nursing Notes were reviewed. REVIEW OF SYSTEMS    (2-9 systems for level 4, 10 or more for level 5)     Review of Systems   Constitutional:  Negative for chills and fever. HENT:  Negative for ear discharge and rhinorrhea. Respiratory:  Positive for shortness of breath. Negative for chest tightness. Cardiovascular:  Positive for chest pain. Genitourinary:  Negative for flank pain. Musculoskeletal:  Negative for back pain. Neurological:  Negative for headaches. All other systems reviewed and are negative. Except as noted above the remainder of the review of systems was reviewed and negative.        PAST MEDICAL HISTORY     Past Medical History:   Diagnosis Date    CHF (congestive heart failure) (HCC)     Chronic diastolic heart failure ([address]) 11/3/2022    Hypertension     Mixed hyperlipidemia

## 2023-07-19 NOTE — ED NOTES
Lab called critical troponin of 0.022 and BUN of 79, Dr Allan Shanks aware.      Johnnie Jackson RN  07/19/23 0701

## 2023-07-19 NOTE — TELEPHONE ENCOUNTER
Writer contacted Dr. Bee Prior via text to inform of 30 day readmission risk. Attending physician: RAKESH Shanks MD

## 2023-07-20 ENCOUNTER — APPOINTMENT (OUTPATIENT)
Dept: NUCLEAR MEDICINE | Age: 78
DRG: 286 | End: 2023-07-20
Payer: MEDICARE

## 2023-07-20 LAB
ABO + RH BLD: NORMAL
ACANTHOCYTES BLD QL SMEAR: ABNORMAL
ANION GAP SERPL CALCULATED.3IONS-SCNC: 16 MEQ/L (ref 9–15)
ANISOCYTOSIS BLD QL SMEAR: ABNORMAL
ANTI-XA UNFRAC HEPARIN: 0.33 IU/ML
ANTI-XA UNFRAC HEPARIN: <0.1 IU/ML
ANTI-XA UNFRAC HEPARIN: <0.1 IU/ML
BASOPHILS # BLD: 0 K/UL (ref 0–0.2)
BASOPHILS NFR BLD: 0 %
BLD GP AB SCN SERPL QL: NORMAL
BLOOD BANK DISPENSE STATUS: NORMAL
BLOOD BANK PRODUCT CODE: NORMAL
BPU ID: NORMAL
BUN SERPL-MCNC: 80 MG/DL (ref 8–23)
CALCIUM SERPL-MCNC: 8.3 MG/DL (ref 8.5–9.9)
CHLORIDE SERPL-SCNC: 100 MEQ/L (ref 95–107)
CO2 SERPL-SCNC: 20 MEQ/L (ref 20–31)
CREAT SERPL-MCNC: 2.82 MG/DL (ref 0.5–0.9)
DESCRIPTION BLOOD BANK: NORMAL
EKG ATRIAL RATE: 100 BPM
EKG ATRIAL RATE: 83 BPM
EKG P AXIS: 54 DEGREES
EKG P AXIS: 55 DEGREES
EKG P-R INTERVAL: 130 MS
EKG P-R INTERVAL: 148 MS
EKG Q-T INTERVAL: 370 MS
EKG Q-T INTERVAL: 428 MS
EKG QRS DURATION: 74 MS
EKG QRS DURATION: 80 MS
EKG QTC CALCULATION (BAZETT): 477 MS
EKG QTC CALCULATION (BAZETT): 502 MS
EKG R AXIS: 34 DEGREES
EKG R AXIS: 8 DEGREES
EKG T AXIS: 152 DEGREES
EKG T AXIS: 171 DEGREES
EKG VENTRICULAR RATE: 100 BPM
EKG VENTRICULAR RATE: 83 BPM
EOSINOPHIL # BLD: 0 K/UL (ref 0–0.7)
EOSINOPHIL NFR BLD: 0 %
ERYTHROCYTE [DISTWIDTH] IN BLOOD BY AUTOMATED COUNT: 15.4 % (ref 11.5–14.5)
GLUCOSE BLD-MCNC: 104 MG/DL (ref 70–99)
GLUCOSE BLD-MCNC: 173 MG/DL (ref 70–99)
GLUCOSE BLD-MCNC: 216 MG/DL (ref 70–99)
GLUCOSE BLD-MCNC: 216 MG/DL (ref 70–99)
GLUCOSE SERPL-MCNC: 101 MG/DL (ref 70–99)
HCT VFR BLD AUTO: 23.3 % (ref 37–47)
HCT VFR BLD AUTO: 25.6 % (ref 37–47)
HGB BLD-MCNC: 7.7 G/DL (ref 12–16)
HGB BLD-MCNC: 8.5 G/DL (ref 12–16)
LV EF: 60 %
LVEF MODALITY: NORMAL
LYMPHOCYTES # BLD: 0.2 K/UL (ref 1–4.8)
LYMPHOCYTES NFR BLD: 2 %
MAGNESIUM SERPL-MCNC: 2.5 MG/DL (ref 1.7–2.4)
MCH RBC QN AUTO: 27.1 PG (ref 27–31.3)
MCHC RBC AUTO-ENTMCNC: 33.1 % (ref 33–37)
MCV RBC AUTO: 81.8 FL (ref 79.4–94.8)
MICROCYTES BLD QL SMEAR: ABNORMAL
MONOCYTES # BLD: 0.5 K/UL (ref 0.2–0.8)
MONOCYTES NFR BLD: 4 %
NEUTROPHILS # BLD: 11.4 K/UL (ref 1.4–6.5)
NEUTS BAND NFR BLD MANUAL: 3 % (ref 5–11)
NEUTS SEG NFR BLD: 91 %
PERFORMED ON: ABNORMAL
PLATELET # BLD AUTO: 337 K/UL (ref 130–400)
POIKILOCYTOSIS BLD QL SMEAR: ABNORMAL
POTASSIUM SERPL-SCNC: 4.9 MEQ/L (ref 3.4–4.9)
RBC # BLD AUTO: 2.85 M/UL (ref 4.2–5.4)
SODIUM SERPL-SCNC: 136 MEQ/L (ref 135–144)
TROPONIN T SERPL-MCNC: 0.02 NG/ML (ref 0–0.01)
WBC # BLD AUTO: 12.1 K/UL (ref 4.8–10.8)

## 2023-07-20 PROCEDURE — 3430000000 HC RX DIAGNOSTIC RADIOPHARMACEUTICAL: Performed by: INTERNAL MEDICINE

## 2023-07-20 PROCEDURE — 6360000002 HC RX W HCPCS: Performed by: INTERNAL MEDICINE

## 2023-07-20 PROCEDURE — 85025 COMPLETE CBC W/AUTO DIFF WBC: CPT

## 2023-07-20 PROCEDURE — 80048 BASIC METABOLIC PNL TOTAL CA: CPT

## 2023-07-20 PROCEDURE — 93005 ELECTROCARDIOGRAM TRACING: CPT | Performed by: INTERNAL MEDICINE

## 2023-07-20 PROCEDURE — 1210000000 HC MED SURG R&B

## 2023-07-20 PROCEDURE — 84484 ASSAY OF TROPONIN QUANT: CPT

## 2023-07-20 PROCEDURE — 36415 COLL VENOUS BLD VENIPUNCTURE: CPT

## 2023-07-20 PROCEDURE — A9540 TC99M MAA: HCPCS | Performed by: INTERNAL MEDICINE

## 2023-07-20 PROCEDURE — 93306 TTE W/DOPPLER COMPLETE: CPT

## 2023-07-20 PROCEDURE — 85520 HEPARIN ASSAY: CPT

## 2023-07-20 PROCEDURE — 6370000000 HC RX 637 (ALT 250 FOR IP): Performed by: INTERNAL MEDICINE

## 2023-07-20 PROCEDURE — 99223 1ST HOSP IP/OBS HIGH 75: CPT | Performed by: INTERNAL MEDICINE

## 2023-07-20 PROCEDURE — 36430 TRANSFUSION BLD/BLD COMPNT: CPT

## 2023-07-20 PROCEDURE — A9539 TC99M PENTETATE: HCPCS | Performed by: INTERNAL MEDICINE

## 2023-07-20 PROCEDURE — 86850 RBC ANTIBODY SCREEN: CPT

## 2023-07-20 PROCEDURE — 6360000002 HC RX W HCPCS: Performed by: EMERGENCY MEDICINE

## 2023-07-20 PROCEDURE — 85018 HEMOGLOBIN: CPT

## 2023-07-20 PROCEDURE — 85014 HEMATOCRIT: CPT

## 2023-07-20 PROCEDURE — 86923 COMPATIBILITY TEST ELECTRIC: CPT

## 2023-07-20 PROCEDURE — 86901 BLOOD TYPING SEROLOGIC RH(D): CPT

## 2023-07-20 PROCEDURE — P9016 RBC LEUKOCYTES REDUCED: HCPCS

## 2023-07-20 PROCEDURE — 2700000000 HC OXYGEN THERAPY PER DAY

## 2023-07-20 PROCEDURE — 78582 LUNG VENTILAT&PERFUS IMAGING: CPT

## 2023-07-20 PROCEDURE — 2580000003 HC RX 258: Performed by: INTERNAL MEDICINE

## 2023-07-20 PROCEDURE — 83735 ASSAY OF MAGNESIUM: CPT

## 2023-07-20 PROCEDURE — 86900 BLOOD TYPING SEROLOGIC ABO: CPT

## 2023-07-20 RX ORDER — SODIUM CHLORIDE 0.9 % (FLUSH) 0.9 %
10 SYRINGE (ML) INJECTION PRN
Status: DISCONTINUED | OUTPATIENT
Start: 2023-07-20 | End: 2023-08-08 | Stop reason: HOSPADM

## 2023-07-20 RX ORDER — HEPARIN SODIUM 5000 [USP'U]/ML
5000 INJECTION, SOLUTION INTRAVENOUS; SUBCUTANEOUS EVERY 8 HOURS SCHEDULED
Status: DISCONTINUED | OUTPATIENT
Start: 2023-07-20 | End: 2023-08-08 | Stop reason: HOSPADM

## 2023-07-20 RX ORDER — KIT FOR THE PREPARATION OF TECHNETIUM TC 99M PENTETATE 20 MG/1
1 INJECTION, POWDER, LYOPHILIZED, FOR SOLUTION INTRAVENOUS; RESPIRATORY (INHALATION)
Status: COMPLETED | OUTPATIENT
Start: 2023-07-20 | End: 2023-07-20

## 2023-07-20 RX ORDER — SODIUM CHLORIDE 9 MG/ML
INJECTION, SOLUTION INTRAVENOUS PRN
Status: DISCONTINUED | OUTPATIENT
Start: 2023-07-20 | End: 2023-08-08 | Stop reason: HOSPADM

## 2023-07-20 RX ADMIN — HEPARIN SODIUM 5000 UNITS: 5000 INJECTION INTRAVENOUS; SUBCUTANEOUS at 16:46

## 2023-07-20 RX ADMIN — ASPIRIN 81 MG 81 MG: 81 TABLET ORAL at 09:23

## 2023-07-20 RX ADMIN — Medication 10 ML: at 09:24

## 2023-07-20 RX ADMIN — PREDNISONE 5 MG: 5 TABLET ORAL at 09:23

## 2023-07-20 RX ADMIN — MYCOPHENOLATE MOFETIL 250 MG: 250 CAPSULE ORAL at 00:57

## 2023-07-20 RX ADMIN — SODIUM ZIRCONIUM CYCLOSILICATE 10 G: 5 POWDER, FOR SUSPENSION ORAL at 09:23

## 2023-07-20 RX ADMIN — CLONIDINE HYDROCHLORIDE 0.2 MG: 0.1 TABLET ORAL at 21:21

## 2023-07-20 RX ADMIN — Medication 5 ML: at 21:26

## 2023-07-20 RX ADMIN — HYDRALAZINE HYDROCHLORIDE 100 MG: 100 TABLET, FILM COATED ORAL at 09:23

## 2023-07-20 RX ADMIN — HYDRALAZINE HYDROCHLORIDE 100 MG: 100 TABLET, FILM COATED ORAL at 00:57

## 2023-07-20 RX ADMIN — CLONIDINE HYDROCHLORIDE 0.2 MG: 0.1 TABLET ORAL at 09:23

## 2023-07-20 RX ADMIN — HEPARIN SODIUM 16 UNITS/KG/HR: 10000 INJECTION, SOLUTION INTRAVENOUS at 05:05

## 2023-07-20 RX ADMIN — ATORVASTATIN CALCIUM 20 MG: 20 TABLET, FILM COATED ORAL at 09:23

## 2023-07-20 RX ADMIN — Medication 5.9 MILLICURIE: at 12:40

## 2023-07-20 RX ADMIN — Medication 400 MG: at 09:23

## 2023-07-20 RX ADMIN — MYCOPHENOLATE MOFETIL 250 MG: 250 CAPSULE ORAL at 21:21

## 2023-07-20 RX ADMIN — CLONIDINE HYDROCHLORIDE 0.2 MG: 0.1 TABLET ORAL at 00:57

## 2023-07-20 RX ADMIN — ISOSORBIDE MONONITRATE 60 MG: 60 TABLET, EXTENDED RELEASE ORAL at 09:23

## 2023-07-20 RX ADMIN — HYDRALAZINE HYDROCHLORIDE 100 MG: 100 TABLET, FILM COATED ORAL at 21:21

## 2023-07-20 RX ADMIN — INSULIN LISPRO 2 UNITS: 100 INJECTION, SOLUTION INTRAVENOUS; SUBCUTANEOUS at 16:46

## 2023-07-20 RX ADMIN — Medication 10 ML: at 12:40

## 2023-07-20 RX ADMIN — PANTOPRAZOLE SODIUM 40 MG: 40 TABLET, DELAYED RELEASE ORAL at 09:23

## 2023-07-20 RX ADMIN — AMLODIPINE BESYLATE 10 MG: 10 TABLET ORAL at 09:23

## 2023-07-20 RX ADMIN — KIT FOR THE PREPARATION OF TECHNETIUM TC 99M PENTETATE 1 MILLICURIE: 20 INJECTION, POWDER, LYOPHILIZED, FOR SOLUTION INTRAVENOUS; RESPIRATORY (INHALATION) at 12:10

## 2023-07-20 RX ADMIN — MYCOPHENOLATE MOFETIL 250 MG: 250 CAPSULE ORAL at 09:23

## 2023-07-20 ASSESSMENT — ENCOUNTER SYMPTOMS
BLOOD IN STOOL: 0
STRIDOR: 0
WHEEZING: 0
EYES NEGATIVE: 1
GASTROINTESTINAL NEGATIVE: 1
SHORTNESS OF BREATH: 1
NAUSEA: 0
CHEST TIGHTNESS: 0
COUGH: 0

## 2023-07-20 ASSESSMENT — PAIN SCALES - GENERAL
PAINLEVEL_OUTOF10: 0

## 2023-07-20 NOTE — ED NOTES
Report called to RN on [address] and monitor was placed on pt.       Rod Hansen RN  07/19/23 5821

## 2023-07-20 NOTE — DISCHARGE INSTR - COC
Continuity of Care Form    Patient Name: Marylynn Carrel   :  1945  MRN:  [id number]    Admit date:  2023  Discharge date:  ***    Code Status Order: Full Code   Advance Directives:     Admitting Physician:  Nava Hutchinson DO  PCP: Orlinda Libman, MD    Discharging Nurse: [facility] Unit/Room#: B372/J506-59  Discharging Unit Phone Number: ***    Emergency Contact:   Extended Emergency Contact Information  Primary Emergency Contact: [address] [place] [address] Phone: [phone]  Mobile Phone: [phone]  Relation: Child  Preferred language: English   needed? No  Secondary Emergency Contact: Renard Avendano  Address: [address] [place] [address],[address] [facility], [address] [place] [address] of [address] [place] Phone: [phone]  Mobile Phone: [phone]  Relation: Boyfriend    Past Surgical History:  Past Surgical History:   Procedure Laterality Date    CATARACT REMOVAL Bilateral     CT BIOPSY RENAL  10/6/2022    CT BIOPSY RENAL 10/6/2022 [facility] CT SCAN    HERNIA REPAIR      HYSTERECTOMY, TOTAL ABDOMINAL (CERVIX REMOVED)      OTHER SURGICAL HISTORY Right 10/06/2022    right renal random biopsy performed by Dr. Willie Ninaor       Immunization History: There is no immunization history on file for this patient.     Active Problems:  Patient Active Problem List   Diagnosis Code    Microhematuria R31.29    Diarrhea of presumed infectious origin R19.7    Anemia D64.9    CHAYO (acute kidney injury) ([address]) N17.9    Cardiorenal disease I13.10    SOB (shortness of breath) R06.02    Abnormal chest x-ray R93.89    Chronic diastolic heart failure (HCC) I50.32    Primary hypertension I10    Mixed hyperlipidemia E78.2    Chronic anemia D64.9    Chronic kidney disease, stage IV (severe) (HCC) N18.4    Hypertensive emergency I16.1    PEA (Pulseless electrical activity) (HCC) I46.9    Acute respiratory failure with hypoxia ([address]) J96.01       Isolation/Infection:   Isolation            No Isolation          Patient Infection

## 2023-07-20 NOTE — PLAN OF CARE
Problem: Discharge Planning  Goal: Discharge to home or other facility with appropriate resources  Outcome: Progressing  Flowsheets (Taken 7/20/2023 0138)  Discharge to home or other facility with appropriate resources:   Identify barriers to discharge with patient and caregiver   Arrange for needed discharge resources and transportation as appropriate   Identify discharge learning needs (meds, wound care, etc)   Refer to discharge planning if patient needs post-hospital services based on physician order or complex needs related to functional status, cognitive ability or social support system     Problem: Skin/Tissue Integrity  Goal: Absence of new skin breakdown  Description: 1. Monitor for areas of redness and/or skin breakdown  2. Assess vascular access sites hourly  3. Every 4-6 hours minimum:  Change oxygen saturation probe site  4. Every 4-6 hours:  If on nasal continuous positive airway pressure, respiratory therapy assess nares and determine need for appliance change or resting period.   Outcome: Progressing     Problem: Safety - Adult  Goal: Free from fall injury  Outcome: Progressing  Flowsheets (Taken 7/20/2023 0137)  Free From Fall Injury:   Instruct family/caregiver on patient safety   Based on caregiver fall risk screen, instruct family/caregiver to ask for assistance with transferring infant if caregiver noted to have fall risk factors

## 2023-07-20 NOTE — ACP (ADVANCE CARE PLANNING)
Advance Care Planning   Healthcare Decision Maker:    Primary Decision Maker:  Malinda Oropeza - [phone]    Secondary Decision Maker: Eli Edouard - [phone]

## 2023-07-20 NOTE — H&P
[facility]    HISTORY AND PHYSICAL EXAM    PATIENT NAME:  Cornelio Sanderson    MRN:  [id number]  SERVICE DATE:  7/19/2023   SERVICE TIME:  8:06 PM    Primary Care Physician: Jcarlos Mehta MD     SUBJECTIVE  CHIEF COMPLAINT: Dyspnea    HPI:  Cornelio Sanderson is a 76 y.o. female who presents with complaint of dyspnea. Patient is a 64-year-old female presenting to the ED with recurrent dyspnea for approximately 1 day. Reportedly 80% at home when EMS arrived, placed on BiPAP, symptoms subsequently improved. Persistent sternal chest pain in setting of relatively recent PEA cardiac arrest for which she received CPR during most recent prior admission. Brought to ED by EMS for further evaluation. Review of records demonstrates that patient had a left leg DVT for which IVC filter was placed by Dr. Antonio Mccray of cardiology on 7/15/2023. She has baseline CKD, and did not undergo cardiac catheterization but instead had serial echocardiograms which did demonstrate improvement from initial acute systolic decompensated CHF with 25% to EF ~55% at time of discharge. Patient does have history of combined systolic and diastolic CHF in addition to the recent PEA cardiac arrest s/p CPR with ROSC, chronic anemia, CKD stage IV baseline, hypertension, hyperlipidemia, DM 2. Evaluation and the ED at this time does demonstrate mild hyperkalemia 5.4 (confirmed, no hemolysis), troponin 0.022 which is significantly improved from previous values, proBNP 20,509, essentially unchanged from previous value of 20,150, but that was increased from 16 372, which was increased from 14,458, raising concern for possibility of progressive worsening CHF over time. In the ED, cardiology was consulted and Dr. Dirk Romero did have concern for some EKG changes demonstrating new anterolateral T wave inversions but no ST acute changes and normal axis.   Given patient's relatively sudden onset dyspnea, known DVT, known recent cardiac arrest, Dr. Dirk Romero

## 2023-07-21 ENCOUNTER — APPOINTMENT (OUTPATIENT)
Dept: CARDIAC CATH/INVASIVE PROCEDURES | Age: 78
DRG: 286 | End: 2023-07-21
Payer: MEDICARE

## 2023-07-21 PROBLEM — R94.31 ACUTE ELECTROCARDIOGRAM CHANGES: Status: ACTIVE | Noted: 2023-07-21

## 2023-07-21 LAB
ANION GAP SERPL CALCULATED.3IONS-SCNC: 13 MEQ/L (ref 9–15)
BASOPHILS # BLD: 0 K/UL (ref 0–0.2)
BASOPHILS NFR BLD: 0.8 %
BUN SERPL-MCNC: 75 MG/DL (ref 8–23)
CALCIUM SERPL-MCNC: 8.2 MG/DL (ref 8.5–9.9)
CHLORIDE SERPL-SCNC: 101 MEQ/L (ref 95–107)
CO2 SERPL-SCNC: 22 MEQ/L (ref 20–31)
CREAT SERPL-MCNC: 2.9 MG/DL (ref 0.5–0.9)
EOSINOPHIL # BLD: 0.1 K/UL (ref 0–0.7)
EOSINOPHIL NFR BLD: 1.7 %
ERYTHROCYTE [DISTWIDTH] IN BLOOD BY AUTOMATED COUNT: 16.1 % (ref 11.5–14.5)
GLUCOSE BLD-MCNC: 121 MG/DL (ref 70–99)
GLUCOSE BLD-MCNC: 160 MG/DL (ref 70–99)
GLUCOSE BLD-MCNC: 160 MG/DL (ref 70–99)
GLUCOSE SERPL-MCNC: 112 MG/DL (ref 70–99)
HCT VFR BLD AUTO: 24.9 % (ref 37–47)
HGB BLD-MCNC: 8.4 G/DL (ref 12–16)
LYMPHOCYTES # BLD: 0.4 K/UL (ref 1–4.8)
LYMPHOCYTES NFR BLD: 9 %
MAGNESIUM SERPL-MCNC: 2.5 MG/DL (ref 1.7–2.4)
MCH RBC QN AUTO: 27 PG (ref 27–31.3)
MCHC RBC AUTO-ENTMCNC: 33.6 % (ref 33–37)
MCV RBC AUTO: 80.4 FL (ref 79.4–94.8)
MONOCYTES # BLD: 0.3 K/UL (ref 0.2–0.8)
MONOCYTES NFR BLD: 6.2 %
NEUTROPHILS # BLD: 3.4 K/UL (ref 1.4–6.5)
NEUTS SEG NFR BLD: 82.3 %
PERFORMED ON: ABNORMAL
PLATELET # BLD AUTO: 260 K/UL (ref 130–400)
POTASSIUM SERPL-SCNC: 4.3 MEQ/L (ref 3.4–4.9)
RBC # BLD AUTO: 3.09 M/UL (ref 4.2–5.4)
SODIUM SERPL-SCNC: 136 MEQ/L (ref 135–144)
WBC # BLD AUTO: 4.2 K/UL (ref 4.8–10.8)

## 2023-07-21 PROCEDURE — 2580000003 HC RX 258: Performed by: INTERNAL MEDICINE

## 2023-07-21 PROCEDURE — 83735 ASSAY OF MAGNESIUM: CPT

## 2023-07-21 PROCEDURE — 2709999900 HC NON-CHARGEABLE SUPPLY

## 2023-07-21 PROCEDURE — 2700000000 HC OXYGEN THERAPY PER DAY

## 2023-07-21 PROCEDURE — 93458 L HRT ARTERY/VENTRICLE ANGIO: CPT

## 2023-07-21 PROCEDURE — 2500000003 HC RX 250 WO HCPCS

## 2023-07-21 PROCEDURE — 80048 BASIC METABOLIC PNL TOTAL CA: CPT

## 2023-07-21 PROCEDURE — 6360000002 HC RX W HCPCS

## 2023-07-21 PROCEDURE — 6360000002 HC RX W HCPCS: Performed by: INTERNAL MEDICINE

## 2023-07-21 PROCEDURE — C1894 INTRO/SHEATH, NON-LASER: HCPCS

## 2023-07-21 PROCEDURE — 6360000004 HC RX CONTRAST MEDICATION: Performed by: INTERNAL MEDICINE

## 2023-07-21 PROCEDURE — 1210000000 HC MED SURG R&B

## 2023-07-21 PROCEDURE — 99233 SBSQ HOSP IP/OBS HIGH 50: CPT | Performed by: INTERNAL MEDICINE

## 2023-07-21 PROCEDURE — 85025 COMPLETE CBC W/AUTO DIFF WBC: CPT

## 2023-07-21 PROCEDURE — 99152 MOD SED SAME PHYS/QHP 5/>YRS: CPT | Performed by: INTERNAL MEDICINE

## 2023-07-21 PROCEDURE — B2111ZZ FLUOROSCOPY OF MULTIPLE CORONARY ARTERIES USING LOW OSMOLAR CONTRAST: ICD-10-PCS | Performed by: STUDENT IN AN ORGANIZED HEALTH CARE EDUCATION/TRAINING PROGRAM

## 2023-07-21 PROCEDURE — C1769 GUIDE WIRE: HCPCS

## 2023-07-21 PROCEDURE — B2151ZZ FLUOROSCOPY OF LEFT HEART USING LOW OSMOLAR CONTRAST: ICD-10-PCS | Performed by: STUDENT IN AN ORGANIZED HEALTH CARE EDUCATION/TRAINING PROGRAM

## 2023-07-21 PROCEDURE — 4A023N7 MEASUREMENT OF CARDIAC SAMPLING AND PRESSURE, LEFT HEART, PERCUTANEOUS APPROACH: ICD-10-PCS | Performed by: STUDENT IN AN ORGANIZED HEALTH CARE EDUCATION/TRAINING PROGRAM

## 2023-07-21 PROCEDURE — 6370000000 HC RX 637 (ALT 250 FOR IP): Performed by: INTERNAL MEDICINE

## 2023-07-21 PROCEDURE — 36415 COLL VENOUS BLD VENIPUNCTURE: CPT

## 2023-07-21 PROCEDURE — 93458 L HRT ARTERY/VENTRICLE ANGIO: CPT | Performed by: INTERNAL MEDICINE

## 2023-07-21 RX ORDER — DIPHENHYDRAMINE HYDROCHLORIDE 50 MG/ML
50 INJECTION INTRAMUSCULAR; INTRAVENOUS ONCE
Status: DISCONTINUED | OUTPATIENT
Start: 2023-07-21 | End: 2023-08-08 | Stop reason: HOSPADM

## 2023-07-21 RX ORDER — FENTANYL CITRATE 0.05 MG/ML
25 INJECTION, SOLUTION INTRAMUSCULAR; INTRAVENOUS
Status: ACTIVE | OUTPATIENT
Start: 2023-07-21 | End: 2023-07-22

## 2023-07-21 RX ORDER — SODIUM CHLORIDE 450 MG/100ML
75 INJECTION, SOLUTION INTRAVENOUS CONTINUOUS
Status: DISCONTINUED | OUTPATIENT
Start: 2023-07-21 | End: 2023-07-21

## 2023-07-21 RX ORDER — ONDANSETRON 2 MG/ML
4 INJECTION INTRAMUSCULAR; INTRAVENOUS EVERY 6 HOURS PRN
Status: DISCONTINUED | OUTPATIENT
Start: 2023-07-21 | End: 2023-08-08 | Stop reason: HOSPADM

## 2023-07-21 RX ORDER — SODIUM CHLORIDE 0.9 % (FLUSH) 0.9 %
5-40 SYRINGE (ML) INJECTION PRN
Status: DISCONTINUED | OUTPATIENT
Start: 2023-07-21 | End: 2023-08-08 | Stop reason: HOSPADM

## 2023-07-21 RX ORDER — NITROGLYCERIN 0.4 MG/1
0.4 TABLET SUBLINGUAL EVERY 5 MIN PRN
Status: DISCONTINUED | OUTPATIENT
Start: 2023-07-21 | End: 2023-08-08 | Stop reason: HOSPADM

## 2023-07-21 RX ORDER — SODIUM CHLORIDE 9 MG/ML
INJECTION, SOLUTION INTRAVENOUS CONTINUOUS
Status: DISCONTINUED | OUTPATIENT
Start: 2023-07-21 | End: 2023-07-21

## 2023-07-21 RX ORDER — MORPHINE SULFATE 2 MG/ML
2 INJECTION, SOLUTION INTRAMUSCULAR; INTRAVENOUS
Status: ACTIVE | OUTPATIENT
Start: 2023-07-21 | End: 2023-07-22

## 2023-07-21 RX ORDER — SODIUM CHLORIDE 0.9 % (FLUSH) 0.9 %
5-40 SYRINGE (ML) INJECTION EVERY 12 HOURS SCHEDULED
Status: DISCONTINUED | OUTPATIENT
Start: 2023-07-21 | End: 2023-07-27 | Stop reason: SDUPTHER

## 2023-07-21 RX ORDER — SODIUM CHLORIDE 9 MG/ML
INJECTION, SOLUTION INTRAVENOUS PRN
Status: DISCONTINUED | OUTPATIENT
Start: 2023-07-21 | End: 2023-08-08 | Stop reason: HOSPADM

## 2023-07-21 RX ORDER — MIDAZOLAM HYDROCHLORIDE 1 MG/ML
2 INJECTION INTRAMUSCULAR; INTRAVENOUS
Status: ACTIVE | OUTPATIENT
Start: 2023-07-21 | End: 2023-07-22

## 2023-07-21 RX ADMIN — Medication 10 ML: at 08:01

## 2023-07-21 RX ADMIN — SODIUM CHLORIDE 75 ML/HR: 4.5 INJECTION, SOLUTION INTRAVENOUS at 09:46

## 2023-07-21 RX ADMIN — Medication 10 ML: at 19:33

## 2023-07-21 RX ADMIN — HYDRALAZINE HYDROCHLORIDE 100 MG: 100 TABLET, FILM COATED ORAL at 08:01

## 2023-07-21 RX ADMIN — MYCOPHENOLATE MOFETIL 250 MG: 250 CAPSULE ORAL at 08:01

## 2023-07-21 RX ADMIN — ATORVASTATIN CALCIUM 20 MG: 20 TABLET, FILM COATED ORAL at 08:01

## 2023-07-21 RX ADMIN — PANTOPRAZOLE SODIUM 40 MG: 40 TABLET, DELAYED RELEASE ORAL at 08:01

## 2023-07-21 RX ADMIN — AMLODIPINE BESYLATE 10 MG: 10 TABLET ORAL at 08:01

## 2023-07-21 RX ADMIN — IOPAMIDOL 15 ML: 612 INJECTION, SOLUTION INTRAVENOUS at 13:59

## 2023-07-21 RX ADMIN — CLONIDINE HYDROCHLORIDE 0.2 MG: 0.1 TABLET ORAL at 20:16

## 2023-07-21 RX ADMIN — HEPARIN SODIUM 5000 UNITS: 5000 INJECTION INTRAVENOUS; SUBCUTANEOUS at 05:23

## 2023-07-21 RX ADMIN — Medication 400 MG: at 08:01

## 2023-07-21 RX ADMIN — MYCOPHENOLATE MOFETIL 250 MG: 250 CAPSULE ORAL at 20:16

## 2023-07-21 RX ADMIN — ASPIRIN 81 MG 81 MG: 81 TABLET ORAL at 08:01

## 2023-07-21 RX ADMIN — HEPARIN SODIUM 5000 UNITS: 5000 INJECTION INTRAVENOUS; SUBCUTANEOUS at 17:23

## 2023-07-21 RX ADMIN — Medication 10 ML: at 19:34

## 2023-07-21 RX ADMIN — HYDRALAZINE HYDROCHLORIDE 100 MG: 100 TABLET, FILM COATED ORAL at 20:16

## 2023-07-21 RX ADMIN — ISOSORBIDE MONONITRATE 60 MG: 60 TABLET, EXTENDED RELEASE ORAL at 08:01

## 2023-07-21 RX ADMIN — CLONIDINE HYDROCHLORIDE 0.2 MG: 0.1 TABLET ORAL at 08:01

## 2023-07-21 ASSESSMENT — ENCOUNTER SYMPTOMS
STRIDOR: 0
CHEST TIGHTNESS: 0
COUGH: 0
BLOOD IN STOOL: 0
EYES NEGATIVE: 1
GASTROINTESTINAL NEGATIVE: 1
NAUSEA: 0
WHEEZING: 0
SHORTNESS OF BREATH: 1

## 2023-07-21 NOTE — FLOWSHEET NOTE
0805- AM assessment completed. Patient resting in bed at this time. Denies any chest pain. Remains NSR on the monitor. +1 pitting edema noted to bilateral upper extremities, +2 pitting edema noted to bilateral lower extremities. Pedal pulses palpable. Shortness of breath noted with exertion. Lungs are diminished with expiratory wheezes noted bilaterally. SATS 98% on RA. She is A/Ox3 and can be up with a stand-by assist/walker as tolerated. Denies any pain with elimination. States her last BM was 7/19/23. Voids reddish-brown urine. Skin remains warm, dry and intact but generalized bruising noted to bilateral upper extremities. #20g SL to left forearm, flushed and patent. Removed SL to right AC secondary to site leaking. Catheter intact and dressing applied. Vital signs stable and AM medications provided. Dr Jean White in to see patient and plan is for a cardiac catheterization this afternoon. Call light remains in reach. 36- I called and spoke with the patient's son Johnnie Jackson. Updated him on the plan of care and then he spoke with Dr Jean White, who was also available on the unit, via phone as well. 1120- Report provided to Jennifer Maguire RN, via phone. 1230- Patient to pre/post cath lab via cart. 1555- Patient returned from pre/post cath lab. Resting in bed at this time. Right radial area remains stable with no bleeding, pain, or bruising. Swelling noted to bilateral hands that was not noted this AM(only to arms). Spoke with Dr Denis Pickett via phone who stated I could stop her IV fluids at this time. Family at bedside and updated on plan of care. Patient has no complaints or concerns at this time. Call light remains in reach. 1730- patient resting in bed at this time with family at bedside. Has no complaints or concerns at this time. Call light remains in reach.      Electronically signed by Abigail Leblanc RN on 7/21/2023 at 7:39 PM

## 2023-07-21 NOTE — BRIEF OP NOTE
Section of Cardiology  Adult Brief Cardiac Cath Procedure Note        Procedure(s):  LHC, b/l coronary angio    Pre-operative Diagnosis:  abn EKG    H&P Status: Completed and reviewed. Post-operative Diagnosis:      LV normal LVEDP. No LV gram performed     LM normal   LAD diffuse disease. 50% prox.  50% mid   CX mild disease  RCA mild disease    Findings:  See full report    Complications:  none    Primary Proceduralist:   Dr.Wes Finney DO    Plan    Max med rx  Rfm    Full procedure note to follow

## 2023-07-22 LAB
ANION GAP SERPL CALCULATED.3IONS-SCNC: 15 MEQ/L (ref 9–15)
BASOPHILS # BLD: 0.1 K/UL (ref 0–0.2)
BASOPHILS NFR BLD: 0.7 %
BNP BLD-MCNC: NORMAL PG/ML
BUN SERPL-MCNC: 70 MG/DL (ref 8–23)
CALCIUM SERPL-MCNC: 8.3 MG/DL (ref 8.5–9.9)
CHLORIDE SERPL-SCNC: 102 MEQ/L (ref 95–107)
CO2 SERPL-SCNC: 20 MEQ/L (ref 20–31)
CREAT SERPL-MCNC: 3.04 MG/DL (ref 0.5–0.9)
EOSINOPHIL # BLD: 0 K/UL (ref 0–0.7)
EOSINOPHIL NFR BLD: 0.5 %
ERYTHROCYTE [DISTWIDTH] IN BLOOD BY AUTOMATED COUNT: 15.8 % (ref 11.5–14.5)
GLUCOSE BLD-MCNC: 119 MG/DL (ref 70–99)
GLUCOSE BLD-MCNC: 144 MG/DL (ref 70–99)
GLUCOSE BLD-MCNC: 172 MG/DL (ref 70–99)
GLUCOSE BLD-MCNC: 233 MG/DL (ref 70–99)
GLUCOSE SERPL-MCNC: 105 MG/DL (ref 70–99)
HCT VFR BLD AUTO: 24.7 % (ref 37–47)
HGB BLD-MCNC: 8.7 G/DL (ref 12–16)
LYMPHOCYTES # BLD: 0.3 K/UL (ref 1–4.8)
LYMPHOCYTES NFR BLD: 4.3 %
MAGNESIUM SERPL-MCNC: 2.3 MG/DL (ref 1.7–2.4)
MCH RBC QN AUTO: 28.4 PG (ref 27–31.3)
MCHC RBC AUTO-ENTMCNC: 35.2 % (ref 33–37)
MCV RBC AUTO: 80.7 FL (ref 79.4–94.8)
MONOCYTES # BLD: 0.2 K/UL (ref 0.2–0.8)
MONOCYTES NFR BLD: 2.5 %
NEUTROPHILS # BLD: 6.8 K/UL (ref 1.4–6.5)
NEUTS SEG NFR BLD: 92 %
PERFORMED ON: ABNORMAL
PLATELET # BLD AUTO: 201 K/UL (ref 130–400)
POTASSIUM SERPL-SCNC: 4.3 MEQ/L (ref 3.4–4.9)
RBC # BLD AUTO: 3.06 M/UL (ref 4.2–5.4)
SODIUM SERPL-SCNC: 137 MEQ/L (ref 135–144)
WBC # BLD AUTO: 7.4 K/UL (ref 4.8–10.8)

## 2023-07-22 PROCEDURE — 2580000003 HC RX 258: Performed by: INTERNAL MEDICINE

## 2023-07-22 PROCEDURE — 6360000002 HC RX W HCPCS: Performed by: INTERNAL MEDICINE

## 2023-07-22 PROCEDURE — 99233 SBSQ HOSP IP/OBS HIGH 50: CPT | Performed by: INTERNAL MEDICINE

## 2023-07-22 PROCEDURE — 83880 ASSAY OF NATRIURETIC PEPTIDE: CPT

## 2023-07-22 PROCEDURE — 80048 BASIC METABOLIC PNL TOTAL CA: CPT

## 2023-07-22 PROCEDURE — 83735 ASSAY OF MAGNESIUM: CPT

## 2023-07-22 PROCEDURE — 36415 COLL VENOUS BLD VENIPUNCTURE: CPT

## 2023-07-22 PROCEDURE — 6370000000 HC RX 637 (ALT 250 FOR IP): Performed by: INTERNAL MEDICINE

## 2023-07-22 PROCEDURE — 1210000000 HC MED SURG R&B

## 2023-07-22 PROCEDURE — 97166 OT EVAL MOD COMPLEX 45 MIN: CPT

## 2023-07-22 PROCEDURE — 85025 COMPLETE CBC W/AUTO DIFF WBC: CPT

## 2023-07-22 RX ORDER — FUROSEMIDE 10 MG/ML
80 INJECTION INTRAMUSCULAR; INTRAVENOUS 2 TIMES DAILY
Status: DISCONTINUED | OUTPATIENT
Start: 2023-07-22 | End: 2023-07-24 | Stop reason: SDUPTHER

## 2023-07-22 RX ORDER — FUROSEMIDE 10 MG/ML
20 INJECTION INTRAMUSCULAR; INTRAVENOUS ONCE
Status: COMPLETED | OUTPATIENT
Start: 2023-07-22 | End: 2023-07-22

## 2023-07-22 RX ADMIN — ATORVASTATIN CALCIUM 20 MG: 20 TABLET, FILM COATED ORAL at 10:17

## 2023-07-22 RX ADMIN — PANTOPRAZOLE SODIUM 40 MG: 40 TABLET, DELAYED RELEASE ORAL at 10:18

## 2023-07-22 RX ADMIN — Medication 10 ML: at 20:22

## 2023-07-22 RX ADMIN — FUROSEMIDE 80 MG: 10 INJECTION, SOLUTION INTRAMUSCULAR; INTRAVENOUS at 17:41

## 2023-07-22 RX ADMIN — PREDNISONE 5 MG: 5 TABLET ORAL at 10:18

## 2023-07-22 RX ADMIN — Medication 10 ML: at 10:25

## 2023-07-22 RX ADMIN — HYDRALAZINE HYDROCHLORIDE 100 MG: 100 TABLET, FILM COATED ORAL at 20:15

## 2023-07-22 RX ADMIN — FUROSEMIDE 20 MG: 10 INJECTION, SOLUTION INTRAMUSCULAR; INTRAVENOUS at 10:21

## 2023-07-22 RX ADMIN — CLONIDINE HYDROCHLORIDE 0.2 MG: 0.1 TABLET ORAL at 20:15

## 2023-07-22 RX ADMIN — MYCOPHENOLATE MOFETIL 250 MG: 250 CAPSULE ORAL at 10:17

## 2023-07-22 RX ADMIN — ASPIRIN 81 MG 81 MG: 81 TABLET ORAL at 10:17

## 2023-07-22 RX ADMIN — Medication 400 MG: at 10:17

## 2023-07-22 RX ADMIN — CLONIDINE HYDROCHLORIDE 0.2 MG: 0.1 TABLET ORAL at 10:18

## 2023-07-22 RX ADMIN — MYCOPHENOLATE MOFETIL 250 MG: 250 CAPSULE ORAL at 20:14

## 2023-07-22 RX ADMIN — HYDRALAZINE HYDROCHLORIDE 100 MG: 100 TABLET, FILM COATED ORAL at 10:17

## 2023-07-22 RX ADMIN — ISOSORBIDE MONONITRATE 60 MG: 60 TABLET, EXTENDED RELEASE ORAL at 10:17

## 2023-07-22 RX ADMIN — EPOETIN ALFA-EPBX 10000 UNITS: 10000 INJECTION, SOLUTION INTRAVENOUS; SUBCUTANEOUS at 15:06

## 2023-07-22 ASSESSMENT — ENCOUNTER SYMPTOMS
SHORTNESS OF BREATH: 1
GASTROINTESTINAL NEGATIVE: 1
NAUSEA: 0
BLOOD IN STOOL: 0
STRIDOR: 0
ABDOMINAL DISTENTION: 0
COUGH: 0
CHEST TIGHTNESS: 0
WHEEZING: 0
EYES NEGATIVE: 1
EYE DISCHARGE: 0

## 2023-07-22 ASSESSMENT — PAIN SCALES - GENERAL: PAINLEVEL_OUTOF10: 0

## 2023-07-22 NOTE — PLAN OF CARE
Problem: Discharge Planning  Goal: Discharge to home or other facility with appropriate resources  Outcome: Progressing     Problem: Skin/Tissue Integrity  Goal: Absence of new skin breakdown  Description: 1. Monitor for areas of redness and/or skin breakdown  2. Assess vascular access sites hourly  3. Every 4-6 hours minimum:  Change oxygen saturation probe site  4. Every 4-6 hours:  If on nasal continuous positive airway pressure, respiratory therapy assess nares and determine need for appliance change or resting period.   Outcome: Progressing     Problem: Safety - Adult  Goal: Free from fall injury  Outcome: Progressing  Flowsheets (Taken 7/22/2023 0800)  Free From Fall Injury: Instruct family/caregiver on patient safety     Problem: Chronic Conditions and Co-morbidities  Goal: Patient's chronic conditions and co-morbidity symptoms are monitored and maintained or improved  Outcome: Progressing     Problem: Pain  Goal: Verbalizes/displays adequate comfort level or baseline comfort level  Outcome: Progressing

## 2023-07-22 NOTE — FLOWSHEET NOTE
Patient up to bathroom prior to medications. Patient voided large amount of bright red bloody urine. sub cutaneous heparin held. Dr. Sera Arnett and notified. Continue to hold heparin for now.

## 2023-07-23 LAB
ANION GAP SERPL CALCULATED.3IONS-SCNC: 14 MEQ/L (ref 9–15)
BASOPHILS # BLD: 0.1 K/UL (ref 0–0.2)
BASOPHILS NFR BLD: 1 %
BUN SERPL-MCNC: 70 MG/DL (ref 8–23)
CALCIUM SERPL-MCNC: 8.2 MG/DL (ref 8.5–9.9)
CHLORIDE SERPL-SCNC: 99 MEQ/L (ref 95–107)
CO2 SERPL-SCNC: 22 MEQ/L (ref 20–31)
CREAT SERPL-MCNC: 3.61 MG/DL (ref 0.5–0.9)
EOSINOPHIL # BLD: 0.1 K/UL (ref 0–0.7)
EOSINOPHIL NFR BLD: 1.6 %
ERYTHROCYTE [DISTWIDTH] IN BLOOD BY AUTOMATED COUNT: 15.9 % (ref 11.5–14.5)
FERRITIN: 261 NG/ML (ref 13–150)
GLUCOSE BLD-MCNC: 168 MG/DL (ref 70–99)
GLUCOSE BLD-MCNC: 171 MG/DL (ref 70–99)
GLUCOSE BLD-MCNC: 186 MG/DL (ref 70–99)
GLUCOSE SERPL-MCNC: 110 MG/DL (ref 70–99)
HCT VFR BLD AUTO: 23.7 % (ref 37–47)
HGB BLD-MCNC: 8 G/DL (ref 12–16)
IRON SATURATION: 27 % (ref 20–55)
IRON: 52 UG/DL (ref 37–145)
LYMPHOCYTES # BLD: 0.4 K/UL (ref 1–4.8)
LYMPHOCYTES NFR BLD: 6.9 %
MAGNESIUM SERPL-MCNC: 2.3 MG/DL (ref 1.7–2.4)
MCH RBC QN AUTO: 27.3 PG (ref 27–31.3)
MCHC RBC AUTO-ENTMCNC: 33.9 % (ref 33–37)
MCV RBC AUTO: 80.6 FL (ref 79.4–94.8)
MONOCYTES # BLD: 0.3 K/UL (ref 0.2–0.8)
MONOCYTES NFR BLD: 5.2 %
NEUTROPHILS # BLD: 4.4 K/UL (ref 1.4–6.5)
NEUTS SEG NFR BLD: 85.3 %
PERFORMED ON: ABNORMAL
PLATELET # BLD AUTO: 281 K/UL (ref 130–400)
PLATELET BLD QL SMEAR: ADEQUATE
POTASSIUM SERPL-SCNC: 3.8 MEQ/L (ref 3.4–4.9)
RBC # BLD AUTO: 2.94 M/UL (ref 4.2–5.4)
SODIUM SERPL-SCNC: 135 MEQ/L (ref 135–144)
TOTAL IRON BINDING CAPACITY: 192 UG/DL (ref 250–450)
UNSATURATED IRON BINDING CAPACITY: 140 UG/DL (ref 112–347)
WBC # BLD AUTO: 5.1 K/UL (ref 4.8–10.8)

## 2023-07-23 PROCEDURE — 36415 COLL VENOUS BLD VENIPUNCTURE: CPT

## 2023-07-23 PROCEDURE — 85025 COMPLETE CBC W/AUTO DIFF WBC: CPT

## 2023-07-23 PROCEDURE — 6360000002 HC RX W HCPCS: Performed by: INTERNAL MEDICINE

## 2023-07-23 PROCEDURE — 83735 ASSAY OF MAGNESIUM: CPT

## 2023-07-23 PROCEDURE — 1210000000 HC MED SURG R&B

## 2023-07-23 PROCEDURE — 6370000000 HC RX 637 (ALT 250 FOR IP): Performed by: INTERNAL MEDICINE

## 2023-07-23 PROCEDURE — 83540 ASSAY OF IRON: CPT

## 2023-07-23 PROCEDURE — 2700000000 HC OXYGEN THERAPY PER DAY

## 2023-07-23 PROCEDURE — 82728 ASSAY OF FERRITIN: CPT

## 2023-07-23 PROCEDURE — 2580000003 HC RX 258: Performed by: INTERNAL MEDICINE

## 2023-07-23 PROCEDURE — 99233 SBSQ HOSP IP/OBS HIGH 50: CPT | Performed by: INTERNAL MEDICINE

## 2023-07-23 PROCEDURE — 97162 PT EVAL MOD COMPLEX 30 MIN: CPT

## 2023-07-23 PROCEDURE — 80048 BASIC METABOLIC PNL TOTAL CA: CPT

## 2023-07-23 PROCEDURE — 83550 IRON BINDING TEST: CPT

## 2023-07-23 RX ORDER — POTASSIUM CHLORIDE 20 MEQ/1
40 TABLET, EXTENDED RELEASE ORAL ONCE
Status: COMPLETED | OUTPATIENT
Start: 2023-07-23 | End: 2023-07-23

## 2023-07-23 RX ORDER — METOLAZONE 5 MG/1
5 TABLET ORAL ONCE
Status: COMPLETED | OUTPATIENT
Start: 2023-07-23 | End: 2023-07-23

## 2023-07-23 RX ADMIN — Medication 10 ML: at 21:19

## 2023-07-23 RX ADMIN — HYDRALAZINE HYDROCHLORIDE 100 MG: 100 TABLET, FILM COATED ORAL at 10:17

## 2023-07-23 RX ADMIN — POTASSIUM CHLORIDE 40 MEQ: 1500 TABLET, EXTENDED RELEASE ORAL at 14:21

## 2023-07-23 RX ADMIN — FUROSEMIDE 80 MG: 10 INJECTION, SOLUTION INTRAMUSCULAR; INTRAVENOUS at 10:18

## 2023-07-23 RX ADMIN — CLONIDINE HYDROCHLORIDE 0.2 MG: 0.1 TABLET ORAL at 10:17

## 2023-07-23 RX ADMIN — METOLAZONE 5 MG: 5 TABLET ORAL at 14:22

## 2023-07-23 RX ADMIN — HYDRALAZINE HYDROCHLORIDE 100 MG: 100 TABLET, FILM COATED ORAL at 21:18

## 2023-07-23 RX ADMIN — MYCOPHENOLATE MOFETIL 250 MG: 250 CAPSULE ORAL at 21:18

## 2023-07-23 RX ADMIN — ISOSORBIDE MONONITRATE 60 MG: 60 TABLET, EXTENDED RELEASE ORAL at 10:17

## 2023-07-23 RX ADMIN — ATORVASTATIN CALCIUM 20 MG: 20 TABLET, FILM COATED ORAL at 10:17

## 2023-07-23 RX ADMIN — Medication 10 ML: at 21:18

## 2023-07-23 RX ADMIN — FUROSEMIDE 80 MG: 10 INJECTION, SOLUTION INTRAMUSCULAR; INTRAVENOUS at 17:43

## 2023-07-23 RX ADMIN — PANTOPRAZOLE SODIUM 40 MG: 40 TABLET, DELAYED RELEASE ORAL at 10:17

## 2023-07-23 RX ADMIN — CLONIDINE HYDROCHLORIDE 0.2 MG: 0.1 TABLET ORAL at 21:17

## 2023-07-23 RX ADMIN — ASPIRIN 81 MG 81 MG: 81 TABLET ORAL at 10:17

## 2023-07-23 RX ADMIN — MYCOPHENOLATE MOFETIL 250 MG: 250 CAPSULE ORAL at 10:17

## 2023-07-23 RX ADMIN — Medication 400 MG: at 10:17

## 2023-07-23 RX ADMIN — Medication 10 ML: at 10:00

## 2023-07-23 RX ADMIN — POLYETHYLENE GLYCOL 3350 17 G: 17 POWDER, FOR SOLUTION ORAL at 10:25

## 2023-07-23 ASSESSMENT — ENCOUNTER SYMPTOMS
WHEEZING: 0
CHEST TIGHTNESS: 0
NAUSEA: 0
GASTROINTESTINAL NEGATIVE: 1
STRIDOR: 0
COUGH: 0
EYES NEGATIVE: 1
SHORTNESS OF BREATH: 1
BLOOD IN STOOL: 0

## 2023-07-23 ASSESSMENT — PAIN SCALES - GENERAL: PAINLEVEL_OUTOF10: 0

## 2023-07-23 NOTE — PLAN OF CARE
Patient is progressing towards discharge  Problem: Discharge Planning  Goal: Discharge to home or other facility with appropriate resources  7/23/2023 0121 by Jnoy Stewart RN  Outcome: Not Progressing  Flowsheets (Taken 7/22/2023 2100)  Discharge to home or other facility with appropriate resources: Identify barriers to discharge with nd caregiver  7/22/2023 1930 by Sagar Rodriguez RN  Outcome: Progressing     Problem: Skin/Tissue Integrity  Goal: Absence of new skin breakdown  Description: 1. Monitor for areas of redness and/or skin breakdown  2. Assess vascular access sites hourly  3. Every 4-6 hours minimum:  Change oxygen saturation probe site  4. Every 4-6 hours:  If on nasal continuous positive airway pressure, respiratory therapy assess nares and determine need for appliance change or resting period.   7/23/2023 0121 by Jony Stewart RN  Outcome: Not Progressing  7/22/2023 1930 by Sagar Rodriguez RN  Outcome: Progressing     Problem: Safety - Adult  Goal: Free from fall injury  7/23/2023 0121 by Jony Stewart RN  Outcome: Not Progressing  7/22/2023 1930 by Sagar Rodriguez RN  Outcome: Progressing  Flowsheets (Taken 7/22/2023 0800)  Free From Fall Injury: Instruct family/caregiver on patient safety     Problem: Chronic Conditions and Co-morbidities  Goal: Patient's chronic conditions and co-morbidity symptoms are monitored and maintained or improved  7/23/2023 0121 by Jony Stewart RN  Outcome: Not Progressing  Flowsheets (Taken 7/22/2023 2100)  Care Plan - Patient's Chronic Conditions and Co-Morbidity Symptoms are Monitored and Maintained or Improved: Monitor and assess patient's chronic conditions and comorbid symptoms for stability, deterioration, or improvement  7/22/2023 1930 by Sagar Rodriguez RN  Outcome: Progressing     Problem: Pain  Goal: Verbalizes/displays adequate comfort level or baseline comfort level  7/23/2023 0121 by Jony Stewart RN  Outcome: Not Progressing  7/22/2023 1930

## 2023-07-23 NOTE — PLAN OF CARE
Problem: Discharge Planning  Goal: Discharge to home or other facility with appropriate resources  Recent Flowsheet Documentation  Taken 7/23/2023 0800 by Garnetta Spatz, RN  Discharge to home or other facility with appropriate resources:   Identify barriers to discharge with patient and caregiver   Arrange for needed discharge resources and transportation as appropriate   Identify discharge learning needs (meds, wound care, etc)   Refer to discharge planning if patient needs post-hospital services based on physician order or complex needs related to functional status, cognitive ability or social support system     Problem: Safety - Adult  Goal: Free from fall injury  Recent Flowsheet Documentation  Taken 7/23/2023 0800 by Garnetta Spatz, RN  Free From Fall Injury: Instruct family/caregiver on patient safety     Problem: Chronic Conditions and Co-morbidities  Goal: Patient's chronic conditions and co-morbidity symptoms are monitored and maintained or improved  Recent Flowsheet Documentation  Taken 7/23/2023 0800 by Garnetta Spatz, RN  Care Plan - Patient's Chronic Conditions and Co-Morbidity Symptoms are Monitored and Maintained or Improved: Monitor and assess patient's chronic conditions and comorbid symptoms for stability, deterioration, or improvement

## 2023-07-23 NOTE — FLOWSHEET NOTE
Assumed care of patient for  FOUR  hour shift. Previous assessment reviewed and updated as needed. Chart and meds reviewed. Pt :   resting with  eyes closed  IV: SL  TELE:   SR 91                         OXYGEN : 2l NC  Precautions:              Falls: 35       Ok: 20  Call light in reach. Bed low position/ Locked.      NOTES:

## 2023-07-24 ENCOUNTER — APPOINTMENT (OUTPATIENT)
Dept: ULTRASOUND IMAGING | Age: 78
DRG: 286 | End: 2023-07-24
Payer: MEDICARE

## 2023-07-24 LAB
ANION GAP SERPL CALCULATED.3IONS-SCNC: 13 MEQ/L (ref 9–15)
BASOPHILS # BLD: 0.1 K/UL (ref 0–0.2)
BASOPHILS NFR BLD: 1.2 %
BUN SERPL-MCNC: 65 MG/DL (ref 8–23)
CALCIUM SERPL-MCNC: 8.2 MG/DL (ref 8.5–9.9)
CHLORIDE SERPL-SCNC: 101 MEQ/L (ref 95–107)
CO2 SERPL-SCNC: 22 MEQ/L (ref 20–31)
CREAT SERPL-MCNC: 3.47 MG/DL (ref 0.5–0.9)
EOSINOPHIL # BLD: 0.1 K/UL (ref 0–0.7)
EOSINOPHIL NFR BLD: 2.2 %
ERYTHROCYTE [DISTWIDTH] IN BLOOD BY AUTOMATED COUNT: 15.9 % (ref 11.5–14.5)
GLUCOSE BLD-MCNC: 122 MG/DL (ref 70–99)
GLUCOSE BLD-MCNC: 199 MG/DL (ref 70–99)
GLUCOSE BLD-MCNC: 204 MG/DL (ref 70–99)
GLUCOSE BLD-MCNC: 248 MG/DL (ref 70–99)
GLUCOSE SERPL-MCNC: 111 MG/DL (ref 70–99)
HCT VFR BLD AUTO: 23.9 % (ref 37–47)
HGB BLD-MCNC: 8.2 G/DL (ref 12–16)
LYMPHOCYTES # BLD: 0.3 K/UL (ref 1–4.8)
LYMPHOCYTES NFR BLD: 5.9 %
MAGNESIUM SERPL-MCNC: 2.2 MG/DL (ref 1.7–2.4)
MCH RBC QN AUTO: 27.6 PG (ref 27–31.3)
MCHC RBC AUTO-ENTMCNC: 34.2 % (ref 33–37)
MCV RBC AUTO: 80.6 FL (ref 79.4–94.8)
MONOCYTES # BLD: 0.2 K/UL (ref 0.2–0.8)
MONOCYTES NFR BLD: 3 %
NEUTROPHILS # BLD: 4.8 K/UL (ref 1.4–6.5)
NEUTS SEG NFR BLD: 87.7 %
PERFORMED ON: ABNORMAL
PLATELET # BLD AUTO: 168 K/UL (ref 130–400)
POTASSIUM SERPL-SCNC: 3.9 MEQ/L (ref 3.4–4.9)
RBC # BLD AUTO: 2.97 M/UL (ref 4.2–5.4)
SODIUM SERPL-SCNC: 136 MEQ/L (ref 135–144)
WBC # BLD AUTO: 5.5 K/UL (ref 4.8–10.8)

## 2023-07-24 PROCEDURE — 99233 SBSQ HOSP IP/OBS HIGH 50: CPT | Performed by: INTERNAL MEDICINE

## 2023-07-24 PROCEDURE — 2580000003 HC RX 258: Performed by: INTERNAL MEDICINE

## 2023-07-24 PROCEDURE — 1210000000 HC MED SURG R&B

## 2023-07-24 PROCEDURE — 2700000000 HC OXYGEN THERAPY PER DAY

## 2023-07-24 PROCEDURE — 36415 COLL VENOUS BLD VENIPUNCTURE: CPT

## 2023-07-24 PROCEDURE — 6370000000 HC RX 637 (ALT 250 FOR IP): Performed by: INTERNAL MEDICINE

## 2023-07-24 PROCEDURE — 83735 ASSAY OF MAGNESIUM: CPT

## 2023-07-24 PROCEDURE — 93970 EXTREMITY STUDY: CPT | Performed by: INTERNAL MEDICINE

## 2023-07-24 PROCEDURE — 6360000002 HC RX W HCPCS: Performed by: INTERNAL MEDICINE

## 2023-07-24 PROCEDURE — 93970 EXTREMITY STUDY: CPT

## 2023-07-24 PROCEDURE — 80048 BASIC METABOLIC PNL TOTAL CA: CPT

## 2023-07-24 PROCEDURE — 85025 COMPLETE CBC W/AUTO DIFF WBC: CPT

## 2023-07-24 RX ORDER — FUROSEMIDE 10 MG/ML
80 INJECTION INTRAMUSCULAR; INTRAVENOUS 2 TIMES DAILY
Status: DISCONTINUED | OUTPATIENT
Start: 2023-07-24 | End: 2023-07-25

## 2023-07-24 RX ADMIN — Medication 10 ML: at 20:18

## 2023-07-24 RX ADMIN — HYDRALAZINE HYDROCHLORIDE 100 MG: 100 TABLET, FILM COATED ORAL at 20:17

## 2023-07-24 RX ADMIN — CLONIDINE HYDROCHLORIDE 0.2 MG: 0.1 TABLET ORAL at 09:05

## 2023-07-24 RX ADMIN — Medication 10 ML: at 09:05

## 2023-07-24 RX ADMIN — CHLOROTHIAZIDE SODIUM 250 MG: 500 INJECTION, POWDER, LYOPHILIZED, FOR SOLUTION INTRAVENOUS at 14:27

## 2023-07-24 RX ADMIN — ASPIRIN 81 MG 81 MG: 81 TABLET ORAL at 09:05

## 2023-07-24 RX ADMIN — MYCOPHENOLATE MOFETIL 250 MG: 250 CAPSULE ORAL at 09:05

## 2023-07-24 RX ADMIN — ISOSORBIDE MONONITRATE 60 MG: 60 TABLET, EXTENDED RELEASE ORAL at 09:05

## 2023-07-24 RX ADMIN — Medication 400 MG: at 09:05

## 2023-07-24 RX ADMIN — PREDNISONE 5 MG: 5 TABLET ORAL at 09:05

## 2023-07-24 RX ADMIN — HYDRALAZINE HYDROCHLORIDE 100 MG: 100 TABLET, FILM COATED ORAL at 09:05

## 2023-07-24 RX ADMIN — PANTOPRAZOLE SODIUM 40 MG: 40 TABLET, DELAYED RELEASE ORAL at 09:05

## 2023-07-24 RX ADMIN — MYCOPHENOLATE MOFETIL 250 MG: 250 CAPSULE ORAL at 20:17

## 2023-07-24 RX ADMIN — FUROSEMIDE 80 MG: 10 INJECTION, SOLUTION INTRAMUSCULAR; INTRAVENOUS at 09:05

## 2023-07-24 RX ADMIN — FUROSEMIDE 80 MG: 10 INJECTION, SOLUTION INTRAMUSCULAR; INTRAVENOUS at 17:09

## 2023-07-24 RX ADMIN — ATORVASTATIN CALCIUM 20 MG: 20 TABLET, FILM COATED ORAL at 09:05

## 2023-07-24 RX ADMIN — CLONIDINE HYDROCHLORIDE 0.2 MG: 0.1 TABLET ORAL at 21:10

## 2023-07-24 ASSESSMENT — ENCOUNTER SYMPTOMS
WHEEZING: 0
STRIDOR: 0
EYES NEGATIVE: 1
CHEST TIGHTNESS: 0
BLOOD IN STOOL: 0
SHORTNESS OF BREATH: 1
COUGH: 0
GASTROINTESTINAL NEGATIVE: 1
NAUSEA: 0

## 2023-07-24 ASSESSMENT — PAIN SCALES - GENERAL: PAINLEVEL_OUTOF10: 0

## 2023-07-24 NOTE — FLOWSHEET NOTE
1150- AM assessment completed. Patient resting in bed at this time. Denies any chest pain. Remains NSR on the monitor. +2 pitting edema noted to bilateral lower extremities. +1 pitting edema noted to bilateral upper extremities. Pedal pulses palpable. Shortness of breath noted with exertion. Lungs are diminished with exertional wheezes noted. SATS 100% on 2L NC. She is A/Ox3 and can be up in the room with a stand-by assist/walker. Denies any pain with elimination. Voids brownish colored urine. States her last BM was yesterday. Skin remains warm, dry and intact but generalized bruising noted to bilateral upper extremities. #22g SL to left hand, flushed and patent. Vital signs stable and AM medications provided. Dr Placido Sellers in to see her at this time. Family visiting at bedside. Call light remains in reach. 1215- Patient to ultrasound via cart. 1300- Patient back from ultrasound. Family at bedside. Has no complaints or concerns at this time. Call light remains in reach. 1700- Patient sitting on the side of the bed at this time. Family visiting at bedside. No signs of any acute distress noted. Call light remains in reach.     Electronically signed by Elkin Escobedo RN on 7/24/2023 at 6:41 PM

## 2023-07-25 LAB
ANION GAP SERPL CALCULATED.3IONS-SCNC: 15 MEQ/L (ref 9–15)
BASOPHILS # BLD: 0 K/UL (ref 0–0.2)
BASOPHILS NFR BLD: 1 %
BNP BLD-MCNC: NORMAL PG/ML
BUN SERPL-MCNC: 65 MG/DL (ref 8–23)
CALCIUM SERPL-MCNC: 8.3 MG/DL (ref 8.5–9.9)
CHLORIDE SERPL-SCNC: 101 MEQ/L (ref 95–107)
CO2 SERPL-SCNC: 25 MEQ/L (ref 20–31)
CREAT SERPL-MCNC: 3.76 MG/DL (ref 0.5–0.9)
ECHO BSA: 1.72 M2
EOSINOPHIL # BLD: 0.1 K/UL (ref 0–0.7)
EOSINOPHIL NFR BLD: 2.5 %
ERYTHROCYTE [DISTWIDTH] IN BLOOD BY AUTOMATED COUNT: 15.7 % (ref 11.5–14.5)
GLUCOSE BLD-MCNC: 122 MG/DL (ref 70–99)
GLUCOSE BLD-MCNC: 144 MG/DL (ref 70–99)
GLUCOSE BLD-MCNC: 192 MG/DL (ref 70–99)
GLUCOSE BLD-MCNC: 249 MG/DL (ref 70–99)
GLUCOSE SERPL-MCNC: 132 MG/DL (ref 70–99)
HCT VFR BLD AUTO: 24.3 % (ref 37–47)
HGB BLD-MCNC: 8.3 G/DL (ref 12–16)
LYMPHOCYTES # BLD: 0.3 K/UL (ref 1–4.8)
LYMPHOCYTES NFR BLD: 6.8 %
MAGNESIUM SERPL-MCNC: 2.2 MG/DL (ref 1.7–2.4)
MCH RBC QN AUTO: 27.1 PG (ref 27–31.3)
MCHC RBC AUTO-ENTMCNC: 34.3 % (ref 33–37)
MCV RBC AUTO: 79.1 FL (ref 79.4–94.8)
MONOCYTES # BLD: 0.2 K/UL (ref 0.2–0.8)
MONOCYTES NFR BLD: 4.1 %
NEUTROPHILS # BLD: 4.2 K/UL (ref 1.4–6.5)
NEUTS SEG NFR BLD: 85.6 %
PERFORMED ON: ABNORMAL
PLATELET # BLD AUTO: 228 K/UL (ref 130–400)
POTASSIUM SERPL-SCNC: 3.7 MEQ/L (ref 3.4–4.9)
RBC # BLD AUTO: 3.07 M/UL (ref 4.2–5.4)
SODIUM SERPL-SCNC: 141 MEQ/L (ref 135–144)
WBC # BLD AUTO: 4.9 K/UL (ref 4.8–10.8)

## 2023-07-25 PROCEDURE — 2580000003 HC RX 258: Performed by: INTERNAL MEDICINE

## 2023-07-25 PROCEDURE — 99233 SBSQ HOSP IP/OBS HIGH 50: CPT | Performed by: INTERNAL MEDICINE

## 2023-07-25 PROCEDURE — 36415 COLL VENOUS BLD VENIPUNCTURE: CPT

## 2023-07-25 PROCEDURE — 97116 GAIT TRAINING THERAPY: CPT

## 2023-07-25 PROCEDURE — 6370000000 HC RX 637 (ALT 250 FOR IP): Performed by: INTERNAL MEDICINE

## 2023-07-25 PROCEDURE — 2700000000 HC OXYGEN THERAPY PER DAY

## 2023-07-25 PROCEDURE — 6360000002 HC RX W HCPCS: Performed by: INTERNAL MEDICINE

## 2023-07-25 PROCEDURE — 80048 BASIC METABOLIC PNL TOTAL CA: CPT

## 2023-07-25 PROCEDURE — 97110 THERAPEUTIC EXERCISES: CPT

## 2023-07-25 PROCEDURE — 83880 ASSAY OF NATRIURETIC PEPTIDE: CPT

## 2023-07-25 PROCEDURE — 83735 ASSAY OF MAGNESIUM: CPT

## 2023-07-25 PROCEDURE — 85025 COMPLETE CBC W/AUTO DIFF WBC: CPT

## 2023-07-25 PROCEDURE — 1210000000 HC MED SURG R&B

## 2023-07-25 RX ORDER — GUAIFENESIN/DEXTROMETHORPHAN 100-10MG/5
5 SYRUP ORAL EVERY 4 HOURS PRN
Status: DISCONTINUED | OUTPATIENT
Start: 2023-07-25 | End: 2023-08-08 | Stop reason: HOSPADM

## 2023-07-25 RX ADMIN — Medication 10 ML: at 22:05

## 2023-07-25 RX ADMIN — Medication 10 ML: at 22:02

## 2023-07-25 RX ADMIN — GUAIFENESIN SYRUP AND DEXTROMETHORPHAN 5 ML: 100; 10 SYRUP ORAL at 14:23

## 2023-07-25 RX ADMIN — ASPIRIN 81 MG 81 MG: 81 TABLET ORAL at 08:37

## 2023-07-25 RX ADMIN — MYCOPHENOLATE MOFETIL 250 MG: 250 CAPSULE ORAL at 20:39

## 2023-07-25 RX ADMIN — GUAIFENESIN SYRUP AND DEXTROMETHORPHAN 5 ML: 100; 10 SYRUP ORAL at 19:18

## 2023-07-25 RX ADMIN — INSULIN LISPRO 2 UNITS: 100 INJECTION, SOLUTION INTRAVENOUS; SUBCUTANEOUS at 11:27

## 2023-07-25 RX ADMIN — ATORVASTATIN CALCIUM 20 MG: 20 TABLET, FILM COATED ORAL at 08:37

## 2023-07-25 RX ADMIN — CHLOROTHIAZIDE SODIUM 500 MG: 500 INJECTION, POWDER, LYOPHILIZED, FOR SOLUTION INTRAVENOUS at 21:07

## 2023-07-25 RX ADMIN — FUROSEMIDE 10 MG/HR: 10 INJECTION, SOLUTION INTRAMUSCULAR; INTRAVENOUS at 21:59

## 2023-07-25 RX ADMIN — CHLOROTHIAZIDE SODIUM 250 MG: 500 INJECTION, POWDER, LYOPHILIZED, FOR SOLUTION INTRAVENOUS at 02:06

## 2023-07-25 RX ADMIN — CLONIDINE HYDROCHLORIDE 0.2 MG: 0.1 TABLET ORAL at 20:39

## 2023-07-25 RX ADMIN — GUAIFENESIN SYRUP AND DEXTROMETHORPHAN 5 ML: 100; 10 SYRUP ORAL at 08:37

## 2023-07-25 RX ADMIN — MYCOPHENOLATE MOFETIL 250 MG: 250 CAPSULE ORAL at 08:37

## 2023-07-25 RX ADMIN — CLONIDINE HYDROCHLORIDE 0.2 MG: 0.1 TABLET ORAL at 08:37

## 2023-07-25 RX ADMIN — HYDRALAZINE HYDROCHLORIDE 100 MG: 100 TABLET, FILM COATED ORAL at 08:37

## 2023-07-25 RX ADMIN — PANTOPRAZOLE SODIUM 40 MG: 40 TABLET, DELAYED RELEASE ORAL at 08:37

## 2023-07-25 RX ADMIN — Medication 10 ML: at 22:03

## 2023-07-25 RX ADMIN — FUROSEMIDE 10 MG/HR: 10 INJECTION, SOLUTION INTRAMUSCULAR; INTRAVENOUS at 11:09

## 2023-07-25 RX ADMIN — CHLOROTHIAZIDE SODIUM 500 MG: 500 INJECTION, POWDER, LYOPHILIZED, FOR SOLUTION INTRAVENOUS at 11:10

## 2023-07-25 RX ADMIN — Medication 10 ML: at 08:38

## 2023-07-25 RX ADMIN — ISOSORBIDE MONONITRATE 60 MG: 60 TABLET, EXTENDED RELEASE ORAL at 08:37

## 2023-07-25 RX ADMIN — Medication 400 MG: at 08:37

## 2023-07-25 RX ADMIN — ONDANSETRON 4 MG: 4 TABLET, ORALLY DISINTEGRATING ORAL at 23:46

## 2023-07-25 RX ADMIN — HYDRALAZINE HYDROCHLORIDE 100 MG: 100 TABLET, FILM COATED ORAL at 20:39

## 2023-07-25 ASSESSMENT — ENCOUNTER SYMPTOMS
WHEEZING: 0
BLOOD IN STOOL: 0
GASTROINTESTINAL NEGATIVE: 1
CHEST TIGHTNESS: 0
NAUSEA: 0
STRIDOR: 0
EYES NEGATIVE: 1
SHORTNESS OF BREATH: 1
COUGH: 0

## 2023-07-26 LAB
ANION GAP SERPL CALCULATED.3IONS-SCNC: 16 MEQ/L (ref 9–15)
BASOPHILS # BLD: 0 K/UL (ref 0–0.2)
BASOPHILS NFR BLD: 0.6 %
BUN SERPL-MCNC: 62 MG/DL (ref 8–23)
CALCIUM SERPL-MCNC: 8.2 MG/DL (ref 8.5–9.9)
CHLORIDE SERPL-SCNC: 97 MEQ/L (ref 95–107)
CO2 SERPL-SCNC: 25 MEQ/L (ref 20–31)
CREAT SERPL-MCNC: 3.88 MG/DL (ref 0.5–0.9)
EOSINOPHIL # BLD: 0.1 K/UL (ref 0–0.7)
EOSINOPHIL NFR BLD: 1.7 %
ERYTHROCYTE [DISTWIDTH] IN BLOOD BY AUTOMATED COUNT: 16.1 % (ref 11.5–14.5)
GLUCOSE BLD-MCNC: 178 MG/DL (ref 70–99)
GLUCOSE BLD-MCNC: 212 MG/DL (ref 70–99)
GLUCOSE BLD-MCNC: 273 MG/DL (ref 70–99)
GLUCOSE SERPL-MCNC: 100 MG/DL (ref 70–99)
HCT VFR BLD AUTO: 26.1 % (ref 37–47)
HGB BLD-MCNC: 8.7 G/DL (ref 12–16)
LYMPHOCYTES # BLD: 0.3 K/UL (ref 1–4.8)
LYMPHOCYTES NFR BLD: 4 %
MAGNESIUM SERPL-MCNC: 2.1 MG/DL (ref 1.7–2.4)
MCH RBC QN AUTO: 26.8 PG (ref 27–31.3)
MCHC RBC AUTO-ENTMCNC: 33.4 % (ref 33–37)
MCV RBC AUTO: 80.4 FL (ref 79.4–94.8)
MONOCYTES # BLD: 0.3 K/UL (ref 0.2–0.8)
MONOCYTES NFR BLD: 3.8 %
NEUTROPHILS # BLD: 6.4 K/UL (ref 1.4–6.5)
NEUTS SEG NFR BLD: 89.9 %
PERFORMED ON: ABNORMAL
PLATELET # BLD AUTO: 189 K/UL (ref 130–400)
POTASSIUM SERPL-SCNC: 3 MEQ/L (ref 3.4–4.9)
RBC # BLD AUTO: 3.25 M/UL (ref 4.2–5.4)
SODIUM SERPL-SCNC: 138 MEQ/L (ref 135–144)
WBC # BLD AUTO: 7.1 K/UL (ref 4.8–10.8)

## 2023-07-26 PROCEDURE — 99233 SBSQ HOSP IP/OBS HIGH 50: CPT | Performed by: INTERNAL MEDICINE

## 2023-07-26 PROCEDURE — 2700000000 HC OXYGEN THERAPY PER DAY

## 2023-07-26 PROCEDURE — 2580000003 HC RX 258: Performed by: INTERNAL MEDICINE

## 2023-07-26 PROCEDURE — 6370000000 HC RX 637 (ALT 250 FOR IP): Performed by: INTERNAL MEDICINE

## 2023-07-26 PROCEDURE — 6360000002 HC RX W HCPCS: Performed by: NURSE PRACTITIONER

## 2023-07-26 PROCEDURE — 83735 ASSAY OF MAGNESIUM: CPT

## 2023-07-26 PROCEDURE — 6360000002 HC RX W HCPCS: Performed by: INTERNAL MEDICINE

## 2023-07-26 PROCEDURE — 36415 COLL VENOUS BLD VENIPUNCTURE: CPT

## 2023-07-26 PROCEDURE — 1210000000 HC MED SURG R&B

## 2023-07-26 PROCEDURE — 80048 BASIC METABOLIC PNL TOTAL CA: CPT

## 2023-07-26 PROCEDURE — 85025 COMPLETE CBC W/AUTO DIFF WBC: CPT

## 2023-07-26 RX ORDER — HYDRALAZINE HYDROCHLORIDE 20 MG/ML
10 INJECTION INTRAMUSCULAR; INTRAVENOUS EVERY 6 HOURS PRN
Status: DISCONTINUED | OUTPATIENT
Start: 2023-07-26 | End: 2023-08-08 | Stop reason: HOSPADM

## 2023-07-26 RX ORDER — POTASSIUM CHLORIDE 7.45 MG/ML
10 INJECTION INTRAVENOUS PRN
Status: DISCONTINUED | OUTPATIENT
Start: 2023-07-26 | End: 2023-08-01

## 2023-07-26 RX ORDER — MAGNESIUM SULFATE IN WATER 40 MG/ML
2000 INJECTION, SOLUTION INTRAVENOUS PRN
Status: DISCONTINUED | OUTPATIENT
Start: 2023-07-26 | End: 2023-08-01

## 2023-07-26 RX ORDER — LABETALOL HYDROCHLORIDE 5 MG/ML
10 INJECTION, SOLUTION INTRAVENOUS EVERY 4 HOURS PRN
Status: DISCONTINUED | OUTPATIENT
Start: 2023-07-26 | End: 2023-08-08 | Stop reason: HOSPADM

## 2023-07-26 RX ORDER — POTASSIUM CHLORIDE 20 MEQ/1
40 TABLET, EXTENDED RELEASE ORAL PRN
Status: DISCONTINUED | OUTPATIENT
Start: 2023-07-26 | End: 2023-08-01

## 2023-07-26 RX ORDER — POTASSIUM CHLORIDE 20 MEQ/1
40 TABLET, EXTENDED RELEASE ORAL ONCE
Status: COMPLETED | OUTPATIENT
Start: 2023-07-26 | End: 2023-07-26

## 2023-07-26 RX ADMIN — POTASSIUM CHLORIDE 40 MEQ: 1500 TABLET, EXTENDED RELEASE ORAL at 08:54

## 2023-07-26 RX ADMIN — HYDRALAZINE HYDROCHLORIDE 100 MG: 100 TABLET, FILM COATED ORAL at 08:08

## 2023-07-26 RX ADMIN — AMLODIPINE BESYLATE 10 MG: 10 TABLET ORAL at 08:55

## 2023-07-26 RX ADMIN — CLONIDINE HYDROCHLORIDE 0.2 MG: 0.1 TABLET ORAL at 08:08

## 2023-07-26 RX ADMIN — ATORVASTATIN CALCIUM 20 MG: 20 TABLET, FILM COATED ORAL at 08:08

## 2023-07-26 RX ADMIN — Medication 400 MG: at 08:08

## 2023-07-26 RX ADMIN — HYDRALAZINE HYDROCHLORIDE 10 MG: 20 INJECTION INTRAMUSCULAR; INTRAVENOUS at 02:50

## 2023-07-26 RX ADMIN — INSULIN LISPRO 4 UNITS: 100 INJECTION, SOLUTION INTRAVENOUS; SUBCUTANEOUS at 16:34

## 2023-07-26 RX ADMIN — HYDRALAZINE HYDROCHLORIDE 100 MG: 100 TABLET, FILM COATED ORAL at 20:37

## 2023-07-26 RX ADMIN — FUROSEMIDE 10 MG/HR: 10 INJECTION, SOLUTION INTRAMUSCULAR; INTRAVENOUS at 18:29

## 2023-07-26 RX ADMIN — MYCOPHENOLATE MOFETIL 250 MG: 250 CAPSULE ORAL at 20:37

## 2023-07-26 RX ADMIN — CLONIDINE HYDROCHLORIDE 0.2 MG: 0.1 TABLET ORAL at 20:37

## 2023-07-26 RX ADMIN — PREDNISONE 5 MG: 5 TABLET ORAL at 08:09

## 2023-07-26 RX ADMIN — Medication 10 ML: at 20:39

## 2023-07-26 RX ADMIN — ASPIRIN 81 MG 81 MG: 81 TABLET ORAL at 08:09

## 2023-07-26 RX ADMIN — FUROSEMIDE 10 MG/HR: 10 INJECTION, SOLUTION INTRAMUSCULAR; INTRAVENOUS at 07:59

## 2023-07-26 RX ADMIN — MYCOPHENOLATE MOFETIL 250 MG: 250 CAPSULE ORAL at 08:12

## 2023-07-26 RX ADMIN — CHLOROTHIAZIDE SODIUM 500 MG: 500 INJECTION, POWDER, LYOPHILIZED, FOR SOLUTION INTRAVENOUS at 09:54

## 2023-07-26 RX ADMIN — ISOSORBIDE MONONITRATE 60 MG: 60 TABLET, EXTENDED RELEASE ORAL at 08:08

## 2023-07-26 RX ADMIN — CHLOROTHIAZIDE SODIUM 500 MG: 500 INJECTION, POWDER, LYOPHILIZED, FOR SOLUTION INTRAVENOUS at 20:41

## 2023-07-26 RX ADMIN — PANTOPRAZOLE SODIUM 40 MG: 40 TABLET, DELAYED RELEASE ORAL at 08:08

## 2023-07-26 ASSESSMENT — ENCOUNTER SYMPTOMS
NAUSEA: 0
CHEST TIGHTNESS: 0
GASTROINTESTINAL NEGATIVE: 1
EYES NEGATIVE: 1
SHORTNESS OF BREATH: 1
COUGH: 0
BLOOD IN STOOL: 0
WHEEZING: 0
STRIDOR: 0

## 2023-07-26 NOTE — FLOWSHEET NOTE
0243: Pt had a run of consistently high BP, the nurse contacted the NP and had hydralazine and Labetalol PRN added for BP control.  Hydralazine given for BP of 184/76

## 2023-07-26 NOTE — PLAN OF CARE
Nutrition Problem #1: Inadequate oral intake  Intervention: Food and/or Nutrient Delivery: Continue Oral Nutrition Supplement, Modify Current Diet (Discontinue cardiac restriction until po intakes improve  Continue DAMIAN restriction  Continue renal oral nutrition supplementTID)

## 2023-07-27 LAB
ANION GAP SERPL CALCULATED.3IONS-SCNC: 13 MEQ/L (ref 9–15)
ANION GAP SERPL CALCULATED.3IONS-SCNC: 15 MEQ/L (ref 9–15)
BNP BLD-MCNC: NORMAL PG/ML
BUN SERPL-MCNC: 60 MG/DL (ref 8–23)
BUN SERPL-MCNC: 62 MG/DL (ref 8–23)
CALCIUM SERPL-MCNC: 8.4 MG/DL (ref 8.5–9.9)
CALCIUM SERPL-MCNC: 8.4 MG/DL (ref 8.5–9.9)
CHLORIDE SERPL-SCNC: 95 MEQ/L (ref 95–107)
CHLORIDE SERPL-SCNC: 96 MEQ/L (ref 95–107)
CO2 SERPL-SCNC: 28 MEQ/L (ref 20–31)
CO2 SERPL-SCNC: 28 MEQ/L (ref 20–31)
CREAT SERPL-MCNC: 4.16 MG/DL (ref 0.5–0.9)
CREAT SERPL-MCNC: 4.24 MG/DL (ref 0.5–0.9)
ERYTHROCYTE [DISTWIDTH] IN BLOOD BY AUTOMATED COUNT: 15.9 % (ref 11.5–14.5)
GLUCOSE BLD-MCNC: 131 MG/DL (ref 70–99)
GLUCOSE BLD-MCNC: 176 MG/DL (ref 70–99)
GLUCOSE BLD-MCNC: 183 MG/DL (ref 70–99)
GLUCOSE BLD-MCNC: 221 MG/DL (ref 70–99)
GLUCOSE SERPL-MCNC: 115 MG/DL (ref 70–99)
GLUCOSE SERPL-MCNC: 144 MG/DL (ref 70–99)
HCT VFR BLD AUTO: 25.3 % (ref 37–47)
HGB BLD-MCNC: 8.6 G/DL (ref 12–16)
MAGNESIUM SERPL-MCNC: 2 MG/DL (ref 1.7–2.4)
MCH RBC QN AUTO: 27.5 PG (ref 27–31.3)
MCHC RBC AUTO-ENTMCNC: 33.9 % (ref 33–37)
MCV RBC AUTO: 81.1 FL (ref 79.4–94.8)
PERFORMED ON: ABNORMAL
PLATELET # BLD AUTO: 242 K/UL (ref 130–400)
POTASSIUM SERPL-SCNC: 3.5 MEQ/L (ref 3.4–4.9)
POTASSIUM SERPL-SCNC: 3.5 MEQ/L (ref 3.4–4.9)
RBC # BLD AUTO: 3.12 M/UL (ref 4.2–5.4)
SODIUM SERPL-SCNC: 137 MEQ/L (ref 135–144)
SODIUM SERPL-SCNC: 138 MEQ/L (ref 135–144)
WBC # BLD AUTO: 4.8 K/UL (ref 4.8–10.8)

## 2023-07-27 PROCEDURE — 83880 ASSAY OF NATRIURETIC PEPTIDE: CPT

## 2023-07-27 PROCEDURE — 97116 GAIT TRAINING THERAPY: CPT

## 2023-07-27 PROCEDURE — 6370000000 HC RX 637 (ALT 250 FOR IP): Performed by: INTERNAL MEDICINE

## 2023-07-27 PROCEDURE — 2700000000 HC OXYGEN THERAPY PER DAY

## 2023-07-27 PROCEDURE — 6360000002 HC RX W HCPCS: Performed by: INTERNAL MEDICINE

## 2023-07-27 PROCEDURE — 1210000000 HC MED SURG R&B

## 2023-07-27 PROCEDURE — 2580000003 HC RX 258: Performed by: INTERNAL MEDICINE

## 2023-07-27 PROCEDURE — 36415 COLL VENOUS BLD VENIPUNCTURE: CPT

## 2023-07-27 PROCEDURE — 99233 SBSQ HOSP IP/OBS HIGH 50: CPT | Performed by: INTERNAL MEDICINE

## 2023-07-27 PROCEDURE — 83735 ASSAY OF MAGNESIUM: CPT

## 2023-07-27 PROCEDURE — 85027 COMPLETE CBC AUTOMATED: CPT

## 2023-07-27 PROCEDURE — 80048 BASIC METABOLIC PNL TOTAL CA: CPT

## 2023-07-27 PROCEDURE — 97535 SELF CARE MNGMENT TRAINING: CPT

## 2023-07-27 RX ORDER — POTASSIUM CHLORIDE 20 MEQ/1
40 TABLET, EXTENDED RELEASE ORAL ONCE
Status: COMPLETED | OUTPATIENT
Start: 2023-07-27 | End: 2023-07-27

## 2023-07-27 RX ADMIN — HYDRALAZINE HYDROCHLORIDE 100 MG: 100 TABLET, FILM COATED ORAL at 08:42

## 2023-07-27 RX ADMIN — MYCOPHENOLATE MOFETIL 250 MG: 250 CAPSULE ORAL at 09:39

## 2023-07-27 RX ADMIN — ATORVASTATIN CALCIUM 20 MG: 20 TABLET, FILM COATED ORAL at 08:42

## 2023-07-27 RX ADMIN — CLONIDINE HYDROCHLORIDE 0.2 MG: 0.1 TABLET ORAL at 08:42

## 2023-07-27 RX ADMIN — AMLODIPINE BESYLATE 10 MG: 10 TABLET ORAL at 08:42

## 2023-07-27 RX ADMIN — CLONIDINE HYDROCHLORIDE 0.2 MG: 0.1 TABLET ORAL at 21:06

## 2023-07-27 RX ADMIN — ASPIRIN 81 MG 81 MG: 81 TABLET ORAL at 08:42

## 2023-07-27 RX ADMIN — MYCOPHENOLATE MOFETIL 250 MG: 250 CAPSULE ORAL at 21:06

## 2023-07-27 RX ADMIN — POLYETHYLENE GLYCOL 3350 17 G: 17 POWDER, FOR SOLUTION ORAL at 08:42

## 2023-07-27 RX ADMIN — PANTOPRAZOLE SODIUM 40 MG: 40 TABLET, DELAYED RELEASE ORAL at 08:42

## 2023-07-27 RX ADMIN — Medication 400 MG: at 08:42

## 2023-07-27 RX ADMIN — INSULIN LISPRO 2 UNITS: 100 INJECTION, SOLUTION INTRAVENOUS; SUBCUTANEOUS at 11:58

## 2023-07-27 RX ADMIN — HYDRALAZINE HYDROCHLORIDE 100 MG: 100 TABLET, FILM COATED ORAL at 21:06

## 2023-07-27 RX ADMIN — ISOSORBIDE MONONITRATE 60 MG: 60 TABLET, EXTENDED RELEASE ORAL at 08:42

## 2023-07-27 RX ADMIN — POTASSIUM CHLORIDE 40 MEQ: 1500 TABLET, EXTENDED RELEASE ORAL at 09:38

## 2023-07-27 RX ADMIN — GUAIFENESIN SYRUP AND DEXTROMETHORPHAN 5 ML: 100; 10 SYRUP ORAL at 21:06

## 2023-07-27 RX ADMIN — FUROSEMIDE 10 MG/HR: 10 INJECTION, SOLUTION INTRAMUSCULAR; INTRAVENOUS at 15:05

## 2023-07-27 ASSESSMENT — ENCOUNTER SYMPTOMS
GASTROINTESTINAL NEGATIVE: 1
COUGH: 0
EYES NEGATIVE: 1
WHEEZING: 0
BLOOD IN STOOL: 0
STRIDOR: 0
NAUSEA: 0
SHORTNESS OF BREATH: 1
CHEST TIGHTNESS: 0

## 2023-07-28 LAB
ANION GAP SERPL CALCULATED.3IONS-SCNC: 13 MEQ/L (ref 9–15)
ANION GAP SERPL CALCULATED.3IONS-SCNC: 14 MEQ/L (ref 9–15)
BNP BLD-MCNC: NORMAL PG/ML
BUN SERPL-MCNC: 61 MG/DL (ref 8–23)
BUN SERPL-MCNC: 63 MG/DL (ref 8–23)
CALCIUM SERPL-MCNC: 8.1 MG/DL (ref 8.5–9.9)
CALCIUM SERPL-MCNC: 8.2 MG/DL (ref 8.5–9.9)
CHLORIDE SERPL-SCNC: 95 MEQ/L (ref 95–107)
CHLORIDE SERPL-SCNC: 97 MEQ/L (ref 95–107)
CO2 SERPL-SCNC: 29 MEQ/L (ref 20–31)
CO2 SERPL-SCNC: 29 MEQ/L (ref 20–31)
CREAT SERPL-MCNC: 4.33 MG/DL (ref 0.5–0.9)
CREAT SERPL-MCNC: 4.55 MG/DL (ref 0.5–0.9)
GLUCOSE BLD-MCNC: 149 MG/DL (ref 70–99)
GLUCOSE BLD-MCNC: 186 MG/DL (ref 70–99)
GLUCOSE BLD-MCNC: 186 MG/DL (ref 70–99)
GLUCOSE BLD-MCNC: 255 MG/DL (ref 70–99)
GLUCOSE SERPL-MCNC: 151 MG/DL (ref 70–99)
GLUCOSE SERPL-MCNC: 162 MG/DL (ref 70–99)
MAGNESIUM SERPL-MCNC: 2 MG/DL (ref 1.7–2.4)
MAGNESIUM SERPL-MCNC: 2 MG/DL (ref 1.7–2.4)
PERFORMED ON: ABNORMAL
POTASSIUM SERPL-SCNC: 3.3 MEQ/L (ref 3.4–4.9)
POTASSIUM SERPL-SCNC: 3.3 MEQ/L (ref 3.4–4.9)
SODIUM SERPL-SCNC: 138 MEQ/L (ref 135–144)
SODIUM SERPL-SCNC: 139 MEQ/L (ref 135–144)

## 2023-07-28 PROCEDURE — 6360000002 HC RX W HCPCS: Performed by: INTERNAL MEDICINE

## 2023-07-28 PROCEDURE — 2700000000 HC OXYGEN THERAPY PER DAY

## 2023-07-28 PROCEDURE — 2580000003 HC RX 258: Performed by: INTERNAL MEDICINE

## 2023-07-28 PROCEDURE — 6370000000 HC RX 637 (ALT 250 FOR IP): Performed by: INTERNAL MEDICINE

## 2023-07-28 PROCEDURE — 99233 SBSQ HOSP IP/OBS HIGH 50: CPT | Performed by: INTERNAL MEDICINE

## 2023-07-28 PROCEDURE — 1210000000 HC MED SURG R&B

## 2023-07-28 PROCEDURE — 36415 COLL VENOUS BLD VENIPUNCTURE: CPT

## 2023-07-28 PROCEDURE — 80048 BASIC METABOLIC PNL TOTAL CA: CPT

## 2023-07-28 PROCEDURE — 83735 ASSAY OF MAGNESIUM: CPT

## 2023-07-28 PROCEDURE — 83880 ASSAY OF NATRIURETIC PEPTIDE: CPT

## 2023-07-28 PROCEDURE — 6360000002 HC RX W HCPCS: Performed by: NURSE PRACTITIONER

## 2023-07-28 RX ADMIN — Medication 10 ML: at 09:18

## 2023-07-28 RX ADMIN — Medication 10 ML: at 21:28

## 2023-07-28 RX ADMIN — CLONIDINE HYDROCHLORIDE 0.2 MG: 0.1 TABLET ORAL at 21:28

## 2023-07-28 RX ADMIN — ASPIRIN 81 MG 81 MG: 81 TABLET ORAL at 09:17

## 2023-07-28 RX ADMIN — MYCOPHENOLATE MOFETIL 250 MG: 250 CAPSULE ORAL at 09:17

## 2023-07-28 RX ADMIN — PREDNISONE 5 MG: 5 TABLET ORAL at 09:17

## 2023-07-28 RX ADMIN — PANTOPRAZOLE SODIUM 40 MG: 40 TABLET, DELAYED RELEASE ORAL at 09:17

## 2023-07-28 RX ADMIN — FUROSEMIDE 10 MG/HR: 10 INJECTION, SOLUTION INTRAMUSCULAR; INTRAVENOUS at 01:50

## 2023-07-28 RX ADMIN — CLONIDINE HYDROCHLORIDE 0.2 MG: 0.1 TABLET ORAL at 09:20

## 2023-07-28 RX ADMIN — MYCOPHENOLATE MOFETIL 250 MG: 250 CAPSULE ORAL at 21:28

## 2023-07-28 RX ADMIN — Medication 400 MG: at 09:17

## 2023-07-28 RX ADMIN — HYDRALAZINE HYDROCHLORIDE 100 MG: 100 TABLET, FILM COATED ORAL at 21:28

## 2023-07-28 RX ADMIN — INSULIN LISPRO 4 UNITS: 100 INJECTION, SOLUTION INTRAVENOUS; SUBCUTANEOUS at 17:01

## 2023-07-28 RX ADMIN — ISOSORBIDE MONONITRATE 60 MG: 60 TABLET, EXTENDED RELEASE ORAL at 09:17

## 2023-07-28 RX ADMIN — ATORVASTATIN CALCIUM 20 MG: 20 TABLET, FILM COATED ORAL at 09:17

## 2023-07-28 RX ADMIN — ONDANSETRON 4 MG: 4 TABLET, ORALLY DISINTEGRATING ORAL at 01:20

## 2023-07-28 RX ADMIN — AMLODIPINE BESYLATE 10 MG: 10 TABLET ORAL at 09:17

## 2023-07-28 RX ADMIN — LABETALOL HYDROCHLORIDE 10 MG: 5 INJECTION, SOLUTION INTRAVENOUS at 01:20

## 2023-07-28 RX ADMIN — HYDRALAZINE HYDROCHLORIDE 100 MG: 100 TABLET, FILM COATED ORAL at 09:17

## 2023-07-28 ASSESSMENT — ENCOUNTER SYMPTOMS
STRIDOR: 0
SHORTNESS OF BREATH: 1
NAUSEA: 0
CHEST TIGHTNESS: 0
GASTROINTESTINAL NEGATIVE: 1
BLOOD IN STOOL: 0
COUGH: 0
WHEEZING: 0
EYES NEGATIVE: 1

## 2023-07-28 ASSESSMENT — PAIN SCALES - GENERAL: PAINLEVEL_OUTOF10: 0

## 2023-07-29 ENCOUNTER — APPOINTMENT (OUTPATIENT)
Dept: GENERAL RADIOLOGY | Age: 78
DRG: 286 | End: 2023-07-29
Attending: INTERNAL MEDICINE
Payer: MEDICARE

## 2023-07-29 LAB
ANION GAP SERPL CALCULATED.3IONS-SCNC: 13 MEQ/L (ref 9–15)
ANION GAP SERPL CALCULATED.3IONS-SCNC: 13 MEQ/L (ref 9–15)
BUN SERPL-MCNC: 64 MG/DL (ref 8–23)
BUN SERPL-MCNC: 65 MG/DL (ref 8–23)
CALCIUM SERPL-MCNC: 8 MG/DL (ref 8.5–9.9)
CALCIUM SERPL-MCNC: 8.3 MG/DL (ref 8.5–9.9)
CHLORIDE SERPL-SCNC: 94 MEQ/L (ref 95–107)
CHLORIDE SERPL-SCNC: 95 MEQ/L (ref 95–107)
CO2 SERPL-SCNC: 28 MEQ/L (ref 20–31)
CO2 SERPL-SCNC: 30 MEQ/L (ref 20–31)
CREAT SERPL-MCNC: 4.53 MG/DL (ref 0.5–0.9)
CREAT SERPL-MCNC: 4.71 MG/DL (ref 0.5–0.9)
ERYTHROCYTE [DISTWIDTH] IN BLOOD BY AUTOMATED COUNT: 16.1 % (ref 11.5–14.5)
GLUCOSE BLD-MCNC: 143 MG/DL (ref 70–99)
GLUCOSE BLD-MCNC: 166 MG/DL (ref 70–99)
GLUCOSE BLD-MCNC: 236 MG/DL (ref 70–99)
GLUCOSE BLD-MCNC: 248 MG/DL (ref 70–99)
GLUCOSE SERPL-MCNC: 134 MG/DL (ref 70–99)
GLUCOSE SERPL-MCNC: 214 MG/DL (ref 70–99)
HCT VFR BLD AUTO: 22.7 % (ref 37–47)
HGB BLD-MCNC: 7.7 G/DL (ref 12–16)
MAGNESIUM SERPL-MCNC: 2.2 MG/DL (ref 1.7–2.4)
MAGNESIUM SERPL-MCNC: 2.2 MG/DL (ref 1.7–2.4)
MCH RBC QN AUTO: 27.3 PG (ref 27–31.3)
MCHC RBC AUTO-ENTMCNC: 34 % (ref 33–37)
MCV RBC AUTO: 80.2 FL (ref 79.4–94.8)
PERFORMED ON: ABNORMAL
PLATELET # BLD AUTO: 258 K/UL (ref 130–400)
POTASSIUM SERPL-SCNC: 3.1 MEQ/L (ref 3.4–4.9)
POTASSIUM SERPL-SCNC: 3.8 MEQ/L (ref 3.4–4.9)
RBC # BLD AUTO: 2.84 M/UL (ref 4.2–5.4)
SODIUM SERPL-SCNC: 136 MEQ/L (ref 135–144)
SODIUM SERPL-SCNC: 137 MEQ/L (ref 135–144)
WBC # BLD AUTO: 5.1 K/UL (ref 4.8–10.8)

## 2023-07-29 PROCEDURE — 97535 SELF CARE MNGMENT TRAINING: CPT

## 2023-07-29 PROCEDURE — 6370000000 HC RX 637 (ALT 250 FOR IP): Performed by: INTERNAL MEDICINE

## 2023-07-29 PROCEDURE — 36415 COLL VENOUS BLD VENIPUNCTURE: CPT

## 2023-07-29 PROCEDURE — 99233 SBSQ HOSP IP/OBS HIGH 50: CPT | Performed by: INTERNAL MEDICINE

## 2023-07-29 PROCEDURE — 80048 BASIC METABOLIC PNL TOTAL CA: CPT

## 2023-07-29 PROCEDURE — 6360000002 HC RX W HCPCS: Performed by: INTERNAL MEDICINE

## 2023-07-29 PROCEDURE — 2700000000 HC OXYGEN THERAPY PER DAY

## 2023-07-29 PROCEDURE — 83735 ASSAY OF MAGNESIUM: CPT

## 2023-07-29 PROCEDURE — 1210000000 HC MED SURG R&B

## 2023-07-29 PROCEDURE — 2580000003 HC RX 258: Performed by: INTERNAL MEDICINE

## 2023-07-29 PROCEDURE — 85027 COMPLETE CBC AUTOMATED: CPT

## 2023-07-29 PROCEDURE — 97116 GAIT TRAINING THERAPY: CPT

## 2023-07-29 PROCEDURE — 74018 RADEX ABDOMEN 1 VIEW: CPT

## 2023-07-29 RX ORDER — CARVEDILOL 12.5 MG/1
12.5 TABLET ORAL 2 TIMES DAILY
Status: DISCONTINUED | OUTPATIENT
Start: 2023-07-29 | End: 2023-08-03

## 2023-07-29 RX ORDER — POLYETHYLENE GLYCOL 3350 17 G/17G
17 POWDER, FOR SOLUTION ORAL ONCE
Status: COMPLETED | OUTPATIENT
Start: 2023-07-29 | End: 2023-07-29

## 2023-07-29 RX ADMIN — Medication 10 ML: at 20:55

## 2023-07-29 RX ADMIN — MYCOPHENOLATE MOFETIL 250 MG: 250 CAPSULE ORAL at 10:18

## 2023-07-29 RX ADMIN — Medication 10 ML: at 10:19

## 2023-07-29 RX ADMIN — CARVEDILOL 12.5 MG: 12.5 TABLET, FILM COATED ORAL at 20:55

## 2023-07-29 RX ADMIN — ASPIRIN 81 MG 81 MG: 81 TABLET ORAL at 10:17

## 2023-07-29 RX ADMIN — EPOETIN ALFA-EPBX 10000 UNITS: 10000 INJECTION, SOLUTION INTRAVENOUS; SUBCUTANEOUS at 13:56

## 2023-07-29 RX ADMIN — AMLODIPINE BESYLATE 10 MG: 10 TABLET ORAL at 10:17

## 2023-07-29 RX ADMIN — ATORVASTATIN CALCIUM 20 MG: 20 TABLET, FILM COATED ORAL at 10:17

## 2023-07-29 RX ADMIN — CLONIDINE HYDROCHLORIDE 0.2 MG: 0.1 TABLET ORAL at 20:55

## 2023-07-29 RX ADMIN — HYDRALAZINE HYDROCHLORIDE 100 MG: 100 TABLET, FILM COATED ORAL at 20:55

## 2023-07-29 RX ADMIN — INSULIN LISPRO 2 UNITS: 100 INJECTION, SOLUTION INTRAVENOUS; SUBCUTANEOUS at 11:36

## 2023-07-29 RX ADMIN — POLYETHYLENE GLYCOL 3350 17 G: 17 POWDER, FOR SOLUTION ORAL at 13:49

## 2023-07-29 RX ADMIN — POTASSIUM CHLORIDE 40 MEQ: 1500 TABLET, EXTENDED RELEASE ORAL at 10:18

## 2023-07-29 RX ADMIN — MYCOPHENOLATE MOFETIL 250 MG: 250 CAPSULE ORAL at 20:58

## 2023-07-29 RX ADMIN — Medication 400 MG: at 10:18

## 2023-07-29 RX ADMIN — ISOSORBIDE MONONITRATE 60 MG: 60 TABLET, EXTENDED RELEASE ORAL at 10:18

## 2023-07-29 RX ADMIN — HYDRALAZINE HYDROCHLORIDE 100 MG: 100 TABLET, FILM COATED ORAL at 10:17

## 2023-07-29 RX ADMIN — PANTOPRAZOLE SODIUM 40 MG: 40 TABLET, DELAYED RELEASE ORAL at 10:18

## 2023-07-29 RX ADMIN — CLONIDINE HYDROCHLORIDE 0.2 MG: 0.1 TABLET ORAL at 10:17

## 2023-07-29 RX ADMIN — CARVEDILOL 12.5 MG: 12.5 TABLET, FILM COATED ORAL at 13:57

## 2023-07-29 ASSESSMENT — ENCOUNTER SYMPTOMS
BLOOD IN STOOL: 0
STRIDOR: 0
EYES NEGATIVE: 1
COUGH: 0
SHORTNESS OF BREATH: 1
CHEST TIGHTNESS: 0
GASTROINTESTINAL NEGATIVE: 1
WHEEZING: 0
NAUSEA: 0

## 2023-07-29 ASSESSMENT — PAIN DESCRIPTION - DESCRIPTORS: DESCRIPTORS: CRAMPING

## 2023-07-29 ASSESSMENT — PAIN DESCRIPTION - LOCATION: LOCATION: ABDOMEN

## 2023-07-29 ASSESSMENT — PAIN SCALES - GENERAL
PAINLEVEL_OUTOF10: 8
PAINLEVEL_OUTOF10: 2

## 2023-07-29 NOTE — FLOWSHEET NOTE
1020- AM assessment completed. Patient resting in bed at this time. Denies any chest pain at this time. Remains NSR/ST on the monitor. +2 pitting edema noted to bilateral lower extremities. +1 pitting edema noted to bilateral upper extremities. Pedal pulses palpable. Shortness of breath noted with exertion. Lungs are diminished bilaterally. SATS 100% on 3L NC. She is A/Ox3 and can be up int he room with a 1 person assist/walker. Denies any pain with elimination. States her last BM was 7/27/23. Voids brownish colored urine. Skin remains warm, dry and intact but scattered bruising noted to bilateral upper extremities. #20g SL to left forearm, site leaking and removed with catheter intact and dressing applied. Will restart a new site once patient returns from xray. Vital signs stable and AM medications provided. Patient to xray for KUB that was ordered secondary to complaints of abdominal pain/cramping. Family present in the room. 1120- Patient returned from xray. New #22g SL placed to right lateral wrist with 1 attempt. Patient tolerated well with no complaints of any discomfort. Remains resting in bed at this time with family at bedside. Call light remains in reach. 1400- Medicated with 1 packet PO glycolax per order. Family at bedside and updated on plan of care. Call light remains in reach. 1730- Patient resting in bed at this time with eyes closed and no signs of any acute distress. Call light remains in reach.      Electronically signed by Kelli Ribeiro RN on 7/29/2023 at 7:23 PM

## 2023-07-30 LAB
ANION GAP SERPL CALCULATED.3IONS-SCNC: 14 MEQ/L (ref 9–15)
ANISOCYTOSIS BLD QL SMEAR: ABNORMAL
BASOPHILS # BLD: 0.1 K/UL (ref 0–0.2)
BASOPHILS NFR BLD: 1 %
BUN SERPL-MCNC: 63 MG/DL (ref 8–23)
CALCIUM SERPL-MCNC: 8.2 MG/DL (ref 8.5–9.9)
CHLORIDE SERPL-SCNC: 97 MEQ/L (ref 95–107)
CO2 SERPL-SCNC: 29 MEQ/L (ref 20–31)
CREAT SERPL-MCNC: 4.76 MG/DL (ref 0.5–0.9)
EOSINOPHIL # BLD: 0.2 K/UL (ref 0–0.7)
EOSINOPHIL NFR BLD: 4 %
ERYTHROCYTE [DISTWIDTH] IN BLOOD BY AUTOMATED COUNT: 16 % (ref 11.5–14.5)
GLUCOSE BLD-MCNC: 126 MG/DL (ref 70–99)
GLUCOSE BLD-MCNC: 210 MG/DL (ref 70–99)
GLUCOSE BLD-MCNC: 213 MG/DL (ref 70–99)
GLUCOSE BLD-MCNC: 266 MG/DL (ref 70–99)
GLUCOSE SERPL-MCNC: 203 MG/DL (ref 70–99)
HCT VFR BLD AUTO: 23.8 % (ref 37–47)
HGB BLD-MCNC: 8 G/DL (ref 12–16)
HYPOCHROMIA BLD QL SMEAR: ABNORMAL
LYMPHOCYTES # BLD: 0.3 K/UL (ref 1–4.8)
LYMPHOCYTES NFR BLD: 5 %
MAGNESIUM SERPL-MCNC: 2.1 MG/DL (ref 1.7–2.4)
MCH RBC QN AUTO: 26.8 PG (ref 27–31.3)
MCHC RBC AUTO-ENTMCNC: 33.7 % (ref 33–37)
MCV RBC AUTO: 79.6 FL (ref 79.4–94.8)
MICROCYTES BLD QL SMEAR: ABNORMAL
MONOCYTES # BLD: 0.3 K/UL (ref 0.2–0.8)
MONOCYTES NFR BLD: 6 %
NEUTROPHILS # BLD: 4.6 K/UL (ref 1.4–6.5)
NEUTS BAND NFR BLD MANUAL: 1 % (ref 5–11)
NEUTS SEG NFR BLD: 83 %
PERFORMED ON: ABNORMAL
PLATELET # BLD AUTO: 236 K/UL (ref 130–400)
PLATELET BLD QL SMEAR: ADEQUATE
POTASSIUM SERPL-SCNC: 3.6 MEQ/L (ref 3.4–4.9)
RBC # BLD AUTO: 2.98 M/UL (ref 4.2–5.4)
SODIUM SERPL-SCNC: 140 MEQ/L (ref 135–144)
WBC # BLD AUTO: 5.5 K/UL (ref 4.8–10.8)

## 2023-07-30 PROCEDURE — 6360000002 HC RX W HCPCS: Performed by: INTERNAL MEDICINE

## 2023-07-30 PROCEDURE — 2700000000 HC OXYGEN THERAPY PER DAY

## 2023-07-30 PROCEDURE — 99233 SBSQ HOSP IP/OBS HIGH 50: CPT | Performed by: INTERNAL MEDICINE

## 2023-07-30 PROCEDURE — 6370000000 HC RX 637 (ALT 250 FOR IP): Performed by: INTERNAL MEDICINE

## 2023-07-30 PROCEDURE — 1210000000 HC MED SURG R&B

## 2023-07-30 PROCEDURE — 36415 COLL VENOUS BLD VENIPUNCTURE: CPT

## 2023-07-30 PROCEDURE — 6360000002 HC RX W HCPCS: Performed by: STUDENT IN AN ORGANIZED HEALTH CARE EDUCATION/TRAINING PROGRAM

## 2023-07-30 PROCEDURE — 85025 COMPLETE CBC W/AUTO DIFF WBC: CPT

## 2023-07-30 PROCEDURE — 2580000003 HC RX 258: Performed by: INTERNAL MEDICINE

## 2023-07-30 PROCEDURE — 83735 ASSAY OF MAGNESIUM: CPT

## 2023-07-30 PROCEDURE — 80048 BASIC METABOLIC PNL TOTAL CA: CPT

## 2023-07-30 RX ORDER — LIDOCAINE 4 G/G
2 PATCH TOPICAL DAILY
Status: DISCONTINUED | OUTPATIENT
Start: 2023-07-30 | End: 2023-08-08 | Stop reason: HOSPADM

## 2023-07-30 RX ORDER — FUROSEMIDE 10 MG/ML
80 INJECTION INTRAMUSCULAR; INTRAVENOUS ONCE
Status: COMPLETED | OUTPATIENT
Start: 2023-07-30 | End: 2023-07-30

## 2023-07-30 RX ADMIN — HYDRALAZINE HYDROCHLORIDE 100 MG: 100 TABLET, FILM COATED ORAL at 09:00

## 2023-07-30 RX ADMIN — HYDRALAZINE HYDROCHLORIDE 100 MG: 100 TABLET, FILM COATED ORAL at 21:00

## 2023-07-30 RX ADMIN — MYCOPHENOLATE MOFETIL 250 MG: 250 CAPSULE ORAL at 09:00

## 2023-07-30 RX ADMIN — INSULIN LISPRO 2 UNITS: 100 INJECTION, SOLUTION INTRAVENOUS; SUBCUTANEOUS at 12:03

## 2023-07-30 RX ADMIN — CARVEDILOL 12.5 MG: 12.5 TABLET, FILM COATED ORAL at 09:01

## 2023-07-30 RX ADMIN — PREDNISONE 5 MG: 5 TABLET ORAL at 09:01

## 2023-07-30 RX ADMIN — ASPIRIN 81 MG 81 MG: 81 TABLET ORAL at 09:00

## 2023-07-30 RX ADMIN — INSULIN LISPRO 2 UNITS: 100 INJECTION, SOLUTION INTRAVENOUS; SUBCUTANEOUS at 16:49

## 2023-07-30 RX ADMIN — CLONIDINE HYDROCHLORIDE 0.2 MG: 0.1 TABLET ORAL at 09:01

## 2023-07-30 RX ADMIN — Medication 400 MG: at 09:01

## 2023-07-30 RX ADMIN — Medication 10 ML: at 21:03

## 2023-07-30 RX ADMIN — CLONIDINE HYDROCHLORIDE 0.2 MG: 0.1 TABLET ORAL at 21:03

## 2023-07-30 RX ADMIN — MYCOPHENOLATE MOFETIL 250 MG: 250 CAPSULE ORAL at 21:03

## 2023-07-30 RX ADMIN — PANTOPRAZOLE SODIUM 40 MG: 40 TABLET, DELAYED RELEASE ORAL at 09:00

## 2023-07-30 RX ADMIN — CARVEDILOL 12.5 MG: 12.5 TABLET, FILM COATED ORAL at 21:03

## 2023-07-30 RX ADMIN — ATORVASTATIN CALCIUM 20 MG: 20 TABLET, FILM COATED ORAL at 09:00

## 2023-07-30 RX ADMIN — Medication 10 ML: at 09:01

## 2023-07-30 RX ADMIN — FUROSEMIDE 80 MG: 10 INJECTION, SOLUTION INTRAMUSCULAR; INTRAVENOUS at 13:49

## 2023-07-30 RX ADMIN — ISOSORBIDE MONONITRATE 60 MG: 60 TABLET, EXTENDED RELEASE ORAL at 09:01

## 2023-07-30 ASSESSMENT — ENCOUNTER SYMPTOMS
BLOOD IN STOOL: 0
STRIDOR: 0
WHEEZING: 0
EYES NEGATIVE: 1
COUGH: 0
GASTROINTESTINAL NEGATIVE: 1
SHORTNESS OF BREATH: 1
CHEST TIGHTNESS: 0
NAUSEA: 0

## 2023-07-30 NOTE — FLOWSHEET NOTE
0900- AM assessment completed. Patient resting in bed at this time. Denies any chest pain at this time. Remains NSR/ST on the monitor. +2 pitting edema noted to bilateral lower extremities. +1 pitting edema noted to left upper extremity, +1 non-pitting edema noted to right upper extremity. Pedal pulses palpable. Shortness of breath noted with exertion. Lungs are diminished bilaterally. SATS 100% on 3L NC. She is A/Ox3 and can be up in the room with a 1 person assist/walker. Denies any pain with elimination. States her last BM was last night. Voids brownish colored urine. Skin remains warm, dry and intact but scattered bruising noted to bilateral upper extremities. #22g SL to right wrist, flushed and patent. Vital signs stable and AM medications provided. 1200- Patient resting in bed at this time. Has no complaints or concerns. Call light remains in reach. 1630- Patient resting in bed at this time. Has no complaints or concerns at this time. Family visiting at bedside. Call light remains in reach. 1800- Patient sitting on the side of the bed at this time visiting with family that is present in the room. Call light remains in reach.     Electronically signed by Kelli Ribeiro RN on 7/30/2023 at 6:30 PM

## 2023-07-31 LAB
ANION GAP SERPL CALCULATED.3IONS-SCNC: 12 MEQ/L (ref 9–15)
BASOPHILS # BLD: 0 K/UL (ref 0–0.2)
BASOPHILS NFR BLD: 0.8 %
BUN SERPL-MCNC: 59 MG/DL (ref 8–23)
CALCIUM SERPL-MCNC: 8 MG/DL (ref 8.5–9.9)
CHLORIDE SERPL-SCNC: 92 MEQ/L (ref 95–107)
CO2 SERPL-SCNC: 29 MEQ/L (ref 20–31)
CREAT SERPL-MCNC: 4.79 MG/DL (ref 0.5–0.9)
EOSINOPHIL # BLD: 0.1 K/UL (ref 0–0.7)
EOSINOPHIL NFR BLD: 2.8 %
ERYTHROCYTE [DISTWIDTH] IN BLOOD BY AUTOMATED COUNT: 15.7 % (ref 11.5–14.5)
GLUCOSE BLD-MCNC: 145 MG/DL (ref 70–99)
GLUCOSE BLD-MCNC: 170 MG/DL (ref 70–99)
GLUCOSE BLD-MCNC: 213 MG/DL (ref 70–99)
GLUCOSE BLD-MCNC: 248 MG/DL (ref 70–99)
GLUCOSE SERPL-MCNC: 118 MG/DL (ref 70–99)
HCT VFR BLD AUTO: 22 % (ref 37–47)
HGB BLD-MCNC: 7.4 G/DL (ref 12–16)
LYMPHOCYTES # BLD: 0.4 K/UL (ref 1–4.8)
LYMPHOCYTES NFR BLD: 8.8 %
MCH RBC QN AUTO: 27.1 PG (ref 27–31.3)
MCHC RBC AUTO-ENTMCNC: 33.6 % (ref 33–37)
MCV RBC AUTO: 80.5 FL (ref 79.4–94.8)
MONOCYTES # BLD: 0.3 K/UL (ref 0.2–0.8)
MONOCYTES NFR BLD: 6.8 %
NEUTROPHILS # BLD: 3.8 K/UL (ref 1.4–6.5)
NEUTS SEG NFR BLD: 80.8 %
PERFORMED ON: ABNORMAL
PLATELET # BLD AUTO: 185 K/UL (ref 130–400)
POTASSIUM SERPL-SCNC: 3.6 MEQ/L (ref 3.4–4.9)
RBC # BLD AUTO: 2.73 M/UL (ref 4.2–5.4)
SODIUM SERPL-SCNC: 133 MEQ/L (ref 135–144)
WBC # BLD AUTO: 4.7 K/UL (ref 4.8–10.8)

## 2023-07-31 PROCEDURE — 97116 GAIT TRAINING THERAPY: CPT

## 2023-07-31 PROCEDURE — 1210000000 HC MED SURG R&B

## 2023-07-31 PROCEDURE — 36415 COLL VENOUS BLD VENIPUNCTURE: CPT

## 2023-07-31 PROCEDURE — 80048 BASIC METABOLIC PNL TOTAL CA: CPT

## 2023-07-31 PROCEDURE — 6370000000 HC RX 637 (ALT 250 FOR IP): Performed by: INTERNAL MEDICINE

## 2023-07-31 PROCEDURE — 6360000002 HC RX W HCPCS: Performed by: INTERNAL MEDICINE

## 2023-07-31 PROCEDURE — 99233 SBSQ HOSP IP/OBS HIGH 50: CPT | Performed by: INTERNAL MEDICINE

## 2023-07-31 PROCEDURE — 2700000000 HC OXYGEN THERAPY PER DAY

## 2023-07-31 PROCEDURE — 2580000003 HC RX 258: Performed by: INTERNAL MEDICINE

## 2023-07-31 PROCEDURE — 85025 COMPLETE CBC W/AUTO DIFF WBC: CPT

## 2023-07-31 RX ORDER — TORSEMIDE 100 MG/1
100 TABLET ORAL DAILY
Status: DISCONTINUED | OUTPATIENT
Start: 2023-07-31 | End: 2023-08-08 | Stop reason: HOSPADM

## 2023-07-31 RX ADMIN — Medication 400 MG: at 08:55

## 2023-07-31 RX ADMIN — CARVEDILOL 12.5 MG: 12.5 TABLET, FILM COATED ORAL at 08:56

## 2023-07-31 RX ADMIN — CARVEDILOL 12.5 MG: 12.5 TABLET, FILM COATED ORAL at 20:52

## 2023-07-31 RX ADMIN — Medication 10 ML: at 20:53

## 2023-07-31 RX ADMIN — CLONIDINE HYDROCHLORIDE 0.2 MG: 0.1 TABLET ORAL at 08:55

## 2023-07-31 RX ADMIN — ISOSORBIDE MONONITRATE 60 MG: 60 TABLET, EXTENDED RELEASE ORAL at 08:55

## 2023-07-31 RX ADMIN — HYDRALAZINE HYDROCHLORIDE 100 MG: 100 TABLET, FILM COATED ORAL at 08:56

## 2023-07-31 RX ADMIN — MYCOPHENOLATE MOFETIL 250 MG: 250 CAPSULE ORAL at 08:55

## 2023-07-31 RX ADMIN — GUAIFENESIN SYRUP AND DEXTROMETHORPHAN 5 ML: 100; 10 SYRUP ORAL at 21:02

## 2023-07-31 RX ADMIN — TORSEMIDE 100 MG: 100 TABLET ORAL at 12:20

## 2023-07-31 RX ADMIN — Medication 10 ML: at 08:54

## 2023-07-31 RX ADMIN — PANTOPRAZOLE SODIUM 40 MG: 40 TABLET, DELAYED RELEASE ORAL at 08:55

## 2023-07-31 RX ADMIN — GUAIFENESIN SYRUP AND DEXTROMETHORPHAN 5 ML: 100; 10 SYRUP ORAL at 08:47

## 2023-07-31 RX ADMIN — MYCOPHENOLATE MOFETIL 250 MG: 250 CAPSULE ORAL at 20:52

## 2023-07-31 RX ADMIN — HYDRALAZINE HYDROCHLORIDE 100 MG: 100 TABLET, FILM COATED ORAL at 20:52

## 2023-07-31 RX ADMIN — ASPIRIN 81 MG 81 MG: 81 TABLET ORAL at 08:55

## 2023-07-31 RX ADMIN — ATORVASTATIN CALCIUM 20 MG: 20 TABLET, FILM COATED ORAL at 09:06

## 2023-07-31 RX ADMIN — CLONIDINE HYDROCHLORIDE 0.2 MG: 0.1 TABLET ORAL at 20:51

## 2023-07-31 ASSESSMENT — ENCOUNTER SYMPTOMS
COUGH: 0
BLOOD IN STOOL: 0
SHORTNESS OF BREATH: 1
CHEST TIGHTNESS: 0
WHEEZING: 0
EYES NEGATIVE: 1
GASTROINTESTINAL NEGATIVE: 1
NAUSEA: 0
STRIDOR: 0

## 2023-08-01 ENCOUNTER — APPOINTMENT (OUTPATIENT)
Dept: INTERVENTIONAL RADIOLOGY/VASCULAR | Age: 78
DRG: 286 | End: 2023-08-01
Payer: MEDICARE

## 2023-08-01 LAB
ALBUMIN SERPL-MCNC: 2.7 G/DL (ref 3.5–4.6)
ANION GAP SERPL CALCULATED.3IONS-SCNC: 13 MEQ/L (ref 9–15)
BASOPHILS # BLD: 0 K/UL (ref 0–0.2)
BASOPHILS NFR BLD: 0.8 %
BUN SERPL-MCNC: 62 MG/DL (ref 8–23)
CALCIUM SERPL-MCNC: 8.1 MG/DL (ref 8.5–9.9)
CHLORIDE SERPL-SCNC: 94 MEQ/L (ref 95–107)
CO2 SERPL-SCNC: 29 MEQ/L (ref 20–31)
CREAT SERPL-MCNC: 4.94 MG/DL (ref 0.5–0.9)
EOSINOPHIL # BLD: 0.2 K/UL (ref 0–0.7)
EOSINOPHIL NFR BLD: 3.9 %
ERYTHROCYTE [DISTWIDTH] IN BLOOD BY AUTOMATED COUNT: 15.8 % (ref 11.5–14.5)
GLUCOSE BLD-MCNC: 194 MG/DL (ref 70–99)
GLUCOSE BLD-MCNC: 205 MG/DL (ref 70–99)
GLUCOSE SERPL-MCNC: 132 MG/DL (ref 70–99)
HBV SURFACE AG SERPL QL IA: NORMAL
HCT VFR BLD AUTO: 23 % (ref 37–47)
HGB BLD-MCNC: 7.8 G/DL (ref 12–16)
LYMPHOCYTES # BLD: 0.4 K/UL (ref 1–4.8)
LYMPHOCYTES NFR BLD: 5.9 %
MCH RBC QN AUTO: 26.6 PG (ref 27–31.3)
MCHC RBC AUTO-ENTMCNC: 33.7 % (ref 33–37)
MCV RBC AUTO: 78.7 FL (ref 79.4–94.8)
MONOCYTES # BLD: 0.4 K/UL (ref 0.2–0.8)
MONOCYTES NFR BLD: 6.2 %
NEUTROPHILS # BLD: 5.1 K/UL (ref 1.4–6.5)
NEUTS SEG NFR BLD: 83.2 %
PERFORMED ON: ABNORMAL
PERFORMED ON: ABNORMAL
PHOSPHATE SERPL-MCNC: 4.7 MG/DL (ref 2.3–4.8)
PLATELET # BLD AUTO: 267 K/UL (ref 130–400)
POTASSIUM SERPL-SCNC: 3.5 MEQ/L (ref 3.4–4.9)
RBC # BLD AUTO: 2.92 M/UL (ref 4.2–5.4)
SODIUM SERPL-SCNC: 136 MEQ/L (ref 135–144)
WBC # BLD AUTO: 6.2 K/UL (ref 4.8–10.8)

## 2023-08-01 PROCEDURE — 6370000000 HC RX 637 (ALT 250 FOR IP): Performed by: INTERNAL MEDICINE

## 2023-08-01 PROCEDURE — 80069 RENAL FUNCTION PANEL: CPT

## 2023-08-01 PROCEDURE — 87340 HEPATITIS B SURFACE AG IA: CPT

## 2023-08-01 PROCEDURE — C1769 GUIDE WIRE: HCPCS

## 2023-08-01 PROCEDURE — 2500000003 HC RX 250 WO HCPCS: Performed by: RADIOLOGY

## 2023-08-01 PROCEDURE — 6360000002 HC RX W HCPCS: Performed by: RADIOLOGY

## 2023-08-01 PROCEDURE — 2700000000 HC OXYGEN THERAPY PER DAY

## 2023-08-01 PROCEDURE — 36415 COLL VENOUS BLD VENIPUNCTURE: CPT

## 2023-08-01 PROCEDURE — 99233 SBSQ HOSP IP/OBS HIGH 50: CPT | Performed by: INTERNAL MEDICINE

## 2023-08-01 PROCEDURE — 36556 INSERT NON-TUNNEL CV CATH: CPT

## 2023-08-01 PROCEDURE — 05HM33Z INSERTION OF INFUSION DEVICE INTO RIGHT INTERNAL JUGULAR VEIN, PERCUTANEOUS APPROACH: ICD-10-PCS | Performed by: STUDENT IN AN ORGANIZED HEALTH CARE EDUCATION/TRAINING PROGRAM

## 2023-08-01 PROCEDURE — 99222 1ST HOSP IP/OBS MODERATE 55: CPT | Performed by: NURSE PRACTITIONER

## 2023-08-01 PROCEDURE — 6360000002 HC RX W HCPCS: Performed by: INTERNAL MEDICINE

## 2023-08-01 PROCEDURE — 77001 FLUOROGUIDE FOR VEIN DEVICE: CPT

## 2023-08-01 PROCEDURE — 85025 COMPLETE CBC W/AUTO DIFF WBC: CPT

## 2023-08-01 PROCEDURE — 2580000003 HC RX 258: Performed by: RADIOLOGY

## 2023-08-01 PROCEDURE — 2580000003 HC RX 258: Performed by: INTERNAL MEDICINE

## 2023-08-01 PROCEDURE — 76937 US GUIDE VASCULAR ACCESS: CPT

## 2023-08-01 PROCEDURE — 5A1D70Z PERFORMANCE OF URINARY FILTRATION, INTERMITTENT, LESS THAN 6 HOURS PER DAY: ICD-10-PCS | Performed by: STUDENT IN AN ORGANIZED HEALTH CARE EDUCATION/TRAINING PROGRAM

## 2023-08-01 PROCEDURE — 1210000000 HC MED SURG R&B

## 2023-08-01 PROCEDURE — A4217 STERILE WATER/SALINE, 500 ML: HCPCS | Performed by: RADIOLOGY

## 2023-08-01 RX ORDER — MAGNESIUM HYDROXIDE 1200 MG/15ML
LIQUID ORAL CONTINUOUS PRN
Status: COMPLETED | OUTPATIENT
Start: 2023-08-01 | End: 2023-08-01

## 2023-08-01 RX ORDER — LIDOCAINE HYDROCHLORIDE 20 MG/ML
INJECTION, SOLUTION INFILTRATION; PERINEURAL PRN
Status: COMPLETED | OUTPATIENT
Start: 2023-08-01 | End: 2023-08-01

## 2023-08-01 RX ORDER — HEPARIN SODIUM 1000 [USP'U]/ML
INJECTION, SOLUTION INTRAVENOUS; SUBCUTANEOUS PRN
Status: COMPLETED | OUTPATIENT
Start: 2023-08-01 | End: 2023-08-01

## 2023-08-01 RX ADMIN — MYCOPHENOLATE MOFETIL 250 MG: 250 CAPSULE ORAL at 08:41

## 2023-08-01 RX ADMIN — ATORVASTATIN CALCIUM 20 MG: 20 TABLET, FILM COATED ORAL at 08:42

## 2023-08-01 RX ADMIN — ISOSORBIDE MONONITRATE 60 MG: 60 TABLET, EXTENDED RELEASE ORAL at 08:42

## 2023-08-01 RX ADMIN — MYCOPHENOLATE MOFETIL 250 MG: 250 CAPSULE ORAL at 22:00

## 2023-08-01 RX ADMIN — ASPIRIN 81 MG 81 MG: 81 TABLET ORAL at 08:41

## 2023-08-01 RX ADMIN — Medication 10 ML: at 08:51

## 2023-08-01 RX ADMIN — CARVEDILOL 12.5 MG: 12.5 TABLET, FILM COATED ORAL at 08:42

## 2023-08-01 RX ADMIN — SODIUM CHLORIDE 500 ML: 900 IRRIGANT IRRIGATION at 13:53

## 2023-08-01 RX ADMIN — Medication 400 MG: at 08:42

## 2023-08-01 RX ADMIN — HEPARIN SODIUM 2600 UNITS: 1000 INJECTION INTRAVENOUS; SUBCUTANEOUS at 13:59

## 2023-08-01 RX ADMIN — Medication 10 ML: at 21:56

## 2023-08-01 RX ADMIN — CARVEDILOL 12.5 MG: 12.5 TABLET, FILM COATED ORAL at 21:51

## 2023-08-01 RX ADMIN — TORSEMIDE 100 MG: 100 TABLET ORAL at 08:41

## 2023-08-01 RX ADMIN — HYDRALAZINE HYDROCHLORIDE 100 MG: 100 TABLET, FILM COATED ORAL at 09:59

## 2023-08-01 RX ADMIN — CLONIDINE HYDROCHLORIDE 0.2 MG: 0.1 TABLET ORAL at 08:42

## 2023-08-01 RX ADMIN — INSULIN LISPRO 2 UNITS: 100 INJECTION, SOLUTION INTRAVENOUS; SUBCUTANEOUS at 11:47

## 2023-08-01 RX ADMIN — LIDOCAINE HYDROCHLORIDE 7 ML: 20 INJECTION, SOLUTION INFILTRATION; PERINEURAL at 13:56

## 2023-08-01 RX ADMIN — POLYETHYLENE GLYCOL 3350 17 G: 17 POWDER, FOR SOLUTION ORAL at 08:42

## 2023-08-01 RX ADMIN — ONDANSETRON 4 MG: 4 TABLET, ORALLY DISINTEGRATING ORAL at 03:31

## 2023-08-01 RX ADMIN — CLONIDINE HYDROCHLORIDE 0.2 MG: 0.1 TABLET ORAL at 21:51

## 2023-08-01 RX ADMIN — HYDRALAZINE HYDROCHLORIDE 100 MG: 100 TABLET, FILM COATED ORAL at 21:51

## 2023-08-01 RX ADMIN — PREDNISONE 5 MG: 5 TABLET ORAL at 08:42

## 2023-08-01 RX ADMIN — PANTOPRAZOLE SODIUM 40 MG: 40 TABLET, DELAYED RELEASE ORAL at 08:42

## 2023-08-01 ASSESSMENT — ENCOUNTER SYMPTOMS
STRIDOR: 0
VOMITING: 0
RESPIRATORY NEGATIVE: 1
ALLERGIC/IMMUNOLOGIC NEGATIVE: 1
WHEEZING: 0
GASTROINTESTINAL NEGATIVE: 1
COUGH: 0
NAUSEA: 0
CHEST TIGHTNESS: 0
BLOOD IN STOOL: 0
SHORTNESS OF BREATH: 0
CONSTIPATION: 1
SHORTNESS OF BREATH: 1
EYES NEGATIVE: 1

## 2023-08-01 NOTE — PROCEDURES
[facility] Cardiology Diagnostic Left Heart/Coronary Cath Report  Guille Perry Sa  76 y.o. [id number]    PROCEDURE  1) Left heart catheterization/hemodynamic measurements of the left ventricle , 2) Coronary angiography , and 3) Left ventriculography     CONCLUSIONS    LM normal   LAD diffuse disease. 50% prox. 50% mid   CX mild disease  RCA mild disease    INDICATIONS    chest pain       RESULTS    LM normal   LAD diffuse disease. 50% prox. 50% mid   CX mild disease  RCA mild disease          PROCEDURE DETAILS      Under my supervision, Versed and fentanyl administered intravenously for moderate sedation. Pulse oximetry, heart rate, and BP are continuously monitored by an independent trained observer present. Sedation start time 14: 26-14: 40. Following full and informed consent, the patient was brought to the cath lab where sterile prep and drape were administered in the usual fashion. Anesthesia was obtained in the right wrist with 1% lidocaine and a 5/6 Fr sheath was placed without complication. The sheath flushed well and saline, nitroglycerin, and nicardipine was given through the sheath. IV Heparin 3000 units was given intravenously. A 5 Fr Diagnostic JL3.5 catheter was advanced and selectively engaged in the left main coronary artery where multiple injections were performed in different projections for cineangiography. A 5 Fr. Diagnostic AR2 catheter was advanced and selectively engaged in the right coronary artery where multiple injections were performed in different projections for cineangiography. A pigtail catheter was placed in the left ventricle and 20 cc contrast bolus was given for left ventriculography and hemodynamic measurements were made. The sheath was left in place and flushed while images were reviewed for decision making. Following review of the images, the decision was made to medically manage. The sheath was removed and TR band placed.          The

## 2023-08-01 NOTE — FLOWSHEET NOTE
0845- AM assessment completed. Patient resting in bed at this time. Denies any chest pain at this time. Remains NSR/ST on the monitor. +2 pitting edema noted to bilateral lower extremities. +1 pitting edema noted to left upper extremity. Pedal pulses palpable. Shortness of breath noted with exertion. Lungs are diminished with basilar crackles noted bilaterally. SATS 99% on 2L NC. She is A/Ox3 and can be up in the room with a 1 person assist/walker. Denies any pain with elimination. States her last BM was 7/30/23. Voids brownish colored urine. Skin remains warm, dry and intact but scattered bruising noted to bilateral upper extremities. #22g SL to right wrist remains flushed and patent. Vital signs stable and AM medications provided. Also medicated with 1 packet of 17g PO glycolax per her request. Has no complaints or concerns at this time. Aware of the plan for a dialysis catheter to be placed later today with hemodialysis to follow placement. Questions answered. Call light remains in reach. 1130- Patient resting in bed at this time. Has no complaints or concerns. Family at bedside. Call light remains in reach. 1330- Patient to Specials by bed for dialysis catheter placement and then to go to the dialysis unit for a treatment. 1510- Notified per monitor room tech that this patient had a 10 beat run of V-tach. Let him know that the patient is currently in dialysis. 1- Notified by [facility] in dialysis that this patient was asymptomatic with the above 10 beat run of V-tach. Perfect serve message sent to Dr Johnathon Garcia letting him know, no new orders at this time. [phone]- Patient returned from dialysis. Vital signs stable. Family at bedside. Has no complaints or concerns at this time. Right upper chest IJ dialysis catheter present and dressing remains clean, dry and intact. Denies any pain at this time. Call light remains in reach.      Electronically signed by Luis Minaya RN on 8/1/2023 at 7:42

## 2023-08-01 NOTE — PLAN OF CARE
Nutrition Problem #1: Inadequate oral intake  Intervention: Food and/or Nutrient Delivery: Modify Current Diet, Continue Oral Nutrition Supplement (Continue carb control 4 diet, would recommend DAMIAN restriction vs 2 gm sodium due to poor po intake)

## 2023-08-01 NOTE — OR NURSING
NO SEDATION     Patient arrived to Special Procedures via bed and telemetry. Pt A&Ox4, respirations even and unlabored, skin WDL, bilateral lower ext edema. Consent obtained for Non-tunneled Hemodialysis Catheter. Pt assisted to IR table in supine position. Patient on vitals monitor, VSS. Right neck vessel assessed for patency using Ultrasound guidance. Site approved by Dr Cathleen Resendiz, who along with IR staff offered reassurance to patient. Right neck area cleaned generously with Chloroprep by Nehemiah Aguilar. Full body drape applied in sterile fashion by [employer]. Timeout performed. Using U/S, Dr. Cathleen Resendiz administered Lidocaine 2% to right neck area, see eMar.  Using U/S guidance, Dr. Cathleen Resendiz used 5F MP set to gain access into right internal jugular. Using fluoro guidance, InQwire 0.35\"x80cm used to maintain access. Medcomp 11.5F x 15cm Raulerson Duo-Flow IJ double lumen catheter (LOT# [id number],  expires 03/23/27) placed. Placement confirmed by fluoro imaging as well as both ports aspirating blood and flushing easily per Dr Cathleen Resendiz. Red port instilled with 1.4ml of heparin, see eMar.      Blue port instilled with 1.2ml of heparin, see eMar.     Catheter sutured to skin with Prolene 2.0 suture. Catheter insertion site dressed with QuikClot hemostatic pad and large tegaderm by IR Tech. Patient tolerated procedure well, verbal and tactile reassurance given throughout procedure. VSS. Patient assisted back onto bed with minimal assistance and will go to dialysis unit as they're ready for her per [facility] in dialysis unit. Report called to [address] [facility] by Samson Phelan. Chandrakant in monitor room notified pt on way to dialysis as well. Catheter ready for use, order placed. Electronically signed by Dionisio Hoffman RN on 8/1/2023 at 2:11 PM

## 2023-08-02 LAB
ALBUMIN SERPL-MCNC: 2.7 G/DL (ref 3.5–4.6)
ANION GAP SERPL CALCULATED.3IONS-SCNC: 11 MEQ/L (ref 9–15)
BUN SERPL-MCNC: 27 MG/DL (ref 8–23)
CALCIUM SERPL-MCNC: 8.2 MG/DL (ref 8.5–9.9)
CHLORIDE SERPL-SCNC: 99 MEQ/L (ref 95–107)
CO2 SERPL-SCNC: 28 MEQ/L (ref 20–31)
CREAT SERPL-MCNC: 2.83 MG/DL (ref 0.5–0.9)
GLUCOSE BLD-MCNC: 126 MG/DL (ref 70–99)
GLUCOSE BLD-MCNC: 173 MG/DL (ref 70–99)
GLUCOSE BLD-MCNC: 184 MG/DL (ref 70–99)
GLUCOSE BLD-MCNC: 224 MG/DL (ref 70–99)
GLUCOSE SERPL-MCNC: 118 MG/DL (ref 70–99)
PERFORMED ON: ABNORMAL
PHOSPHATE SERPL-MCNC: 3.5 MG/DL (ref 2.3–4.8)
POTASSIUM SERPL-SCNC: 3.5 MEQ/L (ref 3.4–4.9)
REASON FOR REJECTION: NORMAL
REJECTED TEST: NORMAL
SODIUM SERPL-SCNC: 138 MEQ/L (ref 135–144)

## 2023-08-02 PROCEDURE — 2700000000 HC OXYGEN THERAPY PER DAY

## 2023-08-02 PROCEDURE — 1210000000 HC MED SURG R&B

## 2023-08-02 PROCEDURE — 6360000002 HC RX W HCPCS: Performed by: INTERNAL MEDICINE

## 2023-08-02 PROCEDURE — 90935 HEMODIALYSIS ONE EVALUATION: CPT

## 2023-08-02 PROCEDURE — 6370000000 HC RX 637 (ALT 250 FOR IP): Performed by: INTERNAL MEDICINE

## 2023-08-02 PROCEDURE — 36415 COLL VENOUS BLD VENIPUNCTURE: CPT

## 2023-08-02 PROCEDURE — 36592 COLLECT BLOOD FROM PICC: CPT

## 2023-08-02 PROCEDURE — 2580000003 HC RX 258: Performed by: INTERNAL MEDICINE

## 2023-08-02 PROCEDURE — 80069 RENAL FUNCTION PANEL: CPT

## 2023-08-02 PROCEDURE — 99233 SBSQ HOSP IP/OBS HIGH 50: CPT | Performed by: INTERNAL MEDICINE

## 2023-08-02 PROCEDURE — 97116 GAIT TRAINING THERAPY: CPT

## 2023-08-02 RX ADMIN — ATORVASTATIN CALCIUM 20 MG: 20 TABLET, FILM COATED ORAL at 11:23

## 2023-08-02 RX ADMIN — INSULIN LISPRO 2 UNITS: 100 INJECTION, SOLUTION INTRAVENOUS; SUBCUTANEOUS at 16:19

## 2023-08-02 RX ADMIN — Medication 10 ML: at 22:46

## 2023-08-02 RX ADMIN — MYCOPHENOLATE MOFETIL 250 MG: 250 CAPSULE ORAL at 11:23

## 2023-08-02 RX ADMIN — CARVEDILOL 12.5 MG: 12.5 TABLET, FILM COATED ORAL at 21:58

## 2023-08-02 RX ADMIN — Medication 400 MG: at 11:23

## 2023-08-02 RX ADMIN — ASPIRIN 81 MG 81 MG: 81 TABLET ORAL at 11:23

## 2023-08-02 RX ADMIN — PANTOPRAZOLE SODIUM 40 MG: 40 TABLET, DELAYED RELEASE ORAL at 11:23

## 2023-08-02 RX ADMIN — MYCOPHENOLATE MOFETIL 250 MG: 250 CAPSULE ORAL at 22:00

## 2023-08-02 RX ADMIN — CARVEDILOL 12.5 MG: 12.5 TABLET, FILM COATED ORAL at 11:23

## 2023-08-02 RX ADMIN — TORSEMIDE 100 MG: 100 TABLET ORAL at 11:23

## 2023-08-02 RX ADMIN — ISOSORBIDE MONONITRATE 60 MG: 60 TABLET, EXTENDED RELEASE ORAL at 11:23

## 2023-08-02 RX ADMIN — HYDRALAZINE HYDROCHLORIDE 100 MG: 100 TABLET, FILM COATED ORAL at 21:58

## 2023-08-02 RX ADMIN — Medication 10 ML: at 11:25

## 2023-08-02 RX ADMIN — CLONIDINE HYDROCHLORIDE 0.2 MG: 0.1 TABLET ORAL at 11:23

## 2023-08-02 RX ADMIN — CLONIDINE HYDROCHLORIDE 0.2 MG: 0.1 TABLET ORAL at 21:58

## 2023-08-02 RX ADMIN — HYDRALAZINE HYDROCHLORIDE 100 MG: 100 TABLET, FILM COATED ORAL at 11:23

## 2023-08-02 ASSESSMENT — ENCOUNTER SYMPTOMS
GASTROINTESTINAL NEGATIVE: 1
SHORTNESS OF BREATH: 1
COUGH: 0
NAUSEA: 0
BLOOD IN STOOL: 0
STRIDOR: 0
CHEST TIGHTNESS: 0
EYES NEGATIVE: 1
WHEEZING: 0

## 2023-08-02 ASSESSMENT — PAIN SCALES - GENERAL: PAINLEVEL_OUTOF10: 0

## 2023-08-03 LAB
ALBUMIN SERPL-MCNC: 2.9 G/DL (ref 3.5–4.6)
ANION GAP SERPL CALCULATED.3IONS-SCNC: 10 MEQ/L (ref 9–15)
BASOPHILS # BLD: 0 K/UL (ref 0–0.2)
BASOPHILS NFR BLD: 0.7 %
BUN SERPL-MCNC: 31 MG/DL (ref 8–23)
CALCIUM SERPL-MCNC: 8.1 MG/DL (ref 8.5–9.9)
CHLORIDE SERPL-SCNC: 96 MEQ/L (ref 95–107)
CO2 SERPL-SCNC: 29 MEQ/L (ref 20–31)
CREAT SERPL-MCNC: 3.68 MG/DL (ref 0.5–0.9)
EOSINOPHIL # BLD: 0.2 K/UL (ref 0–0.7)
EOSINOPHIL NFR BLD: 3.4 %
ERYTHROCYTE [DISTWIDTH] IN BLOOD BY AUTOMATED COUNT: 16.5 % (ref 11.5–14.5)
GLUCOSE BLD-MCNC: 122 MG/DL (ref 70–99)
GLUCOSE BLD-MCNC: 166 MG/DL (ref 70–99)
GLUCOSE BLD-MCNC: 201 MG/DL (ref 70–99)
GLUCOSE BLD-MCNC: 246 MG/DL (ref 70–99)
GLUCOSE SERPL-MCNC: 117 MG/DL (ref 70–99)
HCT VFR BLD AUTO: 22.6 % (ref 37–47)
HGB BLD-MCNC: 7.5 G/DL (ref 12–16)
LYMPHOCYTES # BLD: 0.6 K/UL (ref 1–4.8)
LYMPHOCYTES NFR BLD: 10.4 %
MCH RBC QN AUTO: 26.4 PG (ref 27–31.3)
MCHC RBC AUTO-ENTMCNC: 33 % (ref 33–37)
MCV RBC AUTO: 80 FL (ref 79.4–94.8)
MONOCYTES # BLD: 0.5 K/UL (ref 0.2–0.8)
MONOCYTES NFR BLD: 8.7 %
NEUTROPHILS # BLD: 4.2 K/UL (ref 1.4–6.5)
NEUTS SEG NFR BLD: 76.8 %
PERFORMED ON: ABNORMAL
PHOSPHATE SERPL-MCNC: 3.6 MG/DL (ref 2.3–4.8)
PLATELET # BLD AUTO: 280 K/UL (ref 130–400)
POTASSIUM SERPL-SCNC: 3.4 MEQ/L (ref 3.4–4.9)
RBC # BLD AUTO: 2.83 M/UL (ref 4.2–5.4)
SODIUM SERPL-SCNC: 135 MEQ/L (ref 135–144)
WBC # BLD AUTO: 5.4 K/UL (ref 4.8–10.8)

## 2023-08-03 PROCEDURE — 6360000002 HC RX W HCPCS: Performed by: INTERNAL MEDICINE

## 2023-08-03 PROCEDURE — 85025 COMPLETE CBC W/AUTO DIFF WBC: CPT

## 2023-08-03 PROCEDURE — 2700000000 HC OXYGEN THERAPY PER DAY

## 2023-08-03 PROCEDURE — 2580000003 HC RX 258: Performed by: INTERNAL MEDICINE

## 2023-08-03 PROCEDURE — 36415 COLL VENOUS BLD VENIPUNCTURE: CPT

## 2023-08-03 PROCEDURE — 6370000000 HC RX 637 (ALT 250 FOR IP): Performed by: INTERNAL MEDICINE

## 2023-08-03 PROCEDURE — 80069 RENAL FUNCTION PANEL: CPT

## 2023-08-03 PROCEDURE — 1210000000 HC MED SURG R&B

## 2023-08-03 PROCEDURE — 97116 GAIT TRAINING THERAPY: CPT

## 2023-08-03 PROCEDURE — 99233 SBSQ HOSP IP/OBS HIGH 50: CPT | Performed by: INTERNAL MEDICINE

## 2023-08-03 RX ORDER — CARVEDILOL 25 MG/1
25 TABLET ORAL 2 TIMES DAILY
Status: DISCONTINUED | OUTPATIENT
Start: 2023-08-03 | End: 2023-08-07

## 2023-08-03 RX ADMIN — MYCOPHENOLATE MOFETIL 250 MG: 250 CAPSULE ORAL at 21:49

## 2023-08-03 RX ADMIN — PREDNISONE 5 MG: 5 TABLET ORAL at 09:03

## 2023-08-03 RX ADMIN — HYDRALAZINE HYDROCHLORIDE 100 MG: 100 TABLET, FILM COATED ORAL at 09:02

## 2023-08-03 RX ADMIN — CARVEDILOL 25 MG: 25 TABLET, FILM COATED ORAL at 13:46

## 2023-08-03 RX ADMIN — HYDRALAZINE HYDROCHLORIDE 100 MG: 100 TABLET, FILM COATED ORAL at 21:49

## 2023-08-03 RX ADMIN — ISOSORBIDE MONONITRATE 60 MG: 60 TABLET, EXTENDED RELEASE ORAL at 09:03

## 2023-08-03 RX ADMIN — TORSEMIDE 100 MG: 100 TABLET ORAL at 09:02

## 2023-08-03 RX ADMIN — Medication 10 ML: at 09:05

## 2023-08-03 RX ADMIN — INSULIN LISPRO 2 UNITS: 100 INJECTION, SOLUTION INTRAVENOUS; SUBCUTANEOUS at 16:29

## 2023-08-03 RX ADMIN — CLONIDINE HYDROCHLORIDE 0.2 MG: 0.1 TABLET ORAL at 09:03

## 2023-08-03 RX ADMIN — ASPIRIN 81 MG 81 MG: 81 TABLET ORAL at 09:03

## 2023-08-03 RX ADMIN — ATORVASTATIN CALCIUM 20 MG: 20 TABLET, FILM COATED ORAL at 09:03

## 2023-08-03 RX ADMIN — Medication 400 MG: at 09:02

## 2023-08-03 RX ADMIN — PANTOPRAZOLE SODIUM 40 MG: 40 TABLET, DELAYED RELEASE ORAL at 09:02

## 2023-08-03 RX ADMIN — MYCOPHENOLATE MOFETIL 250 MG: 250 CAPSULE ORAL at 09:03

## 2023-08-03 RX ADMIN — Medication 10 ML: at 21:50

## 2023-08-03 ASSESSMENT — ENCOUNTER SYMPTOMS
COUGH: 0
GASTROINTESTINAL NEGATIVE: 1
SHORTNESS OF BREATH: 1
EYES NEGATIVE: 1
WHEEZING: 0
BLOOD IN STOOL: 0
NAUSEA: 0
CHEST TIGHTNESS: 0
STRIDOR: 0

## 2023-08-03 NOTE — CONSULTS
Inpatient consult to Cardiology  Consult performed by: Janina Paulino MD  Consult ordered by: Alicia Jaimes DO        Patient Name: Barbara Torres Date: 2023  5:20 PM  MR #: [id number]  : 1945    Attending Physician: Mickey Cunningham DO  Reason for consult: CHF    History of Presenting Illness:      Kota Hermosillo is a 76 y.o. female on hospital day 1 with a history of . History Obtained From:  patient, electronic medical record    To ER with > 1 week of SOB and gradual generalized body swelling. Still has intermittent CP limited to right side and it is reproducible. She did have CPR during recent hospitalization for PEA. 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 Cath was contemplated but postponed due to advanced CKD. I did speak w Dr. Marcelino Weathers last admit who agreed with proceeding with Cath as needed. Last PM in ER she has new ABN ECG changes consistent with Anterolateral ischemia. We did recommend Heparin gtt in the ER unless there was contraindication. She has Anemia but no active bleed noted. She also suffered recent acute Left Pop DVT as well  History:      EKG:  Past Medical History:   Diagnosis Date    CHF (congestive heart failure) (HCC)     Chronic diastolic heart failure ([address]) 2022    Hypertension     Mixed hyperlipidemia 2022    Primary hypertension 2022    RPGN (rapidly progressive glomerulonephritis), pauci-immune     On chonric steroid and cellcept, per [facility]     Past Surgical History:   Procedure Laterality Date    CATARACT REMOVAL Bilateral     CT BIOPSY RENAL  10/6/2022    CT BIOPSY RENAL 10/6/2022 [facility] CT SCAN    HERNIA REPAIR      HYSTERECTOMY, TOTAL ABDOMINAL (CERVIX REMOVED)      OTHER SURGICAL HISTORY Right 10/06/2022    right renal random biopsy performed by Dr. Jim Meier History  History reviewed. No pertinent family history.   [] Unable to obtain due to ventilated and/
Physical Medicine & Rehabilitation  Consult Note      Admitting Physician: Taisha Burris MD    Primary Care Provider: Anel Valiente MD     Reason for Consult:  Asses rehab needs, promote physical and mental function, analyze level of care to determine rehab needs, improve ability to actively participate in the rehabilitation process, and decrease likelihood of re-admit to the hospital after discharge. History of Present Illness:    Erin Ragland is a 76 y.o. female admitted to [facility] on 7/19/2023. Acute on Chronic Combined CHF        HPI      I reviewed recent nursing notes discussed care with acute care providers, \" see notes  \". Events from the previous 24 hours reviewed and discussed . Their inpatient work up has included:    Imaging:  Imaging and other studies reviewed and discussed with patient and staff    XR ABDOMEN (KUB) (SINGLE AP VIEW)    Result Date: 7/29/2023  EXAMINATION: ONE SUPINE XRAY VIEW(S) OF THE ABDOMEN 7/29/2023 10:30 am COMPARISON: None. HISTORY: ORDERING SYSTEM PROVIDED HISTORY: pain TECHNOLOGIST PROVIDED HISTORY: Reason for exam:->pain What reading provider will be dictating this exam?->CRC FINDINGS: Fair amount of fecal content seen throughout the entire colon represent a pattern of mild to moderate constipation. There is no indication for obstruction. The small bowel is not distended with fluid or with air. Patient had previous repair the ventilation the mid abdomen where multiple surgical wires present. Patient has an IVC filter. Retroperitoneal fat planes are preserved. Vascular arterial calcifications are seen in the pelvic area. Calcified localized are also observed. No conspicuous calculus seen in the project for the kidneys. Mild osteopenia is seen visualized bones structures. Pattern of mild to moderate constipation. NM LUNG VENT/PERFUSION (VQ)    Result Date: 7/20/2023  EXAMINATION: NUCLEAR MEDICINE VENTILATION PERFUSION SCAN.
Vascular Medicine and Interventional Radiology    Date of Consultation:2023    Jennifer Ospina  : 1945  MR #: [id number]    Consultant:Maggy Porras CNP  Consulting Physician: Dr. Becca Tavarez, Vascular Medicine and Interventional Radiology     Consult Ordered By: Dr. Placido Sellers            PCP:  Rayna Dupont MD     Attending Physician: Pao Cerda MD     Date of Admission: 2023  5:20 PM     Chief Complaint:   Chief Complaint   Patient presents with    Shortness of Breath     CHF post Cardiac arrest on       Reason for Consultation: Temporary HD catheter insertion    History of Present Illness: 47-year-old female with history of HTN, worsening CKD,  type 2 diabetes, with recent admission for PEA cardiac arrest s/p ROSC, LLE DVT s/p IVC filter on 7/15/2023 admitted currently with acute hypoxic respiratory failure requiring BiPAP secondary to acute diastolic heart failure with volume overload and progressing CKD, now stage 5. IR consulted for temporary hemodialysis catheter placement. She reports feeling okay this morning, complains of constipation, states she took medication this morning (to help move her bowels. This morning, creatinine 4.94, GFR 8.4,  platelets 736, hgb 7.8. INR 1.1 on 23. Received ASA 81mg this morning. Heparin SQ on hold. Past Medical History:   has a past medical history of CHF (congestive heart failure) ([address]), Chronic diastolic heart failure ([address]), Hypertension, Mixed hyperlipidemia, Primary hypertension, and RPGN (rapidly progressive glomerulonephritis), pauci-immune. Past SurgicalHistory:   has a past surgical history that includes Hysterectomy, total abdominal; hernia repair; Cataract removal (Bilateral); other surgical history (Right, 10/06/2022); and CT BIOPSY RENAL (10/6/2022). Allergies:Norco [hydrocodone-acetaminophen]    Home Medications:   Prior to Admission medications    Medication Sig Start Date End Date Taking?  Authorizing
anemia  3- check iron levels  4. Check ua  5. Same IS but may need adjustment in this    Thank you for the consultation. Please do not hesitate to call with questions.     Isabelle Montiel MD

## 2023-08-03 NOTE — FLOWSHEET NOTE
Patient told Vlad , that she is feeling a bit lightheaded. Vitals were taken at this time /47, HR 93, O2 100% on 2L/min via Nasal Canula. Got patient up to restroom and then back to bed, she tolerated walking well, feeling okay at this time, no other symptoms.

## 2023-08-03 NOTE — FLOWSHEET NOTE
Pt awake in bed. Denies pain currently. Accepted AM meds without problem. VSS. Tele SR. Respirations even and nonlabored. 2LNC in use. Visitor in room. Call light in reach.

## 2023-08-04 PROBLEM — N18.5 CKD (CHRONIC KIDNEY DISEASE) STAGE 5, GFR LESS THAN 15 ML/MIN (HCC): Status: ACTIVE | Noted: 2023-08-04

## 2023-08-04 PROBLEM — N18.2 CHRONIC RENAL IMPAIRMENT, STAGE 2 (MILD): Status: ACTIVE | Noted: 2023-08-04

## 2023-08-04 LAB
ALBUMIN SERPL-MCNC: 3.2 G/DL (ref 3.5–4.6)
ANION GAP SERPL CALCULATED.3IONS-SCNC: 14 MEQ/L (ref 9–15)
BASOPHILS # BLD: 0 K/UL (ref 0–0.2)
BASOPHILS NFR BLD: 0.5 %
BUN SERPL-MCNC: 35 MG/DL (ref 8–23)
CALCIUM SERPL-MCNC: 8.6 MG/DL (ref 8.5–9.9)
CHLORIDE SERPL-SCNC: 93 MEQ/L (ref 95–107)
CO2 SERPL-SCNC: 28 MEQ/L (ref 20–31)
CREAT SERPL-MCNC: 3.9 MG/DL (ref 0.5–0.9)
CRP SERPL HS-MCNC: 53.1 MG/L (ref 0–5)
EOSINOPHIL # BLD: 0.1 K/UL (ref 0–0.7)
EOSINOPHIL NFR BLD: 1.8 %
ERYTHROCYTE [DISTWIDTH] IN BLOOD BY AUTOMATED COUNT: 16.1 % (ref 11.5–14.5)
GLUCOSE BLD-MCNC: 139 MG/DL (ref 70–99)
GLUCOSE BLD-MCNC: 148 MG/DL (ref 70–99)
GLUCOSE BLD-MCNC: 155 MG/DL (ref 70–99)
GLUCOSE BLD-MCNC: 159 MG/DL (ref 70–99)
GLUCOSE SERPL-MCNC: 123 MG/DL (ref 70–99)
HAV IGM SER IA-ACNC: NONREACTIVE
HCT VFR BLD AUTO: 24.9 % (ref 37–47)
HEPATITIS B CORE IGM ANTIBODY: NONREACTIVE
HEPATITIS B SURF AG,XHBAGS: NONREACTIVE
HEPATITIS C ANTIBODY: NONREACTIVE
HGB BLD-MCNC: 8.4 G/DL (ref 12–16)
LYMPHOCYTES # BLD: 0.6 K/UL (ref 1–4.8)
LYMPHOCYTES NFR BLD: 8 %
MCH RBC QN AUTO: 26.6 PG (ref 27–31.3)
MCHC RBC AUTO-ENTMCNC: 33.8 % (ref 33–37)
MCV RBC AUTO: 78.8 FL (ref 79.4–94.8)
MONOCYTES # BLD: 0.3 K/UL (ref 0.2–0.8)
MONOCYTES NFR BLD: 4.5 %
NEUTROPHILS # BLD: 6.3 K/UL (ref 1.4–6.5)
NEUTS SEG NFR BLD: 85.2 %
PERFORMED ON: ABNORMAL
PHOSPHATE SERPL-MCNC: 4 MG/DL (ref 2.3–4.8)
PLATELET # BLD AUTO: 325 K/UL (ref 130–400)
PLATELET BLD QL SMEAR: ABNORMAL
POTASSIUM SERPL-SCNC: 3.5 MEQ/L (ref 3.4–4.9)
PROCALCITONIN SERPL IA-MCNC: 0.43 NG/ML (ref 0–0.15)
RBC # BLD AUTO: 3.16 M/UL (ref 4.2–5.4)
SLIDE REVIEW: ABNORMAL
SODIUM SERPL-SCNC: 135 MEQ/L (ref 135–144)
WBC # BLD AUTO: 7.5 K/UL (ref 4.8–10.8)

## 2023-08-04 PROCEDURE — 85025 COMPLETE CBC W/AUTO DIFF WBC: CPT

## 2023-08-04 PROCEDURE — 2700000000 HC OXYGEN THERAPY PER DAY

## 2023-08-04 PROCEDURE — 80074 ACUTE HEPATITIS PANEL: CPT

## 2023-08-04 PROCEDURE — 6370000000 HC RX 637 (ALT 250 FOR IP): Performed by: INTERNAL MEDICINE

## 2023-08-04 PROCEDURE — 6360000002 HC RX W HCPCS: Performed by: INTERNAL MEDICINE

## 2023-08-04 PROCEDURE — 90935 HEMODIALYSIS ONE EVALUATION: CPT

## 2023-08-04 PROCEDURE — 86140 C-REACTIVE PROTEIN: CPT

## 2023-08-04 PROCEDURE — 84145 PROCALCITONIN (PCT): CPT

## 2023-08-04 PROCEDURE — 1210000000 HC MED SURG R&B

## 2023-08-04 PROCEDURE — 87040 BLOOD CULTURE FOR BACTERIA: CPT

## 2023-08-04 PROCEDURE — 2580000003 HC RX 258: Performed by: INTERNAL MEDICINE

## 2023-08-04 PROCEDURE — 99232 SBSQ HOSP IP/OBS MODERATE 35: CPT | Performed by: INTERNAL MEDICINE

## 2023-08-04 PROCEDURE — 99232 SBSQ HOSP IP/OBS MODERATE 35: CPT | Performed by: NURSE PRACTITIONER

## 2023-08-04 PROCEDURE — 80069 RENAL FUNCTION PANEL: CPT

## 2023-08-04 PROCEDURE — 36415 COLL VENOUS BLD VENIPUNCTURE: CPT

## 2023-08-04 RX ORDER — HEPARIN SODIUM 1000 [USP'U]/ML
2600 INJECTION, SOLUTION INTRAVENOUS; SUBCUTANEOUS PRN
Status: DISCONTINUED | OUTPATIENT
Start: 2023-08-04 | End: 2023-08-08 | Stop reason: HOSPADM

## 2023-08-04 RX ADMIN — CARVEDILOL 25 MG: 25 TABLET, FILM COATED ORAL at 22:03

## 2023-08-04 RX ADMIN — ASPIRIN 81 MG 81 MG: 81 TABLET ORAL at 15:35

## 2023-08-04 RX ADMIN — MYCOPHENOLATE MOFETIL 250 MG: 250 CAPSULE ORAL at 22:04

## 2023-08-04 RX ADMIN — PANTOPRAZOLE SODIUM 40 MG: 40 TABLET, DELAYED RELEASE ORAL at 15:36

## 2023-08-04 RX ADMIN — TORSEMIDE 100 MG: 100 TABLET ORAL at 15:36

## 2023-08-04 RX ADMIN — ONDANSETRON 4 MG: 2 INJECTION INTRAMUSCULAR; INTRAVENOUS at 12:22

## 2023-08-04 RX ADMIN — HEPARIN SODIUM 2600 UNITS: 1000 INJECTION INTRAVENOUS; SUBCUTANEOUS at 14:02

## 2023-08-04 RX ADMIN — HYDRALAZINE HYDROCHLORIDE 100 MG: 100 TABLET, FILM COATED ORAL at 22:03

## 2023-08-04 RX ADMIN — CLONIDINE HYDROCHLORIDE 0.2 MG: 0.1 TABLET ORAL at 15:36

## 2023-08-04 RX ADMIN — CARVEDILOL 25 MG: 25 TABLET, FILM COATED ORAL at 15:36

## 2023-08-04 RX ADMIN — HYDRALAZINE HYDROCHLORIDE 100 MG: 100 TABLET, FILM COATED ORAL at 15:35

## 2023-08-04 RX ADMIN — Medication 400 MG: at 15:37

## 2023-08-04 RX ADMIN — CLONIDINE HYDROCHLORIDE 0.2 MG: 0.1 TABLET ORAL at 22:03

## 2023-08-04 RX ADMIN — Medication 10 ML: at 23:05

## 2023-08-04 RX ADMIN — MYCOPHENOLATE MOFETIL 250 MG: 250 CAPSULE ORAL at 15:36

## 2023-08-04 RX ADMIN — ISOSORBIDE MONONITRATE 60 MG: 60 TABLET, EXTENDED RELEASE ORAL at 15:35

## 2023-08-04 RX ADMIN — Medication 10 ML: at 15:37

## 2023-08-04 ASSESSMENT — ENCOUNTER SYMPTOMS
ALLERGIC/IMMUNOLOGIC NEGATIVE: 1
GASTROINTESTINAL NEGATIVE: 1
COUGH: 0
STRIDOR: 0
RESPIRATORY NEGATIVE: 1
NAUSEA: 0
EYES NEGATIVE: 1
NAUSEA: 1
SHORTNESS OF BREATH: 1
VOMITING: 0
SHORTNESS OF BREATH: 0
BLOOD IN STOOL: 0
CHEST TIGHTNESS: 0
WHEEZING: 0

## 2023-08-04 NOTE — FLOWSHEET NOTE
[phone] spoke with Jeronimo Snow, aware of IR appt for temp to tunneled catheter on 8/7 with Dr Gregorio Oliveros.

## 2023-08-04 NOTE — FLOWSHEET NOTE
Returned from  dialysis  complained of nausea  was medicated earlier  for it   jaden call whe she is able to take meds

## 2023-08-05 LAB
ALBUMIN SERPL-MCNC: 2.8 G/DL (ref 3.5–4.6)
ANION GAP SERPL CALCULATED.3IONS-SCNC: 10 MEQ/L (ref 9–15)
BASOPHILS # BLD: 0.1 K/UL (ref 0–0.2)
BASOPHILS NFR BLD: 1.1 %
BUN SERPL-MCNC: 18 MG/DL (ref 8–23)
CALCIUM SERPL-MCNC: 8.3 MG/DL (ref 8.5–9.9)
CHLORIDE SERPL-SCNC: 101 MEQ/L (ref 95–107)
CO2 SERPL-SCNC: 29 MEQ/L (ref 20–31)
CREAT SERPL-MCNC: 2.86 MG/DL (ref 0.5–0.9)
EOSINOPHIL # BLD: 0.1 K/UL (ref 0–0.7)
EOSINOPHIL NFR BLD: 1.3 %
ERYTHROCYTE [DISTWIDTH] IN BLOOD BY AUTOMATED COUNT: 15.8 % (ref 11.5–14.5)
GLUCOSE BLD-MCNC: 184 MG/DL (ref 70–99)
GLUCOSE BLD-MCNC: 186 MG/DL (ref 70–99)
GLUCOSE BLD-MCNC: 201 MG/DL (ref 70–99)
GLUCOSE BLD-MCNC: 94 MG/DL (ref 70–99)
GLUCOSE SERPL-MCNC: 81 MG/DL (ref 70–99)
HCT VFR BLD AUTO: 22.2 % (ref 37–47)
HGB BLD-MCNC: 7.4 G/DL (ref 12–16)
LYMPHOCYTES # BLD: 0.8 K/UL (ref 1–4.8)
LYMPHOCYTES NFR BLD: 12.8 %
MCH RBC QN AUTO: 26.2 PG (ref 27–31.3)
MCHC RBC AUTO-ENTMCNC: 33.2 % (ref 33–37)
MCV RBC AUTO: 78.9 FL (ref 79.4–94.8)
MONOCYTES # BLD: 0.3 K/UL (ref 0.2–0.8)
MONOCYTES NFR BLD: 5.3 %
NEUTROPHILS # BLD: 5.2 K/UL (ref 1.4–6.5)
NEUTS SEG NFR BLD: 79.5 %
PERFORMED ON: ABNORMAL
PERFORMED ON: NORMAL
PHOSPHATE SERPL-MCNC: 3.8 MG/DL (ref 2.3–4.8)
PLATELET # BLD AUTO: 403 K/UL (ref 130–400)
POTASSIUM SERPL-SCNC: 3.6 MEQ/L (ref 3.4–4.9)
RBC # BLD AUTO: 2.81 M/UL (ref 4.2–5.4)
SODIUM SERPL-SCNC: 140 MEQ/L (ref 135–144)
WBC # BLD AUTO: 6.5 K/UL (ref 4.8–10.8)

## 2023-08-05 PROCEDURE — 6370000000 HC RX 637 (ALT 250 FOR IP): Performed by: INTERNAL MEDICINE

## 2023-08-05 PROCEDURE — 6360000002 HC RX W HCPCS: Performed by: INTERNAL MEDICINE

## 2023-08-05 PROCEDURE — 80069 RENAL FUNCTION PANEL: CPT

## 2023-08-05 PROCEDURE — 2700000000 HC OXYGEN THERAPY PER DAY

## 2023-08-05 PROCEDURE — 85025 COMPLETE CBC W/AUTO DIFF WBC: CPT

## 2023-08-05 PROCEDURE — 97116 GAIT TRAINING THERAPY: CPT

## 2023-08-05 PROCEDURE — 2580000003 HC RX 258: Performed by: INTERNAL MEDICINE

## 2023-08-05 PROCEDURE — 1210000000 HC MED SURG R&B

## 2023-08-05 PROCEDURE — 36415 COLL VENOUS BLD VENIPUNCTURE: CPT

## 2023-08-05 RX ORDER — MYCOPHENOLATE MOFETIL 250 MG/1
250 CAPSULE ORAL 2 TIMES DAILY
Status: DISCONTINUED | OUTPATIENT
Start: 2023-08-05 | End: 2023-08-08 | Stop reason: HOSPADM

## 2023-08-05 RX ADMIN — TORSEMIDE 100 MG: 100 TABLET ORAL at 09:22

## 2023-08-05 RX ADMIN — MYCOPHENOLATE MOFETIL 250 MG: 250 CAPSULE ORAL at 21:14

## 2023-08-05 RX ADMIN — Medication 10 ML: at 21:19

## 2023-08-05 RX ADMIN — PREDNISONE 5 MG: 5 TABLET ORAL at 09:22

## 2023-08-05 RX ADMIN — CLONIDINE HYDROCHLORIDE 0.2 MG: 0.1 TABLET ORAL at 21:14

## 2023-08-05 RX ADMIN — ISOSORBIDE MONONITRATE 60 MG: 60 TABLET, EXTENDED RELEASE ORAL at 09:22

## 2023-08-05 RX ADMIN — PANTOPRAZOLE SODIUM 40 MG: 40 TABLET, DELAYED RELEASE ORAL at 09:22

## 2023-08-05 RX ADMIN — ASPIRIN 81 MG 81 MG: 81 TABLET ORAL at 09:22

## 2023-08-05 RX ADMIN — HYDRALAZINE HYDROCHLORIDE 100 MG: 100 TABLET, FILM COATED ORAL at 21:14

## 2023-08-05 RX ADMIN — Medication 400 MG: at 09:21

## 2023-08-05 RX ADMIN — Medication 10 ML: at 09:25

## 2023-08-05 RX ADMIN — CARVEDILOL 25 MG: 25 TABLET, FILM COATED ORAL at 21:14

## 2023-08-05 RX ADMIN — ATORVASTATIN CALCIUM 20 MG: 20 TABLET, FILM COATED ORAL at 09:22

## 2023-08-05 RX ADMIN — CARVEDILOL 25 MG: 25 TABLET, FILM COATED ORAL at 09:22

## 2023-08-05 RX ADMIN — EPOETIN ALFA-EPBX 10000 UNITS: 10000 INJECTION, SOLUTION INTRAVENOUS; SUBCUTANEOUS at 14:30

## 2023-08-05 RX ADMIN — CLONIDINE HYDROCHLORIDE 0.2 MG: 0.1 TABLET ORAL at 09:21

## 2023-08-05 RX ADMIN — HYDRALAZINE HYDROCHLORIDE 100 MG: 100 TABLET, FILM COATED ORAL at 09:22

## 2023-08-05 ASSESSMENT — PAIN SCALES - GENERAL: PAINLEVEL_OUTOF10: 0

## 2023-08-05 ASSESSMENT — ENCOUNTER SYMPTOMS
GASTROINTESTINAL NEGATIVE: 1
EYES NEGATIVE: 1
SHORTNESS OF BREATH: 1
NAUSEA: 0
WHEEZING: 0
CHEST TIGHTNESS: 0
STRIDOR: 0
COUGH: 0
BLOOD IN STOOL: 0

## 2023-08-06 LAB
ALBUMIN SERPL-MCNC: 2.9 G/DL (ref 3.5–4.6)
ANION GAP SERPL CALCULATED.3IONS-SCNC: 13 MEQ/L (ref 9–15)
BASOPHILS # BLD: 0.1 K/UL (ref 0–0.2)
BASOPHILS NFR BLD: 0.8 %
BUN SERPL-MCNC: 27 MG/DL (ref 8–23)
CALCIUM SERPL-MCNC: 8.3 MG/DL (ref 8.5–9.9)
CHLORIDE SERPL-SCNC: 95 MEQ/L (ref 95–107)
CO2 SERPL-SCNC: 27 MEQ/L (ref 20–31)
CREAT SERPL-MCNC: 3.69 MG/DL (ref 0.5–0.9)
CRP SERPL HS-MCNC: 48 MG/L (ref 0–5)
EOSINOPHIL # BLD: 0.1 K/UL (ref 0–0.7)
EOSINOPHIL NFR BLD: 1 %
ERYTHROCYTE [DISTWIDTH] IN BLOOD BY AUTOMATED COUNT: 15.8 % (ref 11.5–14.5)
GLUCOSE BLD-MCNC: 131 MG/DL (ref 70–99)
GLUCOSE BLD-MCNC: 146 MG/DL (ref 70–99)
GLUCOSE BLD-MCNC: 148 MG/DL (ref 70–99)
GLUCOSE BLD-MCNC: 192 MG/DL (ref 70–99)
GLUCOSE SERPL-MCNC: 110 MG/DL (ref 70–99)
HCT VFR BLD AUTO: 23 % (ref 37–47)
HGB BLD-MCNC: 7.8 G/DL (ref 12–16)
LYMPHOCYTES # BLD: 0.5 K/UL (ref 1–4.8)
LYMPHOCYTES NFR BLD: 5.8 %
MCH RBC QN AUTO: 26.3 PG (ref 27–31.3)
MCHC RBC AUTO-ENTMCNC: 33.7 % (ref 33–37)
MCV RBC AUTO: 78.1 FL (ref 79.4–94.8)
MONOCYTES # BLD: 0.3 K/UL (ref 0.2–0.8)
MONOCYTES NFR BLD: 3.8 %
NEUTROPHILS # BLD: 7 K/UL (ref 1.4–6.5)
NEUTS SEG NFR BLD: 88.6 %
PERFORMED ON: ABNORMAL
PHOSPHATE SERPL-MCNC: 3.4 MG/DL (ref 2.3–4.8)
PLATELET # BLD AUTO: 335 K/UL (ref 130–400)
POTASSIUM SERPL-SCNC: 3.4 MEQ/L (ref 3.4–4.9)
PROCALCITONIN SERPL IA-MCNC: 0.42 NG/ML (ref 0–0.15)
RBC # BLD AUTO: 2.95 M/UL (ref 4.2–5.4)
SODIUM SERPL-SCNC: 135 MEQ/L (ref 135–144)
WBC # BLD AUTO: 7.9 K/UL (ref 4.8–10.8)

## 2023-08-06 PROCEDURE — 86140 C-REACTIVE PROTEIN: CPT

## 2023-08-06 PROCEDURE — 6370000000 HC RX 637 (ALT 250 FOR IP): Performed by: INTERNAL MEDICINE

## 2023-08-06 PROCEDURE — 1210000000 HC MED SURG R&B

## 2023-08-06 PROCEDURE — 36415 COLL VENOUS BLD VENIPUNCTURE: CPT

## 2023-08-06 PROCEDURE — 2580000003 HC RX 258: Performed by: INTERNAL MEDICINE

## 2023-08-06 PROCEDURE — 80069 RENAL FUNCTION PANEL: CPT

## 2023-08-06 PROCEDURE — 85025 COMPLETE CBC W/AUTO DIFF WBC: CPT

## 2023-08-06 PROCEDURE — 6360000002 HC RX W HCPCS: Performed by: INTERNAL MEDICINE

## 2023-08-06 PROCEDURE — 84145 PROCALCITONIN (PCT): CPT

## 2023-08-06 RX ORDER — LACTULOSE 10 G/15ML
30 SOLUTION ORAL ONCE
Status: COMPLETED | OUTPATIENT
Start: 2023-08-06 | End: 2023-08-06

## 2023-08-06 RX ADMIN — HYDRALAZINE HYDROCHLORIDE 100 MG: 100 TABLET, FILM COATED ORAL at 09:49

## 2023-08-06 RX ADMIN — ASPIRIN 81 MG 81 MG: 81 TABLET ORAL at 09:49

## 2023-08-06 RX ADMIN — POLYETHYLENE GLYCOL 3350 17 G: 17 POWDER, FOR SOLUTION ORAL at 09:49

## 2023-08-06 RX ADMIN — ISOSORBIDE MONONITRATE 60 MG: 60 TABLET, EXTENDED RELEASE ORAL at 09:49

## 2023-08-06 RX ADMIN — HYDRALAZINE HYDROCHLORIDE 100 MG: 100 TABLET, FILM COATED ORAL at 20:41

## 2023-08-06 RX ADMIN — TORSEMIDE 100 MG: 100 TABLET ORAL at 09:49

## 2023-08-06 RX ADMIN — Medication 10 ML: at 20:45

## 2023-08-06 RX ADMIN — PANTOPRAZOLE SODIUM 40 MG: 40 TABLET, DELAYED RELEASE ORAL at 09:49

## 2023-08-06 RX ADMIN — MYCOPHENOLATE MOFETIL 250 MG: 250 CAPSULE ORAL at 20:41

## 2023-08-06 RX ADMIN — CARVEDILOL 25 MG: 25 TABLET, FILM COATED ORAL at 09:49

## 2023-08-06 RX ADMIN — CARVEDILOL 25 MG: 25 TABLET, FILM COATED ORAL at 20:41

## 2023-08-06 RX ADMIN — ATORVASTATIN CALCIUM 20 MG: 20 TABLET, FILM COATED ORAL at 09:49

## 2023-08-06 RX ADMIN — ONDANSETRON 4 MG: 2 INJECTION INTRAMUSCULAR; INTRAVENOUS at 09:49

## 2023-08-06 RX ADMIN — Medication 400 MG: at 09:49

## 2023-08-06 RX ADMIN — CLONIDINE HYDROCHLORIDE 0.2 MG: 0.1 TABLET ORAL at 20:41

## 2023-08-06 RX ADMIN — CLONIDINE HYDROCHLORIDE 0.2 MG: 0.1 TABLET ORAL at 09:49

## 2023-08-06 RX ADMIN — Medication 10 ML: at 10:52

## 2023-08-06 RX ADMIN — MYCOPHENOLATE MOFETIL 250 MG: 250 CAPSULE ORAL at 09:49

## 2023-08-06 RX ADMIN — LACTULOSE 30 G: 20 SOLUTION ORAL at 14:40

## 2023-08-06 ASSESSMENT — ENCOUNTER SYMPTOMS
SHORTNESS OF BREATH: 1
COUGH: 0
STRIDOR: 0
BLOOD IN STOOL: 0
NAUSEA: 0
WHEEZING: 0
EYES NEGATIVE: 1
CHEST TIGHTNESS: 0
GASTROINTESTINAL NEGATIVE: 1

## 2023-08-06 NOTE — PLAN OF CARE
Problem: Discharge Planning  Goal: Discharge to home or other facility with appropriate resources  Outcome: Progressing  Flowsheets (Taken 8/5/2023 1900)  Discharge to home or other facility with appropriate resources: Identify barriers to discharge with patient and caregiver     Problem: Skin/Tissue Integrity  Goal: Absence of new skin breakdown  Description: 1. Monitor for areas of redness and/or skin breakdown  2. Assess vascular access sites hourly  3. Every 4-6 hours minimum:  Change oxygen saturation probe site  4. Every 4-6 hours:  If on nasal continuous positive airway pressure, respiratory therapy assess nares and determine need for appliance change or resting period.   Outcome: Progressing     Problem: Safety - Adult  Goal: Free from fall injury  Outcome: Progressing     Problem: Chronic Conditions and Co-morbidities  Goal: Patient's chronic conditions and co-morbidity symptoms are monitored and maintained or improved  Outcome: Progressing  Flowsheets (Taken 8/5/2023 1900)  Care Plan - Patient's Chronic Conditions and Co-Morbidity Symptoms are Monitored and Maintained or Improved: Monitor and assess patient's chronic conditions and comorbid symptoms for stability, deterioration, or improvement

## 2023-08-07 ENCOUNTER — HOSPITAL ENCOUNTER (INPATIENT)
Dept: INTERVENTIONAL RADIOLOGY/VASCULAR | Age: 78
Discharge: HOME | DRG: 286 | End: 2023-08-09
Payer: MEDICARE

## 2023-08-07 VITALS
TEMPERATURE: 99.1 F | OXYGEN SATURATION: 93 % | SYSTOLIC BLOOD PRESSURE: 162 MMHG | DIASTOLIC BLOOD PRESSURE: 71 MMHG | HEART RATE: 110 BPM | RESPIRATION RATE: 18 BRPM

## 2023-08-07 LAB
ALBUMIN SERPL-MCNC: 2.9 G/DL (ref 3.5–4.6)
ANION GAP SERPL CALCULATED.3IONS-SCNC: 11 MEQ/L (ref 9–15)
BASOPHILS # BLD: 0 K/UL (ref 0–0.2)
BASOPHILS NFR BLD: 0.9 %
BUN SERPL-MCNC: 30 MG/DL (ref 8–23)
CALCIUM SERPL-MCNC: 8.1 MG/DL (ref 8.5–9.9)
CHLORIDE SERPL-SCNC: 97 MEQ/L (ref 95–107)
CO2 SERPL-SCNC: 29 MEQ/L (ref 20–31)
CREAT SERPL-MCNC: 4.25 MG/DL (ref 0.5–0.9)
EOSINOPHIL # BLD: 0.1 K/UL (ref 0–0.7)
EOSINOPHIL NFR BLD: 3 %
ERYTHROCYTE [DISTWIDTH] IN BLOOD BY AUTOMATED COUNT: 15.8 % (ref 11.5–14.5)
GLUCOSE BLD-MCNC: 121 MG/DL (ref 70–99)
GLUCOSE BLD-MCNC: 130 MG/DL (ref 70–99)
GLUCOSE BLD-MCNC: 143 MG/DL (ref 70–99)
GLUCOSE SERPL-MCNC: 116 MG/DL (ref 70–99)
HCT VFR BLD AUTO: 21.8 % (ref 37–47)
HGB BLD-MCNC: 7.5 G/DL (ref 12–16)
LYMPHOCYTES # BLD: 0.5 K/UL (ref 1–4.8)
LYMPHOCYTES NFR BLD: 11.8 %
MCH RBC QN AUTO: 26.7 PG (ref 27–31.3)
MCHC RBC AUTO-ENTMCNC: 34.2 % (ref 33–37)
MCV RBC AUTO: 78.1 FL (ref 79.4–94.8)
MONOCYTES # BLD: 0.3 K/UL (ref 0.2–0.8)
MONOCYTES NFR BLD: 7.1 %
NEUTROPHILS # BLD: 3.5 K/UL (ref 1.4–6.5)
NEUTS SEG NFR BLD: 77.2 %
PERFORMED ON: ABNORMAL
PHOSPHATE SERPL-MCNC: 4.3 MG/DL (ref 2.3–4.8)
PHOSPHATE SERPL-MCNC: 4.3 MG/DL (ref 2.3–4.8)
PLATELET # BLD AUTO: 287 K/UL (ref 130–400)
POTASSIUM SERPL-SCNC: 3.3 MEQ/L (ref 3.4–4.9)
RBC # BLD AUTO: 2.79 M/UL (ref 4.2–5.4)
SODIUM SERPL-SCNC: 137 MEQ/L (ref 135–144)
WBC # BLD AUTO: 4.5 K/UL (ref 4.8–10.8)

## 2023-08-07 PROCEDURE — 36415 COLL VENOUS BLD VENIPUNCTURE: CPT

## 2023-08-07 PROCEDURE — 84100 ASSAY OF PHOSPHORUS: CPT

## 2023-08-07 PROCEDURE — 6360000002 HC RX W HCPCS: Performed by: RADIOLOGY

## 2023-08-07 PROCEDURE — 85025 COMPLETE CBC W/AUTO DIFF WBC: CPT

## 2023-08-07 PROCEDURE — 2500000003 HC RX 250 WO HCPCS: Performed by: RADIOLOGY

## 2023-08-07 PROCEDURE — 6360000002 HC RX W HCPCS: Performed by: INTERNAL MEDICINE

## 2023-08-07 PROCEDURE — 2580000003 HC RX 258: Performed by: RADIOLOGY

## 2023-08-07 PROCEDURE — 1210000000 HC MED SURG R&B

## 2023-08-07 PROCEDURE — 05PYX3Z REMOVAL OF INFUSION DEVICE FROM UPPER VEIN, EXTERNAL APPROACH: ICD-10-PCS | Performed by: STUDENT IN AN ORGANIZED HEALTH CARE EDUCATION/TRAINING PROGRAM

## 2023-08-07 PROCEDURE — C1769 GUIDE WIRE: HCPCS

## 2023-08-07 PROCEDURE — 02HV33Z INSERTION OF INFUSION DEVICE INTO SUPERIOR VENA CAVA, PERCUTANEOUS APPROACH: ICD-10-PCS | Performed by: STUDENT IN AN ORGANIZED HEALTH CARE EDUCATION/TRAINING PROGRAM

## 2023-08-07 PROCEDURE — 8010000000 HC HEMODIALYSIS ACUTE INPT

## 2023-08-07 PROCEDURE — 6370000000 HC RX 637 (ALT 250 FOR IP): Performed by: INTERNAL MEDICINE

## 2023-08-07 PROCEDURE — 80069 RENAL FUNCTION PANEL: CPT

## 2023-08-07 PROCEDURE — 36558 INSERT TUNNELED CV CATH: CPT

## 2023-08-07 PROCEDURE — 77001 FLUOROGUIDE FOR VEIN DEVICE: CPT

## 2023-08-07 PROCEDURE — 2580000003 HC RX 258: Performed by: INTERNAL MEDICINE

## 2023-08-07 PROCEDURE — 83970 ASSAY OF PARATHORMONE: CPT

## 2023-08-07 RX ORDER — HEPARIN SODIUM 1000 [USP'U]/ML
INJECTION, SOLUTION INTRAVENOUS; SUBCUTANEOUS PRN
Status: COMPLETED | OUTPATIENT
Start: 2023-08-07 | End: 2023-08-07

## 2023-08-07 RX ORDER — LIDOCAINE HYDROCHLORIDE 20 MG/ML
INJECTION, SOLUTION INFILTRATION; PERINEURAL PRN
Status: COMPLETED | OUTPATIENT
Start: 2023-08-07 | End: 2023-08-07

## 2023-08-07 RX ORDER — 0.9 % SODIUM CHLORIDE 0.9 %
INTRAVENOUS SOLUTION INTRAVENOUS CONTINUOUS PRN
Status: COMPLETED | OUTPATIENT
Start: 2023-08-07 | End: 2023-08-07

## 2023-08-07 RX ADMIN — HEPARIN SODIUM 4200 UNITS: 1000 INJECTION INTRAVENOUS; SUBCUTANEOUS at 14:51

## 2023-08-07 RX ADMIN — CLONIDINE HYDROCHLORIDE 0.2 MG: 0.1 TABLET ORAL at 21:08

## 2023-08-07 RX ADMIN — HYDRALAZINE HYDROCHLORIDE 100 MG: 100 TABLET, FILM COATED ORAL at 21:08

## 2023-08-07 RX ADMIN — MYCOPHENOLATE MOFETIL 250 MG: 250 CAPSULE ORAL at 21:08

## 2023-08-07 RX ADMIN — SODIUM CHLORIDE 500 ML: 9 INJECTION, SOLUTION INTRAVENOUS at 14:40

## 2023-08-07 RX ADMIN — LIDOCAINE HYDROCHLORIDE 22 ML: 20 INJECTION, SOLUTION INFILTRATION; PERINEURAL at 14:41

## 2023-08-07 RX ADMIN — CARVEDILOL 37.5 MG: 25 TABLET, FILM COATED ORAL at 21:08

## 2023-08-07 RX ADMIN — Medication 10 ML: at 21:08

## 2023-08-07 ASSESSMENT — ENCOUNTER SYMPTOMS
COUGH: 0
BLOOD IN STOOL: 0
STRIDOR: 0
GASTROINTESTINAL NEGATIVE: 1
NAUSEA: 0
CHEST TIGHTNESS: 0
EYES NEGATIVE: 1
WHEEZING: 0
SHORTNESS OF BREATH: 1

## 2023-08-07 NOTE — DISCHARGE INSTRUCTIONS
DISCHARGE INSTRUCTIONS FOR TUNNELED HEMODIALYSIS CATHETER PLACEMENT        Activity:  Rest, avoid strenuous activity such as bending or lifting heavy objects. The site where your catheter was placed may be sore, bruised, minimally swelling or slightly bleeding. Diet:  Resume usual diet. Medication:  * Resume home medication unless otherwise instructed by your physician. * (If Applicable) If taking any blood thinners or Aspirin, speak with your physician before restarting them. * May take mild pain reliever, as needed, such as Acetaminophen or Ibuprofen. INSTRUCTIONS OR COMMENTS RELATIVE TO SPECIFIC EXAM PERFORMED:    - Never immerse your catheter in water, no swimming, hot tubs or tub baths. - May sponge bath, keep dressing site clean and dry. - Do not lift anything over 10 pounds for the next 48 hours. - Monitor the site for the next 24 - 48 hours. - For large amount of bleeding or drainage, Go to ER for evaluation.  - If any signs of infection (redness, swelling, drainage/pus) at the site, Go to ER for evaluation. IF YOU DEVELOP ANY OF THE FOLLOWING SYMPTOMS, CONTACT PHYSICIAN LISTED BELOW:  Physician performing procedure: DR. Hudson Quiroz - [phone] or [phone] Ask to be put in contact with radiology RN. After hours contact ER. The cuff of the catheter is outside of the skin. Extreme pain that increases after leaving the hospital.  Active bleeding (refer to above instructions). Chills, fever above 100 degrees Farenheit and fatigue. Weakness, dizziness, lightheadedness or fainting. If you are unable to contact any of the above physician and you feel you have a major problem, please go to the Emergency Room for treatment.

## 2023-08-07 NOTE — OR NURSING
NO SEDATION     Patient assisted to IR table in supine position. 1430 Consent verified for Tunneled HD Permcath insertion conversion of temporary to tunneled dialysis catheter. Patient placed onto the monitor, VSS. Right neck vessels assessed for patency using US guidance. Site approved by Dr Renato Wills. 1434 Right neck IJ area cleansed generously with Chloroprep and full body drape applied. [address] Dr Renato Wills at table side talking with patient regarding procedure. [address] performed for Fluoroscopy guided conversion right non-tunneled hemodialysis cathter to tunneled hemodialysis cathter or insertion of new hemodialysis catheter. [address] [facility] [address] Renato Wills administered 2% lidocaine to right IJ site with fluoro guidance. Amplatz guidewire inserted through existing temporary dialysis catheter. Existing dialysis catheter removed. Catheter intact. [employer]  long term dilators 12 F and 14 F used prior to 12 F introducer sheath inserted. 1445 Additional 2% lidocaine given along tunneled area. Tunnel created. [id number] [address] Symetrex long term  14.5 Fr by 19 cm HD catheter ( Lot   [id number]  Exp  02.15.2028)  tunneled and inserted through sheath. Peel away sheath and wire removed. 1448 Both blue and brown ports aspirate and inject appropriately per Dr Renato Wills. Catheter ready to use for dialysis. 1451 2.1 units Heparin instilled into red and 2.1 units Heparin instilled intoblue ports. 1452 Skin affix to right IJ site per NH tech. 1454 Sutures prolene applied to catheter site per NH tech. 1455 Right IJ site dressed with  Skin affix. Catheter site dressed with biopatch,guaze, and tegaderm. Both sites without bleeding at this time. Pt tolerated procedure fair as he was very restless on the table constantly moving extremities and head. .  VSS. Pt. was given support and reassurance throughout procedure. 1508 Report given to Eva Irvin ,nurse on floor.

## 2023-08-07 NOTE — PLAN OF CARE
Problem: Discharge Planning  Goal: Discharge to home or other facility with appropriate resources  Outcome: Progressing  Flowsheets (Taken 8/6/2023 1900)  Discharge to home or other facility with appropriate resources: Identify barriers to discharge with patient and caregiver     Problem: Skin/Tissue Integrity  Goal: Absence of new skin breakdown  Description: 1. Monitor for areas of redness and/or skin breakdown  2. Assess vascular access sites hourly  3. Every 4-6 hours minimum:  Change oxygen saturation probe site  4. Every 4-6 hours:  If on nasal continuous positive airway pressure, respiratory therapy assess nares and determine need for appliance change or resting period.   Outcome: Progressing     Problem: Safety - Adult  Goal: Free from fall injury  Outcome: Progressing     Problem: Chronic Conditions and Co-morbidities  Goal: Patient's chronic conditions and co-morbidity symptoms are monitored and maintained or improved  Outcome: Progressing  Flowsheets (Taken 8/6/2023 1900)  Care Plan - Patient's Chronic Conditions and Co-Morbidity Symptoms are Monitored and Maintained or Improved: Monitor and assess patient's chronic conditions and comorbid symptoms for stability, deterioration, or improvement

## 2023-08-08 ENCOUNTER — POST-OP TELEPHONE (OUTPATIENT)
Dept: INTERVENTIONAL RADIOLOGY/VASCULAR | Age: 78
End: 2023-08-08

## 2023-08-08 VITALS
HEIGHT: 63 IN | SYSTOLIC BLOOD PRESSURE: 123 MMHG | RESPIRATION RATE: 18 BRPM | BODY MASS INDEX: 23.34 KG/M2 | OXYGEN SATURATION: 96 % | DIASTOLIC BLOOD PRESSURE: 51 MMHG | HEART RATE: 85 BPM | WEIGHT: 131.7 LBS | TEMPERATURE: 98.1 F

## 2023-08-08 PROBLEM — J96.01 ACUTE RESPIRATORY FAILURE WITH HYPOXIA (HCC): Status: RESOLVED | Noted: 2023-07-19 | Resolved: 2023-08-08

## 2023-08-08 PROBLEM — R94.31 ACUTE ELECTROCARDIOGRAM CHANGES: Status: RESOLVED | Noted: 2023-07-21 | Resolved: 2023-08-08

## 2023-08-08 LAB
ALBUMIN SERPL-MCNC: 2.8 G/DL (ref 3.5–4.6)
ANION GAP SERPL CALCULATED.3IONS-SCNC: 12 MEQ/L (ref 9–15)
BASOPHILS # BLD: 0.1 K/UL (ref 0–0.2)
BASOPHILS NFR BLD: 1.1 %
BUN SERPL-MCNC: 14 MG/DL (ref 8–23)
CALCIUM SERPL-MCNC: 8.2 MG/DL (ref 8.5–9.9)
CHLORIDE SERPL-SCNC: 97 MEQ/L (ref 95–107)
CO2 SERPL-SCNC: 26 MEQ/L (ref 20–31)
CREAT SERPL-MCNC: 3.07 MG/DL (ref 0.5–0.9)
EOSINOPHIL # BLD: 0 K/UL (ref 0–0.7)
EOSINOPHIL NFR BLD: 0.8 %
ERYTHROCYTE [DISTWIDTH] IN BLOOD BY AUTOMATED COUNT: 15.7 % (ref 11.5–14.5)
GLUCOSE BLD-MCNC: 107 MG/DL (ref 70–99)
GLUCOSE BLD-MCNC: 140 MG/DL (ref 70–99)
GLUCOSE SERPL-MCNC: 104 MG/DL (ref 70–99)
HCT VFR BLD AUTO: 22.1 % (ref 37–47)
HGB BLD-MCNC: 7.4 G/DL (ref 12–16)
LYMPHOCYTES # BLD: 0.6 K/UL (ref 1–4.8)
LYMPHOCYTES NFR BLD: 10.5 %
MCH RBC QN AUTO: 26.6 PG (ref 27–31.3)
MCHC RBC AUTO-ENTMCNC: 33.4 % (ref 33–37)
MCV RBC AUTO: 79.7 FL (ref 79.4–94.8)
MONOCYTES # BLD: 0.5 K/UL (ref 0.2–0.8)
MONOCYTES NFR BLD: 8.4 %
NEUTROPHILS # BLD: 4.8 K/UL (ref 1.4–6.5)
NEUTS SEG NFR BLD: 79.2 %
PERFORMED ON: ABNORMAL
PERFORMED ON: ABNORMAL
PHOSPHATE SERPL-MCNC: 3.4 MG/DL (ref 2.3–4.8)
PLATELET # BLD AUTO: 330 K/UL (ref 130–400)
POTASSIUM SERPL-SCNC: 3.4 MEQ/L (ref 3.4–4.9)
RBC # BLD AUTO: 2.78 M/UL (ref 4.2–5.4)
SODIUM SERPL-SCNC: 135 MEQ/L (ref 135–144)
WBC # BLD AUTO: 6.1 K/UL (ref 4.8–10.8)

## 2023-08-08 PROCEDURE — 6370000000 HC RX 637 (ALT 250 FOR IP): Performed by: INTERNAL MEDICINE

## 2023-08-08 PROCEDURE — 80069 RENAL FUNCTION PANEL: CPT

## 2023-08-08 PROCEDURE — 36415 COLL VENOUS BLD VENIPUNCTURE: CPT

## 2023-08-08 PROCEDURE — 85025 COMPLETE CBC W/AUTO DIFF WBC: CPT

## 2023-08-08 PROCEDURE — 6360000002 HC RX W HCPCS: Performed by: INTERNAL MEDICINE

## 2023-08-08 PROCEDURE — 2580000003 HC RX 258: Performed by: INTERNAL MEDICINE

## 2023-08-08 PROCEDURE — 97116 GAIT TRAINING THERAPY: CPT

## 2023-08-08 RX ORDER — ASPIRIN 81 MG/1
81 TABLET, CHEWABLE ORAL DAILY
Qty: 30 TABLET | Refills: 3 | Status: SHIPPED | OUTPATIENT
Start: 2023-08-09

## 2023-08-08 RX ORDER — DILTIAZEM HYDROCHLORIDE 120 MG/1
120 CAPSULE, COATED, EXTENDED RELEASE ORAL DAILY
Qty: 30 CAPSULE | Refills: 3 | Status: SHIPPED | OUTPATIENT
Start: 2023-08-09

## 2023-08-08 RX ORDER — DILTIAZEM HYDROCHLORIDE 120 MG/1
120 CAPSULE, COATED, EXTENDED RELEASE ORAL DAILY
Status: DISCONTINUED | OUTPATIENT
Start: 2023-08-08 | End: 2023-08-08 | Stop reason: HOSPADM

## 2023-08-08 RX ORDER — CARVEDILOL 12.5 MG/1
37.5 TABLET ORAL 2 TIMES DAILY
Qty: 120 TABLET | Refills: 2 | Status: SHIPPED | OUTPATIENT
Start: 2023-08-08

## 2023-08-08 RX ORDER — ONDANSETRON 4 MG/1
4 TABLET, ORALLY DISINTEGRATING ORAL EVERY 8 HOURS PRN
Qty: 30 TABLET | Refills: 1 | Status: SHIPPED | OUTPATIENT
Start: 2023-08-08

## 2023-08-08 RX ORDER — TORSEMIDE 100 MG/1
100 TABLET ORAL DAILY
Qty: 30 TABLET | Refills: 3 | Status: SHIPPED | OUTPATIENT
Start: 2023-08-09

## 2023-08-08 RX ADMIN — DILTIAZEM HYDROCHLORIDE 120 MG: 120 CAPSULE, COATED, EXTENDED RELEASE ORAL at 08:30

## 2023-08-08 RX ADMIN — Medication 400 MG: at 08:24

## 2023-08-08 RX ADMIN — PANTOPRAZOLE SODIUM 40 MG: 40 TABLET, DELAYED RELEASE ORAL at 08:24

## 2023-08-08 RX ADMIN — TORSEMIDE 100 MG: 100 TABLET ORAL at 11:00

## 2023-08-08 RX ADMIN — HYDRALAZINE HYDROCHLORIDE 100 MG: 100 TABLET, FILM COATED ORAL at 08:24

## 2023-08-08 RX ADMIN — MYCOPHENOLATE MOFETIL 250 MG: 250 CAPSULE ORAL at 08:30

## 2023-08-08 RX ADMIN — CARVEDILOL 37.5 MG: 25 TABLET, FILM COATED ORAL at 08:24

## 2023-08-08 RX ADMIN — CLONIDINE HYDROCHLORIDE 0.2 MG: 0.1 TABLET ORAL at 08:24

## 2023-08-08 RX ADMIN — Medication 10 ML: at 08:25

## 2023-08-08 RX ADMIN — ATORVASTATIN CALCIUM 20 MG: 20 TABLET, FILM COATED ORAL at 08:24

## 2023-08-08 RX ADMIN — ASPIRIN 81 MG 81 MG: 81 TABLET ORAL at 08:24

## 2023-08-08 RX ADMIN — ISOSORBIDE MONONITRATE 60 MG: 60 TABLET, EXTENDED RELEASE ORAL at 08:24

## 2023-08-08 ASSESSMENT — ENCOUNTER SYMPTOMS
BLOOD IN STOOL: 0
COUGH: 0
EYES NEGATIVE: 1
WHEEZING: 0
GASTROINTESTINAL NEGATIVE: 1
STRIDOR: 0
NAUSEA: 0
SHORTNESS OF BREATH: 1
CHEST TIGHTNESS: 0

## 2023-08-08 NOTE — PLAN OF CARE
Problem: Discharge Planning  Goal: Discharge to home or other facility with appropriate resources  8/8/2023 1356 by Yo Hutchinson RN  Outcome: Adequate for Discharge  Flowsheets (Taken 8/8/2023 0800)  Discharge to home or other facility with appropriate resources:   Identify barriers to discharge with patient and caregiver   Arrange for needed discharge resources and transportation as appropriate   Identify discharge learning needs (meds, wound care, etc)  8/8/2023 0109 by Antonio Pozo RN  Outcome: Progressing     Problem: Skin/Tissue Integrity  Goal: Absence of new skin breakdown  Description: 1. Monitor for areas of redness and/or skin breakdown  2. Assess vascular access sites hourly  3. Every 4-6 hours minimum:  Change oxygen saturation probe site  4. Every 4-6 hours:  If on nasal continuous positive airway pressure, respiratory therapy assess nares and determine need for appliance change or resting period.   8/8/2023 1356 by Yo Hutchinson RN  Outcome: Adequate for Discharge  8/8/2023 0109 by Antonio Pozo RN  Outcome: Progressing     Problem: Safety - Adult  Goal: Free from fall injury  8/8/2023 1356 by Yo Hutchinson RN  Outcome: Adequate for Discharge  8/8/2023 0109 by Antonio Pozo RN  Outcome: Progressing     Problem: Chronic Conditions and Co-morbidities  Goal: Patient's chronic conditions and co-morbidity symptoms are monitored and maintained or improved  8/8/2023 1356 by Yo Hutchinson RN  Outcome: Adequate for Discharge  Flowsheets (Taken 8/8/2023 0800)  Care Plan - Patient's Chronic Conditions and Co-Morbidity Symptoms are Monitored and Maintained or Improved:   Monitor and assess patient's chronic conditions and comorbid symptoms for stability, deterioration, or improvement   Collaborate with multidisciplinary team to address chronic and comorbid conditions and prevent exacerbation or deterioration   Update acute care plan with appropriate goals if chronic or comorbid symptoms are exacerbated and

## 2023-08-08 NOTE — FLOWSHEET NOTE
Spoke to patient to follow up with her after her temporary dialysis catheter was converted into a tunneled dialysis catheter yesterday. Per patient, she was discharged home today. Patient has her next hemodialysis on Thursday. Patient has no pain or drainage from site. Patient has no additional questions or concerns at this time.

## 2023-08-08 NOTE — PLAN OF CARE
Problem: Discharge Planning  Goal: Discharge to home or other facility with appropriate resources  Outcome: Progressing     Problem: Skin/Tissue Integrity  Goal: Absence of new skin breakdown  Description: 1. Monitor for areas of redness and/or skin breakdown  2. Assess vascular access sites hourly  3. Every 4-6 hours minimum:  Change oxygen saturation probe site  4. Every 4-6 hours:  If on nasal continuous positive airway pressure, respiratory therapy assess nares and determine need for appliance change or resting period.   Outcome: Progressing     Problem: Safety - Adult  Goal: Free from fall injury  Outcome: Progressing     Problem: Chronic Conditions and Co-morbidities  Goal: Patient's chronic conditions and co-morbidity symptoms are monitored and maintained or improved  Outcome: Progressing     Problem: Pain  Goal: Verbalizes/displays adequate comfort level or baseline comfort level  Outcome: Progressing     Problem: Nutrition Deficit:  Goal: Optimize nutritional status  8/8/2023 0109 by Anitra Joshi RN  Outcome: Progressing  8/7/2023 1218 by Hyun Logan RD, LD  Flowsheets (Taken 7/26/2023 1331)  Nutrient intake appropriate for improving, restoring, or maintaining nutritional needs:   Assess nutritional status and recommend course of action   Monitor oral intake, labs, and treatment plans   Recommend appropriate diets, oral nutritional supplements, and vitamin/mineral supplements

## 2023-08-09 LAB
MISCELLANEOUS LAB TEST ORDER: NORMAL
WHOPPER PROMPT: NORMAL

## 2023-08-10 LAB
BACTERIA BLD CULT ORG #2: NORMAL
BACTERIA BLD CULT: NORMAL

## 2023-09-08 DIAGNOSIS — N18.6 ESRD (END STAGE RENAL DISEASE) (HCC): Primary | ICD-10-CM

## 2023-10-19 DIAGNOSIS — N18.6 ESRD (END STAGE RENAL DISEASE) (HCC): Primary | ICD-10-CM

## 2023-10-20 ENCOUNTER — OFFICE VISIT (OUTPATIENT)
Dept: INTERVENTIONAL RADIOLOGY/VASCULAR | Age: 78
End: 2023-10-20
Payer: MEDICARE

## 2023-10-20 VITALS
SYSTOLIC BLOOD PRESSURE: 122 MMHG | BODY MASS INDEX: 23.21 KG/M2 | DIASTOLIC BLOOD PRESSURE: 60 MMHG | OXYGEN SATURATION: 97 % | WEIGHT: 131 LBS | HEIGHT: 63 IN | HEART RATE: 94 BPM

## 2023-10-20 DIAGNOSIS — N18.6 ESRD (END STAGE RENAL DISEASE) (HCC): Primary | ICD-10-CM

## 2023-10-20 PROCEDURE — 1123F ACP DISCUSS/DSCN MKR DOCD: CPT | Performed by: NURSE PRACTITIONER

## 2023-10-20 PROCEDURE — 99214 OFFICE O/P EST MOD 30 MIN: CPT | Performed by: NURSE PRACTITIONER

## 2023-10-20 PROCEDURE — 3074F SYST BP LT 130 MM HG: CPT | Performed by: NURSE PRACTITIONER

## 2023-10-20 PROCEDURE — 3078F DIAST BP <80 MM HG: CPT | Performed by: NURSE PRACTITIONER

## 2023-10-20 ASSESSMENT — ENCOUNTER SYMPTOMS
NAUSEA: 0
ALLERGIC/IMMUNOLOGIC NEGATIVE: 1
RESPIRATORY NEGATIVE: 1
SHORTNESS OF BREATH: 0
EYES NEGATIVE: 1
GASTROINTESTINAL NEGATIVE: 1
VOMITING: 0

## 2023-10-20 NOTE — PROGRESS NOTES
PLAN:    Chart review as noted of referring Nephrology faxed notes. See in Media section. Entire Medication list reviewed for pre operative examination from both faxed information from nephrology and Epic. See Media section. Above labs reviewed. --  --  BUE US vein mapping completed in IR clinic today. Patient is a potential candidate for RUE endovascular AV Fistula placement with ulnar to ulnar creation and cephalic vein outflow. LUE is not appropriate for endovascular AVF creation due to small diameter of  and difficult to visualize connection between vessels      Plan:     --Percutaneous endovascular AV fistula placement procedure with risks including infection, bleeding, steal syndrome, arterial dissection, arterial thrombosis, pseudoaneurysm, failure to mature, venous thrombosis, Contrast allergy that could potentially cause severe adverse event   Pros and cons of endovascular fistula placement as opposed to surgical discussed with patient including quicker maturation phase, less occurrence of complications/thrombus/stenosis, and if failure of endovascular aVF patient can always revert to surgical creation. Procedure to be done with IV conscious sedation and UE nerve block. Patient wishes to proceed. --  INR    --  No blood draws or needle sticks to RUE    --  Follow up US scans in IR clinic every two weeks after placement to evaluate maturation. Thank You Dr. Luis Alberto Pedersen for referral of your patient to our practice for care. Earline Cranker, APRN - CNP  Staff Vascular Medicine and Interventional Radiology Nurse Practitioner  [facility]    Please note this report has been partially produced using speech recognition software and contain errors related to that system including grammar, punctuation, spelling, and words/phrases that may seem inappropriate. If there are questions or concerns please feel free to contact me to clarify.

## 2023-10-27 ENCOUNTER — TELEPHONE (OUTPATIENT)
Dept: INTERVENTIONAL RADIOLOGY/VASCULAR | Age: 78
End: 2023-10-27

## 2023-10-27 NOTE — TELEPHONE ENCOUNTER
Patient was given this information via phone conversation - voiced understanding  2.   Spoke to [employer] in [facility] to schedule procedure    >  YOUR PROCEDURE IS SCHEDULED ON: 11/13/2023 @ 9:00 am   >  You will need to arrive at 8:00 am from home and check in at the Diagnostic Imaging Check In desk.   >  Do not eat or drink after midnight.    >  Make arrangements for transportation, as you should not drive immediately after.  >  Patient to have PT/INR drawn anytime between now and 1 day prior to procedure  >  NO IV OR NEEDLE STICKS TO RIGHT ARM

## 2024-01-10 DIAGNOSIS — N18.6 ESRD (END STAGE RENAL DISEASE) (HCC): Primary | ICD-10-CM

## 2024-01-12 ENCOUNTER — TELEPHONE (OUTPATIENT)
Dept: INTERVENTIONAL RADIOLOGY/VASCULAR | Age: 79
End: 2024-01-12

## 2024-01-12 NOTE — TELEPHONE ENCOUNTER
Patient was given this information via phone conversation - voiced understanding  2.  Spoke to [employer] in [place] to schedule procedure    >  YOUR PROCEDURE IS SCHEDULED ON: 1/18/2024 @ 10:00 am   >  You will need to arrive at 9:30 am from home and check in at the Diagnostic Imaging Check In desk.   >  Patient to hold ASA for 5 days prior to procedure  >  Patient to have PT/INR drawn anytime between now and 1 day prior to procedure

## 2024-01-22 DIAGNOSIS — N18.6 ESRD (END STAGE RENAL DISEASE) (HCC): ICD-10-CM

## 2024-01-22 LAB
INR PPP: 0.9
PROTHROMBIN TIME: 12.9 SEC (ref 12.3–14.9)

## 2024-01-24 ENCOUNTER — HOSPITAL ENCOUNTER (OUTPATIENT)
Dept: INTERVENTIONAL RADIOLOGY/VASCULAR | Age: 79
Discharge: HOME | End: 2024-01-26
Payer: MEDICARE

## 2024-01-24 VITALS
HEART RATE: 88 BPM | SYSTOLIC BLOOD PRESSURE: 141 MMHG | RESPIRATION RATE: 14 BRPM | DIASTOLIC BLOOD PRESSURE: 65 MMHG | OXYGEN SATURATION: 97 %

## 2024-01-24 DIAGNOSIS — N18.6 ESRD (END STAGE RENAL DISEASE) (HCC): ICD-10-CM

## 2024-01-24 PROCEDURE — 2500000003 HC RX 250 WO HCPCS: Performed by: NURSE PRACTITIONER

## 2024-01-24 PROCEDURE — 36589 REMOVAL TUNNELED CV CATH: CPT

## 2024-01-24 PROCEDURE — 77001 FLUOROGUIDE FOR VEIN DEVICE: CPT

## 2024-01-24 PROCEDURE — 2709999900 IR REMOVE TUNNELED CVAD WO SQ PORT/PUMP

## 2024-01-24 RX ORDER — LIDOCAINE HYDROCHLORIDE 20 MG/ML
INJECTION, SOLUTION INFILTRATION; PERINEURAL PRN
Status: COMPLETED | OUTPATIENT
Start: 2024-01-24 | End: 2024-01-24

## 2024-01-24 RX ADMIN — LIDOCAINE HYDROCHLORIDE 13 ML: 20 INJECTION, SOLUTION INFILTRATION; PERINEURAL at 10:58

## 2024-01-24 NOTE — OR NURSING
TUNNELED HD REMOVAL - NO SEDATION    1030 - Pt ambulated to [facility], steady independent gait, A&Ox4. Procedure explained. Consent obtained. Into gown and assisted onto procedure table. Allergies and home medications reviewed. Patient confirms ASA has been on hold x5 days as instructed. Right chest permcath site dressing removed site assessed, cleansed generously with chloroprep. After 3 minute dry time, draped in sterile fashion.     1054 - Maggy Holly CNP here speaking with pt.      1057 - Time out for procedure performed.     1058 - RIGHT chest site numbed with 2% lidocaine around catheter site.     1106 - With assist of  hemostats, Maggy Holly CNP removed tunneled HD 15.5F catheter 19 cm. Catheter is intact. Moderate manual pressure initiated above clavicle site by Rad tech - AR.  Pt tolerating well.     1116 - Hemostasis achieved at site.  Steri strips applied, with a guaze and Tegaderm dressing. No bleeding noted.  Final fluoro image taken.     1120 - Pt assisted off table. D/C instructions reviewed and understanding indicated. Pt ambulated to [facility], steady gait. Electronically signed by Diana Marley RN on 1/24/2024 at 11:27 AM

## 2024-01-24 NOTE — DISCHARGE INSTRUCTIONS
DISCHARGE INSTRUCTIONS FOR TUNNELED HEMODIALYSIS CATHETER PLACEMENT        Activity:  Rest, avoid strenuous activity such as bending or lifting heavy objects. The site where your catheter was placed may be sore, bruised, minimally swelling or slightly bleeding.     Diet:  Resume usual diet.    Medication:  * Resume home medication unless otherwise instructed by your physician.  * (If Applicable) If taking any blood thinners or Aspirin, speak with your physician before restarting them.  * May take mild pain reliever, as needed, such as Acetaminophen or Ibuprofen.    INSTRUCTIONS OR COMMENTS RELATIVE TO SPECIFIC EXAM PERFORMED:    - Never immerse your catheter in water, no swimming, hot tubs or tub baths.  - May sponge bath, keep dressing site clean and dry.  - Do not lift anything over 10 pounds for the next 48 hours.     - Monitor the site for the next 24 - 48 hours.  - For large amount of bleeding or drainage, Go to ER for evaluation.  - If any signs of infection (redness, swelling, drainage/pus) at the site, Go to ER for evaluation.     IF YOU DEVELOP ANY OF THE FOLLOWING SYMPTOMS, CONTACT PHYSICIAN LISTED BELOW:  Physician performing procedure: DR. Ivy - [phone] or [phone] Ask to be put in contact with radiology RN.   After hours contact ER.  The cuff of the catheter is outside of the skin.  Extreme pain that increases after leaving the hospital.  Active bleeding (refer to above instructions).  Chills, fever above 100 degrees Farenheit and fatigue.  Weakness, dizziness, lightheadedness or fainting.    If you are unable to contact any of the above physician and you feel you have a major problem, please go to the Emergency Room for treatment.

## 2024-01-25 ENCOUNTER — TELEPHONE (OUTPATIENT)
Dept: INTERVENTIONAL RADIOLOGY/VASCULAR | Age: 79
End: 2024-01-25

## 2024-01-25 NOTE — TELEPHONE ENCOUNTER
Post procedure phone call made to patient after tunneled HD cath removal yesterday. Pt states she's doing well, but sore. Denies bleeding or drainage at site. Denies questions or needs.

## 2024-01-29 ENCOUNTER — APPOINTMENT (OUTPATIENT)
Dept: CT IMAGING | Age: 79
DRG: 308 | End: 2024-01-29
Payer: COMMERCIAL

## 2024-01-29 ENCOUNTER — HOSPITAL ENCOUNTER (INPATIENT)
Age: 79
LOS: 2 days | Discharge: HOME | DRG: 308 | End: 2024-01-31
Attending: EMERGENCY MEDICINE | Admitting: INTERNAL MEDICINE
Payer: COMMERCIAL

## 2024-01-29 ENCOUNTER — APPOINTMENT (OUTPATIENT)
Dept: ULTRASOUND IMAGING | Age: 79
DRG: 308 | End: 2024-01-29
Payer: COMMERCIAL
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
## 2024-01-29 DIAGNOSIS — R55 SYNCOPE AND COLLAPSE: ICD-10-CM

## 2024-01-29 LAB
ABO + RH BLD: NORMAL
ANION GAP SERPL CALCULATED.3IONS-SCNC: 14 MEQ/L (ref 9–15)
APTT PPP: 23.1 SEC (ref 24.4–36.8)
BASOPHILS # BLD: 0 K/UL (ref 0–0.2)
BASOPHILS NFR BLD: 0.7 %
BLD GP AB SCN SERPL QL: NORMAL
BUN SERPL-MCNC: 60 MG/DL (ref 8–23)
CALCIUM SERPL-MCNC: 9.6 MG/DL (ref 8.5–9.9)
CHLORIDE SERPL-SCNC: 93 MEQ/L (ref 95–107)
CO2 SERPL-SCNC: 30 MEQ/L (ref 20–31)
CREAT SERPL-MCNC: 2.55 MG/DL (ref 0.5–0.9)
EOSINOPHIL # BLD: 0.2 K/UL (ref 0–0.7)
EOSINOPHIL NFR BLD: 3.8 %
ERYTHROCYTE [DISTWIDTH] IN BLOOD BY AUTOMATED COUNT: 13 % (ref 11.5–14.5)
ETHANOL PERCENT: NORMAL G/DL
ETHANOLAMINE SERPL-MCNC: <10 MG/DL (ref 0–0.08)
GLUCOSE SERPL-MCNC: 124 MG/DL (ref 70–99)
HCT VFR BLD AUTO: 31.2 % (ref 37–47)
HGB BLD-MCNC: 10.3 G/DL (ref 12–16)
INR PPP: 0.9
LYMPHOCYTES # BLD: 1.1 K/UL (ref 1–4.8)
LYMPHOCYTES NFR BLD: 18 %
MCH RBC QN AUTO: 29 PG (ref 27–31.3)
MCHC RBC AUTO-ENTMCNC: 33 % (ref 33–37)
MCV RBC AUTO: 87.9 FL (ref 79.4–94.8)
MONOCYTES # BLD: 0.5 K/UL (ref 0.2–0.8)
MONOCYTES NFR BLD: 8.3 %
NEUTROPHILS # BLD: 4.1 K/UL (ref 1.4–6.5)
NEUTS SEG NFR BLD: 68 %
PLATELET # BLD AUTO: 244 K/UL (ref 130–400)
POTASSIUM SERPL-SCNC: 3.7 MEQ/L (ref 3.4–4.9)
PROTHROMBIN TIME: 12.8 SEC (ref 12.3–14.9)
RBC # BLD AUTO: 3.55 M/UL (ref 4.2–5.4)
SODIUM SERPL-SCNC: 137 MEQ/L (ref 135–144)
TROPONIN, HIGH SENSITIVITY: 39 NG/L (ref 0–19)
TROPONIN, HIGH SENSITIVITY: 41 NG/L (ref 0–19)
WBC # BLD AUTO: 6 K/UL (ref 4.8–10.8)

## 2024-01-29 PROCEDURE — 86901 BLOOD TYPING SEROLOGIC RH(D): CPT

## 2024-01-29 PROCEDURE — 86850 RBC ANTIBODY SCREEN: CPT

## 2024-01-29 PROCEDURE — 80048 BASIC METABOLIC PNL TOTAL CA: CPT

## 2024-01-29 PROCEDURE — 71250 CT THORAX DX C-: CPT

## 2024-01-29 PROCEDURE — 82077 ASSAY SPEC XCP UR&BREATH IA: CPT

## 2024-01-29 PROCEDURE — 2580000003 HC RX 258: Performed by: EMERGENCY MEDICINE

## 2024-01-29 PROCEDURE — 84484 ASSAY OF TROPONIN QUANT: CPT

## 2024-01-29 PROCEDURE — 86900 BLOOD TYPING SEROLOGIC ABO: CPT

## 2024-01-29 PROCEDURE — 70450 CT HEAD/BRAIN W/O DYE: CPT

## 2024-01-29 PROCEDURE — 85025 COMPLETE CBC W/AUTO DIFF WBC: CPT

## 2024-01-29 PROCEDURE — 36415 COLL VENOUS BLD VENIPUNCTURE: CPT

## 2024-01-29 PROCEDURE — 93880 EXTRACRANIAL BILAT STUDY: CPT

## 2024-01-29 PROCEDURE — 72125 CT NECK SPINE W/O DYE: CPT

## 2024-01-29 PROCEDURE — 85730 THROMBOPLASTIN TIME PARTIAL: CPT

## 2024-01-29 PROCEDURE — 6360000002 HC RX W HCPCS: Performed by: INTERNAL MEDICINE

## 2024-01-29 PROCEDURE — 99285 EMERGENCY DEPT VISIT HI MDM: CPT | Performed by: SURGERY

## 2024-01-29 PROCEDURE — 1210000000 HC MED SURG R&B

## 2024-01-29 PROCEDURE — 85610 PROTHROMBIN TIME: CPT

## 2024-01-29 PROCEDURE — 96365 THER/PROPH/DIAG IV INF INIT: CPT

## 2024-01-29 PROCEDURE — 99285 EMERGENCY DEPT VISIT HI MDM: CPT

## 2024-01-29 PROCEDURE — 93005 ELECTROCARDIOGRAM TRACING: CPT | Performed by: SURGERY

## 2024-01-29 RX ORDER — POLYETHYLENE GLYCOL 3350 17 G/17G
17 POWDER, FOR SOLUTION ORAL DAILY PRN
Status: DISCONTINUED | OUTPATIENT
Start: 2024-01-29 | End: 2024-01-31 | Stop reason: HOSPADM

## 2024-01-29 RX ORDER — POTASSIUM CHLORIDE 20 MEQ/1
40 TABLET, EXTENDED RELEASE ORAL PRN
Status: DISCONTINUED | OUTPATIENT
Start: 2024-01-29 | End: 2024-01-29

## 2024-01-29 RX ORDER — LACTULOSE 10 G/15ML
20 SOLUTION ORAL DAILY
COMMUNITY

## 2024-01-29 RX ORDER — FLUTICASONE PROPIONATE 50 MCG
1 SPRAY, SUSPENSION (ML) NASAL DAILY
Status: DISCONTINUED | OUTPATIENT
Start: 2024-01-30 | End: 2024-01-31 | Stop reason: HOSPADM

## 2024-01-29 RX ORDER — SODIUM CHLORIDE 0.9 % (FLUSH) 0.9 %
10 SYRINGE (ML) INJECTION PRN
Status: DISCONTINUED | OUTPATIENT
Start: 2024-01-29 | End: 2024-01-31 | Stop reason: HOSPADM

## 2024-01-29 RX ORDER — LANOLIN ALCOHOL/MO/W.PET/CERES
400 CREAM (GRAM) TOPICAL DAILY
Status: DISCONTINUED | OUTPATIENT
Start: 2024-01-30 | End: 2024-01-31 | Stop reason: HOSPADM

## 2024-01-29 RX ORDER — ONDANSETRON 4 MG/1
4 TABLET, ORALLY DISINTEGRATING ORAL EVERY 8 HOURS PRN
Status: DISCONTINUED | OUTPATIENT
Start: 2024-01-29 | End: 2024-01-31 | Stop reason: HOSPADM

## 2024-01-29 RX ORDER — TORSEMIDE 100 MG/1
100 TABLET ORAL EVERY 24 HOURS
Status: DISCONTINUED | OUTPATIENT
Start: 2024-01-30 | End: 2024-01-31 | Stop reason: HOSPADM

## 2024-01-29 RX ORDER — PANTOPRAZOLE SODIUM 40 MG/1
40 TABLET, DELAYED RELEASE ORAL DAILY
Status: DISCONTINUED | OUTPATIENT
Start: 2024-01-30 | End: 2024-01-31 | Stop reason: HOSPADM

## 2024-01-29 RX ORDER — SUCRALFATE ORAL 1 G/10ML
1 SUSPENSION ORAL 4 TIMES DAILY
COMMUNITY

## 2024-01-29 RX ORDER — ATORVASTATIN CALCIUM 20 MG/1
20 TABLET, FILM COATED ORAL DAILY
Status: DISCONTINUED | OUTPATIENT
Start: 2024-01-30 | End: 2024-01-31 | Stop reason: HOSPADM

## 2024-01-29 RX ORDER — POTASSIUM CHLORIDE 7.45 MG/ML
10 INJECTION INTRAVENOUS PRN
Status: DISCONTINUED | OUTPATIENT
Start: 2024-01-29 | End: 2024-01-29

## 2024-01-29 RX ORDER — SUCRALFATE 1 G/1
1 TABLET ORAL
Status: DISCONTINUED | OUTPATIENT
Start: 2024-01-30 | End: 2024-01-31 | Stop reason: HOSPADM

## 2024-01-29 RX ORDER — MAGNESIUM SULFATE IN WATER 40 MG/ML
2000 INJECTION, SOLUTION INTRAVENOUS PRN
Status: DISCONTINUED | OUTPATIENT
Start: 2024-01-29 | End: 2024-01-29

## 2024-01-29 RX ORDER — IPRATROPIUM BROMIDE 42 UG/1
2 SPRAY, METERED NASAL 4 TIMES DAILY
COMMUNITY

## 2024-01-29 RX ORDER — FOLIC ACID 1 MG/1
1 TABLET ORAL DAILY
COMMUNITY

## 2024-01-29 RX ORDER — IPRATROPIUM BROMIDE 42 UG/1
2 SPRAY, METERED NASAL 4 TIMES DAILY
Status: DISCONTINUED | OUTPATIENT
Start: 2024-01-29 | End: 2024-01-31 | Stop reason: HOSPADM

## 2024-01-29 RX ORDER — ONDANSETRON 2 MG/ML
4 INJECTION INTRAMUSCULAR; INTRAVENOUS EVERY 6 HOURS PRN
Status: DISCONTINUED | OUTPATIENT
Start: 2024-01-29 | End: 2024-01-31 | Stop reason: HOSPADM

## 2024-01-29 RX ORDER — HEPARIN SODIUM 5000 [USP'U]/ML
5000 INJECTION, SOLUTION INTRAVENOUS; SUBCUTANEOUS 2 TIMES DAILY
Status: DISCONTINUED | OUTPATIENT
Start: 2024-01-29 | End: 2024-01-31 | Stop reason: HOSPADM

## 2024-01-29 RX ORDER — SODIUM CHLORIDE 0.9 % (FLUSH) 0.9 %
10 SYRINGE (ML) INJECTION EVERY 12 HOURS SCHEDULED
Status: DISCONTINUED | OUTPATIENT
Start: 2024-01-29 | End: 2024-01-31 | Stop reason: HOSPADM

## 2024-01-29 RX ORDER — CARVEDILOL 25 MG/1
25 TABLET ORAL 2 TIMES DAILY
Status: DISCONTINUED | OUTPATIENT
Start: 2024-01-29 | End: 2024-01-31 | Stop reason: HOSPADM

## 2024-01-29 RX ORDER — FERROUS GLUCONATE 324(38)MG
324 TABLET ORAL
COMMUNITY

## 2024-01-29 RX ORDER — FLUTICASONE PROPIONATE 50 MCG
1 SPRAY, SUSPENSION (ML) NASAL DAILY
COMMUNITY

## 2024-01-29 RX ORDER — SODIUM CHLORIDE 9 MG/ML
INJECTION, SOLUTION INTRAVENOUS PRN
Status: DISCONTINUED | OUTPATIENT
Start: 2024-01-29 | End: 2024-01-31 | Stop reason: HOSPADM

## 2024-01-29 RX ORDER — 0.9 % SODIUM CHLORIDE 0.9 %
500 INTRAVENOUS SOLUTION INTRAVENOUS ONCE
Status: COMPLETED | OUTPATIENT
Start: 2024-01-29 | End: 2024-01-29

## 2024-01-29 RX ORDER — CLONIDINE HYDROCHLORIDE 0.2 MG/1
0.2 TABLET ORAL 2 TIMES DAILY
Status: DISCONTINUED | OUTPATIENT
Start: 2024-01-30 | End: 2024-01-31 | Stop reason: HOSPADM

## 2024-01-29 RX ADMIN — HEPARIN SODIUM 5000 UNITS: 5000 INJECTION INTRAVENOUS; SUBCUTANEOUS at 22:23

## 2024-01-29 RX ADMIN — SODIUM CHLORIDE 500 ML: 9 INJECTION, SOLUTION INTRAVENOUS at 16:59

## 2024-01-29 ASSESSMENT — PAIN DESCRIPTION - LOCATION
LOCATION: CHEST

## 2024-01-29 ASSESSMENT — ENCOUNTER SYMPTOMS
ABDOMINAL PAIN: 0
NAUSEA: 0
SHORTNESS OF BREATH: 0
VOMITING: 0
CHEST TIGHTNESS: 0
EYE PAIN: 0
SORE THROAT: 0

## 2024-01-29 ASSESSMENT — PAIN - FUNCTIONAL ASSESSMENT
PAIN_FUNCTIONAL_ASSESSMENT: NONE - DENIES PAIN
PAIN_FUNCTIONAL_ASSESSMENT: 0-10

## 2024-01-29 ASSESSMENT — PAIN SCALES - GENERAL
PAINLEVEL_OUTOF10: 5
PAINLEVEL_OUTOF10: 5
PAINLEVEL_OUTOF10: 7

## 2024-01-29 ASSESSMENT — LIFESTYLE VARIABLES
HOW OFTEN DO YOU HAVE A DRINK CONTAINING ALCOHOL: NEVER
HOW MANY STANDARD DRINKS CONTAINING ALCOHOL DO YOU HAVE ON A TYPICAL DAY: PATIENT DOES NOT DRINK
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
## 2024-01-30 ENCOUNTER — APPOINTMENT (OUTPATIENT)
Age: 79
DRG: 308 | End: 2024-01-30
Attending: INTERNAL MEDICINE
Payer: COMMERCIAL
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
## 2024-01-30 PROBLEM — G93.1 HYPOXIC ENCEPHALOPATHY (HCC): Status: ACTIVE | Noted: 2024-01-30

## 2024-01-30 PROBLEM — G90.01 CAROTID ARTERY HYPERSENSITIVITY: Status: ACTIVE | Noted: 2024-01-30

## 2024-01-30 PROBLEM — E43 SEVERE MALNUTRITION (HCC): Status: ACTIVE | Noted: 2024-01-30

## 2024-01-30 LAB
ANION GAP SERPL CALCULATED.3IONS-SCNC: 12 MEQ/L (ref 9–15)
BASOPHILS # BLD: 0.1 K/UL (ref 0–0.2)
BASOPHILS NFR BLD: 0.8 %
BUN SERPL-MCNC: 56 MG/DL (ref 8–23)
CALCIUM SERPL-MCNC: 9.1 MG/DL (ref 8.5–9.9)
CHLORIDE SERPL-SCNC: 97 MEQ/L (ref 95–107)
CO2 SERPL-SCNC: 28 MEQ/L (ref 20–31)
CREAT SERPL-MCNC: 2.32 MG/DL (ref 0.5–0.9)
ECHO AO ROOT DIAM: 2.4 CM
ECHO AO ROOT INDEX: 1.63 CM/M2
ECHO AV AREA PEAK VELOCITY: 1.5 CM2
ECHO AV AREA VTI: 1.5 CM2
ECHO AV AREA/BSA PEAK VELOCITY: 1 CM2/M2
ECHO AV AREA/BSA VTI: 1 CM2/M2
ECHO AV CUSP MM: 1.6 CM
ECHO AV MEAN GRADIENT: 3 MMHG
ECHO AV MEAN VELOCITY: 0.9 M/S
ECHO AV PEAK GRADIENT: 5 MMHG
ECHO AV PEAK VELOCITY: 1.1 M/S
ECHO AV VELOCITY RATIO: 0.73
ECHO AV VTI: 25.3 CM
ECHO BSA: 1.45 M2
ECHO EST RA PRESSURE: 3 MMHG
ECHO LA DIAMETER INDEX: 2.04 CM/M2
ECHO LA DIAMETER: 3 CM
ECHO LA TO AORTIC ROOT RATIO: 1.25
ECHO LA VOL A-L A2C: 27 ML (ref 22–52)
ECHO LA VOL A-L A4C: 32 ML (ref 22–52)
ECHO LA VOL MOD A2C: 24 ML (ref 22–52)
ECHO LA VOL MOD A4C: 29 ML (ref 22–52)
ECHO LA VOLUME AREA LENGTH: 30 ML
ECHO LA VOLUME INDEX A-L A2C: 18 ML/M2 (ref 16–34)
ECHO LA VOLUME INDEX A-L A4C: 22 ML/M2 (ref 16–34)
ECHO LA VOLUME INDEX AREA LENGTH: 20 ML/M2 (ref 16–34)
ECHO LA VOLUME INDEX MOD A2C: 16 ML/M2 (ref 16–34)
ECHO LA VOLUME INDEX MOD A4C: 20 ML/M2 (ref 16–34)
ECHO LV E' SEPTAL VELOCITY: 5 CM/S
ECHO LV EDV A2C: 47 ML
ECHO LV EDV A4C: 56 ML
ECHO LV EDV BP: 54 ML (ref 56–104)
ECHO LV EDV INDEX A4C: 38 ML/M2
ECHO LV EDV INDEX BP: 37 ML/M2
ECHO LV EDV NDEX A2C: 32 ML/M2
ECHO LV EJECTION FRACTION A2C: 47 %
ECHO LV EJECTION FRACTION A4C: 43 %
ECHO LV EJECTION FRACTION BIPLANE: 43 % (ref 55–100)
ECHO LV ESV A2C: 25 ML
ECHO LV ESV A4C: 32 ML
ECHO LV ESV BP: 30 ML (ref 19–49)
ECHO LV ESV INDEX A2C: 17 ML/M2
ECHO LV ESV INDEX A4C: 22 ML/M2
ECHO LV ESV INDEX BP: 20 ML/M2
ECHO LV FRACTIONAL SHORTENING: 21 % (ref 28–44)
ECHO LV INTERNAL DIMENSION DIASTOLE INDEX: 2.65 CM/M2
ECHO LV INTERNAL DIMENSION DIASTOLIC: 3.9 CM (ref 3.9–5.3)
ECHO LV INTERNAL DIMENSION SYSTOLIC INDEX: 2.11 CM/M2
ECHO LV INTERNAL DIMENSION SYSTOLIC: 3.1 CM
ECHO LV IVSD: 1.1 CM (ref 0.6–0.9)
ECHO LV IVSS: 1.2 CM
ECHO LV MASS 2D: 131 G (ref 67–162)
ECHO LV MASS INDEX 2D: 89.1 G/M2 (ref 43–95)
ECHO LV POSTERIOR WALL DIASTOLIC: 1 CM (ref 0.6–0.9)
ECHO LV POSTERIOR WALL SYSTOLIC: 1.4 CM
ECHO LV RELATIVE WALL THICKNESS RATIO: 0.51
ECHO LVOT AREA: 2.3 CM2
ECHO LVOT AV VTI INDEX: 0.67
ECHO LVOT DIAM: 1.7 CM
ECHO LVOT MEAN GRADIENT: 1 MMHG
ECHO LVOT PEAK GRADIENT: 2 MMHG
ECHO LVOT PEAK VELOCITY: 0.8 M/S
ECHO LVOT STROKE VOLUME INDEX: 26.2 ML/M2
ECHO LVOT SV: 38.6 ML
ECHO LVOT VTI: 17 CM
ECHO MV A VELOCITY: 1.06 M/S
ECHO MV E DECELERATION TIME (DT): 149.1 MS
ECHO MV E VELOCITY: 0.59 M/S
ECHO MV E/A RATIO: 0.56
ECHO MV REGURGITANT PEAK GRADIENT: 38 MMHG
ECHO MV REGURGITANT PEAK VELOCITY: 3.1 M/S
ECHO RIGHT VENTRICULAR SYSTOLIC PRESSURE (RVSP): 34 MMHG
ECHO RV INTERNAL DIMENSION: 1.1 CM
ECHO TV REGURGITANT MAX VELOCITY: 2.8 M/S
ECHO TV REGURGITANT PEAK GRADIENT: 31 MMHG
EKG ATRIAL RATE: 98 BPM
EKG P AXIS: 45 DEGREES
EKG P-R INTERVAL: 172 MS
EKG Q-T INTERVAL: 380 MS
EKG QRS DURATION: 78 MS
EKG QTC CALCULATION (BAZETT): 485 MS
EKG R AXIS: 17 DEGREES
EKG T AXIS: 42 DEGREES
EKG VENTRICULAR RATE: 98 BPM
EOSINOPHIL # BLD: 0.2 K/UL (ref 0–0.7)
EOSINOPHIL NFR BLD: 3.8 %
ERYTHROCYTE [DISTWIDTH] IN BLOOD BY AUTOMATED COUNT: 12.9 % (ref 11.5–14.5)
GLUCOSE SERPL-MCNC: 124 MG/DL (ref 70–99)
HCT VFR BLD AUTO: 30 % (ref 37–47)
HGB BLD-MCNC: 10.1 G/DL (ref 12–16)
LYMPHOCYTES # BLD: 1.5 K/UL (ref 1–4.8)
LYMPHOCYTES NFR BLD: 22.9 %
MCH RBC QN AUTO: 28.7 PG (ref 27–31.3)
MCHC RBC AUTO-ENTMCNC: 33.7 % (ref 33–37)
MCV RBC AUTO: 85.2 FL (ref 79.4–94.8)
MONOCYTES # BLD: 0.6 K/UL (ref 0.2–0.8)
MONOCYTES NFR BLD: 9.2 %
NEUTROPHILS # BLD: 4 K/UL (ref 1.4–6.5)
NEUTS SEG NFR BLD: 62.8 %
PERFORMED ON: ABNORMAL
PLATELET # BLD AUTO: 214 K/UL (ref 130–400)
POC CREATININE: 2.8 MG/DL (ref 0.6–1.2)
POC SAMPLE TYPE: ABNORMAL
POTASSIUM SERPL-SCNC: 3.9 MEQ/L (ref 3.4–4.9)
RBC # BLD AUTO: 3.52 M/UL (ref 4.2–5.4)
SODIUM SERPL-SCNC: 137 MEQ/L (ref 135–144)
WBC # BLD AUTO: 6.4 K/UL (ref 4.8–10.8)

## 2024-01-30 PROCEDURE — 6370000000 HC RX 637 (ALT 250 FOR IP): Performed by: INTERNAL MEDICINE

## 2024-01-30 PROCEDURE — 1210000000 HC MED SURG R&B

## 2024-01-30 PROCEDURE — APPSS45 APP SPLIT SHARED TIME 31-45 MINUTES: Performed by: NURSE PRACTITIONER

## 2024-01-30 PROCEDURE — 93321 DOPPLER ECHO F-UP/LMTD STD: CPT | Performed by: INTERNAL MEDICINE

## 2024-01-30 PROCEDURE — 2580000003 HC RX 258: Performed by: INTERNAL MEDICINE

## 2024-01-30 PROCEDURE — 93308 TTE F-UP OR LMTD: CPT | Performed by: INTERNAL MEDICINE

## 2024-01-30 PROCEDURE — 6360000002 HC RX W HCPCS: Performed by: INTERNAL MEDICINE

## 2024-01-30 PROCEDURE — 97165 OT EVAL LOW COMPLEX 30 MIN: CPT

## 2024-01-30 PROCEDURE — 97161 PT EVAL LOW COMPLEX 20 MIN: CPT

## 2024-01-30 PROCEDURE — 95816 EEG AWAKE AND DROWSY: CPT

## 2024-01-30 PROCEDURE — APPSS45 APP SPLIT SHARED TIME 31-45 MINUTES: Performed by: PHYSICIAN ASSISTANT

## 2024-01-30 PROCEDURE — 80048 BASIC METABOLIC PNL TOTAL CA: CPT

## 2024-01-30 PROCEDURE — 85025 COMPLETE CBC W/AUTO DIFF WBC: CPT

## 2024-01-30 PROCEDURE — 99255 IP/OBS CONSLTJ NEW/EST HI 80: CPT | Performed by: PSYCHIATRY & NEUROLOGY

## 2024-01-30 PROCEDURE — 93308 TTE F-UP OR LMTD: CPT

## 2024-01-30 PROCEDURE — 36415 COLL VENOUS BLD VENIPUNCTURE: CPT

## 2024-01-30 PROCEDURE — 93325 DOPPLER ECHO COLOR FLOW MAPG: CPT | Performed by: INTERNAL MEDICINE

## 2024-01-30 RX ORDER — ASPIRIN 81 MG/1
81 TABLET, CHEWABLE ORAL DAILY
Status: DISCONTINUED | OUTPATIENT
Start: 2024-01-30 | End: 2024-01-31 | Stop reason: HOSPADM

## 2024-01-30 RX ORDER — ACETAMINOPHEN 500 MG
1000 TABLET ORAL EVERY 8 HOURS PRN
Status: DISCONTINUED | OUTPATIENT
Start: 2024-01-30 | End: 2024-01-31 | Stop reason: HOSPADM

## 2024-01-30 RX ORDER — DILTIAZEM HYDROCHLORIDE 120 MG/1
120 CAPSULE, COATED, EXTENDED RELEASE ORAL DAILY
Status: DISCONTINUED | OUTPATIENT
Start: 2024-01-30 | End: 2024-01-31

## 2024-01-30 RX ORDER — ISOSORBIDE MONONITRATE 60 MG/1
60 TABLET, EXTENDED RELEASE ORAL DAILY
Status: DISCONTINUED | OUTPATIENT
Start: 2024-01-30 | End: 2024-01-31 | Stop reason: HOSPADM

## 2024-01-30 RX ORDER — LEVOTHYROXINE SODIUM 0.05 MG/1
50 TABLET ORAL DAILY
Status: DISCONTINUED | OUTPATIENT
Start: 2024-01-30 | End: 2024-01-31 | Stop reason: HOSPADM

## 2024-01-30 RX ADMIN — CARVEDILOL 25 MG: 25 TABLET, FILM COATED ORAL at 09:08

## 2024-01-30 RX ADMIN — DILTIAZEM HYDROCHLORIDE 120 MG: 120 CAPSULE, COATED, EXTENDED RELEASE ORAL at 13:32

## 2024-01-30 RX ADMIN — ATORVASTATIN CALCIUM 20 MG: 20 TABLET, FILM COATED ORAL at 09:08

## 2024-01-30 RX ADMIN — Medication 400 MG: at 09:08

## 2024-01-30 RX ADMIN — CLONIDINE HYDROCHLORIDE 0.2 MG: 0.2 TABLET ORAL at 09:08

## 2024-01-30 RX ADMIN — ASPIRIN 81 MG 81 MG: 81 TABLET ORAL at 13:32

## 2024-01-30 RX ADMIN — CARVEDILOL 25 MG: 25 TABLET, FILM COATED ORAL at 21:35

## 2024-01-30 RX ADMIN — IPRATROPIUM BROMIDE 2 SPRAY: 42 SPRAY NASAL at 21:35

## 2024-01-30 RX ADMIN — FLUTICASONE PROPIONATE 1 SPRAY: 50 SPRAY, METERED NASAL at 09:13

## 2024-01-30 RX ADMIN — ACETAMINOPHEN 1000 MG: 500 TABLET ORAL at 23:03

## 2024-01-30 RX ADMIN — ACETAMINOPHEN 1000 MG: 500 TABLET ORAL at 04:43

## 2024-01-30 RX ADMIN — HEPARIN SODIUM 5000 UNITS: 5000 INJECTION INTRAVENOUS; SUBCUTANEOUS at 09:07

## 2024-01-30 RX ADMIN — HEPARIN SODIUM 5000 UNITS: 5000 INJECTION INTRAVENOUS; SUBCUTANEOUS at 21:35

## 2024-01-30 RX ADMIN — PANTOPRAZOLE SODIUM 40 MG: 40 TABLET, DELAYED RELEASE ORAL at 09:08

## 2024-01-30 RX ADMIN — SODIUM CHLORIDE, PRESERVATIVE FREE 10 ML: 5 INJECTION INTRAVENOUS at 00:12

## 2024-01-30 RX ADMIN — SODIUM CHLORIDE, PRESERVATIVE FREE 10 ML: 5 INJECTION INTRAVENOUS at 21:37

## 2024-01-30 RX ADMIN — TORSEMIDE 100 MG: 100 TABLET ORAL at 13:31

## 2024-01-30 RX ADMIN — CLONIDINE HYDROCHLORIDE 0.2 MG: 0.2 TABLET ORAL at 21:35

## 2024-01-30 RX ADMIN — SUCRALFATE 1 G: 1 TABLET ORAL at 09:08

## 2024-01-30 RX ADMIN — SUCRALFATE 1 G: 1 TABLET ORAL at 13:32

## 2024-01-30 RX ADMIN — SODIUM CHLORIDE, PRESERVATIVE FREE 10 ML: 5 INJECTION INTRAVENOUS at 09:09

## 2024-01-30 RX ADMIN — CARVEDILOL 25 MG: 25 TABLET, FILM COATED ORAL at 00:10

## 2024-01-30 RX ADMIN — ISOSORBIDE MONONITRATE 60 MG: 60 TABLET, EXTENDED RELEASE ORAL at 13:36

## 2024-01-30 ASSESSMENT — PAIN SCALES - GENERAL
PAINLEVEL_OUTOF10: 6
PAINLEVEL_OUTOF10: 0
PAINLEVEL_OUTOF10: 3

## 2024-01-30 ASSESSMENT — PAIN DESCRIPTION - LOCATION
LOCATION: HAND
LOCATION: HEAD

## 2024-01-30 ASSESSMENT — ENCOUNTER SYMPTOMS
VOMITING: 0
NAUSEA: 0
CHEST TIGHTNESS: 0
COLOR CHANGE: 0
ABDOMINAL DISTENTION: 0
TROUBLE SWALLOWING: 0
COUGH: 0
SHORTNESS OF BREATH: 0
ABDOMINAL PAIN: 0
WHEEZING: 0

## 2024-01-30 ASSESSMENT — PAIN DESCRIPTION - DESCRIPTORS
DESCRIPTORS: ACHING
DESCRIPTORS: ACHING
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
## 2024-01-30 NOTE — PLAN OF CARE
Nutrition Problem #1: Severe malnutrition, In context of social or environmental circumstances  Intervention: Food and/or Nutrient Delivery: Continue Current Diet, Start Oral Nutrition Supplement  Nutritional
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
## 2024-01-30 NOTE — PROGRESS NOTES
Pt assessment completed earlier this AM.  Pt denied having any chest pain, no dizziness reported.  SBP elevated 180/65 P 80.  Reassessed SBP after routine AM meds including Clonidine and Coreg-153/57 P 78.  Orthostatic BP also completed-see flowsheets.  Pt denies any needs at this time.  Spouse at the bedside.
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
## 2024-01-30 NOTE — PLAN OF CARE
Problem: Safety - Adult  Goal: Free from fall injury  Outcome: Progressing  Flowsheets (Taken 1/29/2024 2110)  Free From Fall Injury:   Instruct family/caregiver on patient safety   Based on caregiver fall risk screen, instruct family/caregiver to ask for assistance with transferring infant if caregiver noted to have fall risk factors

## 2024-01-30 NOTE — ACP (ADVANCE CARE PLANNING)
Advance Care Planning   Healthcare Decision Maker:    Primary Decision Maker: Valentino Vega - Child - [phone]    Secondary Decision Maker: Renard Avendano - Boyfriend - [phone]    Click here to complete Healthcare Decision Makers including selection of the Healthcare Decision Maker Relationship (ie \"Primary\").
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
## 2024-01-30 NOTE — PROGRESS NOTES
Nutrition Education    Educated on ' Underweight Medical Nutrition Therapy' & \" High calorie High protein food lists & recipes\"  Learners: Patient  Readiness: Acceptance  Method: Explanation and Handout  Response: Verbalizes Understanding  Contact name and number provided.    TARIQ HURST RD, LD

## 2024-01-31 VITALS
RESPIRATION RATE: 18 BRPM | HEART RATE: 83 BPM | HEIGHT: 63 IN | WEIGHT: 104.94 LBS | BODY MASS INDEX: 18.59 KG/M2 | DIASTOLIC BLOOD PRESSURE: 53 MMHG | SYSTOLIC BLOOD PRESSURE: 108 MMHG | OXYGEN SATURATION: 98 % | TEMPERATURE: 98.7 F

## 2024-01-31 LAB
ANION GAP SERPL CALCULATED.3IONS-SCNC: 13 MEQ/L (ref 9–15)
BASOPHILS # BLD: 0 K/UL (ref 0–0.2)
BASOPHILS NFR BLD: 0.6 %
BUN SERPL-MCNC: 56 MG/DL (ref 8–23)
CALCIUM SERPL-MCNC: 9.6 MG/DL (ref 8.5–9.9)
CHLORIDE SERPL-SCNC: 97 MEQ/L (ref 95–107)
CO2 SERPL-SCNC: 29 MEQ/L (ref 20–31)
CREAT SERPL-MCNC: 2.31 MG/DL (ref 0.5–0.9)
EOSINOPHIL # BLD: 0.2 K/UL (ref 0–0.7)
EOSINOPHIL NFR BLD: 4.4 %
ERYTHROCYTE [DISTWIDTH] IN BLOOD BY AUTOMATED COUNT: 12.9 % (ref 11.5–14.5)
GLUCOSE SERPL-MCNC: 116 MG/DL (ref 70–99)
HCT VFR BLD AUTO: 31.5 % (ref 37–47)
HGB BLD-MCNC: 10.3 G/DL (ref 12–16)
LYMPHOCYTES # BLD: 1.6 K/UL (ref 1–4.8)
LYMPHOCYTES NFR BLD: 34.3 %
MCH RBC QN AUTO: 28.2 PG (ref 27–31.3)
MCHC RBC AUTO-ENTMCNC: 32.7 % (ref 33–37)
MCV RBC AUTO: 86.3 FL (ref 79.4–94.8)
MONOCYTES # BLD: 0.6 K/UL (ref 0.2–0.8)
MONOCYTES NFR BLD: 12.7 %
NEUTROPHILS # BLD: 2.2 K/UL (ref 1.4–6.5)
NEUTS SEG NFR BLD: 47.6 %
PLATELET # BLD AUTO: 197 K/UL (ref 130–400)
POTASSIUM SERPL-SCNC: 3.9 MEQ/L (ref 3.4–4.9)
RBC # BLD AUTO: 3.65 M/UL (ref 4.2–5.4)
SODIUM SERPL-SCNC: 139 MEQ/L (ref 135–144)
WBC # BLD AUTO: 4.7 K/UL (ref 4.8–10.8)

## 2024-01-31 PROCEDURE — 97116 GAIT TRAINING THERAPY: CPT

## 2024-01-31 PROCEDURE — 95816 EEG AWAKE AND DROWSY: CPT | Performed by: PSYCHIATRY & NEUROLOGY

## 2024-01-31 PROCEDURE — 6360000002 HC RX W HCPCS: Performed by: INTERNAL MEDICINE

## 2024-01-31 PROCEDURE — APPSS15 APP SPLIT SHARED TIME 0-15 MINUTES: Performed by: NURSE PRACTITIONER

## 2024-01-31 PROCEDURE — 99232 SBSQ HOSP IP/OBS MODERATE 35: CPT | Performed by: INTERNAL MEDICINE

## 2024-01-31 PROCEDURE — 85025 COMPLETE CBC W/AUTO DIFF WBC: CPT

## 2024-01-31 PROCEDURE — 80048 BASIC METABOLIC PNL TOTAL CA: CPT

## 2024-01-31 PROCEDURE — 2580000003 HC RX 258: Performed by: INTERNAL MEDICINE

## 2024-01-31 PROCEDURE — 6370000000 HC RX 637 (ALT 250 FOR IP): Performed by: INTERNAL MEDICINE

## 2024-01-31 PROCEDURE — 36415 COLL VENOUS BLD VENIPUNCTURE: CPT

## 2024-01-31 RX ORDER — CARVEDILOL 25 MG/1
25 TABLET ORAL 2 TIMES DAILY
Qty: 60 TABLET | Refills: 3 | Status: SHIPPED | OUTPATIENT
Start: 2024-01-31

## 2024-01-31 RX ORDER — HYDRALAZINE HYDROCHLORIDE 20 MG/ML
10 INJECTION INTRAMUSCULAR; INTRAVENOUS 2 TIMES DAILY
Status: DISCONTINUED | OUTPATIENT
Start: 2024-01-31 | End: 2024-01-31 | Stop reason: HOSPADM

## 2024-01-31 RX ADMIN — POLYETHYLENE GLYCOL 3350 17 G: 17 POWDER, FOR SOLUTION ORAL at 09:04

## 2024-01-31 RX ADMIN — HEPARIN SODIUM 5000 UNITS: 5000 INJECTION INTRAVENOUS; SUBCUTANEOUS at 09:14

## 2024-01-31 RX ADMIN — Medication 400 MG: at 09:02

## 2024-01-31 RX ADMIN — ASPIRIN 81 MG 81 MG: 81 TABLET ORAL at 09:03

## 2024-01-31 RX ADMIN — CLONIDINE HYDROCHLORIDE 0.2 MG: 0.2 TABLET ORAL at 09:03

## 2024-01-31 RX ADMIN — SUCRALFATE 1 G: 1 TABLET ORAL at 09:03

## 2024-01-31 RX ADMIN — TORSEMIDE 100 MG: 100 TABLET ORAL at 14:09

## 2024-01-31 RX ADMIN — LEVOTHYROXINE SODIUM 50 MCG: 0.05 TABLET ORAL at 05:00

## 2024-01-31 RX ADMIN — ISOSORBIDE MONONITRATE 60 MG: 60 TABLET, EXTENDED RELEASE ORAL at 09:03

## 2024-01-31 RX ADMIN — SUCRALFATE 1 G: 1 TABLET ORAL at 12:10

## 2024-01-31 RX ADMIN — PANTOPRAZOLE SODIUM 40 MG: 40 TABLET, DELAYED RELEASE ORAL at 09:03

## 2024-01-31 RX ADMIN — SODIUM CHLORIDE, PRESERVATIVE FREE 10 ML: 5 INJECTION INTRAVENOUS at 09:03

## 2024-01-31 RX ADMIN — FLUTICASONE PROPIONATE 1 SPRAY: 50 SPRAY, METERED NASAL at 09:03

## 2024-01-31 RX ADMIN — CARVEDILOL 25 MG: 25 TABLET, FILM COATED ORAL at 09:02

## 2024-01-31 RX ADMIN — DILTIAZEM HYDROCHLORIDE 120 MG: 120 CAPSULE, COATED, EXTENDED RELEASE ORAL at 09:03

## 2024-01-31 RX ADMIN — ATORVASTATIN CALCIUM 20 MG: 20 TABLET, FILM COATED ORAL at 09:02

## 2024-01-31 ASSESSMENT — PAIN SCALES - GENERAL
PAINLEVEL_OUTOF10: 0

## 2024-01-31 ASSESSMENT — ENCOUNTER SYMPTOMS
COUGH: 0
VOMITING: 0
SHORTNESS OF BREATH: 0
CHEST TIGHTNESS: 0
COLOR CHANGE: 0
NAUSEA: 0
WHEEZING: 0
TROUBLE SWALLOWING: 0

## 2024-01-31 NOTE — PROCEDURES
[place] [id number]                          ELECTROENCEPHALOGRAM REPORT    PATIENT NAME: SAMANTHA WEIR                    :        1945  MED REC NO:   [id number]                            ROOM:       W284  ACCOUNT NO:   [id number]                           ADMIT DATE: 2024  PROVIDER:     Steffen Tirado MD    DATE OF EE2024    MEDICATIONS  Cardizem, Imdur, Synthroid, Zofran, Lipitor, Catapres,  Coreg, Protonix, Carafate, Flonase, Mag-Oxide.    REASON FOR STUDY:  This is a spontaneous 21-channel recording for this  patient with syncope with loss of consciousness.    EEG FINDINGS:  The background rhythm of this EEG shows a well-regulated  8-9 Hz posterior dominant rhythm of alpha.  This is symmetrical and  attenuates with eye opening.  EKG is monitored and this is regular.   Hyperventilation is not performed due to COPD.  As the record proceeds,  drowsy patterns with early sleep spindles are seen.  Photic stimulation  does not reveal any photic responses.  Most of the record though appears  to be well regulated in awake phase.    IMPRESSION:  This is a normal, well-regulated EEG recording.  Clinical  correlation recommended.        STEFFEN TIRADO MD    D: 2024 9:15:00       T: 2024 9:18:41     [id number]  Job#: [id number]     Doc#: [id number]    CC:

## 2024-01-31 NOTE — FLOWSHEET NOTE
Patient is refusing to have bed alarm on. Patient educated on the importance of the bed alarm and the use of call light. Pt state's, \"I feel fine.\" I'm not dizzy right now\" I'll call if I'm not right\". Call light in reach. Salome MARSH made aware.     1530 Cardiology on the unit okay for patient to be discharge home and to f/u with [facility] cardiology
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
## 2024-01-31 NOTE — PROGRESS NOTES
Cardiology Follow up Note    Subjective:  Resting comfortably. Feels better. No near syncope, dizziness or syncope since admission. Repeat echo shows EF 40-45%. No arrhythmias on telemetry.       Review of Systems:   As noted in HPI    Objective:  BP (!) 108/53   Pulse 83   Temp 98.7 °F (37.1 °C) (Oral)   Resp 18   Ht 1.6 m (5' 2.99\")   Wt 47.6 kg (104 lb 15 oz)   SpO2 98%   BMI 18.59 kg/m²   Vitals stable.   Constitutional: alert, cooperative, in no distress  Eyes: Conjunctiva clear; no scleral icturus. PERRLA   Respiratory: clear to auscultation bilaterally  Musculoskeletal: No chest wall tenderness. No clubbing or cyanosis.   Cardiovascular: regular rate and rhythm, S1, S2 normal, no murmur, click, rub or gallop. No carotid bruit. No JVD. Pulses 2+ and symmetric.   GI: soft, non-tender, non-distended. Bowel sounds normal. No masses,  no organomegaly  MSK: extremities normal, atraumatic, no clubbing. No chest wall pain.    Neurologic: Cranial nerves grossly intact.  No focal motor and/or sensory deficits.  Skin: No rashes or lesions. No cyanosis.       Intake/Output Summary (Last 24 hours) at 1/31/2024 1635  Last data filed at 1/31/2024 1500  Gross per 24 hour   Intake 580 ml   Output --   Net 580 ml       Medications:  aspirin, 81 mg, Daily  dilTIAZem, 120 mg, Daily  isosorbide mononitrate, 60 mg, Daily  levothyroxine, 50 mcg, Daily  sodium chloride flush, 10 mL, 2 times per day  heparin (porcine), 5,000 Units, BID  atorvastatin, 20 mg, Daily  cloNIDine, 0.2 mg, BID  carvedilol, 25 mg, BID  pantoprazole, 40 mg, Daily  sucralfate, 1 g, TID WC  torsemide, 100 mg, Q24H  fluticasone, 1 spray, Daily  ipratropium, 2 spray, 4x Daily  magnesium oxide, 400 mg, Daily      sodium chloride      Recent Labs     01/29/24  1545 01/30/24  0518 01/31/24  0448   HGB 10.3* 10.1* 10.3*   WBC 6.0 6.4 4.7*    214 197    137 139   K 3.7 3.9 3.9   CO2 30 28 29   BUN 60* 56* 56*   INR 0.9  --   --      Telemetry: My
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
## 2024-02-01 NOTE — PROGRESS NOTES
Physical Therapy  Facility/Department: [facility] MED SURG W284/W284-01  Physical Therapy Discharge      NAME: Malu Vega    : 1945 (78 y.o.)  MRN: [id number]    Account: [id number]  Gender: female      Patient has been discharged from acute care hospital. DC patient from current PT program.      Electronically signed by Salome Vaughn PT on 24 at 8:26 AM EST

## 2024-05-07 LAB
ALBUMIN SERPL-MCNC: 3.9 G/DL (ref 3.5–4.6)
ALP SERPL-CCNC: 92 U/L (ref 40–130)
ALT SERPL-CCNC: 23 U/L (ref 0–33)
ANION GAP SERPL CALCULATED.3IONS-SCNC: 10 MEQ/L (ref 9–15)
AST SERPL-CCNC: 17 U/L (ref 0–35)
BASOPHILS # BLD: 0 K/UL (ref 0–0.2)
BASOPHILS NFR BLD: 0.5 %
BILIRUB SERPL-MCNC: 0.5 MG/DL (ref 0.2–0.7)
BUN SERPL-MCNC: 48 MG/DL (ref 8–23)
CALCIUM SERPL-MCNC: 9.5 MG/DL (ref 8.5–9.9)
CHLORIDE SERPL-SCNC: 107 MEQ/L (ref 95–107)
CO2 SERPL-SCNC: 23 MEQ/L (ref 20–31)
CREAT SERPL-MCNC: 2.17 MG/DL (ref 0.5–0.9)
EOSINOPHIL # BLD: 0.4 K/UL (ref 0–0.7)
EOSINOPHIL NFR BLD: 6.4 %
ERYTHROCYTE [DISTWIDTH] IN BLOOD BY AUTOMATED COUNT: 12.3 % (ref 11.5–14.5)
GLOBULIN SER CALC-MCNC: 1.8 G/DL (ref 2.3–3.5)
GLUCOSE SERPL-MCNC: 128 MG/DL (ref 70–99)
HCT VFR BLD AUTO: 26.6 % (ref 37–47)
HGB BLD-MCNC: 8.8 G/DL (ref 12–16)
LYMPHOCYTES # BLD: 1.1 K/UL (ref 1–4.8)
LYMPHOCYTES NFR BLD: 19 %
MCH RBC QN AUTO: 29.9 PG (ref 27–31.3)
MCHC RBC AUTO-ENTMCNC: 33.1 % (ref 33–37)
MCV RBC AUTO: 90.5 FL (ref 79.4–94.8)
MONOCYTES # BLD: 0.5 K/UL (ref 0.2–0.8)
MONOCYTES NFR BLD: 9.2 %
NEUTROPHILS # BLD: 3.7 K/UL (ref 1.4–6.5)
NEUTS SEG NFR BLD: 64.6 %
PLATELET # BLD AUTO: 170 K/UL (ref 130–400)
POTASSIUM SERPL-SCNC: 4.7 MEQ/L (ref 3.4–4.9)
PROT SERPL-MCNC: 5.7 G/DL (ref 6.3–8)
RBC # BLD AUTO: 2.94 M/UL (ref 4.2–5.4)
SODIUM SERPL-SCNC: 140 MEQ/L (ref 135–144)
WBC # BLD AUTO: 5.8 K/UL (ref 4.8–10.8)

## 2024-05-21 ENCOUNTER — HOSPITAL ENCOUNTER (OUTPATIENT)
Dept: INFUSION THERAPY | Age: 79
Setting detail: INFUSION SERIES
Discharge: HOME | End: 2024-05-21
Payer: MEDICARE

## 2024-05-21 VITALS
HEART RATE: 84 BPM | OXYGEN SATURATION: 98 % | DIASTOLIC BLOOD PRESSURE: 65 MMHG | TEMPERATURE: 99.1 F | SYSTOLIC BLOOD PRESSURE: 131 MMHG | RESPIRATION RATE: 18 BRPM

## 2024-05-21 DIAGNOSIS — D64.9 CHRONIC ANEMIA: Primary | ICD-10-CM

## 2024-05-21 DIAGNOSIS — N18.4 CHRONIC KIDNEY DISEASE, STAGE IV (SEVERE) (HCC): ICD-10-CM

## 2024-05-21 PROCEDURE — 6360000002 HC RX W HCPCS: Performed by: INTERNAL MEDICINE

## 2024-05-21 PROCEDURE — 96372 THER/PROPH/DIAG INJ SC/IM: CPT

## 2024-05-21 RX ADMIN — EPOETIN ALFA-EPBX 10000 UNITS: 10000 INJECTION, SOLUTION INTRAVENOUS; SUBCUTANEOUS at 15:23

## 2024-05-21 NOTE — FLOWSHEET NOTE
Patient to the floor ambulatory for her retacrit injection. Vital signs taken. No complaints voiced. Education given both verbal and handout regarding this treatment. Verbalized understanding. Call light within reach.

## 2024-05-21 NOTE — FLOWSHEET NOTE
Injection given in her left arm.  Tolerated well. AVS printed and given to patient. Left the unit ambulatory. All equipment used in the care for this patient has been cleaned.

## 2024-06-04 ENCOUNTER — HOSPITAL ENCOUNTER (OUTPATIENT)
Dept: INFUSION THERAPY | Age: 79
Setting detail: INFUSION SERIES
Discharge: HOME | End: 2024-06-04
Payer: MEDICARE

## 2024-06-04 VITALS
DIASTOLIC BLOOD PRESSURE: 87 MMHG | SYSTOLIC BLOOD PRESSURE: 191 MMHG | HEART RATE: 83 BPM | RESPIRATION RATE: 18 BRPM | TEMPERATURE: 97.3 F

## 2024-06-04 DIAGNOSIS — N18.4 CHRONIC KIDNEY DISEASE, STAGE IV (SEVERE) (HCC): ICD-10-CM

## 2024-06-04 DIAGNOSIS — D64.9 CHRONIC ANEMIA: Primary | ICD-10-CM

## 2024-06-04 PROCEDURE — 99211 OFF/OP EST MAY X REQ PHY/QHP: CPT

## 2024-06-04 PROCEDURE — 6370000000 HC RX 637 (ALT 250 FOR IP): Performed by: INTERNAL MEDICINE

## 2024-06-04 RX ORDER — CLONIDINE HYDROCHLORIDE 0.2 MG/1
0.2 TABLET ORAL 2 TIMES DAILY
Status: DISCONTINUED | OUTPATIENT
Start: 2024-06-04 | End: 2024-06-05 | Stop reason: HOSPADM

## 2024-06-04 RX ADMIN — CLONIDINE HYDROCHLORIDE 0.2 MG: 0.2 TABLET ORAL at 15:57

## 2024-06-04 NOTE — PROGRESS NOTES
Pt arrives to [facility] ambulatory for injection. Pt is made comfortable in chair.  Pt states no c/o pain.

## 2024-06-04 NOTE — PROGRESS NOTES
Recheck /82, HR 76 30 minutes after clonidine given.  Pt leaving floor.  Pt put on for 1pm appt tomorrow, and this nurse will call doctor in am.   Pt left unit walking.

## 2024-06-04 NOTE — PROGRESS NOTES
BP is high , rechecked a few times.  Call out to Dr. Myers, order for clonidine 0.2 mg oral, then wait half hour and recheck BP.  Give retacrit if BP in parameters.

## 2024-06-05 ENCOUNTER — HOSPITAL ENCOUNTER (OUTPATIENT)
Dept: INFUSION THERAPY | Age: 79
Discharge: HOME | End: 2024-06-05

## 2024-06-05 NOTE — PROGRESS NOTES
Spoke with Dr. Myers, explained BP and no retacrit.  Pt to have appt with Dr. Myers Friday at 9:15.  Pt will be notified of info.

## 2024-06-05 NOTE — PROGRESS NOTES
Soco from Dr. Myers's office called, stated they are now going to see patient tomorrow, and cancelling her appt for retacrit today.

## 2024-06-11 ENCOUNTER — HOSPITAL ENCOUNTER (OUTPATIENT)
Dept: INFUSION THERAPY | Age: 79
Setting detail: INFUSION SERIES
Discharge: HOME | End: 2024-06-11
Payer: MEDICARE

## 2024-06-11 VITALS
HEART RATE: 80 BPM | DIASTOLIC BLOOD PRESSURE: 61 MMHG | RESPIRATION RATE: 18 BRPM | SYSTOLIC BLOOD PRESSURE: 132 MMHG | TEMPERATURE: 98.6 F | OXYGEN SATURATION: 98 %

## 2024-06-11 DIAGNOSIS — N18.4 CHRONIC KIDNEY DISEASE, STAGE IV (SEVERE) (HCC): ICD-10-CM

## 2024-06-11 DIAGNOSIS — D64.9 CHRONIC ANEMIA: Primary | ICD-10-CM

## 2024-06-11 LAB
ERYTHROCYTE [DISTWIDTH] IN BLOOD BY AUTOMATED COUNT: 12.5 % (ref 11.5–14.5)
HCT VFR BLD AUTO: 27.1 % (ref 37–47)
HGB BLD-MCNC: 8.9 G/DL (ref 12–16)
MCH RBC QN AUTO: 29.6 PG (ref 27–31.3)
MCHC RBC AUTO-ENTMCNC: 32.8 % (ref 33–37)
MCV RBC AUTO: 90 FL (ref 79.4–94.8)
PLATELET # BLD AUTO: 210 K/UL (ref 130–400)
RBC # BLD AUTO: 3.01 M/UL (ref 4.2–5.4)
WBC # BLD AUTO: 5.6 K/UL (ref 4.8–10.8)

## 2024-06-11 PROCEDURE — 36415 COLL VENOUS BLD VENIPUNCTURE: CPT

## 2024-06-11 PROCEDURE — 6360000002 HC RX W HCPCS: Performed by: INTERNAL MEDICINE

## 2024-06-11 PROCEDURE — 85027 COMPLETE CBC AUTOMATED: CPT

## 2024-06-11 PROCEDURE — 96372 THER/PROPH/DIAG INJ SC/IM: CPT

## 2024-06-11 RX ADMIN — EPOETIN ALFA-EPBX 10000 UNITS: 10000 INJECTION, SOLUTION INTRAVENOUS; SUBCUTANEOUS at 14:06

## 2024-06-11 NOTE — FLOWSHEET NOTE
Injection completed, pt tolerated well. Patient left unit ambulatory with family member. All equipment used in the care for this patient has been cleaned.

## 2024-06-18 ENCOUNTER — APPOINTMENT (OUTPATIENT)
Dept: INFUSION THERAPY | Age: 79
End: 2024-06-18
Payer: MEDICARE

## 2024-06-25 ENCOUNTER — HOSPITAL ENCOUNTER (OUTPATIENT)
Dept: INFUSION THERAPY | Age: 79
Setting detail: INFUSION SERIES
Discharge: HOME | End: 2024-06-25
Payer: MEDICARE

## 2024-06-25 VITALS
TEMPERATURE: 97.4 F | RESPIRATION RATE: 18 BRPM | DIASTOLIC BLOOD PRESSURE: 70 MMHG | SYSTOLIC BLOOD PRESSURE: 160 MMHG | OXYGEN SATURATION: 99 % | HEART RATE: 74 BPM

## 2024-06-25 DIAGNOSIS — D64.9 CHRONIC ANEMIA: Primary | ICD-10-CM

## 2024-06-25 DIAGNOSIS — N18.4 CHRONIC KIDNEY DISEASE, STAGE IV (SEVERE) (HCC): ICD-10-CM

## 2024-06-25 PROCEDURE — 6360000002 HC RX W HCPCS: Performed by: INTERNAL MEDICINE

## 2024-06-25 PROCEDURE — 96372 THER/PROPH/DIAG INJ SC/IM: CPT

## 2024-06-25 RX ADMIN — EPOETIN ALFA-EPBX 10000 UNITS: 10000 INJECTION, SOLUTION INTRAVENOUS; SUBCUTANEOUS at 13:28

## 2024-06-25 NOTE — FLOWSHEET NOTE
Patient to the floor ambulatory with her  for her injection. Vital signs taken. Blood pressure is elevated. Patient took her blood pressure medications at 11 am. Will continue to monitor. Call light within reach.

## 2024-06-25 NOTE — FLOWSHEET NOTE
Spoke with Dr. Alvarez and made him aware of her elevated blood pressure here. Let him know that she was 149/ 79 after taking her blood pressure medications. She is due for her hydralazine. Ok to give today, encourage her to take her blood pressure medications when she gets home. Verbalized understanding.

## 2024-06-25 NOTE — FLOWSHEET NOTE
Injection given in her left arm. Tolerated well. Left the unit ambulatory with her . All equipment used in the care for this patient has been cleaned.]

## 2024-07-09 ENCOUNTER — HOSPITAL ENCOUNTER (OUTPATIENT)
Dept: INFUSION THERAPY | Age: 79
Setting detail: INFUSION SERIES
Discharge: HOME | End: 2024-07-09
Payer: MEDICARE

## 2024-07-09 VITALS
HEART RATE: 81 BPM | TEMPERATURE: 98.5 F | DIASTOLIC BLOOD PRESSURE: 70 MMHG | RESPIRATION RATE: 18 BRPM | OXYGEN SATURATION: 99 % | SYSTOLIC BLOOD PRESSURE: 163 MMHG

## 2024-07-09 DIAGNOSIS — D64.9 CHRONIC ANEMIA: Primary | ICD-10-CM

## 2024-07-09 DIAGNOSIS — N18.4 CHRONIC KIDNEY DISEASE, STAGE IV (SEVERE) (HCC): ICD-10-CM

## 2024-07-09 LAB
ERYTHROCYTE [DISTWIDTH] IN BLOOD BY AUTOMATED COUNT: 13 % (ref 11.5–14.5)
HCT VFR BLD AUTO: 33.3 % (ref 37–47)
HGB BLD-MCNC: 10.9 G/DL (ref 12–16)
MCH RBC QN AUTO: 29.5 PG (ref 27–31.3)
MCHC RBC AUTO-ENTMCNC: 32.7 % (ref 33–37)
MCV RBC AUTO: 90 FL (ref 79.4–94.8)
PLATELET # BLD AUTO: 200 K/UL (ref 130–400)
RBC # BLD AUTO: 3.7 M/UL (ref 4.2–5.4)
WBC # BLD AUTO: 6.3 K/UL (ref 4.8–10.8)

## 2024-07-09 PROCEDURE — 85027 COMPLETE CBC AUTOMATED: CPT

## 2024-07-09 NOTE — FLOWSHEET NOTE
Patient arrived ambulatory to unit with spouse for Retacrit injection.  Vital signs obtained.  Provider notified of results and stated patient can have this injection.  Patient to call provider to verify if future doses are to be given.  Labs drawn and sent to lab.

## 2024-07-09 NOTE — FLOWSHEET NOTE
HGB 10.9.  Retacrit held.  Provider notified of dose held per parameters and patient will contact office before next appointment to verify if dose is needed.  Patient left unit ambulatory with spouse.  No distress noted.  All equipment used in the care for this patient has been cleaned.

## 2024-07-16 ENCOUNTER — APPOINTMENT (OUTPATIENT)
Dept: INFUSION THERAPY | Age: 79
End: 2024-07-16
Payer: MEDICARE

## 2024-07-23 ENCOUNTER — HOSPITAL ENCOUNTER (OUTPATIENT)
Dept: INFUSION THERAPY | Age: 79
Setting detail: INFUSION SERIES
Discharge: HOME | End: 2024-07-23

## 2024-08-06 ENCOUNTER — HOSPITAL ENCOUNTER (OUTPATIENT)
Dept: INFUSION THERAPY | Age: 79
Setting detail: INFUSION SERIES
Discharge: HOME | End: 2024-08-06
Payer: MEDICARE

## 2024-08-06 VITALS
SYSTOLIC BLOOD PRESSURE: 124 MMHG | RESPIRATION RATE: 16 BRPM | TEMPERATURE: 96.8 F | HEART RATE: 86 BPM | DIASTOLIC BLOOD PRESSURE: 60 MMHG

## 2024-08-06 DIAGNOSIS — D64.9 CHRONIC ANEMIA: Primary | ICD-10-CM

## 2024-08-06 DIAGNOSIS — N18.4 CHRONIC KIDNEY DISEASE, STAGE IV (SEVERE) (HCC): ICD-10-CM

## 2024-08-06 LAB
ERYTHROCYTE [DISTWIDTH] IN BLOOD BY AUTOMATED COUNT: 12.9 % (ref 11.5–14.5)
HCT VFR BLD AUTO: 31.3 % (ref 37–47)
HGB BLD-MCNC: 10.8 G/DL (ref 12–16)
MCH RBC QN AUTO: 30.3 PG (ref 27–31.3)
MCHC RBC AUTO-ENTMCNC: 34.5 % (ref 33–37)
MCV RBC AUTO: 87.7 FL (ref 79.4–94.8)
PLATELET # BLD AUTO: 190 K/UL (ref 130–400)
RBC # BLD AUTO: 3.57 M/UL (ref 4.2–5.4)
WBC # BLD AUTO: 5.3 K/UL (ref 4.8–10.8)

## 2024-08-06 PROCEDURE — 85027 COMPLETE CBC AUTOMATED: CPT

## 2024-08-06 PROCEDURE — 36415 COLL VENOUS BLD VENIPUNCTURE: CPT

## 2024-08-06 NOTE — PROGRESS NOTES
[facility]     PT arrived via:  [x] Ambulatory         [] Wheelchair  []With transport                [] Other:     [x]PT oriented to room and call light in reach.   [x] Vital signs obtained and are stable.   []Medication education provided and reviewed with patient. N/a

## 2024-08-06 NOTE — PROGRESS NOTES
Hgb 10.8, patient does not need injection today. Patient provided reminder card for next dose due. Left unit by ambulation.All equipment used in the care for this patient has been cleaned.

## 2024-08-13 ENCOUNTER — APPOINTMENT (OUTPATIENT)
Dept: INFUSION THERAPY | Age: 79
End: 2024-08-13
Payer: MEDICARE

## 2024-08-20 ENCOUNTER — HOSPITAL ENCOUNTER (OUTPATIENT)
Dept: INFUSION THERAPY | Age: 79
Setting detail: INFUSION SERIES
Discharge: HOME | End: 2024-08-20

## 2024-08-20 LAB
BASOPHILS # BLD: 0.1 K/UL (ref 0–0.2)
BASOPHILS NFR BLD: 0.7 %
EOSINOPHIL # BLD: 0.2 K/UL (ref 0–0.7)
EOSINOPHIL NFR BLD: 3.2 %
ERYTHROCYTE [DISTWIDTH] IN BLOOD BY AUTOMATED COUNT: 13 % (ref 11.5–14.5)
HCT VFR BLD AUTO: 32.8 % (ref 37–47)
HGB BLD-MCNC: 10.9 G/DL (ref 12–16)
LYMPHOCYTES # BLD: 1.3 K/UL (ref 1–4.8)
LYMPHOCYTES NFR BLD: 18.8 %
MCH RBC QN AUTO: 29.1 PG (ref 27–31.3)
MCHC RBC AUTO-ENTMCNC: 33.2 % (ref 33–37)
MCV RBC AUTO: 87.7 FL (ref 79.4–94.8)
MONOCYTES # BLD: 0.6 K/UL (ref 0.2–0.8)
MONOCYTES NFR BLD: 9 %
NEUTROPHILS # BLD: 4.8 K/UL (ref 1.4–6.5)
NEUTS SEG NFR BLD: 68 %
PLATELET # BLD AUTO: 236 K/UL (ref 130–400)
RBC # BLD AUTO: 3.74 M/UL (ref 4.2–5.4)
WBC # BLD AUTO: 7 K/UL (ref 4.8–10.8)

## 2024-08-27 ENCOUNTER — APPOINTMENT (OUTPATIENT)
Dept: INFUSION THERAPY | Age: 79
End: 2024-08-27
Payer: MEDICARE

## 2024-09-17 ENCOUNTER — HOSPITAL ENCOUNTER (OUTPATIENT)
Dept: INFUSION THERAPY | Age: 79
Setting detail: INFUSION SERIES
Discharge: HOME | End: 2024-09-17
Payer: MEDICARE

## 2024-09-17 VITALS
SYSTOLIC BLOOD PRESSURE: 109 MMHG | TEMPERATURE: 97.3 F | HEART RATE: 82 BPM | DIASTOLIC BLOOD PRESSURE: 55 MMHG | OXYGEN SATURATION: 96 % | RESPIRATION RATE: 16 BRPM

## 2024-09-17 DIAGNOSIS — N18.4 CHRONIC KIDNEY DISEASE, STAGE IV (SEVERE) (HCC): ICD-10-CM

## 2024-09-17 DIAGNOSIS — D64.9 CHRONIC ANEMIA: Primary | ICD-10-CM

## 2024-09-17 LAB
ERYTHROCYTE [DISTWIDTH] IN BLOOD BY AUTOMATED COUNT: 13.1 % (ref 11.5–14.5)
HCT VFR BLD AUTO: 31.7 % (ref 37–47)
HGB BLD-MCNC: 10.3 G/DL (ref 12–16)
MCH RBC QN AUTO: 29.2 PG (ref 27–31.3)
MCHC RBC AUTO-ENTMCNC: 32.5 % (ref 33–37)
MCV RBC AUTO: 89.8 FL (ref 79.4–94.8)
PLATELET # BLD AUTO: 215 K/UL (ref 130–400)
RBC # BLD AUTO: 3.53 M/UL (ref 4.2–5.4)
WBC # BLD AUTO: 6.6 K/UL (ref 4.8–10.8)

## 2024-09-17 PROCEDURE — 36415 COLL VENOUS BLD VENIPUNCTURE: CPT

## 2024-09-17 PROCEDURE — 85027 COMPLETE CBC AUTOMATED: CPT

## 2024-10-22 ENCOUNTER — HOSPITAL ENCOUNTER (OUTPATIENT)
Dept: INFUSION THERAPY | Age: 79
Setting detail: INFUSION SERIES
Discharge: HOME | End: 2024-10-22
Payer: MEDICARE

## 2024-10-22 VITALS
HEART RATE: 91 BPM | OXYGEN SATURATION: 96 % | DIASTOLIC BLOOD PRESSURE: 56 MMHG | TEMPERATURE: 97.9 F | RESPIRATION RATE: 18 BRPM | SYSTOLIC BLOOD PRESSURE: 121 MMHG

## 2024-10-22 DIAGNOSIS — N18.4 CHRONIC KIDNEY DISEASE, STAGE IV (SEVERE) (HCC): ICD-10-CM

## 2024-10-22 DIAGNOSIS — D64.9 CHRONIC ANEMIA: Primary | ICD-10-CM

## 2024-10-22 LAB
ERYTHROCYTE [DISTWIDTH] IN BLOOD BY AUTOMATED COUNT: 12.8 % (ref 11.5–14.5)
HCT VFR BLD AUTO: 31.2 % (ref 37–47)
HGB BLD-MCNC: 10.6 G/DL (ref 12–16)
MCH RBC QN AUTO: 29.9 PG (ref 27–31.3)
MCHC RBC AUTO-ENTMCNC: 34 % (ref 33–37)
MCV RBC AUTO: 88.1 FL (ref 79.4–94.8)
PLATELET # BLD AUTO: 221 K/UL (ref 130–400)
RBC # BLD AUTO: 3.54 M/UL (ref 4.2–5.4)
WBC # BLD AUTO: 6.4 K/UL (ref 4.8–10.8)

## 2024-10-22 PROCEDURE — 85027 COMPLETE CBC AUTOMATED: CPT

## 2024-10-22 PROCEDURE — 36415 COLL VENOUS BLD VENIPUNCTURE: CPT

## 2024-10-22 NOTE — FLOWSHEET NOTE
Patient to the floor ambulatory for her labs and injection. Vital signs taken. No distress noted. Call light within reach.

## 2024-10-22 NOTE — FLOWSHEET NOTE
HGB 10.6. She does not need her injection today. Patient is aware. Spoke with Steffanie at Dr. Myers office to make them aware. She will print out all of her labs and make Dr. Myers aware. She has an appt in December. Patient left the unit ambulatory. All equipment used in the care for this patient has been cleaned.